# Patient Record
Sex: MALE | Race: WHITE | NOT HISPANIC OR LATINO | Employment: OTHER | URBAN - METROPOLITAN AREA
[De-identification: names, ages, dates, MRNs, and addresses within clinical notes are randomized per-mention and may not be internally consistent; named-entity substitution may affect disease eponyms.]

---

## 2019-01-02 ENCOUNTER — HOSPITAL ENCOUNTER (INPATIENT)
Facility: HOSPITAL | Age: 64
LOS: 37 days | Discharge: HOME HEALTH | End: 2019-02-08
Attending: PHYSICAL MEDICINE & REHABILITATION | Admitting: PHYSICAL MEDICINE & REHABILITATION

## 2019-01-02 DIAGNOSIS — R41.89 IMPAIRED COGNITION: ICD-10-CM

## 2019-01-02 DIAGNOSIS — E46 PROTEIN-CALORIE MALNUTRITION, UNSPECIFIED SEVERITY (HCC): Primary | ICD-10-CM

## 2019-01-02 PROBLEM — Z98.890 S/P COIL EMBOLIZATION OF CEREBRAL ANEURYSM: Status: ACTIVE | Noted: 2019-01-02

## 2019-01-02 PROBLEM — I61.4 CEREBELLAR HEMORRHAGE (HCC): Status: ACTIVE | Noted: 2019-01-02

## 2019-01-02 PROBLEM — I61.5 INTRAVENTRICULAR HEMORRHAGE: Status: ACTIVE | Noted: 2019-01-02

## 2019-01-02 RX ORDER — MULTIPLE VITAMINS W/ MINERALS TAB 9MG-400MCG
1 TAB ORAL DAILY
Status: DISCONTINUED | OUTPATIENT
Start: 2019-01-03 | End: 2019-02-08 | Stop reason: HOSPADM

## 2019-01-02 RX ORDER — ONDANSETRON 4 MG/1
4 TABLET, ORALLY DISINTEGRATING ORAL EVERY 4 HOURS PRN
Status: DISCONTINUED | OUTPATIENT
Start: 2019-01-02 | End: 2019-02-08 | Stop reason: HOSPADM

## 2019-01-02 RX ORDER — BUTALBITAL, ACETAMINOPHEN AND CAFFEINE 50; 325; 40 MG/1; MG/1; MG/1
1 TABLET ORAL EVERY 6 HOURS PRN
Status: DISCONTINUED | OUTPATIENT
Start: 2019-01-02 | End: 2019-01-18

## 2019-01-02 RX ORDER — DEXAMETHASONE 0.5 MG/1
0.5 TABLET ORAL ONCE
Status: COMPLETED | OUTPATIENT
Start: 2019-01-03 | End: 2019-01-03

## 2019-01-02 RX ORDER — LEVETIRACETAM 500 MG/1
500 TABLET ORAL EVERY 12 HOURS SCHEDULED
Status: DISCONTINUED | OUTPATIENT
Start: 2019-01-02 | End: 2019-01-07

## 2019-01-02 RX ORDER — PANTOPRAZOLE SODIUM 40 MG/1
40 TABLET, DELAYED RELEASE ORAL
Status: DISCONTINUED | OUTPATIENT
Start: 2019-01-03 | End: 2019-01-18

## 2019-01-02 RX ORDER — POLYETHYLENE GLYCOL 3350 17 G/17G
17 POWDER, FOR SOLUTION ORAL DAILY
Status: DISCONTINUED | OUTPATIENT
Start: 2019-01-03 | End: 2019-02-08

## 2019-01-02 RX ORDER — FLUDROCORTISONE ACETATE 0.1 MG/1
100 TABLET ORAL DAILY
Status: DISCONTINUED | OUTPATIENT
Start: 2019-01-03 | End: 2019-01-03

## 2019-01-02 RX ADMIN — LEVETIRACETAM 500 MG: 500 TABLET, FILM COATED ORAL at 20:22

## 2019-01-02 RX ADMIN — APIXABAN 5 MG: 5 TABLET, FILM COATED ORAL at 20:22

## 2019-01-03 PROBLEM — R73.09 ELEVATED HEMOGLOBIN A1C: Status: ACTIVE | Noted: 2019-01-03

## 2019-01-03 PROBLEM — E78.5 HYPERLIPIDEMIA: Status: ACTIVE | Noted: 2019-01-03

## 2019-01-03 PROBLEM — E27.40 ADRENAL INSUFFICIENCY (HCC): Status: ACTIVE | Noted: 2019-01-03

## 2019-01-03 PROBLEM — E22.2 SIADH (SYNDROME OF INAPPROPRIATE ADH PRODUCTION): Status: ACTIVE | Noted: 2019-01-03

## 2019-01-03 LAB
ALBUMIN SERPL-MCNC: 3 G/DL (ref 3.5–5.2)
ALP SERPL-CCNC: 55 U/L (ref 39–117)
ALT SERPL W P-5'-P-CCNC: 66 U/L (ref 1–41)
ANION GAP SERPL CALCULATED.3IONS-SCNC: 7.7 MMOL/L
AST SERPL-CCNC: 29 U/L (ref 1–40)
BILIRUB CONJ SERPL-MCNC: <0.2 MG/DL (ref 0–0.3)
BILIRUB INDIRECT SERPL-MCNC: ABNORMAL MG/DL
BILIRUB SERPL-MCNC: 0.4 MG/DL (ref 0.1–1.2)
BUN BLD-MCNC: 13 MG/DL (ref 8–23)
BUN/CREAT SERPL: 24.5 (ref 7–25)
CALCIUM SPEC-SCNC: 8.4 MG/DL (ref 8.6–10.5)
CHLORIDE SERPL-SCNC: 98 MMOL/L (ref 98–107)
CO2 SERPL-SCNC: 24.3 MMOL/L (ref 22–29)
CREAT BLD-MCNC: 0.53 MG/DL (ref 0.76–1.27)
GFR SERPL CREATININE-BSD FRML MDRD: >150 ML/MIN/1.73
GLUCOSE BLD-MCNC: 98 MG/DL (ref 65–99)
POTASSIUM BLD-SCNC: 4 MMOL/L (ref 3.5–5.2)
PROT SERPL-MCNC: 4.9 G/DL (ref 6–8.5)
SODIUM BLD-SCNC: 130 MMOL/L (ref 136–145)
T4 FREE SERPL-MCNC: 1.61 NG/DL (ref 0.93–1.7)
TSH SERPL DL<=0.05 MIU/L-ACNC: 2 MIU/ML (ref 0.27–4.2)

## 2019-01-03 PROCEDURE — 84443 ASSAY THYROID STIM HORMONE: CPT | Performed by: INTERNAL MEDICINE

## 2019-01-03 PROCEDURE — 80048 BASIC METABOLIC PNL TOTAL CA: CPT | Performed by: PHYSICAL MEDICINE & REHABILITATION

## 2019-01-03 PROCEDURE — 83519 RIA NONANTIBODY: CPT | Performed by: INTERNAL MEDICINE

## 2019-01-03 PROCEDURE — 99223 1ST HOSP IP/OBS HIGH 75: CPT | Performed by: INTERNAL MEDICINE

## 2019-01-03 PROCEDURE — 97110 THERAPEUTIC EXERCISES: CPT

## 2019-01-03 PROCEDURE — 82024 ASSAY OF ACTH: CPT | Performed by: INTERNAL MEDICINE

## 2019-01-03 PROCEDURE — 80076 HEPATIC FUNCTION PANEL: CPT | Performed by: PHYSICAL MEDICINE & REHABILITATION

## 2019-01-03 PROCEDURE — 97162 PT EVAL MOD COMPLEX 30 MIN: CPT

## 2019-01-03 PROCEDURE — 84439 ASSAY OF FREE THYROXINE: CPT | Performed by: INTERNAL MEDICINE

## 2019-01-03 PROCEDURE — 96125 COGNITIVE TEST BY HC PRO: CPT

## 2019-01-03 PROCEDURE — 97535 SELF CARE MNGMENT TRAINING: CPT

## 2019-01-03 PROCEDURE — 36415 COLL VENOUS BLD VENIPUNCTURE: CPT | Performed by: INTERNAL MEDICINE

## 2019-01-03 PROCEDURE — 97166 OT EVAL MOD COMPLEX 45 MIN: CPT

## 2019-01-03 RX ORDER — DEXAMETHASONE 0.5 MG/1
0.5 TABLET ORAL
Status: DISCONTINUED | OUTPATIENT
Start: 2019-01-03 | End: 2019-01-04

## 2019-01-03 RX ADMIN — ONDANSETRON 4 MG: 4 TABLET, ORALLY DISINTEGRATING ORAL at 09:14

## 2019-01-03 RX ADMIN — ONDANSETRON 4 MG: 4 TABLET, ORALLY DISINTEGRATING ORAL at 05:05

## 2019-01-03 RX ADMIN — APIXABAN 5 MG: 5 TABLET, FILM COATED ORAL at 07:42

## 2019-01-03 RX ADMIN — AMANTADINE HYDROCHLORIDE 100 MG: 50 SOLUTION ORAL at 12:35

## 2019-01-03 RX ADMIN — ONDANSETRON 4 MG: 4 TABLET, ORALLY DISINTEGRATING ORAL at 13:50

## 2019-01-03 RX ADMIN — MULTIPLE VITAMINS W/ MINERALS TAB 1 TABLET: TAB at 07:42

## 2019-01-03 RX ADMIN — FLUDROCORTISONE ACETATE 100 MCG: 0.1 TABLET ORAL at 07:42

## 2019-01-03 RX ADMIN — APIXABAN 5 MG: 5 TABLET, FILM COATED ORAL at 20:34

## 2019-01-03 RX ADMIN — AMANTADINE HYDROCHLORIDE 100 MG: 50 SOLUTION ORAL at 07:42

## 2019-01-03 RX ADMIN — DEXAMETHASONE 0.5 MG: 0.5 TABLET ORAL at 07:42

## 2019-01-03 RX ADMIN — PANTOPRAZOLE SODIUM 40 MG: 40 TABLET, DELAYED RELEASE ORAL at 05:56

## 2019-01-03 RX ADMIN — LEVETIRACETAM 500 MG: 500 TABLET, FILM COATED ORAL at 07:42

## 2019-01-03 RX ADMIN — LEVETIRACETAM 500 MG: 500 TABLET, FILM COATED ORAL at 20:34

## 2019-01-04 LAB
ANION GAP SERPL CALCULATED.3IONS-SCNC: 10.2 MMOL/L
BUN BLD-MCNC: 15 MG/DL (ref 8–23)
BUN/CREAT SERPL: 24.6 (ref 7–25)
CALCIUM SPEC-SCNC: 8.3 MG/DL (ref 8.6–10.5)
CHLORIDE SERPL-SCNC: 97 MMOL/L (ref 98–107)
CHOLEST SERPL-MCNC: 213 MG/DL (ref 0–200)
CO2 SERPL-SCNC: 23.8 MMOL/L (ref 22–29)
CORTIS SERPL-MCNC: 12.44 MCG/DL
CREAT BLD-MCNC: 0.61 MG/DL (ref 0.76–1.27)
GFR SERPL CREATININE-BSD FRML MDRD: 133 ML/MIN/1.73
GLUCOSE BLD-MCNC: 96 MG/DL (ref 65–99)
HDLC SERPL-MCNC: 54 MG/DL (ref 40–60)
LDLC SERPL CALC-MCNC: 146 MG/DL (ref 0–100)
LDLC/HDLC SERPL: 2.71 {RATIO}
OSMOLALITY SERPL: 272 MOSM/KG (ref 280–301)
OSMOLALITY UR: 847 MOSM/KG
POTASSIUM BLD-SCNC: 3.8 MMOL/L (ref 3.5–5.2)
SODIUM BLD-SCNC: 131 MMOL/L (ref 136–145)
SODIUM UR-SCNC: 39 MMOL/L
TRIGL SERPL-MCNC: 64 MG/DL (ref 0–150)
VLDLC SERPL-MCNC: 12.8 MG/DL (ref 5–40)

## 2019-01-04 PROCEDURE — 83935 ASSAY OF URINE OSMOLALITY: CPT | Performed by: INTERNAL MEDICINE

## 2019-01-04 PROCEDURE — 99232 SBSQ HOSP IP/OBS MODERATE 35: CPT | Performed by: INTERNAL MEDICINE

## 2019-01-04 PROCEDURE — 83930 ASSAY OF BLOOD OSMOLALITY: CPT | Performed by: INTERNAL MEDICINE

## 2019-01-04 PROCEDURE — 80048 BASIC METABOLIC PNL TOTAL CA: CPT | Performed by: PHYSICAL MEDICINE & REHABILITATION

## 2019-01-04 PROCEDURE — G0515 COGNITIVE SKILLS DEVELOPMENT: HCPCS

## 2019-01-04 PROCEDURE — 84300 ASSAY OF URINE SODIUM: CPT | Performed by: INTERNAL MEDICINE

## 2019-01-04 PROCEDURE — 82533 TOTAL CORTISOL: CPT | Performed by: INTERNAL MEDICINE

## 2019-01-04 PROCEDURE — 97535 SELF CARE MNGMENT TRAINING: CPT

## 2019-01-04 PROCEDURE — 97110 THERAPEUTIC EXERCISES: CPT

## 2019-01-04 PROCEDURE — 80061 LIPID PANEL: CPT | Performed by: INTERNAL MEDICINE

## 2019-01-04 RX ORDER — ONDANSETRON 4 MG/1
4 TABLET, ORALLY DISINTEGRATING ORAL DAILY
Status: DISCONTINUED | OUTPATIENT
Start: 2019-01-05 | End: 2019-01-07

## 2019-01-04 RX ADMIN — PANTOPRAZOLE SODIUM 40 MG: 40 TABLET, DELAYED RELEASE ORAL at 05:56

## 2019-01-04 RX ADMIN — ONDANSETRON 4 MG: 4 TABLET, ORALLY DISINTEGRATING ORAL at 19:51

## 2019-01-04 RX ADMIN — LEVETIRACETAM 500 MG: 500 TABLET, FILM COATED ORAL at 19:39

## 2019-01-04 RX ADMIN — APIXABAN 5 MG: 5 TABLET, FILM COATED ORAL at 19:39

## 2019-01-04 RX ADMIN — LEVETIRACETAM 500 MG: 500 TABLET, FILM COATED ORAL at 08:05

## 2019-01-04 RX ADMIN — AMANTADINE HYDROCHLORIDE 100 MG: 50 SOLUTION ORAL at 12:07

## 2019-01-04 RX ADMIN — ONDANSETRON 4 MG: 4 TABLET, ORALLY DISINTEGRATING ORAL at 07:34

## 2019-01-04 RX ADMIN — MULTIPLE VITAMINS W/ MINERALS TAB 1 TABLET: TAB at 08:05

## 2019-01-04 RX ADMIN — DEXAMETHASONE 0.5 MG: 0.5 TABLET ORAL at 08:05

## 2019-01-04 RX ADMIN — POLYETHYLENE GLYCOL 3350 17 G: 17 POWDER, FOR SOLUTION ORAL at 08:05

## 2019-01-04 RX ADMIN — AMANTADINE HYDROCHLORIDE 100 MG: 50 SOLUTION ORAL at 08:05

## 2019-01-04 RX ADMIN — APIXABAN 5 MG: 5 TABLET, FILM COATED ORAL at 08:05

## 2019-01-05 LAB
ANION GAP SERPL CALCULATED.3IONS-SCNC: 11.8 MMOL/L
BASOPHILS # BLD AUTO: 0.02 10*3/MM3 (ref 0–0.2)
BASOPHILS NFR BLD AUTO: 0.3 % (ref 0–1.5)
BUN BLD-MCNC: 13 MG/DL (ref 8–23)
BUN/CREAT SERPL: 21.7 (ref 7–25)
CALCIUM SPEC-SCNC: 8.1 MG/DL (ref 8.6–10.5)
CHLORIDE SERPL-SCNC: 92 MMOL/L (ref 98–107)
CO2 SERPL-SCNC: 24.2 MMOL/L (ref 22–29)
CREAT BLD-MCNC: 0.6 MG/DL (ref 0.76–1.27)
DEPRECATED RDW RBC AUTO: 49.4 FL (ref 37–54)
EOSINOPHIL # BLD AUTO: 0.2 10*3/MM3 (ref 0–0.7)
EOSINOPHIL NFR BLD AUTO: 3.4 % (ref 0.3–6.2)
ERYTHROCYTE [DISTWIDTH] IN BLOOD BY AUTOMATED COUNT: 14.6 % (ref 11.5–14.5)
GFR SERPL CREATININE-BSD FRML MDRD: 135 ML/MIN/1.73
GLUCOSE BLD-MCNC: 126 MG/DL (ref 65–99)
HCT VFR BLD AUTO: 34.5 % (ref 40.4–52.2)
HGB BLD-MCNC: 11.8 G/DL (ref 13.7–17.6)
IMM GRANULOCYTES # BLD AUTO: 0.03 10*3/MM3 (ref 0–0.03)
IMM GRANULOCYTES NFR BLD AUTO: 0.5 % (ref 0–0.5)
LYMPHOCYTES # BLD AUTO: 1.59 10*3/MM3 (ref 0.9–4.8)
LYMPHOCYTES NFR BLD AUTO: 27.3 % (ref 19.6–45.3)
MCH RBC QN AUTO: 31.7 PG (ref 27–32.7)
MCHC RBC AUTO-ENTMCNC: 34.2 G/DL (ref 32.6–36.4)
MCV RBC AUTO: 92.7 FL (ref 79.8–96.2)
MONOCYTES # BLD AUTO: 0.54 10*3/MM3 (ref 0.2–1.2)
MONOCYTES NFR BLD AUTO: 9.3 % (ref 5–12)
NEUTROPHILS # BLD AUTO: 3.47 10*3/MM3 (ref 1.9–8.1)
NEUTROPHILS NFR BLD AUTO: 59.7 % (ref 42.7–76)
OSMOLALITY UR: 759 MOSM/KG
PLATELET # BLD AUTO: 124 10*3/MM3 (ref 140–500)
PMV BLD AUTO: 9.9 FL (ref 6–12)
POTASSIUM BLD-SCNC: 4.4 MMOL/L (ref 3.5–5.2)
RBC # BLD AUTO: 3.72 10*6/MM3 (ref 4.6–6)
SODIUM BLD-SCNC: 128 MMOL/L (ref 136–145)
WBC NRBC COR # BLD: 5.82 10*3/MM3 (ref 4.5–10.7)

## 2019-01-05 PROCEDURE — 83935 ASSAY OF URINE OSMOLALITY: CPT | Performed by: INTERNAL MEDICINE

## 2019-01-05 PROCEDURE — 99233 SBSQ HOSP IP/OBS HIGH 50: CPT | Performed by: INTERNAL MEDICINE

## 2019-01-05 PROCEDURE — 85025 COMPLETE CBC W/AUTO DIFF WBC: CPT | Performed by: PHYSICAL MEDICINE & REHABILITATION

## 2019-01-05 PROCEDURE — 97110 THERAPEUTIC EXERCISES: CPT

## 2019-01-05 PROCEDURE — 80048 BASIC METABOLIC PNL TOTAL CA: CPT | Performed by: INTERNAL MEDICINE

## 2019-01-05 PROCEDURE — 97535 SELF CARE MNGMENT TRAINING: CPT

## 2019-01-05 PROCEDURE — G0515 COGNITIVE SKILLS DEVELOPMENT: HCPCS

## 2019-01-05 RX ADMIN — LEVETIRACETAM 500 MG: 500 TABLET, FILM COATED ORAL at 09:14

## 2019-01-05 RX ADMIN — MICONAZOLE NITRATE 1 APPLICATION: 2 POWDER TOPICAL at 09:14

## 2019-01-05 RX ADMIN — AMANTADINE HYDROCHLORIDE 100 MG: 50 SOLUTION ORAL at 11:29

## 2019-01-05 RX ADMIN — ONDANSETRON 4 MG: 4 TABLET, ORALLY DISINTEGRATING ORAL at 04:54

## 2019-01-05 RX ADMIN — MULTIPLE VITAMINS W/ MINERALS TAB 1 TABLET: TAB at 09:14

## 2019-01-05 RX ADMIN — APIXABAN 5 MG: 5 TABLET, FILM COATED ORAL at 20:28

## 2019-01-05 RX ADMIN — LEVETIRACETAM 500 MG: 500 TABLET, FILM COATED ORAL at 20:28

## 2019-01-05 RX ADMIN — PANTOPRAZOLE SODIUM 40 MG: 40 TABLET, DELAYED RELEASE ORAL at 04:55

## 2019-01-05 RX ADMIN — APIXABAN 5 MG: 5 TABLET, FILM COATED ORAL at 09:14

## 2019-01-05 RX ADMIN — POLYETHYLENE GLYCOL 3350 17 G: 17 POWDER, FOR SOLUTION ORAL at 09:14

## 2019-01-05 RX ADMIN — AMANTADINE HYDROCHLORIDE 100 MG: 50 SOLUTION ORAL at 09:14

## 2019-01-06 LAB
ANION GAP SERPL CALCULATED.3IONS-SCNC: 10.4 MMOL/L
BUN BLD-MCNC: 9 MG/DL (ref 8–23)
BUN/CREAT SERPL: 13.4 (ref 7–25)
CALCIUM SPEC-SCNC: 8.6 MG/DL (ref 8.6–10.5)
CHLORIDE SERPL-SCNC: 95 MMOL/L (ref 98–107)
CO2 SERPL-SCNC: 23.6 MMOL/L (ref 22–29)
CREAT BLD-MCNC: 0.67 MG/DL (ref 0.76–1.27)
GFR SERPL CREATININE-BSD FRML MDRD: 119 ML/MIN/1.73
GLUCOSE BLD-MCNC: 151 MG/DL (ref 65–99)
OSMOLALITY UR: 277 MOSM/KG
POTASSIUM BLD-SCNC: 3.7 MMOL/L (ref 3.5–5.2)
SODIUM BLD-SCNC: 129 MMOL/L (ref 136–145)

## 2019-01-06 PROCEDURE — 80048 BASIC METABOLIC PNL TOTAL CA: CPT | Performed by: INTERNAL MEDICINE

## 2019-01-06 PROCEDURE — 83935 ASSAY OF URINE OSMOLALITY: CPT | Performed by: INTERNAL MEDICINE

## 2019-01-06 PROCEDURE — 99233 SBSQ HOSP IP/OBS HIGH 50: CPT | Performed by: INTERNAL MEDICINE

## 2019-01-06 RX ORDER — BISACODYL 10 MG
10 SUPPOSITORY, RECTAL RECTAL DAILY PRN
Status: DISCONTINUED | OUTPATIENT
Start: 2019-01-06 | End: 2019-02-08

## 2019-01-06 RX ADMIN — ONDANSETRON 4 MG: 4 TABLET, ORALLY DISINTEGRATING ORAL at 06:54

## 2019-01-06 RX ADMIN — APIXABAN 5 MG: 5 TABLET, FILM COATED ORAL at 20:31

## 2019-01-06 RX ADMIN — POLYETHYLENE GLYCOL 3350 17 G: 17 POWDER, FOR SOLUTION ORAL at 09:47

## 2019-01-06 RX ADMIN — MULTIPLE VITAMINS W/ MINERALS TAB 1 TABLET: TAB at 09:44

## 2019-01-06 RX ADMIN — LEVETIRACETAM 500 MG: 500 TABLET, FILM COATED ORAL at 09:44

## 2019-01-06 RX ADMIN — AMANTADINE HYDROCHLORIDE 100 MG: 50 SOLUTION ORAL at 11:44

## 2019-01-06 RX ADMIN — PANTOPRAZOLE SODIUM 40 MG: 40 TABLET, DELAYED RELEASE ORAL at 06:53

## 2019-01-06 RX ADMIN — ONDANSETRON 4 MG: 4 TABLET, ORALLY DISINTEGRATING ORAL at 09:58

## 2019-01-06 RX ADMIN — MICONAZOLE NITRATE: 2 POWDER TOPICAL at 09:42

## 2019-01-06 RX ADMIN — AMANTADINE HYDROCHLORIDE 100 MG: 50 SOLUTION ORAL at 09:44

## 2019-01-06 RX ADMIN — LEVETIRACETAM 500 MG: 500 TABLET, FILM COATED ORAL at 20:31

## 2019-01-06 RX ADMIN — APIXABAN 5 MG: 5 TABLET, FILM COATED ORAL at 09:44

## 2019-01-07 ENCOUNTER — APPOINTMENT (OUTPATIENT)
Dept: CT IMAGING | Facility: HOSPITAL | Age: 64
End: 2019-01-07

## 2019-01-07 LAB
ACTH PLAS-MCNC: 16.3 PG/ML (ref 7.2–63.3)
ALBUMIN SERPL-MCNC: 3.2 G/DL (ref 3.5–5.2)
ALP SERPL-CCNC: 63 U/L (ref 39–117)
ALT SERPL W P-5'-P-CCNC: 58 U/L (ref 1–41)
AST SERPL-CCNC: 30 U/L (ref 1–40)
BASOPHILS # BLD AUTO: 0.02 10*3/MM3 (ref 0–0.2)
BASOPHILS NFR BLD AUTO: 0.3 % (ref 0–1.5)
BILIRUB CONJ SERPL-MCNC: <0.2 MG/DL (ref 0–0.3)
BILIRUB INDIRECT SERPL-MCNC: ABNORMAL MG/DL
BILIRUB SERPL-MCNC: 0.5 MG/DL (ref 0.1–1.2)
CRP SERPL-MCNC: 0.12 MG/DL (ref 0–0.5)
DEPRECATED RDW RBC AUTO: 50.1 FL (ref 37–54)
EOSINOPHIL # BLD AUTO: 0.14 10*3/MM3 (ref 0–0.7)
EOSINOPHIL NFR BLD AUTO: 2.4 % (ref 0.3–6.2)
ERYTHROCYTE [DISTWIDTH] IN BLOOD BY AUTOMATED COUNT: 14.5 % (ref 11.5–14.5)
ERYTHROCYTE [SEDIMENTATION RATE] IN BLOOD: 14 MM/HR (ref 0–20)
HCT VFR BLD AUTO: 38.7 % (ref 40.4–52.2)
HGB BLD-MCNC: 13.3 G/DL (ref 13.7–17.6)
IMM GRANULOCYTES # BLD AUTO: 0.05 10*3/MM3 (ref 0–0.03)
IMM GRANULOCYTES NFR BLD AUTO: 0.8 % (ref 0–0.5)
LYMPHOCYTES # BLD AUTO: 1.12 10*3/MM3 (ref 0.9–4.8)
LYMPHOCYTES NFR BLD AUTO: 18.9 % (ref 19.6–45.3)
MCH RBC QN AUTO: 32.4 PG (ref 27–32.7)
MCHC RBC AUTO-ENTMCNC: 34.4 G/DL (ref 32.6–36.4)
MCV RBC AUTO: 94.2 FL (ref 79.8–96.2)
MONOCYTES # BLD AUTO: 0.57 10*3/MM3 (ref 0.2–1.2)
MONOCYTES NFR BLD AUTO: 9.6 % (ref 5–12)
NEUTROPHILS # BLD AUTO: 4.08 10*3/MM3 (ref 1.9–8.1)
NEUTROPHILS NFR BLD AUTO: 68.8 % (ref 42.7–76)
PLATELET # BLD AUTO: 153 10*3/MM3 (ref 140–500)
PMV BLD AUTO: 9.8 FL (ref 6–12)
PROCALCITONIN SERPL-MCNC: 0.05 NG/ML (ref 0.1–0.25)
PROT SERPL-MCNC: 5.8 G/DL (ref 6–8.5)
RBC # BLD AUTO: 4.11 10*6/MM3 (ref 4.6–6)
WBC NRBC COR # BLD: 5.93 10*3/MM3 (ref 4.5–10.7)

## 2019-01-07 PROCEDURE — 86140 C-REACTIVE PROTEIN: CPT | Performed by: PHYSICAL MEDICINE & REHABILITATION

## 2019-01-07 PROCEDURE — 85652 RBC SED RATE AUTOMATED: CPT | Performed by: PHYSICAL MEDICINE & REHABILITATION

## 2019-01-07 PROCEDURE — 80076 HEPATIC FUNCTION PANEL: CPT | Performed by: PHYSICAL MEDICINE & REHABILITATION

## 2019-01-07 PROCEDURE — 70450 CT HEAD/BRAIN W/O DYE: CPT

## 2019-01-07 PROCEDURE — 97110 THERAPEUTIC EXERCISES: CPT

## 2019-01-07 PROCEDURE — G0515 COGNITIVE SKILLS DEVELOPMENT: HCPCS

## 2019-01-07 PROCEDURE — 84145 PROCALCITONIN (PCT): CPT | Performed by: PHYSICAL MEDICINE & REHABILITATION

## 2019-01-07 PROCEDURE — 97535 SELF CARE MNGMENT TRAINING: CPT

## 2019-01-07 PROCEDURE — 85025 COMPLETE CBC W/AUTO DIFF WBC: CPT | Performed by: PHYSICAL MEDICINE & REHABILITATION

## 2019-01-07 PROCEDURE — 99232 SBSQ HOSP IP/OBS MODERATE 35: CPT | Performed by: INTERNAL MEDICINE

## 2019-01-07 RX ORDER — LEVETIRACETAM 100 MG/ML
500 SOLUTION ORAL EVERY 12 HOURS SCHEDULED
Status: DISPENSED | OUTPATIENT
Start: 2019-01-07 | End: 2019-01-30

## 2019-01-07 RX ORDER — ONDANSETRON 4 MG/1
4 TABLET, ORALLY DISINTEGRATING ORAL
Status: DISPENSED | OUTPATIENT
Start: 2019-01-07 | End: 2019-01-21

## 2019-01-07 RX ADMIN — ONDANSETRON 4 MG: 4 TABLET, ORALLY DISINTEGRATING ORAL at 06:10

## 2019-01-07 RX ADMIN — APIXABAN 5 MG: 5 TABLET, FILM COATED ORAL at 08:07

## 2019-01-07 RX ADMIN — AMANTADINE HYDROCHLORIDE 100 MG: 50 SOLUTION ORAL at 08:08

## 2019-01-07 RX ADMIN — ONDANSETRON 4 MG: 4 TABLET, ORALLY DISINTEGRATING ORAL at 16:11

## 2019-01-07 RX ADMIN — MULTIPLE VITAMINS W/ MINERALS TAB 1 TABLET: TAB at 08:07

## 2019-01-07 RX ADMIN — PANTOPRAZOLE SODIUM 40 MG: 40 TABLET, DELAYED RELEASE ORAL at 06:10

## 2019-01-07 RX ADMIN — APIXABAN 5 MG: 5 TABLET, FILM COATED ORAL at 21:07

## 2019-01-07 RX ADMIN — LEVETIRACETAM 500 MG: 100 SOLUTION ORAL at 21:07

## 2019-01-07 RX ADMIN — ONDANSETRON 4 MG: 4 TABLET, ORALLY DISINTEGRATING ORAL at 21:07

## 2019-01-07 RX ADMIN — ONDANSETRON 4 MG: 4 TABLET, ORALLY DISINTEGRATING ORAL at 10:16

## 2019-01-07 RX ADMIN — LEVETIRACETAM 500 MG: 100 SOLUTION ORAL at 11:59

## 2019-01-07 RX ADMIN — AMANTADINE HYDROCHLORIDE 100 MG: 50 SOLUTION ORAL at 12:00

## 2019-01-07 RX ADMIN — ONDANSETRON 4 MG: 4 TABLET, ORALLY DISINTEGRATING ORAL at 01:41

## 2019-01-07 RX ADMIN — POLYETHYLENE GLYCOL 3350 17 G: 17 POWDER, FOR SOLUTION ORAL at 08:08

## 2019-01-08 ENCOUNTER — APPOINTMENT (OUTPATIENT)
Dept: ULTRASOUND IMAGING | Facility: HOSPITAL | Age: 64
End: 2019-01-08
Attending: INTERNAL MEDICINE

## 2019-01-08 PROCEDURE — 76700 US EXAM ABDOM COMPLETE: CPT

## 2019-01-08 PROCEDURE — G0515 COGNITIVE SKILLS DEVELOPMENT: HCPCS

## 2019-01-08 PROCEDURE — 97110 THERAPEUTIC EXERCISES: CPT

## 2019-01-08 PROCEDURE — 99231 SBSQ HOSP IP/OBS SF/LOW 25: CPT | Performed by: INTERNAL MEDICINE

## 2019-01-08 PROCEDURE — 97535 SELF CARE MNGMENT TRAINING: CPT

## 2019-01-08 RX ORDER — COSYNTROPIN 0.25 MG/ML
0.25 INJECTION, POWDER, FOR SOLUTION INTRAMUSCULAR; INTRAVENOUS ONCE
Status: COMPLETED | OUTPATIENT
Start: 2019-01-09 | End: 2019-01-09

## 2019-01-08 RX ORDER — METHYLPHENIDATE HYDROCHLORIDE 5 MG/1
5 TABLET ORAL 2 TIMES DAILY WITH MEALS
Status: DISCONTINUED | OUTPATIENT
Start: 2019-01-08 | End: 2019-01-24

## 2019-01-08 RX ADMIN — PANTOPRAZOLE SODIUM 40 MG: 40 TABLET, DELAYED RELEASE ORAL at 06:01

## 2019-01-08 RX ADMIN — MULTIPLE VITAMINS W/ MINERALS TAB 1 TABLET: TAB at 07:50

## 2019-01-08 RX ADMIN — AMANTADINE HYDROCHLORIDE 100 MG: 50 SOLUTION ORAL at 07:50

## 2019-01-08 RX ADMIN — LEVETIRACETAM 500 MG: 100 SOLUTION ORAL at 07:50

## 2019-01-08 RX ADMIN — POLYETHYLENE GLYCOL 3350 17 G: 17 POWDER, FOR SOLUTION ORAL at 07:50

## 2019-01-08 RX ADMIN — BISACODYL 10 MG: 10 SUPPOSITORY RECTAL at 18:11

## 2019-01-08 RX ADMIN — LEVETIRACETAM 500 MG: 100 SOLUTION ORAL at 20:07

## 2019-01-08 RX ADMIN — AMANTADINE HYDROCHLORIDE 100 MG: 50 SOLUTION ORAL at 11:31

## 2019-01-08 RX ADMIN — APIXABAN 5 MG: 5 TABLET, FILM COATED ORAL at 20:07

## 2019-01-08 RX ADMIN — ONDANSETRON 4 MG: 4 TABLET, ORALLY DISINTEGRATING ORAL at 16:25

## 2019-01-08 RX ADMIN — ONDANSETRON 4 MG: 4 TABLET, ORALLY DISINTEGRATING ORAL at 06:02

## 2019-01-08 RX ADMIN — ONDANSETRON 4 MG: 4 TABLET, ORALLY DISINTEGRATING ORAL at 11:31

## 2019-01-08 RX ADMIN — METHYLPHENIDATE HYDROCHLORIDE 5 MG: 5 TABLET ORAL at 14:13

## 2019-01-08 RX ADMIN — APIXABAN 5 MG: 5 TABLET, FILM COATED ORAL at 11:31

## 2019-01-09 ENCOUNTER — APPOINTMENT (OUTPATIENT)
Dept: CT IMAGING | Facility: HOSPITAL | Age: 64
End: 2019-01-09

## 2019-01-09 LAB
ANION GAP SERPL CALCULATED.3IONS-SCNC: 9.9 MMOL/L
BUN BLD-MCNC: 8 MG/DL (ref 8–23)
BUN/CREAT SERPL: 11.1 (ref 7–25)
CALCIUM SPEC-SCNC: 8.6 MG/DL (ref 8.6–10.5)
CHLORIDE SERPL-SCNC: 94 MMOL/L (ref 98–107)
CO2 SERPL-SCNC: 25.1 MMOL/L (ref 22–29)
CORTIS SERPL-MCNC: 13.65 MCG/DL
CORTIS SERPL-MCNC: 23.89 MCG/DL
CORTIS SERPL-MCNC: 25.08 MCG/DL
CREAT BLD-MCNC: 0.72 MG/DL (ref 0.76–1.27)
GFR SERPL CREATININE-BSD FRML MDRD: 110 ML/MIN/1.73
GLUCOSE BLD-MCNC: 98 MG/DL (ref 65–99)
OSMOLALITY SERPL: 268 MOSM/KG (ref 280–301)
POTASSIUM BLD-SCNC: 4 MMOL/L (ref 3.5–5.2)
PREALB SERPL-MCNC: 22.8 MG/DL (ref 20–40)
SODIUM BLD-SCNC: 129 MMOL/L (ref 136–145)

## 2019-01-09 PROCEDURE — 36415 COLL VENOUS BLD VENIPUNCTURE: CPT | Performed by: INTERNAL MEDICINE

## 2019-01-09 PROCEDURE — 25010000002 COSYNTROPIN PER 0.25 MG: Performed by: INTERNAL MEDICINE

## 2019-01-09 PROCEDURE — 82533 TOTAL CORTISOL: CPT | Performed by: INTERNAL MEDICINE

## 2019-01-09 PROCEDURE — 97110 THERAPEUTIC EXERCISES: CPT

## 2019-01-09 PROCEDURE — 80048 BASIC METABOLIC PNL TOTAL CA: CPT | Performed by: INTERNAL MEDICINE

## 2019-01-09 PROCEDURE — 97535 SELF CARE MNGMENT TRAINING: CPT | Performed by: OCCUPATIONAL THERAPIST

## 2019-01-09 PROCEDURE — 99232 SBSQ HOSP IP/OBS MODERATE 35: CPT | Performed by: INTERNAL MEDICINE

## 2019-01-09 PROCEDURE — 97112 NEUROMUSCULAR REEDUCATION: CPT | Performed by: OCCUPATIONAL THERAPIST

## 2019-01-09 PROCEDURE — 84134 ASSAY OF PREALBUMIN: CPT | Performed by: INTERNAL MEDICINE

## 2019-01-09 PROCEDURE — 70450 CT HEAD/BRAIN W/O DYE: CPT

## 2019-01-09 PROCEDURE — 83930 ASSAY OF BLOOD OSMOLALITY: CPT | Performed by: INTERNAL MEDICINE

## 2019-01-09 PROCEDURE — 82024 ASSAY OF ACTH: CPT | Performed by: INTERNAL MEDICINE

## 2019-01-09 RX ORDER — SODIUM CHLORIDE 1000 MG
1 TABLET, SOLUBLE MISCELLANEOUS
Status: DISCONTINUED | OUTPATIENT
Start: 2019-01-09 | End: 2019-01-22

## 2019-01-09 RX ADMIN — AMANTADINE HYDROCHLORIDE 100 MG: 50 SOLUTION ORAL at 11:09

## 2019-01-09 RX ADMIN — ONDANSETRON 4 MG: 4 TABLET, ORALLY DISINTEGRATING ORAL at 16:06

## 2019-01-09 RX ADMIN — LEVETIRACETAM 500 MG: 100 SOLUTION ORAL at 20:39

## 2019-01-09 RX ADMIN — LEVETIRACETAM 500 MG: 100 SOLUTION ORAL at 08:21

## 2019-01-09 RX ADMIN — METHYLPHENIDATE HYDROCHLORIDE 5 MG: 5 TABLET ORAL at 11:09

## 2019-01-09 RX ADMIN — ONDANSETRON 4 MG: 4 TABLET, ORALLY DISINTEGRATING ORAL at 11:09

## 2019-01-09 RX ADMIN — PANTOPRAZOLE SODIUM 40 MG: 40 TABLET, DELAYED RELEASE ORAL at 06:22

## 2019-01-09 RX ADMIN — METHYLPHENIDATE HYDROCHLORIDE 5 MG: 5 TABLET ORAL at 08:20

## 2019-01-09 RX ADMIN — AMANTADINE HYDROCHLORIDE 100 MG: 50 SOLUTION ORAL at 08:21

## 2019-01-09 RX ADMIN — ONDANSETRON 4 MG: 4 TABLET, ORALLY DISINTEGRATING ORAL at 06:22

## 2019-01-09 RX ADMIN — COSYNTROPIN 0.25 MG: 0.25 INJECTION, POWDER, LYOPHILIZED, FOR SOLUTION INTRAMUSCULAR; INTRAVENOUS at 08:16

## 2019-01-09 RX ADMIN — Medication 1 G: at 17:10

## 2019-01-09 RX ADMIN — POLYETHYLENE GLYCOL 3350 17 G: 17 POWDER, FOR SOLUTION ORAL at 08:21

## 2019-01-10 ENCOUNTER — APPOINTMENT (OUTPATIENT)
Dept: CT IMAGING | Facility: HOSPITAL | Age: 64
End: 2019-01-10

## 2019-01-10 LAB — ACTH PLAS-MCNC: 18.6 PG/ML (ref 7.2–63.3)

## 2019-01-10 PROCEDURE — 99231 SBSQ HOSP IP/OBS SF/LOW 25: CPT | Performed by: INTERNAL MEDICINE

## 2019-01-10 PROCEDURE — G0515 COGNITIVE SKILLS DEVELOPMENT: HCPCS

## 2019-01-10 PROCEDURE — 97110 THERAPEUTIC EXERCISES: CPT

## 2019-01-10 PROCEDURE — 70450 CT HEAD/BRAIN W/O DYE: CPT

## 2019-01-10 PROCEDURE — 97530 THERAPEUTIC ACTIVITIES: CPT

## 2019-01-10 PROCEDURE — 97535 SELF CARE MNGMENT TRAINING: CPT

## 2019-01-10 RX ADMIN — APIXABAN 5 MG: 5 TABLET, FILM COATED ORAL at 21:14

## 2019-01-10 RX ADMIN — ONDANSETRON 4 MG: 4 TABLET, ORALLY DISINTEGRATING ORAL at 11:38

## 2019-01-10 RX ADMIN — AMANTADINE HYDROCHLORIDE 100 MG: 50 SOLUTION ORAL at 12:40

## 2019-01-10 RX ADMIN — Medication 1 G: at 09:38

## 2019-01-10 RX ADMIN — ONDANSETRON 4 MG: 4 TABLET, ORALLY DISINTEGRATING ORAL at 20:13

## 2019-01-10 RX ADMIN — MULTIPLE VITAMINS W/ MINERALS TAB 1 TABLET: TAB at 09:39

## 2019-01-10 RX ADMIN — Medication 1 G: at 17:57

## 2019-01-10 RX ADMIN — POLYETHYLENE GLYCOL 3350 17 G: 17 POWDER, FOR SOLUTION ORAL at 17:56

## 2019-01-10 RX ADMIN — AMANTADINE HYDROCHLORIDE 100 MG: 50 SOLUTION ORAL at 09:38

## 2019-01-10 RX ADMIN — LEVETIRACETAM 500 MG: 100 SOLUTION ORAL at 09:38

## 2019-01-10 RX ADMIN — METHYLPHENIDATE HYDROCHLORIDE 5 MG: 5 TABLET ORAL at 12:40

## 2019-01-10 RX ADMIN — ONDANSETRON 4 MG: 4 TABLET, ORALLY DISINTEGRATING ORAL at 16:06

## 2019-01-10 RX ADMIN — ONDANSETRON 4 MG: 4 TABLET, ORALLY DISINTEGRATING ORAL at 06:17

## 2019-01-10 RX ADMIN — Medication 1 G: at 12:40

## 2019-01-10 RX ADMIN — PANTOPRAZOLE SODIUM 40 MG: 40 TABLET, DELAYED RELEASE ORAL at 07:05

## 2019-01-10 RX ADMIN — METHYLPHENIDATE HYDROCHLORIDE 5 MG: 5 TABLET ORAL at 09:38

## 2019-01-10 RX ADMIN — LEVETIRACETAM 500 MG: 100 SOLUTION ORAL at 20:13

## 2019-01-11 LAB
21HYDROXYLASE AB SER-ACNC: <1 U/ML
ANION GAP SERPL CALCULATED.3IONS-SCNC: 8.2 MMOL/L
BUN BLD-MCNC: 8 MG/DL (ref 8–23)
BUN/CREAT SERPL: 15.1 (ref 7–25)
CALCIUM SPEC-SCNC: 8.6 MG/DL (ref 8.6–10.5)
CHLORIDE SERPL-SCNC: 96 MMOL/L (ref 98–107)
CO2 SERPL-SCNC: 27.8 MMOL/L (ref 22–29)
CREAT BLD-MCNC: 0.53 MG/DL (ref 0.76–1.27)
GFR SERPL CREATININE-BSD FRML MDRD: >150 ML/MIN/1.73
GLUCOSE BLD-MCNC: 86 MG/DL (ref 65–99)
OSMOLALITY SERPL: 274 MOSM/KG (ref 280–301)
POTASSIUM BLD-SCNC: 3.7 MMOL/L (ref 3.5–5.2)
SODIUM BLD-SCNC: 132 MMOL/L (ref 136–145)

## 2019-01-11 PROCEDURE — 97110 THERAPEUTIC EXERCISES: CPT

## 2019-01-11 PROCEDURE — 97535 SELF CARE MNGMENT TRAINING: CPT

## 2019-01-11 PROCEDURE — 80048 BASIC METABOLIC PNL TOTAL CA: CPT | Performed by: INTERNAL MEDICINE

## 2019-01-11 PROCEDURE — 99231 SBSQ HOSP IP/OBS SF/LOW 25: CPT | Performed by: INTERNAL MEDICINE

## 2019-01-11 PROCEDURE — 36415 COLL VENOUS BLD VENIPUNCTURE: CPT | Performed by: INTERNAL MEDICINE

## 2019-01-11 PROCEDURE — G0515 COGNITIVE SKILLS DEVELOPMENT: HCPCS

## 2019-01-11 PROCEDURE — 83930 ASSAY OF BLOOD OSMOLALITY: CPT | Performed by: INTERNAL MEDICINE

## 2019-01-11 RX ADMIN — Medication 1 G: at 12:22

## 2019-01-11 RX ADMIN — Medication 1 G: at 08:34

## 2019-01-11 RX ADMIN — AMANTADINE HYDROCHLORIDE 100 MG: 50 SOLUTION ORAL at 12:22

## 2019-01-11 RX ADMIN — AMANTADINE HYDROCHLORIDE 100 MG: 50 SOLUTION ORAL at 08:34

## 2019-01-11 RX ADMIN — MULTIPLE VITAMINS W/ MINERALS TAB 1 TABLET: TAB at 08:39

## 2019-01-11 RX ADMIN — ONDANSETRON 4 MG: 4 TABLET, ORALLY DISINTEGRATING ORAL at 11:15

## 2019-01-11 RX ADMIN — LEVETIRACETAM 500 MG: 100 SOLUTION ORAL at 08:34

## 2019-01-11 RX ADMIN — METHYLPHENIDATE HYDROCHLORIDE 5 MG: 5 TABLET ORAL at 08:34

## 2019-01-11 RX ADMIN — APIXABAN 5 MG: 5 TABLET, FILM COATED ORAL at 08:34

## 2019-01-11 RX ADMIN — APIXABAN 5 MG: 5 TABLET, FILM COATED ORAL at 21:13

## 2019-01-11 RX ADMIN — Medication 1 G: at 18:12

## 2019-01-11 RX ADMIN — PANTOPRAZOLE SODIUM 40 MG: 40 TABLET, DELAYED RELEASE ORAL at 06:15

## 2019-01-11 RX ADMIN — ONDANSETRON 4 MG: 4 TABLET, ORALLY DISINTEGRATING ORAL at 06:15

## 2019-01-11 RX ADMIN — LEVETIRACETAM 500 MG: 100 SOLUTION ORAL at 21:13

## 2019-01-11 RX ADMIN — ONDANSETRON 4 MG: 4 TABLET, ORALLY DISINTEGRATING ORAL at 16:20

## 2019-01-11 RX ADMIN — METHYLPHENIDATE HYDROCHLORIDE 5 MG: 5 TABLET ORAL at 12:22

## 2019-01-12 PROCEDURE — 97110 THERAPEUTIC EXERCISES: CPT | Performed by: PHYSICAL THERAPIST

## 2019-01-12 RX ADMIN — ONDANSETRON 4 MG: 4 TABLET, ORALLY DISINTEGRATING ORAL at 05:42

## 2019-01-12 RX ADMIN — ONDANSETRON 4 MG: 4 TABLET, ORALLY DISINTEGRATING ORAL at 21:38

## 2019-01-12 RX ADMIN — APIXABAN 5 MG: 5 TABLET, FILM COATED ORAL at 08:00

## 2019-01-12 RX ADMIN — AMANTADINE HYDROCHLORIDE 100 MG: 50 SOLUTION ORAL at 11:04

## 2019-01-12 RX ADMIN — PANTOPRAZOLE SODIUM 40 MG: 40 TABLET, DELAYED RELEASE ORAL at 05:42

## 2019-01-12 RX ADMIN — AMANTADINE HYDROCHLORIDE 100 MG: 50 SOLUTION ORAL at 08:00

## 2019-01-12 RX ADMIN — ONDANSETRON 4 MG: 4 TABLET, ORALLY DISINTEGRATING ORAL at 11:04

## 2019-01-12 RX ADMIN — Medication 1 G: at 17:13

## 2019-01-12 RX ADMIN — ONDANSETRON 4 MG: 4 TABLET, ORALLY DISINTEGRATING ORAL at 16:15

## 2019-01-12 RX ADMIN — APIXABAN 5 MG: 5 TABLET, FILM COATED ORAL at 21:34

## 2019-01-12 RX ADMIN — METHYLPHENIDATE HYDROCHLORIDE 5 MG: 5 TABLET ORAL at 11:04

## 2019-01-12 RX ADMIN — METHYLPHENIDATE HYDROCHLORIDE 5 MG: 5 TABLET ORAL at 08:00

## 2019-01-12 RX ADMIN — LEVETIRACETAM 500 MG: 100 SOLUTION ORAL at 21:34

## 2019-01-12 RX ADMIN — POLYETHYLENE GLYCOL 3350 17 G: 17 POWDER, FOR SOLUTION ORAL at 08:00

## 2019-01-12 RX ADMIN — Medication 1 G: at 11:04

## 2019-01-12 RX ADMIN — LEVETIRACETAM 500 MG: 100 SOLUTION ORAL at 08:00

## 2019-01-12 RX ADMIN — Medication 1 G: at 08:00

## 2019-01-13 RX ADMIN — LEVETIRACETAM 500 MG: 100 SOLUTION ORAL at 08:13

## 2019-01-13 RX ADMIN — Medication 1 G: at 11:04

## 2019-01-13 RX ADMIN — ONDANSETRON 4 MG: 4 TABLET, ORALLY DISINTEGRATING ORAL at 15:55

## 2019-01-13 RX ADMIN — METHYLPHENIDATE HYDROCHLORIDE 5 MG: 5 TABLET ORAL at 11:04

## 2019-01-13 RX ADMIN — METHYLPHENIDATE HYDROCHLORIDE 5 MG: 5 TABLET ORAL at 08:13

## 2019-01-13 RX ADMIN — APIXABAN 5 MG: 5 TABLET, FILM COATED ORAL at 20:29

## 2019-01-13 RX ADMIN — AMANTADINE HYDROCHLORIDE 100 MG: 50 SOLUTION ORAL at 11:04

## 2019-01-13 RX ADMIN — ONDANSETRON 4 MG: 4 TABLET, ORALLY DISINTEGRATING ORAL at 06:23

## 2019-01-13 RX ADMIN — ONDANSETRON 4 MG: 4 TABLET, ORALLY DISINTEGRATING ORAL at 11:04

## 2019-01-13 RX ADMIN — LEVETIRACETAM 500 MG: 100 SOLUTION ORAL at 20:29

## 2019-01-13 RX ADMIN — Medication 1 G: at 08:13

## 2019-01-13 RX ADMIN — PANTOPRAZOLE SODIUM 40 MG: 40 TABLET, DELAYED RELEASE ORAL at 06:23

## 2019-01-13 RX ADMIN — Medication 1 G: at 17:35

## 2019-01-13 RX ADMIN — APIXABAN 5 MG: 5 TABLET, FILM COATED ORAL at 08:13

## 2019-01-13 RX ADMIN — AMANTADINE HYDROCHLORIDE 100 MG: 50 SOLUTION ORAL at 08:13

## 2019-01-13 RX ADMIN — POLYETHYLENE GLYCOL 3350 17 G: 17 POWDER, FOR SOLUTION ORAL at 08:13

## 2019-01-14 ENCOUNTER — APPOINTMENT (OUTPATIENT)
Dept: GENERAL RADIOLOGY | Facility: HOSPITAL | Age: 64
End: 2019-01-14
Attending: RADIOLOGY

## 2019-01-14 ENCOUNTER — APPOINTMENT (OUTPATIENT)
Dept: GENERAL RADIOLOGY | Facility: HOSPITAL | Age: 64
End: 2019-01-14
Attending: PHYSICAL MEDICINE & REHABILITATION

## 2019-01-14 LAB
ANION GAP SERPL CALCULATED.3IONS-SCNC: 14.9 MMOL/L
BASOPHILS # BLD AUTO: 0.05 10*3/MM3 (ref 0–0.2)
BASOPHILS NFR BLD AUTO: 1.1 % (ref 0–1.5)
BUN BLD-MCNC: 9 MG/DL (ref 8–23)
BUN/CREAT SERPL: 12.7 (ref 7–25)
CALCIUM SPEC-SCNC: 9.2 MG/DL (ref 8.6–10.5)
CHLORIDE SERPL-SCNC: 95 MMOL/L (ref 98–107)
CO2 SERPL-SCNC: 21.1 MMOL/L (ref 22–29)
CREAT BLD-MCNC: 0.71 MG/DL (ref 0.76–1.27)
DEPRECATED RDW RBC AUTO: 52.8 FL (ref 37–54)
EOSINOPHIL # BLD AUTO: 0.09 10*3/MM3 (ref 0–0.7)
EOSINOPHIL NFR BLD AUTO: 2 % (ref 0.3–6.2)
ERYTHROCYTE [DISTWIDTH] IN BLOOD BY AUTOMATED COUNT: 14.5 % (ref 11.5–14.5)
GFR SERPL CREATININE-BSD FRML MDRD: 111 ML/MIN/1.73
GLUCOSE BLD-MCNC: 114 MG/DL (ref 65–99)
HCT VFR BLD AUTO: 35.3 % (ref 40.4–52.2)
HGB BLD-MCNC: 11.7 G/DL (ref 13.7–17.6)
IMM GRANULOCYTES # BLD AUTO: 0.06 10*3/MM3 (ref 0–0.03)
IMM GRANULOCYTES NFR BLD AUTO: 1.4 % (ref 0–0.5)
LYMPHOCYTES # BLD AUTO: 1.31 10*3/MM3 (ref 0.9–4.8)
LYMPHOCYTES NFR BLD AUTO: 29.5 % (ref 19.6–45.3)
MCH RBC QN AUTO: 32.8 PG (ref 27–32.7)
MCHC RBC AUTO-ENTMCNC: 33.1 G/DL (ref 32.6–36.4)
MCV RBC AUTO: 98.9 FL (ref 79.8–96.2)
MONOCYTES # BLD AUTO: 0.6 10*3/MM3 (ref 0.2–1.2)
MONOCYTES NFR BLD AUTO: 13.5 % (ref 5–12)
NEUTROPHILS # BLD AUTO: 2.33 10*3/MM3 (ref 1.9–8.1)
NEUTROPHILS NFR BLD AUTO: 52.5 % (ref 42.7–76)
PLATELET # BLD AUTO: 283 10*3/MM3 (ref 140–500)
PMV BLD AUTO: 8.8 FL (ref 6–12)
POTASSIUM BLD-SCNC: 4.4 MMOL/L (ref 3.5–5.2)
RBC # BLD AUTO: 3.57 10*6/MM3 (ref 4.6–6)
SODIUM BLD-SCNC: 131 MMOL/L (ref 136–145)
WBC NRBC COR # BLD: 4.44 10*3/MM3 (ref 4.5–10.7)

## 2019-01-14 PROCEDURE — 97535 SELF CARE MNGMENT TRAINING: CPT

## 2019-01-14 PROCEDURE — 80048 BASIC METABOLIC PNL TOTAL CA: CPT | Performed by: PHYSICAL MEDICINE & REHABILITATION

## 2019-01-14 PROCEDURE — 97112 NEUROMUSCULAR REEDUCATION: CPT | Performed by: OCCUPATIONAL THERAPIST

## 2019-01-14 PROCEDURE — 85025 COMPLETE CBC W/AUTO DIFF WBC: CPT | Performed by: PHYSICAL MEDICINE & REHABILITATION

## 2019-01-14 PROCEDURE — G0515 COGNITIVE SKILLS DEVELOPMENT: HCPCS

## 2019-01-14 PROCEDURE — 74018 RADEX ABDOMEN 1 VIEW: CPT

## 2019-01-14 PROCEDURE — 36415 COLL VENOUS BLD VENIPUNCTURE: CPT | Performed by: PHYSICAL MEDICINE & REHABILITATION

## 2019-01-14 PROCEDURE — 97110 THERAPEUTIC EXERCISES: CPT

## 2019-01-14 RX ORDER — SCOLOPAMINE TRANSDERMAL SYSTEM 1 MG/1
1 PATCH, EXTENDED RELEASE TRANSDERMAL
Status: DISCONTINUED | OUTPATIENT
Start: 2019-01-14 | End: 2019-01-16

## 2019-01-14 RX ADMIN — LEVETIRACETAM 500 MG: 100 SOLUTION ORAL at 09:18

## 2019-01-14 RX ADMIN — MICONAZOLE NITRATE: 2 POWDER TOPICAL at 09:19

## 2019-01-14 RX ADMIN — AMANTADINE HYDROCHLORIDE 100 MG: 50 SOLUTION ORAL at 09:18

## 2019-01-14 RX ADMIN — Medication 1 G: at 09:17

## 2019-01-14 RX ADMIN — METHYLPHENIDATE HYDROCHLORIDE 5 MG: 5 TABLET ORAL at 11:34

## 2019-01-14 RX ADMIN — Medication 1 G: at 11:34

## 2019-01-14 RX ADMIN — Medication 1 G: at 17:30

## 2019-01-14 RX ADMIN — MULTIPLE VITAMINS W/ MINERALS TAB 1 TABLET: TAB at 09:18

## 2019-01-14 RX ADMIN — SCOPALAMINE 1 PATCH: 1 PATCH, EXTENDED RELEASE TRANSDERMAL at 14:32

## 2019-01-14 RX ADMIN — AMANTADINE HYDROCHLORIDE 100 MG: 50 SOLUTION ORAL at 11:35

## 2019-01-14 RX ADMIN — APIXABAN 5 MG: 5 TABLET, FILM COATED ORAL at 20:20

## 2019-01-14 RX ADMIN — ONDANSETRON 4 MG: 4 TABLET, ORALLY DISINTEGRATING ORAL at 17:30

## 2019-01-14 RX ADMIN — APIXABAN 5 MG: 5 TABLET, FILM COATED ORAL at 09:17

## 2019-01-14 RX ADMIN — POLYETHYLENE GLYCOL 3350 17 G: 17 POWDER, FOR SOLUTION ORAL at 09:19

## 2019-01-14 RX ADMIN — LEVETIRACETAM 500 MG: 100 SOLUTION ORAL at 20:20

## 2019-01-14 RX ADMIN — ONDANSETRON 4 MG: 4 TABLET, ORALLY DISINTEGRATING ORAL at 06:17

## 2019-01-14 RX ADMIN — ONDANSETRON 4 MG: 4 TABLET, ORALLY DISINTEGRATING ORAL at 11:28

## 2019-01-14 RX ADMIN — METHYLPHENIDATE HYDROCHLORIDE 5 MG: 5 TABLET ORAL at 09:17

## 2019-01-14 RX ADMIN — PANTOPRAZOLE SODIUM 40 MG: 40 TABLET, DELAYED RELEASE ORAL at 09:17

## 2019-01-15 LAB
ANION GAP SERPL CALCULATED.3IONS-SCNC: 8.6 MMOL/L
BUN BLD-MCNC: 9 MG/DL (ref 8–23)
BUN/CREAT SERPL: 13 (ref 7–25)
CALCIUM SPEC-SCNC: 8.4 MG/DL (ref 8.6–10.5)
CHLORIDE SERPL-SCNC: 97 MMOL/L (ref 98–107)
CO2 SERPL-SCNC: 25.4 MMOL/L (ref 22–29)
CREAT BLD-MCNC: 0.69 MG/DL (ref 0.76–1.27)
GFR SERPL CREATININE-BSD FRML MDRD: 115 ML/MIN/1.73
GLUCOSE BLD-MCNC: 134 MG/DL (ref 65–99)
POTASSIUM BLD-SCNC: 4.2 MMOL/L (ref 3.5–5.2)
SODIUM BLD-SCNC: 131 MMOL/L (ref 136–145)

## 2019-01-15 PROCEDURE — 97535 SELF CARE MNGMENT TRAINING: CPT

## 2019-01-15 PROCEDURE — 80048 BASIC METABOLIC PNL TOTAL CA: CPT | Performed by: INTERNAL MEDICINE

## 2019-01-15 PROCEDURE — 97110 THERAPEUTIC EXERCISES: CPT

## 2019-01-15 PROCEDURE — 36415 COLL VENOUS BLD VENIPUNCTURE: CPT | Performed by: INTERNAL MEDICINE

## 2019-01-15 PROCEDURE — G0515 COGNITIVE SKILLS DEVELOPMENT: HCPCS

## 2019-01-15 RX ADMIN — APIXABAN 5 MG: 5 TABLET, FILM COATED ORAL at 08:21

## 2019-01-15 RX ADMIN — Medication 1 G: at 17:18

## 2019-01-15 RX ADMIN — MICONAZOLE NITRATE: 2 POWDER TOPICAL at 08:21

## 2019-01-15 RX ADMIN — AMANTADINE HYDROCHLORIDE 100 MG: 50 SOLUTION ORAL at 08:21

## 2019-01-15 RX ADMIN — ONDANSETRON 4 MG: 4 TABLET, ORALLY DISINTEGRATING ORAL at 17:18

## 2019-01-15 RX ADMIN — AMANTADINE HYDROCHLORIDE 100 MG: 50 SOLUTION ORAL at 11:36

## 2019-01-15 RX ADMIN — LEVETIRACETAM 500 MG: 100 SOLUTION ORAL at 08:21

## 2019-01-15 RX ADMIN — METHYLPHENIDATE HYDROCHLORIDE 5 MG: 5 TABLET ORAL at 08:22

## 2019-01-15 RX ADMIN — LEVETIRACETAM 500 MG: 100 SOLUTION ORAL at 21:51

## 2019-01-15 RX ADMIN — POLYETHYLENE GLYCOL 3350 17 G: 17 POWDER, FOR SOLUTION ORAL at 08:20

## 2019-01-15 RX ADMIN — MULTIPLE VITAMINS W/ MINERALS TAB 1 TABLET: TAB at 08:21

## 2019-01-15 RX ADMIN — PANTOPRAZOLE SODIUM 40 MG: 40 TABLET, DELAYED RELEASE ORAL at 06:48

## 2019-01-15 RX ADMIN — Medication 1 G: at 11:36

## 2019-01-15 RX ADMIN — ONDANSETRON 4 MG: 4 TABLET, ORALLY DISINTEGRATING ORAL at 11:36

## 2019-01-15 RX ADMIN — METHYLPHENIDATE HYDROCHLORIDE 5 MG: 5 TABLET ORAL at 11:36

## 2019-01-15 RX ADMIN — ONDANSETRON 4 MG: 4 TABLET, ORALLY DISINTEGRATING ORAL at 06:26

## 2019-01-15 RX ADMIN — Medication 1 G: at 08:21

## 2019-01-16 ENCOUNTER — APPOINTMENT (OUTPATIENT)
Dept: CARDIOLOGY | Facility: HOSPITAL | Age: 64
End: 2019-01-16
Attending: PHYSICAL MEDICINE & REHABILITATION

## 2019-01-16 ENCOUNTER — APPOINTMENT (OUTPATIENT)
Dept: MRI IMAGING | Facility: HOSPITAL | Age: 64
End: 2019-01-16
Attending: PHYSICAL MEDICINE & REHABILITATION

## 2019-01-16 LAB
ANION GAP SERPL CALCULATED.3IONS-SCNC: 9.9 MMOL/L
BACTERIA UR QL AUTO: ABNORMAL /HPF
BASOPHILS # BLD AUTO: 0.08 10*3/MM3 (ref 0–0.2)
BASOPHILS NFR BLD AUTO: 1.3 % (ref 0–1.5)
BH CV UPPER VENOUS LEFT AXILLARY AUGMENT: NORMAL
BH CV UPPER VENOUS LEFT AXILLARY COMPETENT: NORMAL
BH CV UPPER VENOUS LEFT AXILLARY COMPRESS: NORMAL
BH CV UPPER VENOUS LEFT AXILLARY PHASIC: NORMAL
BH CV UPPER VENOUS LEFT AXILLARY SPONT: NORMAL
BH CV UPPER VENOUS LEFT BASILIC FOREARM COMPRESS: NORMAL
BH CV UPPER VENOUS LEFT BASILIC UPPER COLOR: 1
BH CV UPPER VENOUS LEFT BASILIC UPPER COMPRESS: NORMAL
BH CV UPPER VENOUS LEFT BASILIC UPPER THROMBUS: NORMAL
BH CV UPPER VENOUS LEFT BRACHIAL COMPRESS: NORMAL
BH CV UPPER VENOUS LEFT CEPHALIC FOREARM COLOR: 1
BH CV UPPER VENOUS LEFT CEPHALIC FOREARM COMPRESS: NORMAL
BH CV UPPER VENOUS LEFT CEPHALIC FOREARM THROMBUS: NORMAL
BH CV UPPER VENOUS LEFT CEPHALIC UPPER COMPRESS: NORMAL
BH CV UPPER VENOUS LEFT INTERNAL JUGULAR AUGMENT: NORMAL
BH CV UPPER VENOUS LEFT INTERNAL JUGULAR COMPETENT: NORMAL
BH CV UPPER VENOUS LEFT INTERNAL JUGULAR COMPRESS: NORMAL
BH CV UPPER VENOUS LEFT INTERNAL JUGULAR PHASIC: NORMAL
BH CV UPPER VENOUS LEFT INTERNAL JUGULAR SPONT: NORMAL
BH CV UPPER VENOUS LEFT RADIAL COMPRESS: NORMAL
BH CV UPPER VENOUS LEFT SUBCLAVIAN AUGMENT: NORMAL
BH CV UPPER VENOUS LEFT SUBCLAVIAN COMPETENT: NORMAL
BH CV UPPER VENOUS LEFT SUBCLAVIAN PHASIC: NORMAL
BH CV UPPER VENOUS LEFT SUBCLAVIAN SPONT: NORMAL
BH CV UPPER VENOUS LEFT ULNAR COMPRESS: NORMAL
BH CV UPPER VENOUS RIGHT AXILLARY AUGMENT: NORMAL
BH CV UPPER VENOUS RIGHT AXILLARY COMPETENT: NORMAL
BH CV UPPER VENOUS RIGHT AXILLARY COMPRESS: NORMAL
BH CV UPPER VENOUS RIGHT AXILLARY PHASIC: NORMAL
BH CV UPPER VENOUS RIGHT AXILLARY SPONT: NORMAL
BH CV UPPER VENOUS RIGHT BASILIC FOREARM COLOR: 1
BH CV UPPER VENOUS RIGHT BASILIC FOREARM COMPRESS: NORMAL
BH CV UPPER VENOUS RIGHT BASILIC FOREARM THROMBUS: NORMAL
BH CV UPPER VENOUS RIGHT BASILIC UPPER COLOR: 1
BH CV UPPER VENOUS RIGHT BASILIC UPPER COMPRESS: NORMAL
BH CV UPPER VENOUS RIGHT BASILIC UPPER THROMBUS: NORMAL
BH CV UPPER VENOUS RIGHT BRACHIAL COMPRESS: NORMAL
BH CV UPPER VENOUS RIGHT CEPHALIC FOREARM COLOR: 1
BH CV UPPER VENOUS RIGHT CEPHALIC FOREARM COMPRESS: NORMAL
BH CV UPPER VENOUS RIGHT CEPHALIC FOREARM THROMBUS: NORMAL
BH CV UPPER VENOUS RIGHT CEPHALIC UPPER COLOR: 1
BH CV UPPER VENOUS RIGHT CEPHALIC UPPER COMPRESS: NORMAL
BH CV UPPER VENOUS RIGHT CEPHALIC UPPER THROMBUS: NORMAL
BH CV UPPER VENOUS RIGHT INTERNAL JUGULAR AUGMENT: NORMAL
BH CV UPPER VENOUS RIGHT INTERNAL JUGULAR COMPETENT: NORMAL
BH CV UPPER VENOUS RIGHT INTERNAL JUGULAR COMPRESS: NORMAL
BH CV UPPER VENOUS RIGHT INTERNAL JUGULAR PHASIC: NORMAL
BH CV UPPER VENOUS RIGHT INTERNAL JUGULAR SPONT: NORMAL
BH CV UPPER VENOUS RIGHT RADIAL COMPRESS: NORMAL
BH CV UPPER VENOUS RIGHT SUBCLAVIAN AUGMENT: NORMAL
BH CV UPPER VENOUS RIGHT SUBCLAVIAN COMPETENT: NORMAL
BH CV UPPER VENOUS RIGHT SUBCLAVIAN PHASIC: NORMAL
BH CV UPPER VENOUS RIGHT SUBCLAVIAN SPONT: NORMAL
BH CV UPPER VENOUS RIGHT ULNAR COMPRESS: NORMAL
BILIRUB UR QL STRIP: NEGATIVE
BUN BLD-MCNC: 11 MG/DL (ref 8–23)
BUN/CREAT SERPL: 19.6 (ref 7–25)
CALCIUM SPEC-SCNC: 8.8 MG/DL (ref 8.6–10.5)
CHLORIDE SERPL-SCNC: 97 MMOL/L (ref 98–107)
CLARITY UR: ABNORMAL
CO2 SERPL-SCNC: 22.1 MMOL/L (ref 22–29)
COLOR UR: YELLOW
CREAT BLD-MCNC: 0.56 MG/DL (ref 0.76–1.27)
DEPRECATED RDW RBC AUTO: 51.6 FL (ref 37–54)
EOSINOPHIL # BLD AUTO: 0.08 10*3/MM3 (ref 0–0.7)
EOSINOPHIL NFR BLD AUTO: 1.3 % (ref 0.3–6.2)
ERYTHROCYTE [DISTWIDTH] IN BLOOD BY AUTOMATED COUNT: 14.4 % (ref 11.5–14.5)
GFR SERPL CREATININE-BSD FRML MDRD: 146 ML/MIN/1.73
GLUCOSE BLD-MCNC: 110 MG/DL (ref 65–99)
GLUCOSE UR STRIP-MCNC: NEGATIVE MG/DL
HCT VFR BLD AUTO: 40.2 % (ref 40.4–52.2)
HGB BLD-MCNC: 13.4 G/DL (ref 13.7–17.6)
HGB UR QL STRIP.AUTO: NEGATIVE
HYALINE CASTS UR QL AUTO: ABNORMAL /LPF
IMM GRANULOCYTES # BLD AUTO: 0.18 10*3/MM3 (ref 0–0.03)
IMM GRANULOCYTES NFR BLD AUTO: 2.8 % (ref 0–0.5)
KETONES UR QL STRIP: NEGATIVE
LEUKOCYTE ESTERASE UR QL STRIP.AUTO: ABNORMAL
LYMPHOCYTES # BLD AUTO: 1.17 10*3/MM3 (ref 0.9–4.8)
LYMPHOCYTES NFR BLD AUTO: 18.5 % (ref 19.6–45.3)
MCH RBC QN AUTO: 32.7 PG (ref 27–32.7)
MCHC RBC AUTO-ENTMCNC: 33.3 G/DL (ref 32.6–36.4)
MCV RBC AUTO: 98 FL (ref 79.8–96.2)
MONOCYTES # BLD AUTO: 0.87 10*3/MM3 (ref 0.2–1.2)
MONOCYTES NFR BLD AUTO: 13.7 % (ref 5–12)
NEUTROPHILS # BLD AUTO: 4.13 10*3/MM3 (ref 1.9–8.1)
NEUTROPHILS NFR BLD AUTO: 65.2 % (ref 42.7–76)
NITRITE UR QL STRIP: NEGATIVE
NRBC BLD AUTO-RTO: 0 /100 WBC (ref 0–0)
PH UR STRIP.AUTO: 8 [PH] (ref 5–8)
PLATELET # BLD AUTO: 336 10*3/MM3 (ref 140–500)
PMV BLD AUTO: 9.1 FL (ref 6–12)
POTASSIUM BLD-SCNC: 4.5 MMOL/L (ref 3.5–5.2)
PROT UR QL STRIP: NEGATIVE
RBC # BLD AUTO: 4.1 10*6/MM3 (ref 4.6–6)
RBC # UR: ABNORMAL /HPF
REF LAB TEST METHOD: ABNORMAL
SODIUM BLD-SCNC: 129 MMOL/L (ref 136–145)
SP GR UR STRIP: 1.01 (ref 1–1.03)
SQUAMOUS #/AREA URNS HPF: ABNORMAL /HPF
UROBILINOGEN UR QL STRIP: ABNORMAL
WBC NRBC COR # BLD: 6.33 10*3/MM3 (ref 4.5–10.7)
WBC UR QL AUTO: ABNORMAL /HPF

## 2019-01-16 PROCEDURE — 93970 EXTREMITY STUDY: CPT

## 2019-01-16 PROCEDURE — 97110 THERAPEUTIC EXERCISES: CPT

## 2019-01-16 PROCEDURE — 80048 BASIC METABOLIC PNL TOTAL CA: CPT | Performed by: PHYSICAL MEDICINE & REHABILITATION

## 2019-01-16 PROCEDURE — 70551 MRI BRAIN STEM W/O DYE: CPT

## 2019-01-16 PROCEDURE — G0515 COGNITIVE SKILLS DEVELOPMENT: HCPCS

## 2019-01-16 PROCEDURE — 85007 BL SMEAR W/DIFF WBC COUNT: CPT | Performed by: PHYSICAL MEDICINE & REHABILITATION

## 2019-01-16 PROCEDURE — 87086 URINE CULTURE/COLONY COUNT: CPT | Performed by: PHYSICAL MEDICINE & REHABILITATION

## 2019-01-16 PROCEDURE — 97535 SELF CARE MNGMENT TRAINING: CPT

## 2019-01-16 PROCEDURE — 85025 COMPLETE CBC W/AUTO DIFF WBC: CPT | Performed by: PHYSICAL MEDICINE & REHABILITATION

## 2019-01-16 PROCEDURE — 81001 URINALYSIS AUTO W/SCOPE: CPT | Performed by: PHYSICAL MEDICINE & REHABILITATION

## 2019-01-16 RX ADMIN — APIXABAN 5 MG: 5 TABLET, FILM COATED ORAL at 22:26

## 2019-01-16 RX ADMIN — MULTIPLE VITAMINS W/ MINERALS TAB 1 TABLET: TAB at 09:14

## 2019-01-16 RX ADMIN — ONDANSETRON 4 MG: 4 TABLET, ORALLY DISINTEGRATING ORAL at 14:32

## 2019-01-16 RX ADMIN — MICONAZOLE NITRATE: 2 POWDER TOPICAL at 09:16

## 2019-01-16 RX ADMIN — PANTOPRAZOLE SODIUM 40 MG: 40 TABLET, DELAYED RELEASE ORAL at 05:53

## 2019-01-16 RX ADMIN — Medication 1 G: at 14:32

## 2019-01-16 RX ADMIN — Medication 1 G: at 09:14

## 2019-01-16 RX ADMIN — AMANTADINE HYDROCHLORIDE 100 MG: 50 SOLUTION ORAL at 14:32

## 2019-01-16 RX ADMIN — METHYLPHENIDATE HYDROCHLORIDE 5 MG: 5 TABLET ORAL at 09:18

## 2019-01-16 RX ADMIN — ONDANSETRON 4 MG: 4 TABLET, ORALLY DISINTEGRATING ORAL at 05:53

## 2019-01-16 RX ADMIN — AMANTADINE HYDROCHLORIDE 100 MG: 50 SOLUTION ORAL at 09:14

## 2019-01-16 RX ADMIN — LEVETIRACETAM 500 MG: 100 SOLUTION ORAL at 09:14

## 2019-01-16 RX ADMIN — POLYETHYLENE GLYCOL 3350 17 G: 17 POWDER, FOR SOLUTION ORAL at 09:18

## 2019-01-16 RX ADMIN — Medication 1 G: at 17:07

## 2019-01-16 RX ADMIN — LEVETIRACETAM 500 MG: 100 SOLUTION ORAL at 22:25

## 2019-01-16 RX ADMIN — METHYLPHENIDATE HYDROCHLORIDE 5 MG: 5 TABLET ORAL at 14:32

## 2019-01-16 RX ADMIN — ONDANSETRON 4 MG: 4 TABLET, ORALLY DISINTEGRATING ORAL at 17:07

## 2019-01-17 PROCEDURE — 97535 SELF CARE MNGMENT TRAINING: CPT

## 2019-01-17 PROCEDURE — 97110 THERAPEUTIC EXERCISES: CPT

## 2019-01-17 PROCEDURE — G0515 COGNITIVE SKILLS DEVELOPMENT: HCPCS

## 2019-01-17 RX ADMIN — APIXABAN 5 MG: 5 TABLET, FILM COATED ORAL at 20:23

## 2019-01-17 RX ADMIN — LEVETIRACETAM 500 MG: 100 SOLUTION ORAL at 11:10

## 2019-01-17 RX ADMIN — AMANTADINE HYDROCHLORIDE 100 MG: 50 SOLUTION ORAL at 11:10

## 2019-01-17 RX ADMIN — MULTIPLE VITAMINS W/ MINERALS TAB 1 TABLET: TAB at 11:08

## 2019-01-17 RX ADMIN — AMANTADINE HYDROCHLORIDE 100 MG: 50 SOLUTION ORAL at 15:12

## 2019-01-17 RX ADMIN — METHYLPHENIDATE HYDROCHLORIDE 5 MG: 5 TABLET ORAL at 15:12

## 2019-01-17 RX ADMIN — Medication 1 G: at 15:11

## 2019-01-17 RX ADMIN — ONDANSETRON 4 MG: 4 TABLET, ORALLY DISINTEGRATING ORAL at 12:29

## 2019-01-17 RX ADMIN — ONDANSETRON 4 MG: 4 TABLET, ORALLY DISINTEGRATING ORAL at 15:51

## 2019-01-17 RX ADMIN — METHYLPHENIDATE HYDROCHLORIDE 5 MG: 5 TABLET ORAL at 11:11

## 2019-01-17 RX ADMIN — LEVETIRACETAM 500 MG: 100 SOLUTION ORAL at 20:23

## 2019-01-17 RX ADMIN — APIXABAN 5 MG: 5 TABLET, FILM COATED ORAL at 11:08

## 2019-01-17 RX ADMIN — Medication 1 G: at 20:23

## 2019-01-17 RX ADMIN — Medication 1 G: at 11:12

## 2019-01-17 RX ADMIN — ONDANSETRON 4 MG: 4 TABLET, ORALLY DISINTEGRATING ORAL at 05:49

## 2019-01-18 LAB
ANION GAP SERPL CALCULATED.3IONS-SCNC: 10.1 MMOL/L
BACTERIA SPEC AEROBE CULT: NO GROWTH
BUN BLD-MCNC: 14 MG/DL (ref 8–23)
BUN/CREAT SERPL: 24.6 (ref 7–25)
CALCIUM SPEC-SCNC: 8.4 MG/DL (ref 8.6–10.5)
CHLORIDE SERPL-SCNC: 99 MMOL/L (ref 98–107)
CO2 SERPL-SCNC: 23.9 MMOL/L (ref 22–29)
CREAT BLD-MCNC: 0.57 MG/DL (ref 0.76–1.27)
GFR SERPL CREATININE-BSD FRML MDRD: 143 ML/MIN/1.73
GLUCOSE BLD-MCNC: 141 MG/DL (ref 65–99)
POTASSIUM BLD-SCNC: 4.5 MMOL/L (ref 3.5–5.2)
SODIUM BLD-SCNC: 133 MMOL/L (ref 136–145)

## 2019-01-18 PROCEDURE — 97535 SELF CARE MNGMENT TRAINING: CPT

## 2019-01-18 PROCEDURE — G0515 COGNITIVE SKILLS DEVELOPMENT: HCPCS

## 2019-01-18 PROCEDURE — 80048 BASIC METABOLIC PNL TOTAL CA: CPT | Performed by: PHYSICAL MEDICINE & REHABILITATION

## 2019-01-18 PROCEDURE — 97110 THERAPEUTIC EXERCISES: CPT

## 2019-01-18 RX ORDER — LANSOPRAZOLE
30 KIT EVERY MORNING
Status: DISCONTINUED | OUTPATIENT
Start: 2019-01-18 | End: 2019-02-08 | Stop reason: HOSPADM

## 2019-01-18 RX ORDER — PROMETHAZINE HYDROCHLORIDE 25 MG/1
12.5 TABLET ORAL EVERY 8 HOURS PRN
Status: DISCONTINUED | OUTPATIENT
Start: 2019-01-19 | End: 2019-02-08

## 2019-01-18 RX ADMIN — MULTIPLE VITAMINS W/ MINERALS TAB 1 TABLET: TAB at 08:08

## 2019-01-18 RX ADMIN — Medication 1 G: at 11:34

## 2019-01-18 RX ADMIN — Medication 1 G: at 08:09

## 2019-01-18 RX ADMIN — ONDANSETRON 4 MG: 4 TABLET, ORALLY DISINTEGRATING ORAL at 15:34

## 2019-01-18 RX ADMIN — POLYETHYLENE GLYCOL 3350 17 G: 17 POWDER, FOR SOLUTION ORAL at 08:09

## 2019-01-18 RX ADMIN — APIXABAN 5 MG: 5 TABLET, FILM COATED ORAL at 08:07

## 2019-01-18 RX ADMIN — ONDANSETRON 4 MG: 4 TABLET, ORALLY DISINTEGRATING ORAL at 06:01

## 2019-01-18 RX ADMIN — ONDANSETRON 4 MG: 4 TABLET, ORALLY DISINTEGRATING ORAL at 11:34

## 2019-01-18 RX ADMIN — METHYLPHENIDATE HYDROCHLORIDE 5 MG: 5 TABLET ORAL at 08:08

## 2019-01-18 RX ADMIN — APIXABAN 5 MG: 5 TABLET, FILM COATED ORAL at 20:52

## 2019-01-18 RX ADMIN — LANSOPRAZOLE 30 MG: KIT at 17:54

## 2019-01-18 RX ADMIN — METHYLPHENIDATE HYDROCHLORIDE 5 MG: 5 TABLET ORAL at 11:34

## 2019-01-18 RX ADMIN — LEVETIRACETAM 500 MG: 100 SOLUTION ORAL at 20:52

## 2019-01-18 RX ADMIN — AMANTADINE HYDROCHLORIDE 100 MG: 50 SOLUTION ORAL at 08:07

## 2019-01-18 RX ADMIN — ONDANSETRON 4 MG: 4 TABLET, ORALLY DISINTEGRATING ORAL at 02:16

## 2019-01-18 RX ADMIN — LEVETIRACETAM 500 MG: 100 SOLUTION ORAL at 08:07

## 2019-01-18 RX ADMIN — AMANTADINE HYDROCHLORIDE 100 MG: 50 SOLUTION ORAL at 11:33

## 2019-01-18 RX ADMIN — Medication 1 G: at 17:54

## 2019-01-19 LAB
ALBUMIN SERPL-MCNC: 3.5 G/DL (ref 3.5–5.2)
ALBUMIN/GLOB SERPL: 1.3 G/DL
ALP SERPL-CCNC: 62 U/L (ref 39–117)
ALT SERPL W P-5'-P-CCNC: 30 U/L (ref 1–41)
ANION GAP SERPL CALCULATED.3IONS-SCNC: 13.6 MMOL/L
AST SERPL-CCNC: 17 U/L (ref 1–40)
BASOPHILS # BLD AUTO: 0.07 10*3/MM3 (ref 0–0.2)
BASOPHILS NFR BLD AUTO: 0.5 % (ref 0–1.5)
BILIRUB SERPL-MCNC: 0.3 MG/DL (ref 0.1–1.2)
BUN BLD-MCNC: 15 MG/DL (ref 8–23)
BUN/CREAT SERPL: 25.9 (ref 7–25)
CALCIUM SPEC-SCNC: 8.8 MG/DL (ref 8.6–10.5)
CHLORIDE SERPL-SCNC: 95 MMOL/L (ref 98–107)
CO2 SERPL-SCNC: 23.4 MMOL/L (ref 22–29)
CREAT BLD-MCNC: 0.58 MG/DL (ref 0.76–1.27)
CRP SERPL-MCNC: 0.64 MG/DL (ref 0–0.5)
DEPRECATED RDW RBC AUTO: 53.7 FL (ref 37–54)
EOSINOPHIL # BLD AUTO: 0.05 10*3/MM3 (ref 0–0.7)
EOSINOPHIL NFR BLD AUTO: 0.4 % (ref 0.3–6.2)
ERYTHROCYTE [DISTWIDTH] IN BLOOD BY AUTOMATED COUNT: 14.6 % (ref 11.5–14.5)
ERYTHROCYTE [SEDIMENTATION RATE] IN BLOOD: 23 MM/HR (ref 0–20)
GFR SERPL CREATININE-BSD FRML MDRD: 141 ML/MIN/1.73
GLOBULIN UR ELPH-MCNC: 2.6 GM/DL
GLUCOSE BLD-MCNC: 102 MG/DL (ref 65–99)
HCT VFR BLD AUTO: 39.5 % (ref 40.4–52.2)
HGB BLD-MCNC: 12.9 G/DL (ref 13.7–17.6)
IMM GRANULOCYTES # BLD AUTO: 0.2 10*3/MM3 (ref 0–0.03)
IMM GRANULOCYTES NFR BLD AUTO: 1.5 % (ref 0–0.5)
LYMPHOCYTES # BLD AUTO: 1.04 10*3/MM3 (ref 0.9–4.8)
LYMPHOCYTES NFR BLD AUTO: 8 % (ref 19.6–45.3)
MCH RBC QN AUTO: 32.7 PG (ref 27–32.7)
MCHC RBC AUTO-ENTMCNC: 32.7 G/DL (ref 32.6–36.4)
MCV RBC AUTO: 100.3 FL (ref 79.8–96.2)
MONOCYTES # BLD AUTO: 1.45 10*3/MM3 (ref 0.2–1.2)
MONOCYTES NFR BLD AUTO: 11.2 % (ref 5–12)
NEUTROPHILS # BLD AUTO: 10.12 10*3/MM3 (ref 1.9–8.1)
NEUTROPHILS NFR BLD AUTO: 78.4 % (ref 42.7–76)
PLATELET # BLD AUTO: 327 10*3/MM3 (ref 140–500)
PMV BLD AUTO: 9.1 FL (ref 6–12)
POTASSIUM BLD-SCNC: 4.5 MMOL/L (ref 3.5–5.2)
PROCALCITONIN SERPL-MCNC: 0.06 NG/ML (ref 0.1–0.25)
PROT SERPL-MCNC: 6.1 G/DL (ref 6–8.5)
RBC # BLD AUTO: 3.94 10*6/MM3 (ref 4.6–6)
SODIUM BLD-SCNC: 132 MMOL/L (ref 136–145)
WBC NRBC COR # BLD: 12.93 10*3/MM3 (ref 4.5–10.7)

## 2019-01-19 PROCEDURE — 63710000001 PROMETHAZINE PER 25 MG: Performed by: PHYSICAL MEDICINE & REHABILITATION

## 2019-01-19 PROCEDURE — 99223 1ST HOSP IP/OBS HIGH 75: CPT | Performed by: INTERNAL MEDICINE

## 2019-01-19 PROCEDURE — 86140 C-REACTIVE PROTEIN: CPT | Performed by: PHYSICAL MEDICINE & REHABILITATION

## 2019-01-19 PROCEDURE — 80053 COMPREHEN METABOLIC PANEL: CPT | Performed by: PHYSICAL MEDICINE & REHABILITATION

## 2019-01-19 PROCEDURE — 97110 THERAPEUTIC EXERCISES: CPT

## 2019-01-19 PROCEDURE — 85025 COMPLETE CBC W/AUTO DIFF WBC: CPT | Performed by: PHYSICAL MEDICINE & REHABILITATION

## 2019-01-19 PROCEDURE — 84145 PROCALCITONIN (PCT): CPT | Performed by: PHYSICAL MEDICINE & REHABILITATION

## 2019-01-19 PROCEDURE — 85652 RBC SED RATE AUTOMATED: CPT | Performed by: PHYSICAL MEDICINE & REHABILITATION

## 2019-01-19 RX ADMIN — METHYLPHENIDATE HYDROCHLORIDE 5 MG: 5 TABLET ORAL at 11:28

## 2019-01-19 RX ADMIN — LEVETIRACETAM 500 MG: 100 SOLUTION ORAL at 20:16

## 2019-01-19 RX ADMIN — PROMETHAZINE HYDROCHLORIDE 12.5 MG: 25 TABLET ORAL at 20:17

## 2019-01-19 RX ADMIN — METHYLPHENIDATE HYDROCHLORIDE 5 MG: 5 TABLET ORAL at 08:38

## 2019-01-19 RX ADMIN — MICONAZOLE NITRATE: 2 POWDER TOPICAL at 10:12

## 2019-01-19 RX ADMIN — AMANTADINE HYDROCHLORIDE 100 MG: 50 SOLUTION ORAL at 08:38

## 2019-01-19 RX ADMIN — ONDANSETRON 4 MG: 4 TABLET, ORALLY DISINTEGRATING ORAL at 11:01

## 2019-01-19 RX ADMIN — APIXABAN 5 MG: 5 TABLET, FILM COATED ORAL at 08:38

## 2019-01-19 RX ADMIN — AMANTADINE HYDROCHLORIDE 100 MG: 50 SOLUTION ORAL at 11:28

## 2019-01-19 RX ADMIN — PROMETHAZINE HYDROCHLORIDE 12.5 MG: 25 TABLET ORAL at 11:29

## 2019-01-19 RX ADMIN — MULTIPLE VITAMINS W/ MINERALS TAB 1 TABLET: TAB at 08:38

## 2019-01-19 RX ADMIN — LANSOPRAZOLE 30 MG: KIT at 05:47

## 2019-01-19 RX ADMIN — APIXABAN 5 MG: 5 TABLET, FILM COATED ORAL at 20:16

## 2019-01-19 RX ADMIN — ONDANSETRON 4 MG: 4 TABLET, ORALLY DISINTEGRATING ORAL at 16:47

## 2019-01-19 RX ADMIN — ONDANSETRON 4 MG: 4 TABLET, ORALLY DISINTEGRATING ORAL at 16:17

## 2019-01-19 RX ADMIN — ONDANSETRON 4 MG: 4 TABLET, ORALLY DISINTEGRATING ORAL at 05:47

## 2019-01-19 RX ADMIN — Medication 1 G: at 17:00

## 2019-01-19 RX ADMIN — Medication 1 G: at 08:38

## 2019-01-19 RX ADMIN — Medication 1 G: at 11:28

## 2019-01-19 RX ADMIN — LEVETIRACETAM 500 MG: 100 SOLUTION ORAL at 08:38

## 2019-01-20 LAB
ANION GAP SERPL CALCULATED.3IONS-SCNC: 10.6 MMOL/L
BUN BLD-MCNC: 13 MG/DL (ref 8–23)
BUN/CREAT SERPL: 21.7 (ref 7–25)
CALCIUM SPEC-SCNC: 8.3 MG/DL (ref 8.6–10.5)
CHLORIDE SERPL-SCNC: 98 MMOL/L (ref 98–107)
CO2 SERPL-SCNC: 24.4 MMOL/L (ref 22–29)
CREAT BLD-MCNC: 0.6 MG/DL (ref 0.76–1.27)
GFR SERPL CREATININE-BSD FRML MDRD: 135 ML/MIN/1.73
GLUCOSE BLD-MCNC: 132 MG/DL (ref 65–99)
MAGNESIUM SERPL-MCNC: 2.1 MG/DL (ref 1.6–2.4)
POTASSIUM BLD-SCNC: 4.6 MMOL/L (ref 3.5–5.2)
SODIUM BLD-SCNC: 133 MMOL/L (ref 136–145)

## 2019-01-20 PROCEDURE — 83735 ASSAY OF MAGNESIUM: CPT | Performed by: INTERNAL MEDICINE

## 2019-01-20 PROCEDURE — 80048 BASIC METABOLIC PNL TOTAL CA: CPT | Performed by: INTERNAL MEDICINE

## 2019-01-20 PROCEDURE — 99232 SBSQ HOSP IP/OBS MODERATE 35: CPT | Performed by: INTERNAL MEDICINE

## 2019-01-20 RX ADMIN — ONDANSETRON 4 MG: 4 TABLET, ORALLY DISINTEGRATING ORAL at 06:21

## 2019-01-20 RX ADMIN — METHYLPHENIDATE HYDROCHLORIDE 5 MG: 5 TABLET ORAL at 11:12

## 2019-01-20 RX ADMIN — AMANTADINE HYDROCHLORIDE 100 MG: 50 SOLUTION ORAL at 07:49

## 2019-01-20 RX ADMIN — LANSOPRAZOLE 30 MG: KIT at 06:21

## 2019-01-20 RX ADMIN — Medication 1 G: at 17:09

## 2019-01-20 RX ADMIN — Medication 1 G: at 11:12

## 2019-01-20 RX ADMIN — ONDANSETRON 4 MG: 4 TABLET, ORALLY DISINTEGRATING ORAL at 15:22

## 2019-01-20 RX ADMIN — APIXABAN 5 MG: 5 TABLET, FILM COATED ORAL at 21:55

## 2019-01-20 RX ADMIN — APIXABAN 5 MG: 5 TABLET, FILM COATED ORAL at 08:01

## 2019-01-20 RX ADMIN — LEVETIRACETAM 500 MG: 100 SOLUTION ORAL at 08:01

## 2019-01-20 RX ADMIN — Medication 1 G: at 07:47

## 2019-01-20 RX ADMIN — LEVETIRACETAM 500 MG: 100 SOLUTION ORAL at 21:55

## 2019-01-20 RX ADMIN — METHYLPHENIDATE HYDROCHLORIDE 5 MG: 5 TABLET ORAL at 07:47

## 2019-01-20 RX ADMIN — ONDANSETRON 4 MG: 4 TABLET, ORALLY DISINTEGRATING ORAL at 10:36

## 2019-01-21 LAB
ANION GAP SERPL CALCULATED.3IONS-SCNC: 9.9 MMOL/L
BASOPHILS # BLD AUTO: 0.08 10*3/MM3 (ref 0–0.2)
BASOPHILS NFR BLD AUTO: 1 % (ref 0–1.5)
BUN BLD-MCNC: 17 MG/DL (ref 8–23)
BUN/CREAT SERPL: 27 (ref 7–25)
CALCIUM SPEC-SCNC: 8.8 MG/DL (ref 8.6–10.5)
CHLORIDE SERPL-SCNC: 97 MMOL/L (ref 98–107)
CO2 SERPL-SCNC: 25.1 MMOL/L (ref 22–29)
CREAT BLD-MCNC: 0.63 MG/DL (ref 0.76–1.27)
DEPRECATED RDW RBC AUTO: 54.4 FL (ref 37–54)
EOSINOPHIL # BLD AUTO: 0.1 10*3/MM3 (ref 0–0.7)
EOSINOPHIL NFR BLD AUTO: 1.3 % (ref 0.3–6.2)
ERYTHROCYTE [DISTWIDTH] IN BLOOD BY AUTOMATED COUNT: 14.9 % (ref 11.5–14.5)
GFR SERPL CREATININE-BSD FRML MDRD: 128 ML/MIN/1.73
GLUCOSE BLD-MCNC: 174 MG/DL (ref 65–99)
HCT VFR BLD AUTO: 35.4 % (ref 40.4–52.2)
HGB BLD-MCNC: 11.5 G/DL (ref 13.7–17.6)
IMM GRANULOCYTES # BLD AUTO: 0.34 10*3/MM3 (ref 0–0.03)
IMM GRANULOCYTES NFR BLD AUTO: 4.4 % (ref 0–0.5)
LYMPHOCYTES # BLD AUTO: 1.26 10*3/MM3 (ref 0.9–4.8)
LYMPHOCYTES NFR BLD AUTO: 16.4 % (ref 19.6–45.3)
MAGNESIUM SERPL-MCNC: 2.1 MG/DL (ref 1.6–2.4)
MCH RBC QN AUTO: 32.5 PG (ref 27–32.7)
MCHC RBC AUTO-ENTMCNC: 32.5 G/DL (ref 32.6–36.4)
MCV RBC AUTO: 100 FL (ref 79.8–96.2)
MONOCYTES # BLD AUTO: 0.73 10*3/MM3 (ref 0.2–1.2)
MONOCYTES NFR BLD AUTO: 9.5 % (ref 5–12)
NEUTROPHILS # BLD AUTO: 5.17 10*3/MM3 (ref 1.9–8.1)
NEUTROPHILS NFR BLD AUTO: 67.4 % (ref 42.7–76)
PLATELET # BLD AUTO: 309 10*3/MM3 (ref 140–500)
PMV BLD AUTO: 9.4 FL (ref 6–12)
POTASSIUM BLD-SCNC: 4.3 MMOL/L (ref 3.5–5.2)
RBC # BLD AUTO: 3.54 10*6/MM3 (ref 4.6–6)
SODIUM BLD-SCNC: 132 MMOL/L (ref 136–145)
WBC NRBC COR # BLD: 7.68 10*3/MM3 (ref 4.5–10.7)

## 2019-01-21 PROCEDURE — 80048 BASIC METABOLIC PNL TOTAL CA: CPT | Performed by: PHYSICAL MEDICINE & REHABILITATION

## 2019-01-21 PROCEDURE — 97110 THERAPEUTIC EXERCISES: CPT

## 2019-01-21 PROCEDURE — 85025 COMPLETE CBC W/AUTO DIFF WBC: CPT | Performed by: PHYSICAL MEDICINE & REHABILITATION

## 2019-01-21 PROCEDURE — 99231 SBSQ HOSP IP/OBS SF/LOW 25: CPT | Performed by: INTERNAL MEDICINE

## 2019-01-21 PROCEDURE — 97535 SELF CARE MNGMENT TRAINING: CPT

## 2019-01-21 PROCEDURE — G0515 COGNITIVE SKILLS DEVELOPMENT: HCPCS

## 2019-01-21 PROCEDURE — 83735 ASSAY OF MAGNESIUM: CPT | Performed by: INTERNAL MEDICINE

## 2019-01-21 RX ORDER — MECLIZINE HCL 12.5 MG/1
12.5 TABLET ORAL
Status: DISCONTINUED | OUTPATIENT
Start: 2019-01-22 | End: 2019-02-04

## 2019-01-21 RX ORDER — MIRTAZAPINE 15 MG/1
7.5 TABLET, FILM COATED ORAL NIGHTLY
Status: DISCONTINUED | OUTPATIENT
Start: 2019-01-22 | End: 2019-01-24

## 2019-01-21 RX ADMIN — Medication 1 G: at 08:49

## 2019-01-21 RX ADMIN — APIXABAN 5 MG: 5 TABLET, FILM COATED ORAL at 20:23

## 2019-01-21 RX ADMIN — APIXABAN 5 MG: 5 TABLET, FILM COATED ORAL at 08:49

## 2019-01-21 RX ADMIN — ONDANSETRON 4 MG: 4 TABLET, ORALLY DISINTEGRATING ORAL at 11:55

## 2019-01-21 RX ADMIN — METHYLPHENIDATE HYDROCHLORIDE 5 MG: 5 TABLET ORAL at 11:55

## 2019-01-21 RX ADMIN — POLYETHYLENE GLYCOL 3350 17 G: 17 POWDER, FOR SOLUTION ORAL at 08:49

## 2019-01-21 RX ADMIN — Medication 1 G: at 16:15

## 2019-01-21 RX ADMIN — MULTIPLE VITAMINS W/ MINERALS TAB 1 TABLET: TAB at 08:49

## 2019-01-21 RX ADMIN — ONDANSETRON 4 MG: 4 TABLET, ORALLY DISINTEGRATING ORAL at 06:03

## 2019-01-21 RX ADMIN — Medication 1 G: at 11:55

## 2019-01-21 RX ADMIN — LANSOPRAZOLE 30 MG: KIT at 06:03

## 2019-01-21 RX ADMIN — LEVETIRACETAM 500 MG: 100 SOLUTION ORAL at 20:23

## 2019-01-21 RX ADMIN — ONDANSETRON 4 MG: 4 TABLET, ORALLY DISINTEGRATING ORAL at 16:15

## 2019-01-21 RX ADMIN — METHYLPHENIDATE HYDROCHLORIDE 5 MG: 5 TABLET ORAL at 08:49

## 2019-01-21 RX ADMIN — LEVETIRACETAM 500 MG: 100 SOLUTION ORAL at 08:49

## 2019-01-22 PROCEDURE — 97110 THERAPEUTIC EXERCISES: CPT | Performed by: PHYSICAL THERAPIST

## 2019-01-22 PROCEDURE — 97110 THERAPEUTIC EXERCISES: CPT

## 2019-01-22 PROCEDURE — 97535 SELF CARE MNGMENT TRAINING: CPT

## 2019-01-22 PROCEDURE — G0515 COGNITIVE SKILLS DEVELOPMENT: HCPCS

## 2019-01-22 RX ORDER — SODIUM CHLORIDE 1000 MG
1 TABLET, SOLUBLE MISCELLANEOUS 2 TIMES DAILY WITH MEALS
Status: DISCONTINUED | OUTPATIENT
Start: 2019-01-22 | End: 2019-01-23

## 2019-01-22 RX ADMIN — LEVETIRACETAM 500 MG: 100 SOLUTION ORAL at 21:04

## 2019-01-22 RX ADMIN — LANSOPRAZOLE 30 MG: KIT at 05:53

## 2019-01-22 RX ADMIN — METHYLPHENIDATE HYDROCHLORIDE 5 MG: 5 TABLET ORAL at 07:10

## 2019-01-22 RX ADMIN — MIRTAZAPINE 7.5 MG: 15 TABLET, FILM COATED ORAL at 21:04

## 2019-01-22 RX ADMIN — MECLIZINE HCL 12.5 MG: 12.5 TABLET ORAL at 11:18

## 2019-01-22 RX ADMIN — MECLIZINE HCL 12.5 MG: 12.5 TABLET ORAL at 05:53

## 2019-01-22 RX ADMIN — LEVETIRACETAM 500 MG: 100 SOLUTION ORAL at 07:10

## 2019-01-22 RX ADMIN — Medication 1 G: at 16:21

## 2019-01-22 RX ADMIN — Medication 1 G: at 11:18

## 2019-01-22 RX ADMIN — APIXABAN 5 MG: 5 TABLET, FILM COATED ORAL at 07:10

## 2019-01-22 RX ADMIN — METHYLPHENIDATE HYDROCHLORIDE 5 MG: 5 TABLET ORAL at 11:19

## 2019-01-22 RX ADMIN — MULTIPLE VITAMINS W/ MINERALS TAB 1 TABLET: TAB at 07:10

## 2019-01-22 RX ADMIN — APIXABAN 5 MG: 5 TABLET, FILM COATED ORAL at 21:04

## 2019-01-22 RX ADMIN — Medication 1 G: at 07:10

## 2019-01-22 RX ADMIN — MECLIZINE HCL 12.5 MG: 12.5 TABLET ORAL at 16:22

## 2019-01-23 LAB
ANION GAP SERPL CALCULATED.3IONS-SCNC: 11.5 MMOL/L
BUN BLD-MCNC: 16 MG/DL (ref 8–23)
BUN/CREAT SERPL: 32 (ref 7–25)
CALCIUM SPEC-SCNC: 9 MG/DL (ref 8.6–10.5)
CHLORIDE SERPL-SCNC: 98 MMOL/L (ref 98–107)
CO2 SERPL-SCNC: 26.5 MMOL/L (ref 22–29)
CREAT BLD-MCNC: 0.5 MG/DL (ref 0.76–1.27)
GFR SERPL CREATININE-BSD FRML MDRD: >150 ML/MIN/1.73
GLUCOSE BLD-MCNC: 103 MG/DL (ref 65–99)
POTASSIUM BLD-SCNC: 4.4 MMOL/L (ref 3.5–5.2)
SODIUM BLD-SCNC: 136 MMOL/L (ref 136–145)

## 2019-01-23 PROCEDURE — 97110 THERAPEUTIC EXERCISES: CPT | Performed by: OCCUPATIONAL THERAPIST

## 2019-01-23 PROCEDURE — 97535 SELF CARE MNGMENT TRAINING: CPT

## 2019-01-23 PROCEDURE — 97110 THERAPEUTIC EXERCISES: CPT

## 2019-01-23 PROCEDURE — 80048 BASIC METABOLIC PNL TOTAL CA: CPT | Performed by: PHYSICAL MEDICINE & REHABILITATION

## 2019-01-23 PROCEDURE — 97112 NEUROMUSCULAR REEDUCATION: CPT | Performed by: OCCUPATIONAL THERAPIST

## 2019-01-23 PROCEDURE — G0515 COGNITIVE SKILLS DEVELOPMENT: HCPCS

## 2019-01-23 RX ADMIN — MULTIPLE VITAMINS W/ MINERALS TAB 1 TABLET: TAB at 18:03

## 2019-01-23 RX ADMIN — LEVETIRACETAM 500 MG: 100 SOLUTION ORAL at 21:07

## 2019-01-23 RX ADMIN — LEVETIRACETAM 500 MG: 100 SOLUTION ORAL at 08:56

## 2019-01-23 RX ADMIN — METHYLPHENIDATE HYDROCHLORIDE 5 MG: 5 TABLET ORAL at 11:27

## 2019-01-23 RX ADMIN — METHYLPHENIDATE HYDROCHLORIDE 5 MG: 5 TABLET ORAL at 08:56

## 2019-01-23 RX ADMIN — MECLIZINE HCL 12.5 MG: 12.5 TABLET ORAL at 11:27

## 2019-01-23 RX ADMIN — MECLIZINE HCL 12.5 MG: 12.5 TABLET ORAL at 06:14

## 2019-01-23 RX ADMIN — Medication 1 G: at 08:56

## 2019-01-23 RX ADMIN — APIXABAN 5 MG: 5 TABLET, FILM COATED ORAL at 08:56

## 2019-01-23 RX ADMIN — MECLIZINE HCL 12.5 MG: 12.5 TABLET ORAL at 16:08

## 2019-01-23 RX ADMIN — MIRTAZAPINE 7.5 MG: 15 TABLET, FILM COATED ORAL at 21:07

## 2019-01-23 RX ADMIN — LANSOPRAZOLE 30 MG: KIT at 06:15

## 2019-01-24 ENCOUNTER — APPOINTMENT (OUTPATIENT)
Dept: CT IMAGING | Facility: HOSPITAL | Age: 64
End: 2019-01-24

## 2019-01-24 LAB
BASOPHILS # BLD AUTO: 0.11 10*3/MM3 (ref 0–0.2)
BASOPHILS NFR BLD AUTO: 1.3 % (ref 0–1.5)
DEPRECATED RDW RBC AUTO: 57.2 FL (ref 37–54)
EOSINOPHIL # BLD AUTO: 0.05 10*3/MM3 (ref 0–0.7)
EOSINOPHIL NFR BLD AUTO: 0.6 % (ref 0.3–6.2)
ERYTHROCYTE [DISTWIDTH] IN BLOOD BY AUTOMATED COUNT: 15.4 % (ref 11.5–14.5)
HCT VFR BLD AUTO: 44.4 % (ref 40.4–52.2)
HGB BLD-MCNC: 14.5 G/DL (ref 13.7–17.6)
IMM GRANULOCYTES # BLD AUTO: 0.15 10*3/MM3 (ref 0–0.03)
IMM GRANULOCYTES NFR BLD AUTO: 1.8 % (ref 0–0.5)
LYMPHOCYTES # BLD AUTO: 1.48 10*3/MM3 (ref 0.9–4.8)
LYMPHOCYTES NFR BLD AUTO: 17.5 % (ref 19.6–45.3)
MCH RBC QN AUTO: 33 PG (ref 27–32.7)
MCHC RBC AUTO-ENTMCNC: 32.7 G/DL (ref 32.6–36.4)
MCV RBC AUTO: 101.1 FL (ref 79.8–96.2)
MONOCYTES # BLD AUTO: 0.82 10*3/MM3 (ref 0.2–1.2)
MONOCYTES NFR BLD AUTO: 9.7 % (ref 5–12)
NEUTROPHILS # BLD AUTO: 6.02 10*3/MM3 (ref 1.9–8.1)
NEUTROPHILS NFR BLD AUTO: 70.9 % (ref 42.7–76)
PLAT MORPH BLD: NORMAL
PLATELET # BLD AUTO: 309 10*3/MM3 (ref 140–500)
PMV BLD AUTO: 9.8 FL (ref 6–12)
RBC # BLD AUTO: 4.39 10*6/MM3 (ref 4.6–6)
RBC MORPH BLD: NORMAL
WBC MORPH BLD: NORMAL
WBC NRBC COR # BLD: 8.48 10*3/MM3 (ref 4.5–10.7)

## 2019-01-24 PROCEDURE — 85025 COMPLETE CBC W/AUTO DIFF WBC: CPT | Performed by: PHYSICAL MEDICINE & REHABILITATION

## 2019-01-24 PROCEDURE — 70450 CT HEAD/BRAIN W/O DYE: CPT

## 2019-01-24 PROCEDURE — 97110 THERAPEUTIC EXERCISES: CPT

## 2019-01-24 PROCEDURE — G0515 COGNITIVE SKILLS DEVELOPMENT: HCPCS | Performed by: SPEECH-LANGUAGE PATHOLOGIST

## 2019-01-24 PROCEDURE — 85007 BL SMEAR W/DIFF WBC COUNT: CPT | Performed by: PHYSICAL MEDICINE & REHABILITATION

## 2019-01-24 PROCEDURE — 97112 NEUROMUSCULAR REEDUCATION: CPT

## 2019-01-24 PROCEDURE — 97535 SELF CARE MNGMENT TRAINING: CPT | Performed by: OCCUPATIONAL THERAPIST

## 2019-01-24 PROCEDURE — G0515 COGNITIVE SKILLS DEVELOPMENT: HCPCS

## 2019-01-24 RX ADMIN — APIXABAN 5 MG: 5 TABLET, FILM COATED ORAL at 07:32

## 2019-01-24 RX ADMIN — APIXABAN 5 MG: 5 TABLET, FILM COATED ORAL at 19:38

## 2019-01-24 RX ADMIN — MECLIZINE HCL 12.5 MG: 12.5 TABLET ORAL at 12:09

## 2019-01-24 RX ADMIN — MECLIZINE HCL 12.5 MG: 12.5 TABLET ORAL at 16:23

## 2019-01-24 RX ADMIN — LEVETIRACETAM 500 MG: 100 SOLUTION ORAL at 19:38

## 2019-01-24 RX ADMIN — LEVETIRACETAM 500 MG: 100 SOLUTION ORAL at 07:32

## 2019-01-24 RX ADMIN — METHYLPHENIDATE HYDROCHLORIDE 5 MG: 5 TABLET ORAL at 12:09

## 2019-01-24 RX ADMIN — METHYLPHENIDATE HYDROCHLORIDE 5 MG: 5 TABLET ORAL at 07:33

## 2019-01-24 RX ADMIN — MECLIZINE HCL 12.5 MG: 12.5 TABLET ORAL at 06:13

## 2019-01-24 RX ADMIN — LANSOPRAZOLE 30 MG: KIT at 06:13

## 2019-01-25 LAB
ANION GAP SERPL CALCULATED.3IONS-SCNC: 13.7 MMOL/L
BACTERIA UR QL AUTO: ABNORMAL /HPF
BILIRUB UR QL STRIP: NEGATIVE
BUN BLD-MCNC: 18 MG/DL (ref 8–23)
BUN/CREAT SERPL: 30 (ref 7–25)
CALCIUM SPEC-SCNC: 8.9 MG/DL (ref 8.6–10.5)
CHLORIDE SERPL-SCNC: 99 MMOL/L (ref 98–107)
CLARITY UR: CLEAR
CO2 SERPL-SCNC: 24.3 MMOL/L (ref 22–29)
COLOR UR: YELLOW
CREAT BLD-MCNC: 0.6 MG/DL (ref 0.76–1.27)
GFR SERPL CREATININE-BSD FRML MDRD: 135 ML/MIN/1.73
GLUCOSE BLD-MCNC: 100 MG/DL (ref 65–99)
GLUCOSE UR STRIP-MCNC: NEGATIVE MG/DL
HGB UR QL STRIP.AUTO: NEGATIVE
HYALINE CASTS UR QL AUTO: ABNORMAL /LPF
KETONES UR QL STRIP: NEGATIVE
LEUKOCYTE ESTERASE UR QL STRIP.AUTO: ABNORMAL
NITRITE UR QL STRIP: NEGATIVE
PH UR STRIP.AUTO: 6.5 [PH] (ref 5–8)
POTASSIUM BLD-SCNC: 4.3 MMOL/L (ref 3.5–5.2)
PROT UR QL STRIP: NEGATIVE
RBC # UR: ABNORMAL /HPF
REF LAB TEST METHOD: ABNORMAL
SODIUM BLD-SCNC: 137 MMOL/L (ref 136–145)
SP GR UR STRIP: 1.02 (ref 1–1.03)
SQUAMOUS #/AREA URNS HPF: ABNORMAL /HPF
UROBILINOGEN UR QL STRIP: ABNORMAL
WBC UR QL AUTO: ABNORMAL /HPF

## 2019-01-25 PROCEDURE — 81001 URINALYSIS AUTO W/SCOPE: CPT | Performed by: PHYSICAL MEDICINE & REHABILITATION

## 2019-01-25 PROCEDURE — 97110 THERAPEUTIC EXERCISES: CPT

## 2019-01-25 PROCEDURE — 97110 THERAPEUTIC EXERCISES: CPT | Performed by: PHYSICAL THERAPIST

## 2019-01-25 PROCEDURE — G0515 COGNITIVE SKILLS DEVELOPMENT: HCPCS

## 2019-01-25 PROCEDURE — 87086 URINE CULTURE/COLONY COUNT: CPT | Performed by: PHYSICAL MEDICINE & REHABILITATION

## 2019-01-25 PROCEDURE — 80048 BASIC METABOLIC PNL TOTAL CA: CPT | Performed by: PHYSICAL MEDICINE & REHABILITATION

## 2019-01-25 PROCEDURE — 97535 SELF CARE MNGMENT TRAINING: CPT

## 2019-01-25 PROCEDURE — 99222 1ST HOSP IP/OBS MODERATE 55: CPT | Performed by: INTERNAL MEDICINE

## 2019-01-25 RX ADMIN — MECLIZINE HCL 12.5 MG: 12.5 TABLET ORAL at 06:19

## 2019-01-25 RX ADMIN — APIXABAN 5 MG: 5 TABLET, FILM COATED ORAL at 21:03

## 2019-01-25 RX ADMIN — LANSOPRAZOLE 30 MG: KIT at 06:19

## 2019-01-25 RX ADMIN — LEVETIRACETAM 500 MG: 100 SOLUTION ORAL at 09:26

## 2019-01-25 RX ADMIN — MULTIPLE VITAMINS W/ MINERALS TAB 1 TABLET: TAB at 09:26

## 2019-01-25 RX ADMIN — APIXABAN 5 MG: 5 TABLET, FILM COATED ORAL at 09:26

## 2019-01-25 RX ADMIN — MECLIZINE HCL 12.5 MG: 12.5 TABLET ORAL at 16:14

## 2019-01-25 RX ADMIN — MECLIZINE HCL 12.5 MG: 12.5 TABLET ORAL at 10:51

## 2019-01-26 LAB — BACTERIA SPEC AEROBE CULT: NO GROWTH

## 2019-01-26 PROCEDURE — 97110 THERAPEUTIC EXERCISES: CPT

## 2019-01-26 RX ADMIN — MECLIZINE HCL 12.5 MG: 12.5 TABLET ORAL at 11:09

## 2019-01-26 RX ADMIN — LANSOPRAZOLE 30 MG: KIT at 06:20

## 2019-01-26 RX ADMIN — MECLIZINE HCL 12.5 MG: 12.5 TABLET ORAL at 16:42

## 2019-01-26 RX ADMIN — MULTIPLE VITAMINS W/ MINERALS TAB 1 TABLET: TAB at 09:55

## 2019-01-26 RX ADMIN — LEVETIRACETAM 500 MG: 100 SOLUTION ORAL at 09:55

## 2019-01-26 RX ADMIN — POLYETHYLENE GLYCOL 3350 17 G: 17 POWDER, FOR SOLUTION ORAL at 09:55

## 2019-01-26 RX ADMIN — LEVETIRACETAM 500 MG: 100 SOLUTION ORAL at 22:28

## 2019-01-26 RX ADMIN — MECLIZINE HCL 12.5 MG: 12.5 TABLET ORAL at 06:20

## 2019-01-27 PROCEDURE — 99232 SBSQ HOSP IP/OBS MODERATE 35: CPT | Performed by: INTERNAL MEDICINE

## 2019-01-27 PROCEDURE — 25010000002 ONDANSETRON PER 1 MG: Performed by: PHYSICAL MEDICINE & REHABILITATION

## 2019-01-27 RX ORDER — SODIUM CHLORIDE 9 MG/ML
75 INJECTION, SOLUTION INTRAVENOUS CONTINUOUS
Status: DISCONTINUED | OUTPATIENT
Start: 2019-01-27 | End: 2019-01-29

## 2019-01-27 RX ORDER — SODIUM CHLORIDE 0.9 % (FLUSH) 0.9 %
3 SYRINGE (ML) INJECTION EVERY 12 HOURS SCHEDULED
Status: DISCONTINUED | OUTPATIENT
Start: 2019-01-27 | End: 2019-02-07

## 2019-01-27 RX ORDER — PANTOPRAZOLE SODIUM 40 MG/1
40 TABLET, DELAYED RELEASE ORAL
Status: DISCONTINUED | OUTPATIENT
Start: 2019-01-28 | End: 2019-01-28

## 2019-01-27 RX ORDER — ONDANSETRON 2 MG/ML
4 INJECTION INTRAMUSCULAR; INTRAVENOUS EVERY 6 HOURS PRN
Status: DISCONTINUED | OUTPATIENT
Start: 2019-01-27 | End: 2019-02-08

## 2019-01-27 RX ORDER — SODIUM CHLORIDE 0.9 % (FLUSH) 0.9 %
5 SYRINGE (ML) INJECTION AS NEEDED
Status: DISCONTINUED | OUTPATIENT
Start: 2019-01-27 | End: 2019-02-08

## 2019-01-27 RX ADMIN — MULTIPLE VITAMINS W/ MINERALS TAB 1 TABLET: TAB at 07:40

## 2019-01-27 RX ADMIN — SODIUM CHLORIDE, PRESERVATIVE FREE 3 ML: 5 INJECTION INTRAVENOUS at 10:23

## 2019-01-27 RX ADMIN — MECLIZINE HCL 12.5 MG: 12.5 TABLET ORAL at 11:22

## 2019-01-27 RX ADMIN — LEVETIRACETAM 500 MG: 100 SOLUTION ORAL at 20:41

## 2019-01-27 RX ADMIN — ONDANSETRON 4 MG: 2 INJECTION, SOLUTION INTRAMUSCULAR; INTRAVENOUS at 21:02

## 2019-01-27 RX ADMIN — MECLIZINE HCL 12.5 MG: 12.5 TABLET ORAL at 07:47

## 2019-01-27 RX ADMIN — SODIUM CHLORIDE 75 ML/HR: 9 INJECTION, SOLUTION INTRAVENOUS at 23:13

## 2019-01-27 RX ADMIN — LEVETIRACETAM 500 MG: 100 SOLUTION ORAL at 07:46

## 2019-01-27 RX ADMIN — SODIUM CHLORIDE 75 ML/HR: 9 INJECTION, SOLUTION INTRAVENOUS at 10:21

## 2019-01-27 RX ADMIN — LANSOPRAZOLE 30 MG: KIT at 07:47

## 2019-01-28 ENCOUNTER — APPOINTMENT (OUTPATIENT)
Dept: GENERAL RADIOLOGY | Facility: HOSPITAL | Age: 64
End: 2019-01-28

## 2019-01-28 LAB
ANION GAP SERPL CALCULATED.3IONS-SCNC: 10.9 MMOL/L
BASOPHILS # BLD AUTO: 0.08 10*3/MM3 (ref 0–0.2)
BASOPHILS NFR BLD AUTO: 1.4 % (ref 0–1.5)
BUN BLD-MCNC: 20 MG/DL (ref 8–23)
BUN/CREAT SERPL: 32.3 (ref 7–25)
CALCIUM SPEC-SCNC: 8.6 MG/DL (ref 8.6–10.5)
CHLORIDE SERPL-SCNC: 104 MMOL/L (ref 98–107)
CO2 SERPL-SCNC: 24.1 MMOL/L (ref 22–29)
CREAT BLD-MCNC: 0.62 MG/DL (ref 0.76–1.27)
DEPRECATED RDW RBC AUTO: 58 FL (ref 37–54)
EOSINOPHIL # BLD AUTO: 0.05 10*3/MM3 (ref 0–0.7)
EOSINOPHIL NFR BLD AUTO: 0.9 % (ref 0.3–6.2)
ERYTHROCYTE [DISTWIDTH] IN BLOOD BY AUTOMATED COUNT: 15.3 % (ref 11.5–14.5)
GFR SERPL CREATININE-BSD FRML MDRD: 130 ML/MIN/1.73
GLUCOSE BLD-MCNC: 84 MG/DL (ref 65–99)
HCT VFR BLD AUTO: 38.5 % (ref 40.4–52.2)
HGB BLD-MCNC: 12.7 G/DL (ref 13.7–17.6)
IMM GRANULOCYTES # BLD AUTO: 0.02 10*3/MM3 (ref 0–0.03)
IMM GRANULOCYTES NFR BLD AUTO: 0.4 % (ref 0–0.5)
LYMPHOCYTES # BLD AUTO: 1.54 10*3/MM3 (ref 0.9–4.8)
LYMPHOCYTES NFR BLD AUTO: 27.4 % (ref 19.6–45.3)
MCH RBC QN AUTO: 34 PG (ref 27–32.7)
MCHC RBC AUTO-ENTMCNC: 33 G/DL (ref 32.6–36.4)
MCV RBC AUTO: 102.9 FL (ref 79.8–96.2)
MONOCYTES # BLD AUTO: 0.49 10*3/MM3 (ref 0.2–1.2)
MONOCYTES NFR BLD AUTO: 8.7 % (ref 5–12)
NEUTROPHILS # BLD AUTO: 3.46 10*3/MM3 (ref 1.9–8.1)
NEUTROPHILS NFR BLD AUTO: 61.6 % (ref 42.7–76)
NRBC BLD AUTO-RTO: 1 /100 WBC (ref 0–0)
PLATELET # BLD AUTO: 228 10*3/MM3 (ref 140–500)
PMV BLD AUTO: 9.9 FL (ref 6–12)
POTASSIUM BLD-SCNC: 4.1 MMOL/L (ref 3.5–5.2)
RBC # BLD AUTO: 3.74 10*6/MM3 (ref 4.6–6)
SODIUM BLD-SCNC: 139 MMOL/L (ref 136–145)
WBC NRBC COR # BLD: 5.62 10*3/MM3 (ref 4.5–10.7)

## 2019-01-28 PROCEDURE — 74249: CPT

## 2019-01-28 PROCEDURE — 85025 COMPLETE CBC W/AUTO DIFF WBC: CPT | Performed by: PHYSICAL MEDICINE & REHABILITATION

## 2019-01-28 PROCEDURE — 97535 SELF CARE MNGMENT TRAINING: CPT

## 2019-01-28 PROCEDURE — G0515 COGNITIVE SKILLS DEVELOPMENT: HCPCS

## 2019-01-28 PROCEDURE — 97110 THERAPEUTIC EXERCISES: CPT

## 2019-01-28 PROCEDURE — 80048 BASIC METABOLIC PNL TOTAL CA: CPT | Performed by: PHYSICAL MEDICINE & REHABILITATION

## 2019-01-28 PROCEDURE — 99232 SBSQ HOSP IP/OBS MODERATE 35: CPT | Performed by: INTERNAL MEDICINE

## 2019-01-28 PROCEDURE — 74245: CPT | Performed by: INTERNAL MEDICINE

## 2019-01-28 RX ORDER — LACOSAMIDE 100 MG/1
100 TABLET ORAL EVERY 12 HOURS SCHEDULED
Status: DISCONTINUED | OUTPATIENT
Start: 2019-01-29 | End: 2019-01-28

## 2019-01-28 RX ORDER — LACOSAMIDE 100 MG/1
200 TABLET ORAL ONCE
Status: DISCONTINUED | OUTPATIENT
Start: 2019-01-28 | End: 2019-01-28

## 2019-01-28 RX ADMIN — BARIUM SULFATE 366 ML: 960 POWDER, FOR SUSPENSION ORAL at 12:26

## 2019-01-28 RX ADMIN — MECLIZINE HCL 12.5 MG: 12.5 TABLET ORAL at 16:39

## 2019-01-28 RX ADMIN — SODIUM CHLORIDE 75 ML/HR: 9 INJECTION, SOLUTION INTRAVENOUS at 12:42

## 2019-01-28 RX ADMIN — LEVETIRACETAM 500 MG: 100 SOLUTION ORAL at 19:07

## 2019-01-28 RX ADMIN — MULTIPLE VITAMINS W/ MINERALS TAB 1 TABLET: TAB at 12:51

## 2019-01-28 RX ADMIN — MECLIZINE HCL 12.5 MG: 12.5 TABLET ORAL at 12:53

## 2019-01-28 RX ADMIN — LEVETIRACETAM 500 MG: 100 SOLUTION ORAL at 12:53

## 2019-01-29 ENCOUNTER — APPOINTMENT (OUTPATIENT)
Dept: NUCLEAR MEDICINE | Facility: HOSPITAL | Age: 64
End: 2019-01-29

## 2019-01-29 PROCEDURE — 97535 SELF CARE MNGMENT TRAINING: CPT

## 2019-01-29 PROCEDURE — 97110 THERAPEUTIC EXERCISES: CPT

## 2019-01-29 PROCEDURE — G0515 COGNITIVE SKILLS DEVELOPMENT: HCPCS

## 2019-01-29 PROCEDURE — 99232 SBSQ HOSP IP/OBS MODERATE 35: CPT | Performed by: INTERNAL MEDICINE

## 2019-01-29 RX ADMIN — MECLIZINE HCL 12.5 MG: 12.5 TABLET ORAL at 18:10

## 2019-01-29 RX ADMIN — LEVETIRACETAM 500 MG: 100 SOLUTION ORAL at 08:10

## 2019-01-29 RX ADMIN — SODIUM CHLORIDE, PRESERVATIVE FREE 3 ML: 5 INJECTION INTRAVENOUS at 21:40

## 2019-01-29 RX ADMIN — MECLIZINE HCL 12.5 MG: 12.5 TABLET ORAL at 08:10

## 2019-01-29 RX ADMIN — MULTIPLE VITAMINS W/ MINERALS TAB 1 TABLET: TAB at 08:10

## 2019-01-29 RX ADMIN — SODIUM CHLORIDE, PRESERVATIVE FREE 3 ML: 5 INJECTION INTRAVENOUS at 08:10

## 2019-01-29 RX ADMIN — MECLIZINE HCL 12.5 MG: 12.5 TABLET ORAL at 11:34

## 2019-01-29 RX ADMIN — LANSOPRAZOLE 30 MG: KIT at 08:10

## 2019-01-29 RX ADMIN — LEVETIRACETAM 500 MG: 100 SOLUTION ORAL at 19:15

## 2019-01-30 ENCOUNTER — APPOINTMENT (OUTPATIENT)
Dept: NUCLEAR MEDICINE | Facility: HOSPITAL | Age: 64
End: 2019-01-30

## 2019-01-30 LAB
ANION GAP SERPL CALCULATED.3IONS-SCNC: 15.7 MMOL/L
BUN BLD-MCNC: 10 MG/DL (ref 8–23)
BUN/CREAT SERPL: 17.9 (ref 7–25)
CALCIUM SPEC-SCNC: 9.2 MG/DL (ref 8.6–10.5)
CHLORIDE SERPL-SCNC: 100 MMOL/L (ref 98–107)
CO2 SERPL-SCNC: 23.3 MMOL/L (ref 22–29)
CREAT BLD-MCNC: 0.56 MG/DL (ref 0.76–1.27)
GFR SERPL CREATININE-BSD FRML MDRD: 146 ML/MIN/1.73
GLUCOSE BLD-MCNC: 88 MG/DL (ref 65–99)
POTASSIUM BLD-SCNC: 3.8 MMOL/L (ref 3.5–5.2)
SODIUM BLD-SCNC: 139 MMOL/L (ref 136–145)

## 2019-01-30 PROCEDURE — G0515 COGNITIVE SKILLS DEVELOPMENT: HCPCS

## 2019-01-30 PROCEDURE — A9541 TC99M SULFUR COLLOID: HCPCS | Performed by: INTERNAL MEDICINE

## 2019-01-30 PROCEDURE — 99232 SBSQ HOSP IP/OBS MODERATE 35: CPT | Performed by: INTERNAL MEDICINE

## 2019-01-30 PROCEDURE — 97112 NEUROMUSCULAR REEDUCATION: CPT | Performed by: OCCUPATIONAL THERAPIST

## 2019-01-30 PROCEDURE — 97110 THERAPEUTIC EXERCISES: CPT | Performed by: OCCUPATIONAL THERAPIST

## 2019-01-30 PROCEDURE — 80048 BASIC METABOLIC PNL TOTAL CA: CPT | Performed by: PHYSICAL MEDICINE & REHABILITATION

## 2019-01-30 PROCEDURE — 0 TECHNETIUM SULFUR COLLOID: Performed by: INTERNAL MEDICINE

## 2019-01-30 PROCEDURE — 78264 GASTRIC EMPTYING IMG STUDY: CPT

## 2019-01-30 PROCEDURE — 97110 THERAPEUTIC EXERCISES: CPT

## 2019-01-30 RX ORDER — LACOSAMIDE 10 MG/ML
100 SOLUTION ORAL ONCE
Status: COMPLETED | OUTPATIENT
Start: 2019-01-30 | End: 2019-01-30

## 2019-01-30 RX ADMIN — LEVETIRACETAM 500 MG: 100 SOLUTION ORAL at 19:13

## 2019-01-30 RX ADMIN — LACOSAMIDE 100 MG: 10 SOLUTION ORAL at 14:48

## 2019-01-30 RX ADMIN — SODIUM CHLORIDE, PRESERVATIVE FREE 3 ML: 5 INJECTION INTRAVENOUS at 19:14

## 2019-01-30 RX ADMIN — MECLIZINE HCL 12.5 MG: 12.5 TABLET ORAL at 16:25

## 2019-01-30 RX ADMIN — TECHNETIUM TC 99M SULFUR COLLOID 1 DOSE: KIT at 08:00

## 2019-01-30 RX ADMIN — MECLIZINE HCL 12.5 MG: 12.5 TABLET ORAL at 12:11

## 2019-01-30 RX ADMIN — LEVETIRACETAM 500 MG: 100 SOLUTION ORAL at 12:10

## 2019-01-31 PROBLEM — E46 PROTEIN-CALORIE MALNUTRITION: Status: ACTIVE | Noted: 2019-01-31

## 2019-01-31 PROCEDURE — 99232 SBSQ HOSP IP/OBS MODERATE 35: CPT | Performed by: INTERNAL MEDICINE

## 2019-01-31 PROCEDURE — 97110 THERAPEUTIC EXERCISES: CPT

## 2019-01-31 PROCEDURE — 97535 SELF CARE MNGMENT TRAINING: CPT

## 2019-01-31 RX ORDER — LACOSAMIDE 10 MG/ML
100 SOLUTION ORAL EVERY 12 HOURS SCHEDULED
Status: DISCONTINUED | OUTPATIENT
Start: 2019-01-31 | End: 2019-02-04

## 2019-01-31 RX ORDER — VANCOMYCIN HYDROCHLORIDE 1 G/200ML
1000 INJECTION, SOLUTION INTRAVENOUS
Status: DISCONTINUED | OUTPATIENT
Start: 2019-02-01 | End: 2019-02-01

## 2019-01-31 RX ADMIN — SODIUM CHLORIDE, PRESERVATIVE FREE 3 ML: 5 INJECTION INTRAVENOUS at 09:39

## 2019-01-31 RX ADMIN — LACOSAMIDE 100 MG: 10 SOLUTION ORAL at 16:48

## 2019-01-31 RX ADMIN — MECLIZINE HCL 12.5 MG: 12.5 TABLET ORAL at 11:05

## 2019-01-31 RX ADMIN — LANSOPRAZOLE 30 MG: KIT at 05:47

## 2019-01-31 RX ADMIN — POLYETHYLENE GLYCOL 3350 17 G: 17 POWDER, FOR SOLUTION ORAL at 17:04

## 2019-01-31 RX ADMIN — MECLIZINE HCL 12.5 MG: 12.5 TABLET ORAL at 05:37

## 2019-01-31 RX ADMIN — MULTIPLE VITAMINS W/ MINERALS TAB 1 TABLET: TAB at 09:40

## 2019-01-31 RX ADMIN — MECLIZINE HCL 12.5 MG: 12.5 TABLET ORAL at 16:48

## 2019-02-01 ENCOUNTER — HOSPITAL ENCOUNTER (OUTPATIENT)
Facility: HOSPITAL | Age: 64
Setting detail: HOSPITAL OUTPATIENT SURGERY
End: 2019-02-01
Attending: INTERNAL MEDICINE | Admitting: INTERNAL MEDICINE

## 2019-02-01 ENCOUNTER — ANESTHESIA (OUTPATIENT)
Dept: GASTROENTEROLOGY | Facility: HOSPITAL | Age: 64
End: 2019-02-01

## 2019-02-01 ENCOUNTER — ANESTHESIA EVENT (OUTPATIENT)
Dept: GASTROENTEROLOGY | Facility: HOSPITAL | Age: 64
End: 2019-02-01

## 2019-02-01 VITALS
HEART RATE: 79 BPM | TEMPERATURE: 97.6 F | SYSTOLIC BLOOD PRESSURE: 143 MMHG | RESPIRATION RATE: 12 BRPM | OXYGEN SATURATION: 99 % | DIASTOLIC BLOOD PRESSURE: 85 MMHG

## 2019-02-01 LAB
ANION GAP SERPL CALCULATED.3IONS-SCNC: 14.7 MMOL/L
BASOPHILS # BLD AUTO: 0.05 10*3/MM3 (ref 0–0.2)
BASOPHILS NFR BLD AUTO: 0.8 % (ref 0–1.5)
BUN BLD-MCNC: 12 MG/DL (ref 8–23)
BUN/CREAT SERPL: 20.7 (ref 7–25)
CALCIUM SPEC-SCNC: 9.3 MG/DL (ref 8.6–10.5)
CHLORIDE SERPL-SCNC: 102 MMOL/L (ref 98–107)
CO2 SERPL-SCNC: 22.3 MMOL/L (ref 22–29)
CREAT BLD-MCNC: 0.58 MG/DL (ref 0.76–1.27)
DEPRECATED RDW RBC AUTO: 53.6 FL (ref 37–54)
EOSINOPHIL # BLD AUTO: 0.09 10*3/MM3 (ref 0–0.7)
EOSINOPHIL NFR BLD AUTO: 1.4 % (ref 0.3–6.2)
ERYTHROCYTE [DISTWIDTH] IN BLOOD BY AUTOMATED COUNT: 15 % (ref 11.5–14.5)
GFR SERPL CREATININE-BSD FRML MDRD: 141 ML/MIN/1.73
GLUCOSE BLD-MCNC: 93 MG/DL (ref 65–99)
HCT VFR BLD AUTO: 44.8 % (ref 40.4–52.2)
HGB BLD-MCNC: 15.2 G/DL (ref 13.7–17.6)
IMM GRANULOCYTES # BLD AUTO: 0.05 10*3/MM3 (ref 0–0.03)
IMM GRANULOCYTES NFR BLD AUTO: 0.8 % (ref 0–0.5)
INR PPP: 1.09 (ref 0.9–1.1)
LYMPHOCYTES # BLD AUTO: 1.05 10*3/MM3 (ref 0.9–4.8)
LYMPHOCYTES NFR BLD AUTO: 16.8 % (ref 19.6–45.3)
MCH RBC QN AUTO: 33 PG (ref 27–32.7)
MCHC RBC AUTO-ENTMCNC: 33.9 G/DL (ref 32.6–36.4)
MCV RBC AUTO: 97.4 FL (ref 79.8–96.2)
MONOCYTES # BLD AUTO: 0.4 10*3/MM3 (ref 0.2–1.2)
MONOCYTES NFR BLD AUTO: 6.4 % (ref 5–12)
NEUTROPHILS # BLD AUTO: 4.65 10*3/MM3 (ref 1.9–8.1)
NEUTROPHILS NFR BLD AUTO: 74.6 % (ref 42.7–76)
PLATELET # BLD AUTO: 227 10*3/MM3 (ref 140–500)
PMV BLD AUTO: 10.3 FL (ref 6–12)
POTASSIUM BLD-SCNC: 4.1 MMOL/L (ref 3.5–5.2)
PROTHROMBIN TIME: 13.9 SECONDS (ref 11.7–14.2)
RBC # BLD AUTO: 4.6 10*6/MM3 (ref 4.6–6)
SODIUM BLD-SCNC: 139 MMOL/L (ref 136–145)
WBC NRBC COR # BLD: 6.24 10*3/MM3 (ref 4.5–10.7)

## 2019-02-01 PROCEDURE — 97535 SELF CARE MNGMENT TRAINING: CPT

## 2019-02-01 PROCEDURE — 85610 PROTHROMBIN TIME: CPT | Performed by: INTERNAL MEDICINE

## 2019-02-01 PROCEDURE — 25010000002 PROPOFOL 10 MG/ML EMULSION: Performed by: ANESTHESIOLOGY

## 2019-02-01 PROCEDURE — 80048 BASIC METABOLIC PNL TOTAL CA: CPT | Performed by: PHYSICAL MEDICINE & REHABILITATION

## 2019-02-01 PROCEDURE — 0DH63UZ INSERTION OF FEEDING DEVICE INTO STOMACH, PERCUTANEOUS APPROACH: ICD-10-PCS | Performed by: INTERNAL MEDICINE

## 2019-02-01 PROCEDURE — 43246 EGD PLACE GASTROSTOMY TUBE: CPT | Performed by: INTERNAL MEDICINE

## 2019-02-01 PROCEDURE — G0515 COGNITIVE SKILLS DEVELOPMENT: HCPCS

## 2019-02-01 PROCEDURE — 97110 THERAPEUTIC EXERCISES: CPT

## 2019-02-01 PROCEDURE — 85025 COMPLETE CBC W/AUTO DIFF WBC: CPT | Performed by: INTERNAL MEDICINE

## 2019-02-01 RX ORDER — PROPOFOL 10 MG/ML
VIAL (ML) INTRAVENOUS CONTINUOUS PRN
Status: DISCONTINUED | OUTPATIENT
Start: 2019-02-01 | End: 2019-02-01 | Stop reason: SURG

## 2019-02-01 RX ORDER — PROPOFOL 10 MG/ML
VIAL (ML) INTRAVENOUS AS NEEDED
Status: DISCONTINUED | OUTPATIENT
Start: 2019-02-01 | End: 2019-02-01 | Stop reason: SURG

## 2019-02-01 RX ORDER — LIDOCAINE HYDROCHLORIDE 20 MG/ML
INJECTION, SOLUTION INFILTRATION; PERINEURAL AS NEEDED
Status: DISCONTINUED | OUTPATIENT
Start: 2019-02-01 | End: 2019-02-01 | Stop reason: SURG

## 2019-02-01 RX ORDER — DEXTROSE, SODIUM CHLORIDE, AND POTASSIUM CHLORIDE 5; .9; .15 G/100ML; G/100ML; G/100ML
75 INJECTION INTRAVENOUS CONTINUOUS
Status: DISCONTINUED | OUTPATIENT
Start: 2019-02-01 | End: 2019-02-02

## 2019-02-01 RX ORDER — SODIUM CHLORIDE, SODIUM LACTATE, POTASSIUM CHLORIDE, CALCIUM CHLORIDE 600; 310; 30; 20 MG/100ML; MG/100ML; MG/100ML; MG/100ML
1000 INJECTION, SOLUTION INTRAVENOUS CONTINUOUS
Status: DISCONTINUED | OUTPATIENT
Start: 2019-02-01 | End: 2019-02-01 | Stop reason: HOSPADM

## 2019-02-01 RX ADMIN — PROPOFOL 100 MCG/KG/MIN: 10 INJECTION, EMULSION INTRAVENOUS at 16:18

## 2019-02-01 RX ADMIN — SODIUM CHLORIDE, PRESERVATIVE FREE 3 ML: 5 INJECTION INTRAVENOUS at 09:12

## 2019-02-01 RX ADMIN — LACOSAMIDE 100 MG: 10 SOLUTION ORAL at 23:08

## 2019-02-01 RX ADMIN — SODIUM CHLORIDE, PRESERVATIVE FREE 3 ML: 5 INJECTION INTRAVENOUS at 23:14

## 2019-02-01 RX ADMIN — PROPOFOL 100 MG: 10 INJECTION, EMULSION INTRAVENOUS at 16:18

## 2019-02-01 RX ADMIN — POTASSIUM CHLORIDE, DEXTROSE MONOHYDRATE AND SODIUM CHLORIDE 100 ML/HR: 150; 5; 900 INJECTION, SOLUTION INTRAVENOUS at 13:00

## 2019-02-01 RX ADMIN — LIDOCAINE HYDROCHLORIDE 100 MG: 20 INJECTION, SOLUTION INFILTRATION; PERINEURAL at 16:18

## 2019-02-01 RX ADMIN — SODIUM CHLORIDE, POTASSIUM CHLORIDE, SODIUM LACTATE AND CALCIUM CHLORIDE: 600; 310; 30; 20 INJECTION, SOLUTION INTRAVENOUS at 16:13

## 2019-02-01 NOTE — PROGRESS NOTES
Inpatient Rehabilitation Functional Measures Assessment    Functional Measures  ALBERTO Eating:  Branch  [facility] Grooming: Branch  [facility] Bathing:  Branch  [facility] Upper Body Dressing:  [place]  [facility] Lower Body Dressing:  [facility] Toileting:  [facility] Bladder Management  Level of Assistance:  [place]  Frequency/Number of Accidents this Shift:  [facility] Bowel Management  Level of Assistance: [place]  Frequency/Number of Accidents this Shift: [place]    [facility] Bed/Chair/Wheelchair Transfer:  Activity was not observed.  ALBERTO Toilet Transfer:  [facility] Tub/Shower Transfer:  [place]    Previously Documented Mode of Locomotion at Discharge: Field  ALBERTO Expected Mode of Locomotion at Discharge: [facility] Walk/Wheelchair:  WHEELCHAIR OBSERVATION   Activity was not observed.    WALK OBSERVATION   Walk Distance Scale = 3.  Distance walked is greater than 150 feet. Walk Score  = 1.  Patient performs 75% or more of effort and requires minimal assistance of  two or more people for walking. for safety . Patient walked a distance of  160  feet. Patient requires the following assistive device(s): Rolling walker.  ALBERTO Stairs:  Activity was not observed.    ALBERTO Comprehension:  [facility] Expression:  [facility] Social Interaction:  [facility] Problem Solving:  [facility] Memory:  [place]    Therapy Mode Minutes  Occupational Therapy: [place]  Physical Therapy: Individual: 60 minutes.  Speech Language Pathology:  [place]    Signed by: Steffany Flynn PT

## 2019-02-01 NOTE — ANESTHESIA PREPROCEDURE EVALUATION
Anesthesia Evaluation     Patient summary reviewed and Nursing notes reviewed   NPO Solid Status: > 8 hours  NPO Liquid Status: > 8 hours           Airway   Mallampati: III  No difficulty expected  Dental - normal exam     Pulmonary     breath sounds clear to auscultation  Cardiovascular     (+) hyperlipidemia,       Neuro/Psych  (+) CVA,       ROS Comment: Cerebellar hemorrage  GI/Hepatic/Renal/Endo      Musculoskeletal     Abdominal    Substance History      OB/GYN          Other                        Anesthesia Plan    ASA 4     MAC     intravenous induction   Anesthetic plan, all risks, benefits, and alternatives have been provided, discussed and informed consent has been obtained with: patient.

## 2019-02-01 NOTE — PROGRESS NOTES
Inpatient Rehabilitation Functional Measures Assessment and Plan of Care    Plan of Care  Updated Problems/Interventions  Field    Functional Measures  ALBERTO Eating:  [facility] Grooming: [facility] Bathing:  [facility] Upper Body Dressing:  [facility] Lower Body Dressing:  [facility] Toileting:  [facility] Bladder Management  Level of Assistance:  [place]  Frequency/Number of Accidents this Shift:  [facility] Bowel Management  Level of Assistance: [place]  Frequency/Number of Accidents this Shift: [facility] Bed/Chair/Wheelchair Transfer:  [facility] Toilet Transfer:  [facility] Tub/Shower Transfer:  [place]    Previously Documented Mode of Locomotion at Discharge: Field  ALBERTO Expected Mode of Locomotion at Discharge: [facility] Walk/Wheelchair:  [facility] Stairs:  [facility] Comprehension:  [facility] Expression:  [facility] Social Interaction:  [facility] Problem Solving:  [facility] Memory:  [place]    Therapy Mode Minutes  Occupational Therapy: Individual: 30 minutes.  Physical Therapy: [place]  Speech Language Pathology:  [place]    Signed by: MELISSA Marrero/ERNESTO

## 2019-02-01 NOTE — PROGRESS NOTES
"   LOS: 30 days   Patient Care Team:  Jesus Bautista MD as PCP - General (Family Medicine)     Chief Complaint:   1. 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  2. 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  3. Status post right  shunt removal secondary to infection- completed course of antibiotics on 01/02/2019.  4. Status post external ventricular drain and removal.  5. Left upper extremity brachial vein DVT-on Eliquis.  6. Hyponatremia.   7. Neural stimulation-       Subjective          Subjective  Continues slow processing. Denies HA. Denies nausea but has emesis bag with him.  Plan for PEG tube is now around 2-2:30 PM  Doing better on task with SLP this morning.        History taken from: patient    Objective     Vital Signs  Temp:  [96.7 °F (35.9 °C)-97.9 °F (36.6 °C)] 97.9 °F (36.6 °C)  Heart Rate:  [85-97] 97  Resp:  [16-20] 18  BP: (120-137)/(81-90) 120/81    Objective:  Vital signs: (most recent): Blood pressure 120/81, pulse 97, temperature 97.9 °F (36.6 °C), temperature source Oral, resp. rate 18, height 162.6 cm (64\"), weight 58.2 kg (128 lb 4.9 oz), SpO2 98 %.            GENERAL: The patient is a 63-year-old male who is awake, alert   cooperative in no acute distress.  HEENT:   Sclerae anicteric. Conjunctivae pink. Oropharynx moist.     LUNGS: Clear to auscultation bilaterally without wheezes, rales or rhonchi. No accessory muscle use.  HEART: RRR  ABDOMEN: Normoactive bowel sounds. Soft, nontender.    EXTREMITIES: Without edema or cyanosis.   NEUROLOGIC: Slow processing   . Converses.    Impaired recall. Takes resistance bilaterally            Results Review:     I reviewed the patient's new clinical results.  Results from last 7 days   Lab Units 02/01/19  0724 01/30/19  0622 01/28/19  0653   SODIUM mmol/L 139 139 139   POTASSIUM mmol/L 4.1 3.8 4.1   CHLORIDE mmol/L 102 100 104   CO2 " mmol/L 22.3 23.3 24.1   BUN mg/dL 12 10 20   CREATININE mg/dL 0.58* 0.56* 0.62*   CALCIUM mg/dL 9.3 9.2 8.6   GLUCOSE mg/dL 93 88 84     Results from last 7 days   Lab Units 02/01/19  0724 01/28/19  0723   WBC 10*3/mm3 6.24 5.62   HEMOGLOBIN g/dL 15.2 12.7*   HEMATOCRIT % 44.8 38.5*   PLATELETS 10*3/mm3 227 228       MRI brain - Jan 16, 2019  Portions summarized -   The lateral 3rd and superior 4th ventricle remain mildly enlarged  compatible with mild hydrocephalus unchanged since 10/09/2019  , the   ventricles are clearly slightly larger than they were on prior outside  head CTs on 12/25/2018 and 12/27/2018 after the removal of the  ventriculostomy catheters.  3.8 x 2.9 cm area of FLAIR hyperintensity and mixed diffusion  hyperintensity extending from the inferior medial cerebellar white  matter into the cerebellar vermis that is compatible with subacute  infarct of brain along the margins of the [place] used to embolize the  vermian AVM  The 2nd  ventriculostomy catheter extended from the superior right coronal sutures through the  anterior superior lateral right frontal lobe parenchyma to the  anterosuperior body of the right lateral ventricle, and this has some  diffusion hyperintensity in the tract but also there is some FLAIR and  diffusion hyperintensity circumscribing the catheter tract within the  right anterosuperior lateral frontal white matter measuring up to 12 x 8  x 9 mm surrounding FLAIR and diffusion hyperintensity could be edema  related to the placement and removal or could be some focal cerebritis.    CT head - Jan 24 , 2019  -There is mild prominence of the temporal horns similar to the prior  examination. Beam hardening artifact from the involved material limits  evaluation of the posterior fossa somewhat, but there is no convincing  evidence of hemorrhage involving the posterior fossa. There is resolving  intraventricular blood appreciated. The occipital horns are smaller than  the prior  examinations. The remaining portions of the lateral ventricles  as well as the 3rd and 4th ventricle appear unchanged. There is no  evidence of transependymal migration of fluid. There is no evidence of  intra-axial hemorrhage.    Medication Review: done  Scheduled Meds:    influenza vaccine 0.5 mL Intramuscular Once   lacosamide 100 mg Oral Q12H   lansoprazole 30 mg Oral QAM   meclizine 12.5 mg Oral TID AC   multivitamin with minerals 1 tablet Oral Daily   polyethylene glycol 17 g Oral Daily   sodium chloride 3 mL Intravenous Q12H   vancomycin 1,000 mg Intravenous On Call to OR     Continuous Infusions:    dextrose 5 % and sodium chloride 0.9 % with KCl 20 mEq 100 mL/hr     PRN Meds:.•  bisacodyl  •  lidocaine viscous  •  ondansetron  •  ondansetron ODT  •  promethazine  •  sodium chloride      Assessment/Plan       Cerebellar hemorrhage (CMS/HCC)    Intraventricular hemorrhage (CMS/HCC)    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    SIADH (syndrome of inappropriate ADH production) (CMS/HCC)    Elevated hemoglobin A1c    Hyperlipidemia      Assessment & Plan  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    Status post hydrocephalus. S/p removal infected  shunt. Features of hydrocephalus worsening impaired memory, balance , bladder incontinence reviewed with patient and wife in event he develops any of those symptoms in future.   Jan 7 - recheck CT head with patient's lethargy / persistent nausea ( also had at outside hospital) to assess for any worsening of hydrocephalus with shunt removed.  Addendum-There is stable to equivocal slight interval increase in size in the lateral and third ventricles when compared to prior head CT 11 days ago on 12/27/2018 compatible with mild hydrocephalus. There is some low density and volume loss along the margins of the hyperdense liquid  embolic agents of the cerebellar vermi,  compatible with areas of infarcted cerebellar vermis measuring up to 3.5 x 3 x 2.8 cm, unchanged.No residual acute intracranial hemorrhage is seen.  Jan 9 - Nursing called - patient got up on his own, bed alarm went off, in room near nursing station.  Fell and struck head on bedside dresser and bed that wife was in.  Nursing reports no change seen from recent baseline neuro. Got stat CT head ( no chnages from two days ago, no new bleed) and repeat CT  in 24 hours as on Eliquis, neuro checks q 2 hours, sitter ordered. Discontinued Eliquis which he is on for LUE brachial DVT and re-assess regarding anticoagulation after CT tomorrow.   He does not describe any new complaint. Baseline nausea, headache. He did better yesterday after first dose of Ritalin and ate more at dinner. Ate 50% breakfast today per sitter.   Selvin 10 - neuro stable. Given head trauma yesterday on Eliquis, will repeat CT head this afternoon to rule out any bleed before resuming Eliquis for DVT. Add:  F/U CT this afternoon - no acute hemorrhage.  Will resume Eliquis for LUE DVT  Selvin 15 - Patient with variable performance, more alert at times per nursing earlier today,  but then other times more fatigued with therapies.   On amantadine and ritalin.  Na level stable yesterday. To recheck in AM.  WBC normal yesterday. No fever.  On Eliquis for LUE DVT on venous duplex Dec 4 and still present on venous duplex Dec 26.  Concern at any point would be for potential for SDH over time.   At this point, as he is 6 weeks out from LUE DVT on Dec 4, will hold Eliquis for now, recheck BUE venous duplex tomorrow, and then decide on ongoing anti-coagulation,  and if any persistent decline with performance will check CT head. Will not check CT head this evening as not any dramatic change on physical exam .    Jan 16 - Patient with confused comments, said an odd name randomly, mis-identified where wife worked. SLP also notes some confused comments. Wife reports he  complains of headache, but  denied having headache presently to examiner. Wife reports he complained of ear pain but may have been related to tape for feeding tube. His nausea is better and eating better. He reports nausea is low . Does not complain of any new weakness on exam.   Na 129 - not a dramatic change but will d/c water flushes with tube feeds. Will get MRI brain with and without contrast stat- assess for any acute changes from prior infection or any acute bleed. MRI states can get timely once screening sheet completed. Recheck WBC.  Will d/c scopalamine patch - done. (add:  Discussed with neurosurgery and neuroradiology - patient below the threshold in terms of number of CT scan for any concern for cumulative effect - can get repeat CT scans as need). (add:  MRI done without contrast as not able to get IV site).    Add:  MRI portions summarized above. No acute bleed. Subacute infarct - 3.8 x 2.9 cm area of FLAIR hyperintensity and mixed diffusion hyperintensity extending from the inferior medial cerebellar white matter into the cerebellar vermis that is compatible with subacute infarct of brain along the margins of the [place] used to embolize the  vermian AVM . Mild hydrocephalus - Neurosurgery wanted to hold of placing  shunt in near term due to infection with  shunt. Along tract of previous ventriculostomy changes of either edema or focal cerebritis (will monitor as no fever or leukocytosis).   Jan 17 - The patient's imaging was reviewed with [facility] neuro radiology and [facility] neurosurgery service.  Neurosurgery evaluating ventricle size as well as the area along the track of the previous frontal ventriculostomy of either edema or focal cerebritis.  On the previous CAT scan that appeared to represent possibly an area of CSF backflow that improved with subsequent repeat ventriculostomy placement posteriorly.  Jan 19 - Patient with temp 100.2 earlier today. Later was 99.0 / 98.4.   He continues with  baseline nausea. Ate only 1/5 of breakfast. He describes some posterior headache.  Wife reports he will do confused action, talk about people who don't live here.   He continues more alert. Does not describe any focal weakness. Dysphonia better.   Will recheck CBC with diff, procalcitonin, ESR, CRP, CMP and consult ID to see.  Addendum-infectious disease does not feel there was any active infectious process.  Continues to monitor  Jan 24 - Ct shows ventricles stable to slightly smaller size  Jan 31 - f/u with [facility] -  [facility] note reviewed.  Per EDIL, can come off AED and see how does in terms of seizures.  He received first dose of Vimpat liquid yesterday but was not continued on MAR as q 12 hours. Got second dose today and back on MAR as q 12 hours. Last dose Keppra yesterday.  Will continue Vimpat through procedure tomorrow and can then look at taper off.           Status post meningitis.Completed antibiotics at outside hospital.  Jan 4 - recheck CBC in AM.  Jan 5 - WBC 5K  Jan 7 - WBC 5.9. No fever. Will check procalcitonin/ESR, CRP with lethargy to assess for any recurrent infection.  Addendum-unremarkable.  CRP 0.12, sedimentation rate 14, pro-calcitonin 0.05, WBC 5.93  Jan 19 - temp 100.2. Recheck labs and ask ID to see. Addendum-infectious disease does not feel there was any active infectious process.  Continues to monitor  Jan 21 - afberile. WBC 7.69      Nausea - Jan 14 . Has been on Zofran. Persistent nausea s/p posterior fossa changes. Previously held off on scopolamine patch given potential cognitive side effects, but with persistent nausea that is affecting nutrition, will add.   Jan 16 - Scopolamine helped nausea but had to d/c scopolamine due to cognitive side effect.   Jan 18 - Tolerates tube feeds. Still nauseated. Discussed trial of Phenergan after therapies tomorrow due to sedative side effect.    January 20- he took Phenergan twice yesterday, patient reports nausea better, wife  notes that had emesis after dinner.  He is on Zofran scheduled before meal time  Jan 21 - Has not take phenergan since Jan 19. Emesis again with breakfast.Discussed observe off amantadine today to see if any effect on confused comments.  Discussed tomorrow will change from Zofran scheduled to antivert 12.5 mg scheduled tid before meals.  Also add Remeron tomorrow PM to see if helps with appetite. Other option would include Topamax 25 mg HS for headache/nausea.   Jan 24 - Antivert helps nausea but ? If cognitive side effect. Will adjust other meds first.  Jan 25 - Get gastroenterology input on any options for nausea.  Jan 28 - UGI with SBFT today. Possibly gastric emptying study depending on results.   Jan 29 - UGI with SBFT overall unremarkable yesterday. To go for gastric emptying study tomorrow at 7:30 AM  Jan 30 - GES results - normal. Denies nausea on Anitvert.   Feb 1 - if tolerates tube feeds after PEG placed, may change Antivert to 12.5 mg tid prn on Vish Feb 3 to see if any cognitive side effect.       Hyponatremia. SIADH.   Selvin 3 - stable at 130.   Jan 5 - Serum sodium has gone up from 130 to 131.  Serum osmolarity is low at 272 but urine osmolarity is inappropriately concentrated at 847/759.  Thyroid function test is normal.  Results are consistent with SIADH.   Placed on a 1500 mL per day fluid restriction ( but he does not approach that on his own).  Jan 6 - . Urine osmolality 277. BUN 9, Cr 0.67.   Jan 8 - Endocrinology increase dietary salt and continue fluid restriction.  Jan 9 - salt tabs added  Jan 14 -   Jan 18 - Na 133. On salt tabs. No water flushes with tube feeds.   Jan 22 - salt tabs decreased to bid  Jan 23 - Na 136. Salt tabs d/'d as difficulty taking by mouth  Jan 28 - started on normal saline yesterday with decrease appetite and NPO after midnight for meds. Na 139 today.   Jan 29 - IVF d/c'd  Feb 1 - PEG not until afternoon. Add IVF x 2 liters D5 NS with 20 KCL at 100 cc per  hour.       Adrenal - Evaluation against adrenal insufficiency.  Endocrinology discontinuing decadron and will re-evaluate further.   Jan 7 - worsened lethargy in therapies today, ? If could be related to being off steroids?  Jan 8- repeat ACTH test  January 11-adrenal insufficiency    Left upper extremity DVT-on Eliquis.  DVT prophylaxis-SCD and Eliquis.  Jan 9 - Eliquis on hold for at least 24 hours with recent fall with trauma to end. Re-assess after CT on Selvin 10.   Selvin 15 - Concern at any point would be for potential for SDH over time any time he shows a fluctuation. At this point, as he is 6 weeks out from LUE DVT on Dec 4, will hold Eliquis for now, recheck BUE venous duplex tomorrow, and then decide on ongoing anticoagulation.   Jan 16 - left brachial vein DVT resolved. BUE superficial venous thrombosis. No bleed on MRI. Resume Eliquis.   Jan 26 - Will d/c Eliquis after  Friday Jan 25 PM dose in preparation for any possible endoscopic evaluation/ procedure first of the week. . BUE venous duplex Jan 16  showed resolution of LUE brachial vein DVT. Had acute and chronic RUE SVT and chronic LUE SVT.       Encephalopathy / Neuro- stimulation-admitted on amantadine.  Jan 8 - depression may be a component. SSRI may affect appetite or sodium level. Possibly add Ritalin, could affect appetite but if more alert could possibly eat more.  Will add Ritalin 5 mg every 8 AM and 12 noon.  Selvin 10 - more alert and interactive  Jan 20 - he has some confused comments.  Neurosurgery evaluated imaging.  Seen by ID and not felt to have any acute infectious process.  Discussed with patient and wife,  may possibly have some cognitive side effects from amantadine, decreased to 100 mg today and then discontinue starting with tomorrow and see if there is any improvement.  Doubt confusion would be related to Ritalin.  He has had it before starting of Phenergan. Na level okay 133.  Other consideration would be Keppra side effect  contributing.  Jan 22 - no apparent change yet off amantadine.   Jan 24 - continues with increased confusion/ behavioral changes.   Patient has been more confused, more difficulty with tasks from cognitive standpoint.  Making odd, confused comments. Will perseverate on some tasks.  Also increased irritability at times, shaking fists.  He has had some hallucinations as well  He denies any headache. Denies any nausea but does not remember emesis from this morning.  His wife notes that nausea is noticeably better since antivert and nursing reports same, but we discussed could be having some confusion from antivert. Other possibilities could be Ritalin although showed initial improvement with that vs hydrocephalus. Discussed discontinue Remeron and Ritalin and recheck CT head. Will also check UA. Continue antivert for now as does help nausea.   Add: CBC unremarkable. UA pending. CT head stable with - There is resolvingintraventricular blood appreciated. The occipital horns are smaller than the prior examinations. The remaining portions of the lateral ventricles as well as the 3rd and 4th ventricle appear unchanged. There is no evidence of transependymal migration of fluid.  Jan 25 - still confusion , aggressive behavior at night. He had confusion last weekend before antivert but could have symptoms of underlying changes in brain made more prominent by medication side effect. Discussed see how does off other meds for next couple days and if confusion/ hallucinations continue, then change Keppra to Vimpat. UA unremarkable. BUN/CR, NA okay.   Jan 28 - had planned change to Vimpat but patient unable to swallow pill that large and liquid form not available here.  Jan 30 - gets more confused / irritable later in the days. Still with hallucinations at times per wife. Patient not able to describe. Vimpat liquid to be available this afternoon - will transition from Keppra to see if helps cognition/hallucinaiton/ mood. Last dose  Keppra tonight.         Seizure prophylaxis-Keppra.  Jan 30 - Vimpat liquid to be available this afternoon - will transition from Keppra to see if helps cognition/hallucinaiton/ mood.   Feb 1 - per [facility] EDIL can come off seizure prophylaxis - will taper off Vimpat in couple days after recovery period after PEG placement     Nutrition - Jan 5 -  poor intake. Staff encouraging intake. ( He was placed on 1500 cc fluid restriction but does not appraoch that on his own. Calorie count in progress. May possibly add Remeron. Would avoid Megace given its thrombogenic potential and patient's history of LUE DVT.  Jan 7 - will change back to scheduled Zofran prior to meals. Assess ventricles with CT, labs to assess for infection.    Jan 8 - RUQ ultrasound ordered - see report.  Consider Remeron but sedative side effect could be an issue. Nausea probably related to posterior fossa CNS   Jan 9 - PO intake remains poor.  Will discuss with patient and wife Cortrak feeding tube.   Add: patient and spouse is agreeable. He ate about 50% at supper. Nursing feels he is showing some improvement in intake. Discussed with patient and wife that with next meal that he does not show good intake will place Cortrak. They are in agreement.   Selvin 10 - eating 50% breakfast and lunch so hold on Cortrak presently.   Jan 14 -  Persistent nausea. Had emesis yesterday and again today.  Intake decreased again over weekend.  Neuro without change. No headache, dizziness, abd pain. Previous follow up CTs without significant change.  Discussed with patient and wife - agreeable with Cortrak tube placement to help with nutrition given weight loss.  On Zofran scheduled before meals. Concern with adding Phenergan is sedative side effect. Will try scopalamine patch.   Selvin 15 - nausea better per patient report. Tolerates tube feeds.   Jan 18 - continue tube feeds through weekend and then re-assess.   Jan 21 - Intake by mouth still limited.  Reviewed with  dietician, patient, wife and will continue Cortrak tube feeds.  Jan 22- adding remeron for appetite tonight.   Jan 23 - patient pulled Cortrak early AM - discussed see how eats with tube out  Jan 24 -  Discussed replacing Cortrak tube in AM for nutritional support as less likely to pull out during the day.   Jan 25 - replace Cortrak- addendum: Patient not able to tolerate replacement of Cortrak tube. His intake has remained poor. Discussed option of Reglan to see if helps appetite but concern would be for cognitive side effects. Discussed with patient / wife looking at G-tube placement. Will ask Gastroenterology to see for assessment for options on nausea and PEG tube placement.. His Eliquis can be held at any time for the procedure.   Jan 29 - gastric emptying swallow study tomorrow to determine if place PEG or PEJ tube  Jan 30 - await GES results. Patient has appointment with his Neurosurgery at outside facility tomorrow at 1PM which would affect timing of tube placement.   Feb 1 - PEG placement today.           Impaired cognition, mobility and self-care. Now admitted for comprehensive inpatient rehabilitation program with physical therapy 1 hour, occupational therapy 1 hour, speech therapy 1 hour, 5 days a week. Areas to be addressed include cognition/executive function, swallowing, functional mobility, gross and fine motor control, ADL training, transfers, balance, gait. Rehabilitation nursing for carryover and monitoring of his neurologic status, bowel, bladder, skin. Ongoing physician followup. Weekly team conferences. Goal for him to achieve a level of supervision with his mobility and self-care and improvement in his cognition. Rehabilitation prognosis fair. Medical prognosis fair. Estimated length stay is indeterminate at this time.     Selvin 3 - initiated acute inpt rehab  TEAM CONF - JAN 4 - BED MIN. TRANSFERS MIN. GAIT 80 FEET MIN ASSIST RW. MAX - DEPENDENT WITH ADLS, CUES TO KEEP EYES OPEN AND  PARTICIPATE. DIFFICULTY WITH VISUAL TASKS.  MODERATE SEVERE  COGNITIVE DEFICITS. BNE PENDING. SWALLOW - TOLERATES SMALL PILLS WITH WATER. AMANTADINE FOR NEUROSTIM. CONTINENT BOWEL AND BLADDER. 30 LBS WEIGHT LOSS DURING HOSPITAL STAY.NAUSEA - HAS BEEN TAKING ZOFRAN. WAS SCHEDULED AT [facility] AS WELL AS PRN. WILL ADD SCHEDULED DOSE Q AM HERE AND CONTINUE PRN.  NUTRITION - NOT EATING OR DRINKING - 25% WITH CUES.  . SLEPT FROM 6 PM TO 7 AM. HYPONATREMIA - SIADH - STABLE.  ADRENAL INSUFFICIENCY EVALUATION.    ELOS- 3 WEEKS    TEAM CONF - JAN 10 - Executive Functions severely impaired-impaired attention, thought organization, visuospatial skills  Memory moderately impaired- impaired immediate and delayed  verbal recall, slightly better with visual recall  Bed/Chair/Wheelchair Min  Bed Mobility  Min  Walk 80 ft Rwx Min  Toilet Transfers  Min A  Bathing Mod A  Dressing Lower MAX  Dressing Upper Min  Toileting Dep  Continent mostly, but some incontinence  Sitter for safety  ELOS - two weeks    TEAM CONF - JAN 17 -TRANSFERS MIN-MOD ASSIST.  GAIT 80 FEET MIN-MOD 2.  LBD MOD-MAX.  UBD MIN ASSIST.  BATH MOD ASSIST. MAX CUES FOR TASKS WITH SLP.   MORE DIFFICULTY WITH TASKS. POOR INITIATION. NOT MAKING PROGRESS.  BLADDER INCONTINENT. ON CORTRAK TUBE FEEDS.   WILL ASK [facility] TO REVIEW RECENT IMAGING.   ELOS - TWO WEEKS    TEAM CONF - JAN 24 - NAUSEA AND EMESIS AFTER MED THIS AM.  RENAL DISCONTINUED SALT TABS.  FATIGUE . VARIABLE PARTICIPATION. BED MIN ASSIST. GAIT 80 FEET MIN ASSIST. ADLS MIN ASSIST FOR BATHING AND DRESSING. COGNITIVE TASKS - FLUCTUATING SKILLS DAY TO DAY. SEVERELY IMPAIRED MEMORY. ON RITALIN FOR NEUROSTIMULATION. ADDED REMERON LOW DOSE FOR APPETITE.  PULLED OUT CORTRAK - ATE 25-50% YESTERDAY. ON ANTIVERT PRIOR TO MEALS FOR NAUSEA.  WILL DISCUSS WITH PATIENT AND WIFE REGARDING POSSIBLE G-TUBE.  CONTINENT BLADDER.   ELOS - ONE WEEK -MAY NEED TO LOOK AT SUBACUTE.      Nilson Win,  MD  02/01/19  11:20 AM    Time:             >35 minutes with > 50% face-to-face / floor time / coordination of care

## 2019-02-01 NOTE — THERAPY TREATMENT NOTE
Inpatient Rehabilitation - Occupational Therapy Treatment Note    [facility]     Patient Name: Feliciano Light  : 1955  MRN: [id number]    Today's Date: 2019                 Admit Date: 2019      Visit Dx:    ICD-10-CM ICD-9-CM   1. Protein-calorie malnutrition, unspecified severity (CMS/HCC) E46 263.9   2. 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   • SIADH (syndrome of inappropriate ADH production) (CMS/HCC)   • Elevated hemoglobin A1c   • Hyperlipidemia   • Protein-calorie malnutrition (CMS/HCC)         Therapy Treatment    IRF Treatment Summary     Row Name 19 1339 19 1300 19 1100       Evaluation/Treatment Time and Intent    Subjective Information  fatigue  -CC  fatigue  -SA  no complaints  -SA    Existing Precautions/Restrictions  fall  -CC  fall  -SA  fall  -SA    Document Type  therapy note (daily note)  -CC  therapy note (daily note)  -  therapy note (daily note)  -SA    Mode of Treatment  occupational therapy  -CC  speech-language pathology  -SA  speech-language pathology  -SA    Patient/Family Observations  up in w/c  -CC  up in w/c; RN placing hydration getting ready for PEG Tube placement at 1430. Pt grimmacing; with eyes closed and refused to work with SLP. SHook head no he wasn't going to talk.   -SA  up in w/c; good mood and verbalized this am  -SA    Start Time (Evaluation/Treatment)  --  --  1100  -SA    Stop Time (Evaluation/Treatment)  --  --  1130  -SA    Unable to Perform (Evaluation/Treatment)  patient refused earlier session  -CC  patient refused  -SA  --    Recorded by [CC] Genevieve Talavera OTR [SA] Heaven Bautista MS CCC-SLP [SA] Heaven Bautista MS CCC-SLP    Row Name 19 0930 19 0815          Evaluation/Treatment Time and Intent    Subjective  Information  --  complains of;fatigue  -LB     Existing Precautions/Restrictions  --  fall  -LB     Document Type  --  therapy note (daily note)  -LB     Mode of Treatment  speech-language pathology  -SA  physical therapy  -LB     Patient/Family Observations  --  pt during first session, eyes closed with little verbage.  When returned at 1000, pt did wake up and participate in therapy  -LB     Unable to Perform (Evaluation/Treatment)  other (see comments) unable to be aroused; will check back  -  --     Recorded by [SA] Heaven Bautista MS CCC-SLP [LB] Steffany Flynn, PT     Row Name 02/01/19 1339 02/01/19 0815          Cognition/Psychosocial- PT/OT    Affect/Mental Status (Cognitive)  confused  -CC  confused  -LB     Behavioral Issues (Cognitive)  withdrawn  -CC  withdrawn  -LB     Orientation Status (Cognition)  --  oriented to;person  -LB     Follows Commands (Cognition)  --  follows one step commands;50-74% accuracy;delayed response/completion;increased processing time needed;initiation impaired  -LB     Personal Safety Interventions  fall prevention program maintained;gait belt;nonskid shoes/slippers when out of bed  -CC  fall prevention program maintained;gait belt;muscle strengthening facilitated;nonskid shoes/slippers when out of bed  -LB     Cognitive Function (Cognitive)  safety deficit  -CC  --     Attention Deficit (Cognitive)  --  distractible in noisy environment;distractible in quiet environment  -LB     Safety Deficit (Cognitive)  --  insight into deficits/self awareness;problem solving;judgment;ability to follow commands  -LB     Recorded by [CC] Genevieve Talavera, OTR [LB] Steffany Flynn, PT     Row Name 02/01/19 0815             Bed-Chair Transfer    Bed-Chair [place] (Transfers)  verbal cues;minimum assist (75% patient effort)  -LB      Assistive Device (Bed-Chair Transfers)  wheelchair  -LB      Recorded by [LB] Steffany Flynn, PT      Row Name 02/01/19 0815             Chair-Bed Transfer     Chair-Bed [place] (Transfers)  verbal cues;minimum assist (75% patient effort)  -LB      Assistive Device (Chair-Bed Transfers)  wheelchair  -LB      Recorded by [LB] Steffany Flynn, PT      Row Name 02/01/19 1339 02/01/19 0815          Sit-Stand Transfer    Sit-Stand [place] (Transfers)  verbal cues;minimum assist (75% patient effort);moderate assist (50% patient effort)  -CC  verbal cues;contact guard;minimum assist (75% patient effort)  -LB     Assistive Device (Sit-Stand Transfers)  walker, front-wheeled  -CC  walker, front-wheeled  -LB     Recorded by [CC] Genevieve Talavera OTR [LB] Steffany Flynn, PT     Row Name 02/01/19 1339 02/01/19 0815          Stand-Sit Transfer    Stand-Sit [place] (Transfers)  verbal cues;minimum assist (75% patient effort)  -CC  verbal cues;contact guard  -LB     Assistive Device (Stand-Sit Transfers)  walker, front-wheeled  -CC  walker, front-wheeled  -LB     Recorded by [CC] Genevieve Talavera OTR [LB] Steffany Flynn, PT     Row Name 02/01/19 1339             Shower Transfer    Type (Shower Transfer)  stand pivot/stand step;sit-stand;stand-sit  -CC      [place] Level (Shower Transfer)  moderate assist (50% patient effort) resistant  -CC      Assistive Device (Shower Transfer)  grab bars/tub rail;shower chair  -CC      Recorded by [CC] Genevieve Talavera OTR      Row Name 02/01/19 0815             Gait/Stairs Assessment/Training    [place] Level (Gait)  minimum assist (75% patient effort)  -LB      Assistive Device (Gait)  walker, front-wheeled  -LB      Distance in Feet (Gait)  160, 80 x 2  -LB      Deviations/Abnormal Patterns (Gait)  gait speed decreased;stride length decreased  -LB      Bilateral Gait Deviations  forward flexed posture;heel strike decreased  -LB      Comment (Gait/Stairs)  as pt fatigues, places walker too far forward and step length decreases as well as foot clearance  -LB      Recorded by [LB] Steffany Flynn, PT      Row Name 02/01/19 0884              Safety Issues, Functional Mobility    Safety Issues Affecting Function (Mobility)  ability to follow commands;judgment;problem solving;safety precautions follow-through/compliance  -LB      Impairments Affecting Function (Mobility)  balance;cognition;visual/perceptual;motor planning  -LB      Recorded by [LB] Steffany Flynn PT      Row Name 02/01/19 1339             Bathing Assessment/Treatment    Bathing [place] Level  bathing skills;lower body;upper body;verbal cues;nonverbal cues (demo/gesture);contact guard assist;maximum assist (25% patient effort)  -CC      Assistive Device (Bathing)  grab bar/tub rail;hand held shower spray hose;shower chair  -CC      Bathing Position  supported sitting;supported standing  -CC      Bathing Setup Assistance  adjust water temperature;obtain supplies  -CC      Recorded by [CC] Genevieve Talavera OTR      Row Name 02/01/19 1339             Upper Body Dressing Assessment/Treatment    Upper Body Dressing Task  upper body dressing skills;don;moderate assist (50-74% patient effort)  -CC      Upper Body Dressing Position  -- gown  -CC      Set-up Assistance (Upper Body Dressing)  obtain clothing  -CC      Recorded by [CC] Genevieve Talavera OTR      Row Name 02/01/19 1339             Lower Body Dressing Assessment/Treatment    Lower Body Dressing [place] Level  don;socks;verbal cues;minimum assist (75% patient effort)  -CC      Lower Body Dressing Position  supported sitting  -CC      Comment (Lower Body Dressing)  -- dep brief  -CC      Recorded by [CC] Genevieve Talavera OTR      Row Name 02/01/19 1339             Grooming Assessment/Treatment    Grooming [place] Level  grooming skills;deodorant application;oral care regimen;wash face, hands;set up;supervision  -CC      Grooming Position  supported sitting  -CC      Grooming Setup Assistance  obtain supplies  -CC      Recorded by [CC] Genevieve Talavera OTR      Row Name 02/01/19 1339 02/01/19 0815          Pain  Scale: Numbers Pre/Post-Treatment    Pain Scale: Numbers, Pretreatment  0/10 - no pain  -CC  0/10 - no pain  -LB     Pain Scale: Numbers, Post-Treatment  0/10 - no pain  -CC  0/10 - no pain  -LB     Recorded by [CC] Genevieve Talavera OTR [LB] Steffany Flynn, PT     Row Name 02/01/19 0815             Static Sitting Balance    Level of [place] (Unsupported Sitting, Static Balance)  contact guard assist  -LB      Sitting Position (Unsupported Sitting, Static Balance)  sitting edge of mat  -LB      Time Able to Maintain Position (Unsupported Sitting, Static Balance)  more than 5 minutes  -LB      Comment (Unsupported Sitting, Static Balance)  balloon tapping - pt able to see balloon on both right and left side.  Pt did encouragement to participate in the activity.  After a few minutes pt did complain of nausea which may be related to the visual scanning  -LB      Recorded by [LB] Steffany Flynn, PT      Row Name 02/01/19 0815             Lower Extremity Seated Therapeutic Exercise    Performed, Seated Lower Extremity (Therapeutic Exercise)  hip flexion/extension;knee flexion/extension;LAQ (long arc quad), knee extension;ankle dorsiflexion/plantarflexion  -LB      Exercise Type, Seated Lower Extremity (Therapeutic Exercise)  AROM (active range of motion)  -LB      Sets/Reps Detail, Seated Lower Extremity (Therapeutic Exercise)  1/15  -LB      Recorded by [LB] Steffany Flynn, PT      Row Name 02/01/19 1339 02/01/19 0815          Positioning and Restraints    Pre-Treatment Position  sitting in chair/recliner  -CC  sitting in chair/recliner  -LB     Post Treatment Position  wheelchair  -CC  wheelchair  -LB     In Wheelchair  notified nsg;sitting;with family/caregiver  -CC  with family/caregiver  -LB     Recorded by [CC] Genevieve Talavera, MELISSA [LB] Steffany Flynn, PT     Row Name 02/01/19 0815             Weekly Summary of Progress (PT)    Weekly Outcome Summary: Physical Therapy  Pt was able to progress to performing steps  with handrails but not much change in other aspects of mobility.  Plan for PEG today and hopefully increasing nutrition will help improve tolerance to activity.  -LB      Recorded by [LB] Steffany Flynn, PT        User Key  (r) = Recorded By, (t) = Taken By, (c) = Cosigned By    Initials Name Effective Dates    CC Genevieve Talavera, OTR 06/08/18 -     LB Steffany Flynn, PT 04/03/18 -     SA Heaven Bautista MS CCC-SLP 04/03/18 -           Wound 01/02/19 1710 Bilateral lateral head incision (Active)   Dressing Appearance open to air 2/1/2019  7:10 AM   Closure Open to air 2/1/2019  7:10 AM   Periwound dry 2/1/2019  7:10 AM   Drainage Amount none 2/1/2019  7:10 AM   Dressing Care, Wound open to air 2/1/2019  7:10 AM         OT Recommendation and Plan                 OT IRF GOALS     Row Name 01/31/19 1200 01/23/19 1100          Transfer Goal 1 (OT-IRF)    Activity/Assistive Device (Transfer Goal 1, OT-IRF)  toilet  -CC  --     [place] Level (Transfer Goal 1, OT-IRF)  contact guard assist  -CC  --     Time Frame (Transfer Goal 1, OT-IRF)  short term goal (STG)  -CC  --     Progress/Outcomes (Transfer Goal 1, OT-IRF)  goal ongoing  -CC  --        Transfer Goal 2 (OT-IRF)    Activity/Assistive Device (Transfer Goal 2, OT-IRF)  toilet  -CC  toilet  -CC     [place] Level (Transfer Goal 2, OT-IRF)  standby assist  -CC  standby assist  -CC     Time Frame (Transfer Goal 2, OT-IRF)  long term goal (LTG)  -CC  long term goal (LTG)  -CC     Progress/Outcomes (Transfer Goal 2, OT-IRF)  goal ongoing  -CC  goal ongoing  -CC        Transfer Goal 3 (OT-IRF)    Activity/Assistive Device (Transfer Goal 3, OT-IRF)  shower chair  -CC  shower chair  -CC     [place] Level (Transfer Goal 3, OT-IRF)  contact guard assist  -CC  contact guard assist  -CC     Time Frame (Transfer Goal 3, OT-IRF)  short term goal (STG)  -CC  short term goal (STG)  -CC     Progress/Outcomes (Transfer Goal 3, OT-IRF)  goal ongoing  -CC  goal ongoing  -CC         Transfer Goal 4 (OT-IRF)    Activity/Assistive Device (Transfer Goal 4, OT-IRF)  shower chair  -CC  shower chair  -CC     [place] Level (Transfer Goal 4, OT-IRF)  standby assist  -CC  standby assist  -CC     Time Frame (Transfer Goal 4, OT-IRF)  long term goal (LTG)  -CC  long term goal (LTG)  -CC     Progress/Outcomes (Transfer Goal 4, OT-IRF)  goal no longer appropriate  -CC  goal revised this date  -CC        Bathing Goal 1 (OT-IRF)    Activity/Device (Bathing Goal 1, OT-IRF)  bathing skills, all  -CC  --     [place] Level (Bathing Goal 1, OT-IRF)  minimum assist (75% or more patient effort);contact guard assist  -CC  --     Time Frame (Bathing Goal 1, OT-IRF)  short term goal (STG)  -CC  --     Progress/Outcomes (Bathing Goal 1, OT-IRF)  goal met  -CC  --        Bathing Goal 2 (OT-IRF)    Activity/Device (Bathing Goal 2, OT-IRF)  bathing skills, all  -CC  bathing skills, all  -CC     [place] Level (Bathing Goal 2, OT-IRF)  contact guard assist;standby assist  -CC  standby assist  -CC     Time Frame (Bathing Goal 2, OT-IRF)  long term goal (LTG)  -CC  long term goal (LTG)  -CC     Progress/Outcomes (Bathing Goal 2, OT-IRF)  goal ongoing  -CC  goal ongoing  -CC        UB Dressing Goal 1 (OT-IRF)    Activity/Device (UB Dressing Goal 1, OT-IRF)  upper body dressing  -CC  upper body dressing  -CC     [place] (UB Dress Goal 1, OT-IRF)  standby assist  -CC  standby assist  -CC     Time Frame (UB Dressing Goal 1, OT-IRF)  long term goal (LTG)  -CC  long term goal (LTG)  -CC     Progress/Outcomes (UB Dressing Goal 1, OT-IRF)  goal ongoing  -CC  goal ongoing  -CC        UB Dressing Goal 2 (OT-IRF)    Activity/Device (UB Dressing Goal 2, OT-IRF)  upper body dressing  -CC  upper body dressing  -CC     [place] (UB Dress Goal 2, OT-IRF)  supervision required  -CC  supervision required  -CC     Time Frame (UB Dressing Goal 2, OT-IRF)  long term goal (LTG)  -CC  long term goal (LTG)  -CC      Progress/Outcomes (UB Dressing Goal 2, OT-IRF)  goal ongoing  -CC  goal ongoing  -CC        LB Dressing Goal 1 (OT-IRF)    Activity/Device (LB Dressing Goal 1, OT-IRF)  lower body dressing  -CC  --     [place] (LB Dressing Goal 1, OT-IRF)  moderate assist (50-74% patient effort)  -CC  --     Time Frame (LB Dressing Goal 1, OT-IRF)  long term goal (LTG)  -CC  --     Progress/Outcomes (LB Dressing Goal 1, OT-IRF)  goal met  -CC  --        LB Dressing Goal 2 (OT-IRF)    Activity/Device (LB Dressing Goal 2, OT-IRF)  lower body dressing  -CC  lower body dressing  -CC     [place] (LB Dressing Goal 2, OT-IRF)  minimum assist (75% or more patient effort);contact guard assist  -CC  minimum assist (75% or more patient effort);contact guard assist  -CC     Time Frame (LB Dressing Goal 2, OT-IRF)  long term goal (LTG)  -CC  long term goal (LTG)  -CC     Progress/Outcomes (LB Dressing Goal 2, OT-IRF)  goal revised this date  -CC  goal revised this date  -CC        Grooming Goal 1 (OT-IRF)    Activity/Device (Grooming Goal 1, OT-IRF)  --  grooming skills, all  -CC     [place] (Grooming Goal 1, OT-IRF)  --  supervision required  -CC     Time Frame (Grooming Goal 1, OT-IRF)  --  long term goal (LTG)  -CC     Progress/Outcomes (Grooming Goal 1, OT-IRF)  --  goal met  -CC        Toileting Goal 1 (OT-IRF)    Activity/Device (Toileting Goal 1, OT-IRF)  --  toileting skills, all  -CC     [place] Level (Toileting Goal 1, OT-IRF)  --  moderate assist (50-74% patient effort)  -CC     Time Frame (Toileting Goal 1, OT-IRF)  --  short term goal (STG)  -CC     Progress/Outcomes (Toileting Goal 1, OT-IRF)  --  goal met  -CC        Toileting Goal 2 (OT-IRF)    Activity/Device (Toileting Goal 2, OT-IRF)  toileting skills, all  -CC  toileting skills, all  -CC     [place] Level (Toileting Goal 2, OT-IRF)  contact guard assist  -CC  contact guard assist  -CC     Time Frame (Toileting Goal 2, OT-IRF)  long term goal (LTG)   -CC  long term goal (LTG)  -CC     Progress/Outcomes (Toileting Goal 2, OT-IRF)  goal revised this date  -CC  goal revised this date  -CC        Strength Goal 1 (OT-IRF)    Strength Goal 1 (OT-IRF)  Pt will increase B UE strength to approx. 4/5 to assist w/ ADLs and transfers.  -CC  Pt will increase B UE strength to approx. 4/5 to assist w/ ADLs and transfers.  -CC     Time Frame (Strength Goal 1, OT-IRF)  long term goal (LTG)  -CC  long term goal (LTG)  -CC     Progress/Outcomes (Strength Goal 1, OT-IRF)  goal ongoing  -CC  goal ongoing  -CC        Direction Following Goal 1 (OT-IRF)    Activity (Direction Following Goal 1, OT-IRF)  follow one step directions  -CC  follow one step directions  -CC     [place]/Cues/Accuracy (Direction Following Goal 1, OT-IRF)  with minimum;verbal cues/redirection  -CC  with minimum;verbal cues/redirection  -CC     Time Frame (Direction Following Goal 1, OT-IRF)  long term goal (LTG)  -CC  long term goal (LTG)  -CC     Progress/Outcomes (Direction Following Goal 1, OT-IRF)  goal ongoing  -CC  goal ongoing  -CC       User Key  (r) = Recorded By, (t) = Taken By, (c) = Cosigned By    Initials Name Provider Type    Genevieve Farooq OTR Occupational Therapist          Occupational Therapy Education     Title: PT OT SLP Therapies (In Progress)     Topic: Occupational Therapy (In Progress)     Point: ADL training (In Progress)     Description: Instruct learner(s) on proper safety adaptation and remediation techniques during self care or transfers.   Instruct in proper use of assistive devices.    Learning Progress Summary           Patient Acceptance, E,TB, NR by GERSON at 1/12/2019 12:08 PM    Acceptance, E, VU,NR by RP at 1/3/2019  3:44 PM    Comment:  OT educ on OT role in therapeutic prcoess and pt's POC.   Significant Other Acceptance, E, VU,NR by RP at 1/3/2019  3:44 PM    Comment:  OT educ on OT role in therapeutic prcoess and pt's POC.                   Point: Home exercise  program (In Progress)     Description: Instruct learner(s) on appropriate technique for monitoring, assisting and/or progressing therapeutic exercises/activities.    Learning Progress Summary           Patient Acceptance, E,TB, NR by GERSON at 1/12/2019 12:08 PM                   Point: Precautions (In Progress)     Description: Instruct learner(s) on prescribed precautions during self-care and functional transfers.    Learning Progress Summary           Patient Acceptance, E,TB, NR by GERSON at 1/12/2019 12:08 PM                   Point: Body mechanics (In Progress)     Description: Instruct learner(s) on proper positioning and spine alignment during self-care, functional mobility activities and/or exercises.    Learning Progress Summary           Patient Acceptance, E,TB, NR by GERSON at 1/12/2019 12:08 PM                               User Key     Initials Effective Dates Name Provider Type Discipline     04/06/17 -  Vickie Pradhan, PT Physical Therapist PT     05/03/18 -  Geetha Posey OT Occupational Therapist OT                       Time Calculation:     Time Calculation- OT     Row Name 02/01/19 1230             Time Calculation- OT    OT Start Time  1230  -CC      OT Stop Time  1300  -CC      OT Time Calculation (min)  30 min  -CC      OT Non-Billable Time (min)  30 min tech  -CC        User Key  (r) = Recorded By, (t) = Taken By, (c) = Cosigned By    Initials Name Provider Type    CC Genevieve Talavera OTR Occupational Therapist          Therapy Suggested Charges     Code   Minutes Charges    None              Therapy Charges for Today     Code Description Service Date Service Provider Modifiers Qty    58508337132 HC OT SELF CARE/MGMT/TRAIN EA 15 MIN 1/31/2019 Genevieve Talavera OTR GO 2    62481227540 HC OT THER SUPP EA 15 MIN 2/1/2019 Genevieve Talavera OTR GO 2    47125055125 HC OT SELF CARE/MGMT/TRAIN EA 15 MIN 2/1/2019 Genevieve Talavera OTR GO 2                   MELISSA Marrero  2/1/2019

## 2019-02-01 NOTE — PLAN OF CARE
Problem: Patient Care Overview  Goal: Plan of Care Review  Outcome: Ongoing (interventions implemented as appropriate)   02/01/19 1409   Patient Care Overview   IRF Plan of Care Review progress ongoing, continue   Progress, Functional Goals demonstrating adequate progress   Coping/Psychosocial   Plan of Care Reviewed With patient   OTHER   Outcome Summary Patient is NPO since midnight for peg tube placement. Wife is at bedside. patient left unit via stretcher for procedure at 1407.       Problem: Skin Injury Risk (Adult)  Goal: Skin Health and Integrity  Outcome: Ongoing (interventions implemented as appropriate)      Problem: Fall Risk (Adult)  Goal: Absence of Fall  Outcome: Ongoing (interventions implemented as appropriate)      Problem: Nausea/Vomiting (Adult)  Goal: Symptom Relief  Outcome: Ongoing (interventions implemented as appropriate)      Problem: Stroke (Hemorrhagic) (Adult)  Goal: Signs and Symptoms of Listed Potential Problems Will be Absent, Minimized or Managed (Stroke)  Outcome: Ongoing (interventions implemented as appropriate)

## 2019-02-01 NOTE — PROGRESS NOTES
Occupational Therapy: Branch    Physical Therapy: Branch    Speech Language Pathology:  Individual: 30 minutes.    Signed by: Heaven Bautista SLP

## 2019-02-01 NOTE — PLAN OF CARE
Problem: Skin Injury Risk (Adult)  Goal: Skin Health and Integrity   02/01/19 0506   Skin Injury Risk (Adult)   Skin Health and Integrity making progress toward outcome       Problem: Fall Risk (Adult)  Goal: Absence of Fall   02/01/19 0506   Fall Risk (Adult)   Absence of Fall making progress toward outcome

## 2019-02-01 NOTE — PROGRESS NOTES
Inpatient Rehabilitation Functional Measures Assessment    Functional Measures  ALBERTO Eating:  [facility] Grooming: [facility] Bathing:  [facility] Upper Body Dressing:  [facility] Lower Body Dressing:  [facility] Toileting:  [facility] Bladder Management  Level of Assistance:  [place]  Frequency/Number of Accidents this Shift:  [facility] Bowel Management  Level of Assistance: [place]  Frequency/Number of Accidents this Shift: [facility] Bed/Chair/Wheelchair Transfer:  [facility] Toilet Transfer:  [facility] Tub/Shower Transfer:  [place]    Previously Documented Mode of Locomotion at Discharge: Field  ALBERTO Expected Mode of Locomotion at Discharge: [facility] Walk/Wheelchair:  [facility] Stairs:  [facility] Comprehension:  Auditory comprehension is the usual mode. Patient does not  comprehend complex/abstract information in their primary language without  assistance from a helper. Comprehension Score = 3, Moderate Prompting. Patient  comprehends basic daily needs or ideas 50-74% of the time. Patient requires  moderate/some prompting. No assistive devices were required.  ALBERTO Expression:  Vocal expression is the usual mode. Expression Score = 6,  Modified Independent.  Patient expresses complex/abstract information in their  primary language, requiring: Additional time.  ALBERTO Social Interaction:  Social Interaction Score = 6, Modified Independent.  Patient is modified independent for social interaction, requiring: Requires  additional time.  AL[place]ERT[place] Problem Solving:  Patient does not make appropriate decisions in order to  solve complex problems without assistance from a helper. Problem Solving Score =  2, Maximal Direction. Patient makes appropriate decisions in order to solve  routine problems 25-49% of the time. Patient requires maximal direction for the  following behavior(s): Difficulty weighing alternatives/making choices.  Inability to follow multi-step commands. Poor judgment.  ALBERTO Memory:  Memory  Score = 3, Moderate Prompting. Patient recognizes and  remembers 50-74% of the time. Patient requires moderate/some prompting  for  memory for the following: Disoriented to person, place, time or situation.  Inability to follow multi-step commands. Needs assistance for use of  environmental cues. Needs assistance for use of memory aids.    Therapy Mode Minutes  Occupational Therapy: Branch  Physical Therapy: Branch  Speech Language Pathology:  Branch    Signed by: Danielle Forte RN

## 2019-02-01 NOTE — PROGRESS NOTES
Inpatient Rehabilitation Functional Measures Assessment    Functional Measures  ALBERTO Eating:  Branch  [facility] Grooming: Branch  [facility] Bathing:  Branch  [facility] Upper Body Dressing:  Branch  [facility] Lower Body Dressing:  Branch  [facility] Toileting:  Branch    [facility] Bladder Management  Level of Assistance:  [place]  Frequency/Number of Accidents this Shift:  [facility] Bowel Management  Level of Assistance: [place]  Frequency/Number of Accidents this Shift: [facility] Bed/Chair/Wheelchair Transfer:  [facility] Toilet Transfer:  [facility] Tub/Shower Transfer:  [place]    Previously Documented Mode of Locomotion at Discharge: Field  ALBERTO Expected Mode of Locomotion at Discharge: [facility] Walk/Wheelchair:  [facility] Stairs:  [facility] Comprehension:  Branch  [facility] Expression:  Branch  [facility] Social Interaction:  [facility] Problem Solving:  [facility] Memory:  [place]    Therapy Mode Minutes  Occupational Therapy: Branch  Physical Therapy: Branch  Speech Language Pathology:  Branch    Signed by: Amy Spring RN

## 2019-02-01 NOTE — THERAPY TREATMENT NOTE
Inpatient Rehabilitation - Physical Therapy Treatment Note  [facility]     Patient Name: Feliciano Light  : 1955  MRN: [id number]    Today's Date: 2019                 Admit Date: 2019      Visit Dx:      ICD-10-CM ICD-9-CM   1. Protein-calorie malnutrition, unspecified severity (CMS/HCC) E46 263.9   2. 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   • SIADH (syndrome of inappropriate ADH production) (CMS/HCC)   • Elevated hemoglobin A1c   • Hyperlipidemia   • Protein-calorie malnutrition (CMS/HCC)       Therapy Treatment    IRF Treatment Summary     Row Name 1930 19 0815          Evaluation/Treatment Time and Intent    Subjective Information  --  complains of;fatigue  -LB     Existing Precautions/Restrictions  --  fall  -LB     Document Type  --  therapy note (daily note)  -LB     Mode of Treatment  speech-language pathology  -SA  physical therapy  -LB     Patient/Family Observations  --  pt during first session, eyes closed with little verbage.  When returned at 1000, pt did wake up and participate in therapy  -LB     Unable to Perform (Evaluation/Treatment)  other (see comments) unable to be aroused; will check back  -  --     Recorded by [SA] Heaven Bautista MS CCC-SLP [LB] Steffany Flynn, PT     Row Name 19 0815             Cognition/Psychosocial- PT/OT    Affect/Mental Status (Cognitive)  confused  -LB      Behavioral Issues (Cognitive)  withdrawn  -LB      Orientation Status (Cognition)  oriented to;person  -LB      Follows Commands (Cognition)  follows one step commands;50-74% accuracy;delayed response/completion;increased processing time needed;initiation impaired  -LB      Personal Safety Interventions  fall prevention program maintained;gait belt;muscle strengthening  facilitated;nonskid shoes/slippers when out of bed  -LB      Attention Deficit (Cognitive)  distractible in noisy environment;distractible in quiet environment  -LB      Safety Deficit (Cognitive)  insight into deficits/self awareness;problem solving;judgment;ability to follow commands  -LB      Recorded by [LB] Steffany Flynn, PT      Row Name 02/01/19 0815             Bed-Chair Transfer    Bed-Chair [place] (Transfers)  verbal cues;minimum assist (75% patient effort)  -LB      Assistive Device (Bed-Chair Transfers)  wheelchair  -LB      Recorded by [LB] Steffany Flynn, PT      Row Name 02/01/19 0815             Chair-Bed Transfer    Chair-Bed [place] (Transfers)  verbal cues;minimum assist (75% patient effort)  -LB      Assistive Device (Chair-Bed Transfers)  wheelchair  -LB      Recorded by [LB] Steffany Flynn, PT      Row Name 02/01/19 0815             Sit-Stand Transfer    Sit-Stand [place] (Transfers)  verbal cues;contact guard;minimum assist (75% patient effort)  -LB      Assistive Device (Sit-Stand Transfers)  walker, front-wheeled  -LB      Recorded by [LB] Steffany Flynn, PT      Row Name 02/01/19 0815             Stand-Sit Transfer    Stand-Sit [place] (Transfers)  verbal cues;contact guard  -LB      Assistive Device (Stand-Sit Transfers)  walker, front-wheeled  -LB      Recorded by [LB] Steffany Flynn, PT      Row Name 02/01/19 0815             Gait/Stairs Assessment/Training    [place] Level (Gait)  minimum assist (75% patient effort)  -LB      Assistive Device (Gait)  walker, front-wheeled  -LB      Distance in Feet (Gait)  160, 80 x 2  -LB      Deviations/Abnormal Patterns (Gait)  gait speed decreased;stride length decreased  -LB      Bilateral Gait Deviations  forward flexed posture;heel strike decreased  -LB      Comment (Gait/Stairs)  as pt fatigues, places walker too far forward and step length decreases as well as foot clearance  -LB      Recorded by [LB] Steffany Flynn, PT       Row Name 02/01/19 0815             Safety Issues, Functional Mobility    Safety Issues Affecting Function (Mobility)  ability to follow commands;judgment;problem solving;safety precautions follow-through/compliance  -LB      Impairments Affecting Function (Mobility)  balance;cognition;visual/perceptual;motor planning  -LB      Recorded by [LB] Steffany Flynn, PT      Row Name 02/01/19 0815             Pain Scale: Numbers Pre/Post-Treatment    Pain Scale: Numbers, Pretreatment  0/10 - no pain  -LB      Pain Scale: Numbers, Post-Treatment  0/10 - no pain  -LB      Recorded by [LB] Steffany Flynn, PT      Row Name 02/01/19 0815             Static Sitting Balance    Level of [place] (Unsupported Sitting, Static Balance)  contact guard assist  -LB      Sitting Position (Unsupported Sitting, Static Balance)  sitting edge of mat  -LB      Time Able to Maintain Position (Unsupported Sitting, Static Balance)  more than 5 minutes  -LB      Comment (Unsupported Sitting, Static Balance)  balloon tapping - pt able to see balloon on both right and left side.  Pt did encouragement to participate in the activity.  After a few minutes pt did complain of nausea which may be related to the visual scanning  -LB      Recorded by [LB] Steffany Flynn, PT      Row Name 02/01/19 0815             Lower Extremity Seated Therapeutic Exercise    Performed, Seated Lower Extremity (Therapeutic Exercise)  hip flexion/extension;knee flexion/extension;LAQ (long arc quad), knee extension;ankle dorsiflexion/plantarflexion  -LB      Exercise Type, Seated Lower Extremity (Therapeutic Exercise)  AROM (active range of motion)  -LB      Sets/Reps Detail, Seated Lower Extremity (Therapeutic Exercise)  1/15  -LB      Recorded by [LB] Steffany Flynn, PT      Row Name 02/01/19 0815             Positioning and Restraints    Pre-Treatment Position  sitting in chair/recliner  -LB      Post Treatment Position  wheelchair  -LB      In Wheelchair  with  family/caregiver  -LB      Recorded by [LB] Steffany Flynn PT      Row Name 02/01/19 0815             Weekly Summary of Progress (PT)    Weekly Outcome Summary: Physical Therapy  Pt was able to progress to performing steps with handrails but not much change in other aspects of mobility.  Plan for PEG today and hopefully increasing nutrition will help improve tolerance to activity.  -LB      Recorded by [LB] Steffany Flynn PT        User Key  (r) = Recorded By, (t) = Taken By, (c) = Cosigned By    Initials Name Effective Dates    Steffany Domingo PT 04/03/18 -     Heaven Hanson, MS CCC-SLP 04/03/18 -         Wound 01/02/19 1710 Bilateral lateral head incision (Active)   Dressing Appearance open to air 2/1/2019  7:10 AM   Closure Open to air 2/1/2019  7:10 AM   Periwound dry 2/1/2019  7:10 AM   Drainage Amount none 2/1/2019  7:10 AM   Dressing Care, Wound open to air 2/1/2019  7:10 AM     Physical Therapy Education     Title: PT OT SLP Therapies (In Progress)     Topic: Physical Therapy (In Progress)     Point: Mobility training (Done)     Learning Progress Summary           Patient Acceptance, E,TB, VU,DU by ERNESTO at 1/31/2019 10:00 AM    Comment:  Wife, Denice able to assist pt with car transfer and ambulation for 80ft with Rwx.  okay to go to MD appt today    Acceptance, E,TB, NR by ERNESTO at 1/29/2019  9:16 AM    Acceptance, E,TB, NR by ERNESTO at 1/28/2019  3:06 PM    Acceptance, E,TB, VU,NR by IRENE at 1/26/2019 12:21 PM    Acceptance, E, DU,NR by AILEEN at 1/25/2019 11:14 AM    Acceptance, E,TB, VU,NR by ERNESTO at 1/24/2019  1:47 PM    Acceptance, E,D, DU,NR by AILEEN at 1/22/2019  3:18 PM    Acceptance, E,TB, NR by ERNESTO at 1/21/2019 12:15 PM    Acceptance, E,TB, NR by ERNESTO at 1/19/2019  9:33 AM    Acceptance, E, NR by ERNESTO1 at 1/18/2019  3:31 PM    Acceptance, E,TB, NR by LB at 1/17/2019  9:22 AM    Acceptance, E, NR by LB1 at 1/16/2019  1:21 PM    Acceptance, E,TB, NR by LB at 1/14/2019  9:21 AM    Acceptance, E,TB, NR by JS at 1/12/2019  12:08 PM    Acceptance, E,TB, NR by LB at 1/11/2019  9:33 AM    Acceptance, E,TB, VU,NR by LB at 1/9/2019  9:37 AM    Acceptance, E,TB, NR by LB at 1/8/2019  9:14 AM    Acceptance, E,TB, NR by LB at 1/7/2019  9:36 AM    Acceptance, E,TB, VU,NR by IRENE at 1/5/2019  2:34 PM    Acceptance, E,TB, NR by LB at 1/4/2019 10:22 AM    Acceptance, E,TB, NR by LB at 1/3/2019 10:39 AM   Significant Other Acceptance, E,TB, VU,DU by LB at 1/31/2019 10:00 AM    Comment:  Wife, Denice able to assist pt with car transfer and ambulation for 80ft with Rwx.  okay to go to MD appt today                   Point: Home exercise program (In Progress)     Learning Progress Summary           Patient Acceptance, E,TB, NR by LB at 1/22/2019  3:25 PM    Acceptance, E,TB, NR by JS at 1/12/2019 12:08 PM                   Point: Precautions (In Progress)     Learning Progress Summary           Patient Acceptance, E,TB, NR by JS at 1/12/2019 12:08 PM    Acceptance, E,TB, NR by LB at 1/8/2019  3:14 PM                               User Key     Initials Effective Dates Name Provider Type Discipline    IRENE 06/08/18 -  Elvira Salter, PT Physical Therapist PT    JS 04/06/17 -  Vickie Pradhan, PT Physical Therapist PT    LB 04/03/18 -  Steffany Flynn, PT Physical Therapist PT    LB1 03/07/18 -  Jessica Oneal, PTA Physical Therapy Assistant PT    JK 04/03/18 -  Emily Jarrett, PT Physical Therapist PT                  PT Recommendation and Plan  Planned Therapy Interventions (PT Eval): bed mobility training, balance training, strengthening, stair training, transfer training, neuromuscular re-education, home exercise program  Frequency of Treatment (PT Eval): 60 minutes per session     Daily Summary of Progress (PT)  Recommendations: Physical Therapy: The patient did get sick at end of session.  RN informed       PT IRF GOALS     Row Name 02/01/19 1200             Bed Mobility Goal 1 (PT-IRF)    Activity/Assistive Device (Bed Mobility Goal 1, PT-IRF)   rolling to left;rolling to right;sit to supine;supine to sit  -LB      [place] Level (Bed Mobility Goal 1, PT-IRF)  contact guard assist  -LB      Progress/Outcomes (Bed Mobility Goal 1, PT-IRF)  goal ongoing  -LB         Transfer Goal 1 (PT-IRF)    Activity/Assistive Device (Transfer Goal 1, PT-IRF)  sit-to-stand/stand-to-sit;bed-to-chair/chair-to-bed  -LB      [place] Level (Transfer Goal 1, PT-IRF)  contact guard assist;minimum assist (75% or more patient effort)  -LB      Progress/Outcomes (Transfer Goal 1, PT-IRF)  goal ongoing  -LB         Transfer Goal 2 (PT-IRF)    Activity/Assistive Device (Transfer Goal 2, PT-IRF)  car transfer  -LB      [place] Level (Transfer Goal 2, PT-IRF)  minimum assist (75% or more patient effort)  -LB      Progress/Outcomes (Transfer Goal 2, PT-IRF)  goal met  -LB         Gait/Walking Locomotion Goal 1 (PT-IRF)    Activity/Assistive Device (Gait/Walking Locomotion Goal 1, PT-IRF)  walker, rolling  -LB      Gait/Walking Locomotion Distance Goal 1 (PT-IRF)  100  -LB      [place] Level (Gait/Walking Locomotion Goal 1, PT-IRF)  contact guard assist;minimum assist (75% or more patient effort)  -LB      Progress/Outcomes (Gait/Walking Locomotion Goal 1, PT-IRF)  goal ongoing  -LB         Stairs Goal 1 (PT-IRF)    Activity/Assistive Device (Stairs Goal 1, PT-IRF)  ascending stairs;descending stairs  -LB      Number of Stairs (Stairs Goal 1, PT-IRF)  4  -LB      [place] Level (Stairs Goal 1, PT-IRF)  minimum assist (75% or more patient effort)  -LB      Progress/Outcomes (Stairs Goal 1, PT-IRF)  goal ongoing  -LB        User Key  (r) = Recorded By, (t) = Taken By, (c) = Cosigned By    Initials Name Provider Type    Steffany Domingo, PT Physical Therapist               Time Calculation:     PT Charges     Row Name 02/01/19 1227 02/01/19 1226          Time Calculation    Start Time  1000  -LB  0800  -LB     Stop Time  1045  -LB  0815  -LB     Time Calculation (min)   45 min  -LB  15 min  -LB     PT Received On  02/01/19  -LB  02/01/19  -LB     PT - Next Appointment  02/02/19  -LB  --     PT Goal Re-Cert Due Date  02/08/19  -LB  --       User Key  (r) = Recorded By, (t) = Taken By, (c) = Cosigned By    Initials Name Provider Type    Steffany Domingo, PT Physical Therapist            Therapy Charges for Today     Code Description Service Date Service Provider Modifiers Qty    69102690596 HC PT THER PROC EA 15 MIN 1/31/2019 Steffany Flynn, PT GP 2    32151130624 HC PT THER PROC EA 15 MIN 2/1/2019 Steffany Flynn, PT GP 4    [id number]  PT CARE PLAN EACH 15 MIN 2/1/2019 Steffany Flynn, PT GP 1                   Steffany Flynn PT  2/1/2019

## 2019-02-01 NOTE — ANESTHESIA POSTPROCEDURE EVALUATION
Patient: Feliciano Light    Procedure Summary     Date:  02/01/19 Room / Location:   [facility]    Anesthesia Start:  1613 Anesthesia Stop:  1647    Procedure:  ESOPHAGOGASTRODUODENOSCOPY WITH PERCUTANEOUS ENDOSCOPIC GASTROSTOMY TUBE INSERTION (N/A Esophagus) Diagnosis:       Protein-calorie malnutrition, unspecified severity (CMS/HCC)      (Protein-calorie malnutrition, unspecified severity (CMS/HCC) [E46])    Surgeon:  Caterina Dotson MD Provider:  Lorenzo Castrejon MD    Anesthesia Type:  MAC ASA Status:  4          Anesthesia Type: MAC  Last vitals  BP   143/85 (02/01/19 1707)   Temp   36.4 °C (97.6 °F) (02/01/19 1420)   Pulse   79 (02/01/19 1707)   Resp   12 (02/01/19 1707)     SpO2   99 % (02/01/19 1707)     Post Anesthesia Care and Evaluation    Patient location during evaluation: bedside  Patient participation: complete - patient participated  Level of consciousness: sleepy but conscious  Pain score: 0  Pain management: adequate  Airway patency: patent  Anesthetic complications: No anesthetic complications    Cardiovascular status: acceptable  Respiratory status: acceptable  Hydration status: acceptable    Comments: /85 (BP Location: Right arm, Patient Position: Lying)   Pulse 79   Temp 36.4 °C (97.6 °F) (Oral)   Resp 12   SpO2 99%

## 2019-02-01 NOTE — DISCHARGE INSTRUCTIONS
Upper Endoscopy, Care After    Refer to this sheet in the next few weeks. These instructions provide you with information about caring for yourself after your procedure. Your health care provider may also give you more specific instructions. Your treatment has been planned according to current medical practices, but problems sometimes occur. Call your health care provider if you have any problems or questions after your procedure.  What can I expect after the procedure?  After the procedure, it is common to have:  · A sore throat.  · Bloating.  · Nausea.    Follow these instructions at home:  · Follow instructions from your health care provider about what to eat or drink after your procedure.  · Return to your normal activities as told by your health care provider. Ask your health care provider what activities are safe for you.  · Take over-the-counter and prescription medicines only as told by your health care provider.  · Do not drive for 24 hours if you received a sedative.  · Keep all follow-up visits as told by your health care provider. This is important.  Contact a health care provider if:  · You have a sore throat that lasts longer than one day.  · You have trouble swallowing.  Get help right away if:  · You have a fever.  · You vomit blood or your vomit looks like coffee grounds.  · You have bloody, black, or tarry stools.  · You have a severe sore throat or you cannot swallow.  · You have difficulty breathing.  · You have severe pain in your chest or belly.  This information is not intended to replace advice given to you by your health care provider. Make sure you discuss any questions you have with your health care provider.  Document Released: 06/18/2013 Document Revised: 05/25/2017 Document Reviewed: 09/29/2016  [employer] Patient Education © 2018 [employer]

## 2019-02-01 NOTE — PROGRESS NOTES
Inpatient Rehabilitation Plan of Care Note    Plan of Care  Care Plan Reviewed - No updates at this time.    Field    Signed by: Amy Spring RN

## 2019-02-01 NOTE — PLAN OF CARE
Problem: Patient Care Overview  Goal: Plan of Care Review   02/01/19 0509   Patient Care Overview   IRF Plan of Care Review progress ongoing, continue   Progress, Functional Goals demonstrating adequate progress   Coping/Psychosocial   Plan of Care Reviewed With patient   OTHER   Outcome Summary slept well, npo, lower extremities warm now, continent tonight, brief on, no emesis, wife chalo bedside

## 2019-02-01 NOTE — PROGRESS NOTES
Inpatient Rehabilitation Plan of Care Note    Plan of Care  Care Plan Reviewed - No updates at this time.    Signed by: Danielle Forte RN

## 2019-02-01 NOTE — THERAPY TREATMENT NOTE
Inpatient Rehabilitation - Speech Language Pathology Treatment Note    [facility]       Patient Name: Feliciano Light  : 1955  MRN: [id number]    Today's Date: 2019           Admit Date: 2019      Visit Dx:        ICD-10-CM ICD-9-CM   1. Protein-calorie malnutrition, unspecified severity (CMS/HCC) E46 263.9   2. 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   • SIADH (syndrome of inappropriate ADH production) (CMS/HCC)   • Elevated hemoglobin A1c   • Hyperlipidemia   • Protein-calorie malnutrition (CMS/HCC)          Therapy Treatment    Evaluation/Coping    Evaluation/Treatment Time and Intent  Subjective Information: fatigue (19 1300 : Heaven Bautista, MS CCC-SLP)  Existing Precautions/Restrictions: fall (19 1300 : Heaven Bautista, MS CCC-SLP)  Document Type: therapy note (daily note) (19 1300 : Heaven Bautista, MS CCC-SLP)  Mode of Treatment: speech-language pathology (19 1300 : Heaven Bautista, MS CCC-SLP)  Patient/Family Observations: up in w/c; RN placing hydration getting ready for PEG Tube placement at 1430. Pt grimmacing; with eyes closed and refused to work with SLP. SHook head no he wasn't going to talk.  (19 1300 : Heaven Bautista, MS CCC-SLP)  Start Time (Evaluation/Treatment): 1100 (19 1100 : Heaven Bautista, MS CCC-SLP)  Stop Time (Evaluation/Treatment): 1130 (19 1100 : Heaven Bautista, MS CCC-SLP)  Unable to Perform (Evaluation/Treatment): patient refused (19 1300 : Heaven Bautista, MS CCC-SLP)    Vitals/Pain/Safety         Cognition/Communication         Oral Motor/Eating         Mobility/Basic Activities/Instrumental Activities/Motor/Modality                   ROM/MMT                   Sensory/Myotome/Dermatome/Edema               Posture/Balance/Special  Tests/Exercise/Transportation/Sexual Function                   Orthotics/Residual Limb/Prosthetic Management              Outcome Summary         EDUCATION    The patient has been educated in the following areas:     Cognitive Impairment.    SLP Recommendation and Plan    SLP Diagnosis: Moderate cognitive deficits    SLP Diagnosis: Moderate cognitive deficits    Rehab Potential/Prognosis: good         Anticipated Dischage Disposition: home with assist, anticipate therapy at next level of care              Predicted Duration Therapy Intervention (Days): until discharge                  SLP GOALS     Row Name 19 1100 19 1400 19 1500       Awareness of Basic Personal Information Goal 1 (SLP)    Improve Awareness of Basic Personal Information Goal 1 (SLP)  answering open-ended questions regarding personal/biographical information;naming self, family members  -SA  answering open-ended questions regarding personal/biographical information;naming self, family members  -SA  answering open-ended questions regarding personal/biographical information;naming self, family members  -SA    Time Frame (Awareness of Basic Personal Information Goal 1, SLP)  by discharge  -SA  by discharge  -SA  by discharge  -SA    Progress (Awareness of Basic Personal Information Goal 1, SLP)  with minimal cues (75-90%)  -SA  with maximum cues (25-49%)  -SA  --    Progress/Outcomes (Awareness of Basic Personal Information Goal 1, SLP)  good progress toward goal  -SA  progress slower than expected  -SA  --    Comment (Awareness of Basic Personal Information Goal 1, SLP)  83% answering name, ; age, wife's name, address and phone number. Unable to answer phone number.   -SA  pt refusing to asnwer questions  -SA  --       Attention Goal 1 (SLP)    Improve Attention by Goal 1 (SLP)  --  complete selective attention task  -SA  complete selective attention task  -SA    Time Frame (Attention Goal 1, SLP)  --  by discharge  -SA  by  discharge  -SA    Progress (Attention Goal 1, SLP)  --  with maximum cues (25-49%);with 1:1 supervision/constant cues  -SA  0%  -SA    Progress/Outcomes (Attention Goal 1, SLP)  --  progress slower than expected  -SA  progress slower than expected  -SA    Comment (Attention Goal 1, SLP)  --  Played Blink game for attending and matching colors. Pt needed max assist to match 6 colors; only used half the deck and this took 20 minutes of session.  -SA  Attmepted sorting activity with specific colors; pt refused and shrugged despite MAX encouragement  -SA       Orientation Goal 1 (SLP)    Improve Orientation Through Goal 1 (SLP)  demonstrating orientation to month;demonstrating orientation to year;demonstrating orientation to place;90%;independently (over 90% accuracy)  -SA  demonstrating orientation to month;demonstrating orientation to year;demonstrating orientation to place;90%;independently (over 90% accuracy)  -SA  demonstrating orientation to month;demonstrating orientation to year;demonstrating orientation to place;90%;independently (over 90% accuracy)  -SA    Time Frame (Orientation Goal 1, SLP)  by discharge  -SA  by discharge  -SA  by discharge  -SA    Progress (Orientation Goal 1, SLP)  0%;with 1:1 supervision/constant cues  -SA  0%  -SA  0%  -SA    Progress/Outcomes (Orientation Goal 1, SLP)  goal ongoing;progress slower than expected  -SA  progress slower than expected  -SA  progress slower than expected  -SA    Comment (Orientation Goal 1, SLP)  ANswered; however, incorrectly  -SA  pt refused to answer any questions.  SLP asked pt question and provided answers re: choice of 2 and yes/no and pt shrugged shoulders. SLP asked if he was going to participate and pt shook head no.   -SA  pt refused to answer any questions orally; SLP asked pt question and provided answers re: choice of 2 and yes/no and pt shrugged  -SA       Organizational Skills Goal 1 (SLP)    Time Frame (Thought Organization Skills Goal 1,  SLP)  by discharge  -SA  --  --    Progress (Thought Organization Skills Goal 1, SLP)  with maximum cues (25-49%)  -SA  --  --    Progress/Outcomes (Thought Organization Skills Goal 1, SLP)  goal ongoing;progress slower than expected  -SA  --  --    Comment (Thought Organization Skills Goal 1, SLP)  0% naming items needed for a task  -SA  --  --       Executive Functional Skills Goal 1 (SLP)    Improve Executive Function Skills Goal 1 (SLP)  time management activity  -SA  --  --    Time Frame (Executive Function Skills Goal 1, SLP)  by discharge  -SA  --  --    Progress (Executive Function Skills Goal 1, SLP)  40%;with maximum cues (25-49%)  -SA  --  --    Progress/Outcomes (Executive Function Skills Goal 1, SLP)  goal ongoing  -SA  --  --    Comment (Executive Function Skills Goal 1, SLP)  Telling time to the hour; max cues re: long and short hand  -SA  --  --       Right Hemisphere Function Goal 1 (SLP)    Improve Right Hemisphere Function Through Goal 1 (SLP)  --  --  complete visuo-spatial activities (visual closure, trail making, mazes  -SA    Time Frame (Right Hemisphere Function Goal 1, SLP)  --  --  by discharge  -SA    Progress (Right Hemisphere Function Goal 1, SLP)  --  --  50%  -SA    Progress/Outcomes (Right Hemisphere Function Goal 1, SLP)  --  --  goal ongoing  -SA    Comment (Right Hemisphere Function Goal 1, SLP)  --  --  Max assist; how many dots in a specific box  -      User Key  (r) = Recorded By, (t) = Taken By, (c) = Cosigned By    Initials Name Provider Type    Heaven Hanson MS CCC-SLP Speech and Language Pathologist                  Time Calculation:       Time Calculation- SLP     Row Name 02/01/19 1251             Time Calculation- SLP    SLP Start Time  1100  -      SLP Stop Time  1130  -      SLP Time Calculation (min)  30 min  -      SLP Received On  02/01/19  -        User Key  (r) = Recorded By, (t) = Taken By, (c) = Cosigned By    Initials Name Provider Type    SA Bautista  MS Heaven CCC-SLP Speech and Language Pathologist            Therapy Charges for Today     Code Description Service Date Service Provider Modifiers Qty    [id number]  ST DEV OF COGN SKILLS EACH 15 MIN 2/1/2019 Heaven Bautista, MS CCC-SLP  2                           Heaven Bautista MS CCC-SLP  2/1/2019

## 2019-02-02 LAB
ALBUMIN SERPL-MCNC: 3.3 G/DL (ref 3.5–5.2)
ALBUMIN/GLOB SERPL: 1.4 G/DL
ALP SERPL-CCNC: 62 U/L (ref 39–117)
ALT SERPL W P-5'-P-CCNC: 36 U/L (ref 1–41)
ANION GAP SERPL CALCULATED.3IONS-SCNC: 12.4 MMOL/L
AST SERPL-CCNC: 19 U/L (ref 1–40)
BASOPHILS # BLD AUTO: 0.05 10*3/MM3 (ref 0–0.2)
BASOPHILS NFR BLD AUTO: 0.3 % (ref 0–1.5)
BILIRUB SERPL-MCNC: 0.8 MG/DL (ref 0.1–1.2)
BUN BLD-MCNC: 11 MG/DL (ref 8–23)
BUN/CREAT SERPL: 19.3 (ref 7–25)
CALCIUM SPEC-SCNC: 9.1 MG/DL (ref 8.6–10.5)
CHLORIDE SERPL-SCNC: 103 MMOL/L (ref 98–107)
CO2 SERPL-SCNC: 24.6 MMOL/L (ref 22–29)
CREAT BLD-MCNC: 0.57 MG/DL (ref 0.76–1.27)
DEPRECATED RDW RBC AUTO: 54.9 FL (ref 37–54)
EOSINOPHIL # BLD AUTO: 0.24 10*3/MM3 (ref 0–0.7)
EOSINOPHIL NFR BLD AUTO: 1.7 % (ref 0.3–6.2)
ERYTHROCYTE [DISTWIDTH] IN BLOOD BY AUTOMATED COUNT: 15.1 % (ref 11.5–14.5)
GFR SERPL CREATININE-BSD FRML MDRD: 143 ML/MIN/1.73
GLOBULIN UR ELPH-MCNC: 2.3 GM/DL
GLUCOSE BLD-MCNC: 107 MG/DL (ref 65–99)
HCT VFR BLD AUTO: 41.5 % (ref 40.4–52.2)
HGB BLD-MCNC: 13.4 G/DL (ref 13.7–17.6)
IMM GRANULOCYTES # BLD AUTO: 0.04 10*3/MM3 (ref 0–0.03)
IMM GRANULOCYTES NFR BLD AUTO: 0.3 % (ref 0–0.5)
LYMPHOCYTES # BLD AUTO: 0.94 10*3/MM3 (ref 0.9–4.8)
LYMPHOCYTES NFR BLD AUTO: 6.5 % (ref 19.6–45.3)
MCH RBC QN AUTO: 32.7 PG (ref 27–32.7)
MCHC RBC AUTO-ENTMCNC: 32.3 G/DL (ref 32.6–36.4)
MCV RBC AUTO: 101.2 FL (ref 79.8–96.2)
MONOCYTES # BLD AUTO: 0.75 10*3/MM3 (ref 0.2–1.2)
MONOCYTES NFR BLD AUTO: 5.2 % (ref 5–12)
NEUTROPHILS # BLD AUTO: 12.41 10*3/MM3 (ref 1.9–8.1)
NEUTROPHILS NFR BLD AUTO: 86 % (ref 42.7–76)
PLATELET # BLD AUTO: 193 10*3/MM3 (ref 140–500)
PMV BLD AUTO: 10.1 FL (ref 6–12)
POTASSIUM BLD-SCNC: 3.9 MMOL/L (ref 3.5–5.2)
PROT SERPL-MCNC: 5.6 G/DL (ref 6–8.5)
RBC # BLD AUTO: 4.1 10*6/MM3 (ref 4.6–6)
SODIUM BLD-SCNC: 140 MMOL/L (ref 136–145)
WBC NRBC COR # BLD: 14.43 10*3/MM3 (ref 4.5–10.7)

## 2019-02-02 PROCEDURE — 80053 COMPREHEN METABOLIC PANEL: CPT | Performed by: PHYSICAL MEDICINE & REHABILITATION

## 2019-02-02 PROCEDURE — 99232 SBSQ HOSP IP/OBS MODERATE 35: CPT | Performed by: INTERNAL MEDICINE

## 2019-02-02 PROCEDURE — G0515 COGNITIVE SKILLS DEVELOPMENT: HCPCS

## 2019-02-02 PROCEDURE — 85025 COMPLETE CBC W/AUTO DIFF WBC: CPT | Performed by: INTERNAL MEDICINE

## 2019-02-02 RX ORDER — ACETAMINOPHEN 160 MG/5ML
650 SOLUTION ORAL EVERY 6 HOURS PRN
Status: DISCONTINUED | OUTPATIENT
Start: 2019-02-02 | End: 2019-02-08 | Stop reason: HOSPADM

## 2019-02-02 RX ADMIN — LACOSAMIDE 100 MG: 10 SOLUTION ORAL at 21:44

## 2019-02-02 RX ADMIN — POTASSIUM CHLORIDE, DEXTROSE MONOHYDRATE AND SODIUM CHLORIDE 75 ML/HR: 150; 5; 900 INJECTION, SOLUTION INTRAVENOUS at 02:19

## 2019-02-02 RX ADMIN — SODIUM CHLORIDE, PRESERVATIVE FREE 3 ML: 5 INJECTION INTRAVENOUS at 21:45

## 2019-02-02 RX ADMIN — MECLIZINE HCL 12.5 MG: 12.5 TABLET ORAL at 07:43

## 2019-02-02 RX ADMIN — MECLIZINE HCL 12.5 MG: 12.5 TABLET ORAL at 12:39

## 2019-02-02 RX ADMIN — ACETAMINOPHEN 650 MG: 160 SOLUTION ORAL at 21:44

## 2019-02-02 RX ADMIN — ACETAMINOPHEN 650 MG: 160 SOLUTION ORAL at 14:45

## 2019-02-02 RX ADMIN — LACOSAMIDE 100 MG: 10 SOLUTION ORAL at 07:43

## 2019-02-02 RX ADMIN — MULTIPLE VITAMINS W/ MINERALS TAB 1 TABLET: TAB at 07:45

## 2019-02-02 RX ADMIN — LANSOPRAZOLE 30 MG: KIT at 07:43

## 2019-02-02 RX ADMIN — SODIUM CHLORIDE, PRESERVATIVE FREE 3 ML: 5 INJECTION INTRAVENOUS at 11:39

## 2019-02-02 RX ADMIN — MECLIZINE HCL 12.5 MG: 12.5 TABLET ORAL at 17:20

## 2019-02-02 NOTE — PLAN OF CARE
Problem: Patient Care Overview  Goal: Plan of Care Review  Outcome: Ongoing (interventions implemented as appropriate)   02/02/19 0714   Patient Care Overview   IRF Plan of Care Review progress ongoing, continue   Progress, Functional Goals demonstrating adequate progress   Coping/Psychosocial   Plan of Care Reviewed With patient;spouse   OTHER   Outcome Summary Started fibersource in peg-tube- at rate of 25ml/hr @1150. IV fluids running at 75. Increased urinary output disrupting pt rest. Wife at bedside.        Problem: Fall Risk (Adult)  Goal: Absence of Fall  Outcome: Ongoing (interventions implemented as appropriate)      Problem: Nausea/Vomiting (Adult)  Goal: Adequate Hydration  Outcome: Ongoing (interventions implemented as appropriate)      Problem: Nutrition, Enteral (Adult)  Goal: Signs and Symptoms of Listed Potential Problems Will be Absent, Minimized or Managed (Nutrition, Enteral)  Outcome: Ongoing (interventions implemented as appropriate)

## 2019-02-02 NOTE — PROGRESS NOTES
Inpatient Rehabilitation Functional Measures Assessment    Functional Measures  ALBERTO Eating:  Eating Score = 5. Patient is supervision/set-up for eating,  requiring: Stand by assistance. No assistive devices were required.  ALBERTO Grooming: Activity was not observed.  ALBERTO Bathing:  Activity was not observed.  ALBERTO Upper Body Dressing:  Activity was not observed.  ALBERTO Lower Body Dressing:  Activity was not observed.  ALBERTO Toileting:  Toileting Score = 4.  Patient requires minimal assistance for  toileting, such as steadying for balance while cleansing or adjusting clothes.  Patient requires the following assistive device(s): Grab bar.    ALBERTO Bladder Management  Level of Assistance:  Bladder Score = 3. Patient performs 50-74% of tasks and  requires moderate assistance for bladder management. [place] provides some assist  to position the urinal, holds it in place, or empties the urinal.  Frequency/Number of Accidents this Shift:  Bladder accidents this shift:  0 .  Patient has not had an accident this shift.    ALBERTO Bowel Management  Level of Assistance: Bowel Score = 7.  Patient is completely independent for  bowel management.  Patient did not have bowel movement.  No  medication/intervention was provided.  Frequency/Number of Accidents this Shift: [place]    ALBERTO Bed/Chair/Wheelchair Transfer:  Bed/chair/wheelchair Transfer Score = 4.  Patient performs 75% or more of effort and minimal assistance (little/incidental  help/lifting of one limb/steadying) for transferring to and from the  bed/chair/wheelchair, requiring: Contact guard. Steadying. Patient requires the  following assistive device(s): Bed rails. Grab bars. Arm rest.  ALBERTO Toilet Transfer:  Toilet Transfer Score = 4.  Patient performs 75% or more  of effort and minimal assistance (little/incidental help/steadying) for  transferring to and from the toilet/commode, requiring: Steadying. Contact  guard. Patient requires the following assistive device(s): Grab bars.  ALBERTO  Tub/Shower Transfer:  Activity was not observed.    Previously Documented Mode of Locomotion at Discharge: Field  ALBERTO Expected Mode of Locomotion at Discharge: [facility] Walk/Wheelchair:  [facility] Stairs:  [facility] Comprehension:  [facility] Expression:  [facility] Social Interaction:  [facility] Problem Solving:  [facility] Memory:  [place]    Therapy Mode Minutes  Occupational Therapy: Branch  Physical Therapy: Branch  Speech Language Pathology:  [place]    Signed by: GENESIS Burgos

## 2019-02-02 NOTE — THERAPY TREATMENT NOTE
Inpatient Rehabilitation - Occupational Therapy Treatment Note    [facility]     Patient Name: Feliciano Light  : 1955  MRN: [id number]    Today's Date: 2019                 Admit Date: 2019      Visit Dx:    ICD-10-CM ICD-9-CM   1. Protein-calorie malnutrition, unspecified severity (CMS/HCC) E46 263.9   2. 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 RN requested to allow pt to rest.   -CC  --     Recorded by [CC] Genevieve Talavera OTR [LT] Caterina Roy MS CCC-SLP       User Key  (r) = Recorded By, (t) = Taken By, (c) = Cosigned By    Initials Name Effective Dates    CC Genevieve Talavera OTR 18 -     LT Caterina Roy MS CCC-SLP 18 -           Wound 19 1710 Bilateral lateral head incision (Active)   Dressing Appearance open to air 2019  7:41 AM         OT Recommendation and Plan                 OT IRF GOALS     Row Name 19 1200 19 1100          Transfer Goal 1 (OT-IRF)    Activity/Assistive Device (Transfer Goal 1, OT-IRF)  toilet  -CC  --     [place] Level (Transfer Goal 1, OT-IRF)  contact guard assist  -CC  --     Time Frame (Transfer Goal 1, OT-IRF)  short term goal (STG)  -CC  --     Progress/Outcomes (Transfer Goal 1, OT-IRF)  goal ongoing  -CC  --         Transfer Goal 2 (OT-IRF)    Activity/Assistive Device (Transfer Goal 2, OT-IRF)  toilet  -CC  toilet  -CC     [place] Level (Transfer Goal 2, OT-IRF)  standby assist  -CC  standby assist  -CC     Time Frame (Transfer Goal 2, OT-IRF)  long term goal (LTG)  -CC  long term goal (LTG)  -CC     Progress/Outcomes (Transfer Goal 2, OT-IRF)  goal ongoing  -CC  goal ongoing  -CC        Transfer Goal 3 (OT-IRF)    Activity/Assistive Device (Transfer Goal 3, OT-IRF)  shower chair  -CC  shower chair  -CC     [place] Level (Transfer Goal 3, OT-IRF)  contact guard assist  -CC  contact guard assist  -CC     Time Frame (Transfer Goal 3, OT-IRF)  short term goal (STG)  -CC  short term goal (STG)  -CC     Progress/Outcomes (Transfer Goal 3, OT-IRF)  goal ongoing  -CC  goal ongoing  -CC        Transfer Goal 4 (OT-IRF)    Activity/Assistive Device (Transfer Goal 4, OT-IRF)  shower chair  -CC  shower chair  -CC     [place] Level (Transfer Goal 4, OT-IRF)  standby assist  -CC  standby assist  -CC     Time Frame (Transfer Goal 4, OT-IRF)  long term goal (LTG)  -CC  long term goal (LTG)  -CC     Progress/Outcomes (Transfer Goal 4, OT-IRF)  goal no longer appropriate  -CC  goal revised this date  -CC        Bathing Goal 1 (OT-IRF)    Activity/Device (Bathing Goal 1, OT-IRF)  bathing skills, all  -CC  --     [place] Level (Bathing Goal 1, OT-IRF)  minimum assist (75% or more patient effort);contact guard assist  -CC  --     Time Frame (Bathing Goal 1, OT-IRF)  short term goal (STG)  -CC  --     Progress/Outcomes (Bathing Goal 1, OT-IRF)  goal met  -CC  --        Bathing Goal 2 (OT-IRF)    Activity/Device (Bathing Goal 2, OT-IRF)  bathing skills, all  -CC  bathing skills, all  -CC     [place] Level (Bathing Goal 2, OT-IRF)  contact guard assist;standby assist  -CC  standby assist  -CC     Time Frame (Bathing Goal 2, OT-IRF)  long term goal (LTG)  -CC  long term goal (LTG)  -CC     Progress/Outcomes (Bathing Goal 2,  OT-IRF)  goal ongoing  -CC  goal ongoing  -CC        UB Dressing Goal 1 (OT-IRF)    Activity/Device (UB Dressing Goal 1, OT-IRF)  upper body dressing  -CC  upper body dressing  -CC     [place] (UB Dress Goal 1, OT-IRF)  standby assist  -CC  standby assist  -CC     Time Frame (UB Dressing Goal 1, OT-IRF)  long term goal (LTG)  -CC  long term goal (LTG)  -CC     Progress/Outcomes (UB Dressing Goal 1, OT-IRF)  goal ongoing  -CC  goal ongoing  -CC        UB Dressing Goal 2 (OT-IRF)    Activity/Device (UB Dressing Goal 2, OT-IRF)  upper body dressing  -CC  upper body dressing  -CC     [place] (UB Dress Goal 2, OT-IRF)  supervision required  -CC  supervision required  -CC     Time Frame (UB Dressing Goal 2, OT-IRF)  long term goal (LTG)  -CC  long term goal (LTG)  -CC     Progress/Outcomes (UB Dressing Goal 2, OT-IRF)  goal ongoing  -CC  goal ongoing  -CC        LB Dressing Goal 1 (OT-IRF)    Activity/Device (LB Dressing Goal 1, OT-IRF)  lower body dressing  -CC  --     [place] (LB Dressing Goal 1, OT-IRF)  moderate assist (50-74% patient effort)  -CC  --     Time Frame (LB Dressing Goal 1, OT-IRF)  long term goal (LTG)  -CC  --     Progress/Outcomes (LB Dressing Goal 1, OT-IRF)  goal met  -CC  --        LB Dressing Goal 2 (OT-IRF)    Activity/Device (LB Dressing Goal 2, OT-IRF)  lower body dressing  -CC  lower body dressing  -CC     [place] (LB Dressing Goal 2, OT-IRF)  minimum assist (75% or more patient effort);contact guard assist  -CC  minimum assist (75% or more patient effort);contact guard assist  -CC     Time Frame (LB Dressing Goal 2, OT-IRF)  long term goal (LTG)  -CC  long term goal (LTG)  -CC     Progress/Outcomes (LB Dressing Goal 2, OT-IRF)  goal revised this date  -CC  goal revised this date  -CC        Grooming Goal 1 (OT-IRF)    Activity/Device (Grooming Goal 1, OT-IRF)  --  grooming skills, all  -CC     [place] (Grooming Goal 1, OT-IRF)  --  supervision required  -CC     Time  Frame (Grooming Goal 1, OT-IRF)  --  long term goal (LTG)  -CC     Progress/Outcomes (Grooming Goal 1, OT-IRF)  --  goal met  -CC        Toileting Goal 1 (OT-IRF)    Activity/Device (Toileting Goal 1, OT-IRF)  --  toileting skills, all  -CC     [place] Level (Toileting Goal 1, OT-IRF)  --  moderate assist (50-74% patient effort)  -CC     Time Frame (Toileting Goal 1, OT-IRF)  --  short term goal (STG)  -CC     Progress/Outcomes (Toileting Goal 1, OT-IRF)  --  goal met  -CC        Toileting Goal 2 (OT-IRF)    Activity/Device (Toileting Goal 2, OT-IRF)  toileting skills, all  -CC  toileting skills, all  -CC     [place] Level (Toileting Goal 2, OT-IRF)  contact guard assist  -CC  contact guard assist  -CC     Time Frame (Toileting Goal 2, OT-IRF)  long term goal (LTG)  -CC  long term goal (LTG)  -CC     Progress/Outcomes (Toileting Goal 2, OT-IRF)  goal revised this date  -CC  goal revised this date  -CC        Strength Goal 1 (OT-IRF)    Strength Goal 1 (OT-IRF)  Pt will increase B UE strength to approx. 4/5 to assist w/ ADLs and transfers.  -CC  Pt will increase B UE strength to approx. 4/5 to assist w/ ADLs and transfers.  -CC     Time Frame (Strength Goal 1, OT-IRF)  long term goal (LTG)  -CC  long term goal (LTG)  -CC     Progress/Outcomes (Strength Goal 1, OT-IRF)  goal ongoing  -CC  goal ongoing  -CC        Direction Following Goal 1 (OT-IRF)    Activity (Direction Following Goal 1, OT-IRF)  follow one step directions  -CC  follow one step directions  -CC     [place]/Cues/Accuracy (Direction Following Goal 1, OT-IRF)  with minimum;verbal cues/redirection  -CC  with minimum;verbal cues/redirection  -CC     Time Frame (Direction Following Goal 1, OT-IRF)  long term goal (LTG)  -CC  long term goal (LTG)  -CC     Progress/Outcomes (Direction Following Goal 1, OT-IRF)  goal ongoing  -CC  goal ongoing  -CC       User Key  (r) = Recorded By, (t) = Taken By, (c) = Cosigned By    Initials Name Provider  Type    CC Genevieve Talavera OTR Occupational Therapist          Occupational Therapy Education     Title: PT OT SLP Therapies (In Progress)     Topic: Occupational Therapy (In Progress)     Point: ADL training (In Progress)     Description: Instruct learner(s) on proper safety adaptation and remediation techniques during self care or transfers.   Instruct in proper use of assistive devices.    Learning Progress Summary           Patient Acceptance, E,TB, NR by JS at 1/12/2019 12:08 PM    Acceptance, E, VU,NR by RP at 1/3/2019  3:44 PM    Comment:  OT educ on OT role in therapeutic prcoess and pt's POC.   Significant Other Acceptance, E, VU,NR by RP at 1/3/2019  3:44 PM    Comment:  OT educ on OT role in therapeutic prcoess and pt's POC.                   Point: Home exercise program (In Progress)     Description: Instruct learner(s) on appropriate technique for monitoring, assisting and/or progressing therapeutic exercises/activities.    Learning Progress Summary           Patient Acceptance, E,TB, NR by GERSON at 1/12/2019 12:08 PM                   Point: Precautions (In Progress)     Description: Instruct learner(s) on prescribed precautions during self-care and functional transfers.    Learning Progress Summary           Patient Acceptance, E,TB, NR by GERSON at 1/12/2019 12:08 PM                   Point: Body mechanics (In Progress)     Description: Instruct learner(s) on proper positioning and spine alignment during self-care, functional mobility activities and/or exercises.    Learning Progress Summary           Patient Acceptance, E,TB, NR by GERSON at 1/12/2019 12:08 PM                               User Key     Initials Effective Dates Name Provider Type Discipline    GERSON 04/06/17 -  Vickie Pradhan PT Physical Therapist PT    RP 05/03/18 -  Geetha Posey OT Occupational Therapist OT                       Time Calculation:         Therapy Suggested Charges     Code   Minutes Charges    None              Therapy  Charges for Today     Code Description Service Date Service Provider Modifiers Qty    [id number] HC OT THER SUPP EA 15 MIN 2/1/2019 Genevieve Talavera OTR GO 2    [id number] HC OT SELF CARE/MGMT/TRAIN EA 15 MIN 2/1/2019 Genevieve Talavera OTR GO 2                   MELISSA Marrero  2/2/2019

## 2019-02-02 NOTE — THERAPY TREATMENT NOTE
Inpatient Rehabilitation - Speech Language Pathology Treatment Note  [facility]     Patient Name: Feliciano Light  : 1955  MRN: [id number]  Today's Date: 2019         Admit Date: 2019    Visit Dx:      ICD-10-CM ICD-9-CM   1. Protein-calorie malnutrition, unspecified severity (CMS/HCC) E46 263.9   2. 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    SLP Recommendation and Plan                                    SLP GOALS     Row Name 19 0800 19 1100 19 1400       Awareness of Basic Personal Information Goal 1 (SLP)    Improve Awareness of Basic Personal Information Goal 1 (SLP)  answering yes/no questions regarding personal/biographical information;naming self, family members  -LT  answering open-ended questions regarding personal/biographical information;naming self, family members  -SA  answering open-ended questions regarding personal/biographical information;naming self, family members  -SA    Time Frame (Awareness of Basic Personal Information Goal 1, SLP)  --  by discharge  -SA  by discharge  -SA    Progress (Awareness of Basic Personal Information Goal 1, SLP)  with maximum cues (25-49%);with 1:1 supervision/constant cues;10% pt stated name, refused to open eyes or participate further  -LT  with minimal cues (75-90%)  -SA  with maximum cues (25-49%)  -SA    Progress/Outcomes (Awareness of Basic Personal Information Goal 1, SLP)  --  good progress toward goal  -SA  progress slower than expected  -SA    Comment (Awareness  of Basic Personal Information Goal 1, SLP)  --  83% answering name, ; age, wife's name, address and phone number. Unable to answer phone number.   -SA  pt refusing to asnwer questions  -SA       Attention Goal 1 (SLP)    Improve Attention by Goal 1 (SLP)  --  --  complete selective attention task  -SA    Time Frame (Attention Goal 1, SLP)  --  --  by discharge  -SA    Progress (Attention Goal 1, SLP)  --  --  with maximum cues (25-49%);with 1:1 supervision/constant cues  -SA    Progress/Outcomes (Attention Goal 1, SLP)  --  --  progress slower than expected  -SA    Comment (Attention Goal 1, SLP)  --  --  Played Blink game for attending and matching colors. Pt needed max assist to match 6 colors; only used half the deck and this took 20 minutes of session.  -SA       Orientation Goal 1 ([facility])    Improve Orientation Through Goal 1 ([facility])  demonstrating orientation to day;demonstrating orientation to month;demonstrating orientation to year  -LT  demonstrating orientation to month;demonstrating orientation to year;demonstrating orientation to place;90%;independently (over 90% accuracy)  -SA  demonstrating orientation to month;demonstrating orientation to year;demonstrating orientation to place;90%;independently (over 90% accuracy)  -SA    Time Frame (Orientation Goal 1, [facility])  --  by discharge  -SA  by discharge  -SA    Progress (Orientation Goal 1, SLP)  0%;with 1:1 supervision/constant cues  -LT  0%;with 1:1 supervision/constant cues  -SA  0%  -SA    Progress/Outcomes (Orientation Goal 1, SLP)  --  goal ongoing;progress slower than expected  -SA  progress slower than expected  -SA    Comment (Orientation Goal 1, SLP)  --  ANswered; however, incorrectly  -SA  pt refused to answer any questions.  SLP asked pt question and provided answers re: choice of 2 and yes/no and pt shrugged shoulders. SLP asked if he was going to participate and pt shook head no.   -SA       Organizational Skills Goal 1 (SLP)    Time Frame  (Thought Organization Skills Goal 1, SLP)  --  by discharge  -SA  --    Progress (Thought Organization Skills Goal 1, SLP)  --  with maximum cues (25-49%)  -SA  --    Progress/Outcomes (Thought Organization Skills Goal 1, SLP)  --  goal ongoing;progress slower than expected  -SA  --    Comment (Thought Organization Skills Goal 1, SLP)  --  0% naming items needed for a task  -SA  --       Executive Functional Skills Goal 1 (SLP)    Improve Executive Function Skills Goal 1 (SLP)  --  time management activity  -SA  --    Time Frame (Executive Function Skills Goal 1, SLP)  --  by discharge  -SA  --    Progress (Executive Function Skills Goal 1, SLP)  --  40%;with maximum cues (25-49%)  -SA  --    Progress/Outcomes (Executive Function Skills Goal 1, SLP)  --  goal ongoing  -SA  --    Comment (Executive Function Skills Goal 1, SLP)  --  Telling time to the hour; max cues re: long and short hand  -SA  --      User Key  (r) = Recorded By, (t) = Taken By, (c) = Cosigned By    Initials Name Provider Type     Caterina Roy MS CCC-SLP Speech and Language Pathologist    Heaven Hanson MS CCC-SLP Speech and Language Pathologist              Time Calculation:     Time Calculation- SLP     Row Name 02/02/19 0858             Time Calculation- SLP    SLP Start Time  0830  -LT      SLP Stop Time  0900  -LT      SLP Time Calculation (min)  30 min  -LT        User Key  (r) = Recorded By, (t) = Taken By, (c) = Cosigned By    Initials Name Provider Type     Caterina Roy, MS CCC-SLP Speech and Language Pathologist          Therapy Charges for Today     Code Description Service Date Service Provider Modifiers Qty    [id number] [facility] DEV OF COGN SKILLS EACH 15 MIN 2/2/2019 Caterina Roy, MS CCC-SLP  2                     Caterina Roy MS CCC-SLP  2/2/2019

## 2019-02-02 NOTE — PROGRESS NOTES
PEG tube placed today.   Esophagus, stomach, and examined duodenum normal.  To start using at 10 PM for initiation of tube feeds initially at 25 cc/hour and meds.  To resume PO diet tomorrow 7 AM.  Regular diet - Pills whole with water; one at at time; 90 degrees; strict supervision by staff or wife.  High salt diet. ( can probably d/c Ensure tid with meals as will be on tube feeds).      2 Liters IVF - now running 75 cc/hour - completes at 9:00 AM.    [Once tolerating continuous tube feeds at goal, may change to a bolus regimen as outlined by dietician yesterday - recommend -   Nutren 1.5, 360 cc x 3 per day + 240 cc at HS  Flush with 100 cc water q feeding  If pt eats well at a meal (50% or more), can hold bolus.    If pt receives full amount of TF, will receive 1980 kcals, 89 gm pro, 1003 cc free water + 400 cc in flushes. Nutrition to continue following].    Will recheck BUE venous duplex on Vish Feb 3 and if not recurrence of DVT ( will be two months out at that point) but not resume Eliquis as LUE DVT was line associated.

## 2019-02-02 NOTE — PROGRESS NOTES
Inpatient Rehabilitation Functional Measures Assessment    Functional Measures  ALBERTO Eating:  Eating Score = 1, Total Assistance. Patient requires total  assistance for complete nutrition or supplemental nutrition/hydration.  ALBERTO Grooming: [facility] Bathing:  [facility] Upper Body Dressing:  [facility] Lower Body Dressing:  [facility] Toileting:  [facility] Bladder Management  Level of Assistance:  [place]  Frequency/Number of Accidents this Shift:  [facility] Bowel Management  Level of Assistance: [place]  Frequency/Number of Accidents this Shift: [facility] Bed/Chair/Wheelchair Transfer:  [facility] Toilet Transfer:  [facility] Tub/Shower Transfer:  [place]    Previously Documented Mode of Locomotion at Discharge: Field  ALBERTO Expected Mode of Locomotion at Discharge: [facility] Walk/Wheelchair:  [facility] Stairs:  [facility] Comprehension:  Auditory comprehension is the usual mode. Patient does not  comprehend complex/abstract information in their primary language without  assistance from a helper. Comprehension Score = 2, Maximal Prompting. Patient  comprehends basic daily needs 25-49% of the time. Patient understands simple  information via single words or gestures. Requires maximal/a lot of prompting  (most of the time). No assistive devices were required.  ALBERTO Expression:  Vocal expression is the usual mode. Patient does not express  complex/abstract information in their primary language without a helper.  Expression Score = 2, Maximal Prompting. Patient expresses basic daily needs  25-49% of the time. Patient uses only single words or gestures and requires  maximal/a lot of prompting (most of the time). No assistive devices were  required.  ALBERTO Social Interaction:  Activity was not observed.  ALBERTO Problem Solving:  Patient does not make appropriate decisions in order to  solve complex problems without assistance from a helper. Problem Solving Score =  2, Maximal Direction. Patient makes  appropriate decisions in order to solve  routine problems 25-49% of the time. Patient requires maximal direction for the  following behavior(s): Impulsivity. Poor judgment.  ALBERTO Memory:  Activity was not observed.    Therapy Mode Minutes  Occupational Therapy: Branch  Physical Therapy: Branch  Speech Language Pathology:  Branch    Signed by: Leonardo Martinez RN

## 2019-02-02 NOTE — PLAN OF CARE
Problem: Patient Care Overview  Goal: Plan of Care Review  Outcome: Ongoing (interventions implemented as appropriate)   02/02/19 1545   Patient Care Overview   IRF Plan of Care Review progress ongoing, continue   Progress, Functional Goals demonstrating adequate progress   Coping/Psychosocial   Plan of Care Reviewed With patient   OTHER   Outcome Summary Patient has been drowsy most of today, did get up for breakfest this AM and went to therapy started vomiting in physcial therapy so laid back down to rest, Dr. Martin saw patient and ordered to hold tube feedings until 12noon and then restart at 10cc/hr for the night and can increase back to 25cc/hr alcides if patient tollerates, Tylenol given for peg site soreness.      Goal: Coping Plan  Outcome: Ongoing (interventions implemented as appropriate)   02/02/19 1545   Coping Plan   Demonstration of Effective Coping Strategies demonstrating adequate progress       Problem: Skin Injury Risk (Adult)  Goal: Skin Health and Integrity  Outcome: Ongoing (interventions implemented as appropriate)   02/02/19 1545   Skin Injury Risk (Adult)   Skin Health and Integrity making progress toward outcome       Problem: Fall Risk (Adult)  Goal: Absence of Fall  Outcome: Ongoing (interventions implemented as appropriate)   02/02/19 1545   Fall Risk (Adult)   Absence of Fall making progress toward outcome       Problem: Nausea/Vomiting (Adult)  Goal: Symptom Relief  Outcome: Ongoing (interventions implemented as appropriate)   02/02/19 1545   Nausea/Vomiting (Adult)   Symptom Relief making progress toward outcome       Problem: Stroke (Hemorrhagic) (Adult)  Goal: Signs and Symptoms of Listed Potential Problems Will be Absent, Minimized or Managed (Stroke)  Outcome: Ongoing (interventions implemented as appropriate)   02/02/19 1545   Goal/Outcome Evaluation   Problems Assessed (Stroke (Hemorrhagic)) all   Problems Present (Stroke (Hemorrhagic)) motor/sensory impairment

## 2019-02-02 NOTE — CONSULTS
Adult Nutrition  Assessment/PES    Patient Name:  Feliciano Light  YOB: 1955  MRN: [id number]  Admit Date:  1/2/2019    Assessment Date:  2/2/2019    Comments:  PEG placed yesterday. Tube feeding of Fibersource HN at 25 ml/hr initiated this morning. Patient began vomiting so tube feeding held. To restart at 10 ml/hr at 12N until 2/3. It will then be increased to 25 if tolerating 10. Tube feeding formula changed to Nutren 1.5. Goal rate continuous if 55 ml/hr, water flush 100 ml every 4 hours.    Reason for Assessment     Row Name 02/02/19 1155          Reason for Assessment    Reason For Assessment  physician consult;TF/PN         Nutrition/Diet History     Row Name 02/02/19 1155          Nutrition/Diet History    Typical Food/Fluid Intake  Regular diet. PO this morning 25% breakfast. Fibersource HN tube feeding initiated via PEG at 25 ml/hr. Patient began vomiting. Tube feeding on hold until 12 N and to be restarted at 10 until 2/3. It will be increased to 25 if tolerateing 10.           Labs/Tests/Procedures/Meds     Row Name 02/02/19 1158          Labs/Procedures/Meds    Lab Results Reviewed  reviewed     Lab Results Comments  Hgb low; WBC high        Diagnostic Tests/Procedures    Diagnostic Test/Procedure Reviewed  reviewed        Medications    Pertinent Medications Reviewed  reviewed     Pertinent Medications Comments  antacid, anticonvulsant, laxative, MVI         Physical Findings     Row Name 02/02/19 1159          Physical Findings    Gastrointestinal  nausea;vomiting;feeding tube     Tubes  PEG     Skin  other (see comments) head ics healed; Cristian score 17           Nutrition Prescription Ordered     Row Name 02/02/19 1200          Nutrition Prescription PO    Current PO Diet  Regular     Fluid Consistency  Thin     Supplement  Mighty Shake     Supplement Frequency  2 times a day        Nutrition Prescription EN    Enteral Route  PEG         Evaluation of Received Nutrient/Fluid Intake      Row Name 02/02/19 1200          PO Evaluation    Number of Days PO Intake Evaluated  1 day     Number of Meals  1     % PO Intake  25%        EN Evaluation    TF Changes  Held               Problem/Interventions:            Intervention Goal     Row Name 02/02/19 1203          Intervention Goal    General  Maintain nutrition;Nutrition support treatment     PO  Tolerate PO;Increase intake     PO Intake %  50 %     TF/PN  Inititiate TF/PN     Weight  Appropriate weight gain         Nutrition Intervention     Row Name 02/02/19 1204          Nutrition Intervention    RD/Tech Action  Follow Tx progress;Care plan reviewd         Nutrition Prescription     Row Name 02/02/19 1204          Nutrition Prescription PO    PO Prescription  Discontinue supplement        Nutrition Prescription EN    Enteral Prescription  Enteral begin/change;Enteral to supply     Enteral Route  PEG     Product  -- Nutren 1.5     TF Delivery Method  Continuous     Continuous TF Goal Rate (mL/hr)  55 mL/hr     Continuous TF Starting Rate (mL/hr)  10 mL/hr     Continuous TF Goal Volume (mL)  1320 mL     Water flush (mL)   100 mL     Water Flush Frequency  Other (comment) every 6 hours     New EN Prescription Ordered?  No, recommended        EN to Supply    Kcal/Day  1980 Kcal/Day     Kcal/Kg  34 Kcal/Kg     Kcal/Kg Weight Method  Actual weight     Protein (gm/day)  89 gm/day     Meet Estimated Kcal Need (%)  100 %     Meet Estimated Protein Need (%)  100 %     TF Free H2O (mL)  1008 mL     Total Free H2O (mL/day)  1408 mL/day         Education/Evaluation     Row Name 02/02/19 1208          Monitor/Evaluation    Monitor  Per protocol           Electronically signed by:  Ally Townsend RD  02/02/19 12:14 PM

## 2019-02-02 NOTE — SIGNIFICANT NOTE
Dr. Martin notified of vomiting at bedside now, he ordered to hold tube feedings until 1200 noon and restart at 10cc/hr until alcides 2/3/19, we can increase to 25cc/hr if tolerating 10cc/hr

## 2019-02-02 NOTE — PROGRESS NOTES
[facility]  Inpatient Progress Note    Reason for Follow Up:  PEG Placed yesterday    Subjective     Interval History:   Vomited today after exertion with physical therapy, was tolerating 25 cc of tube feeds plus he ate food on top of it    Current Facility-Administered Medications:   •  acetaminophen (TYLENOL) 160 MG/5ML solution 650 mg, 650 mg, Per G Tube, Q6H PRN, Nilson Win MD, 650 mg at 02/02/19 1445  •  bisacodyl (DULCOLAX) suppository 10 mg, 10 mg, Rectal, Daily PRN, Nilson Win MD, 10 mg at 01/08/19 1811  •  Influenza Vac Subunit Quad (FLUCELVAX) injection 0.5 mL, 0.5 mL, Intramuscular, Once, Nilson Win MD  •  lacosamide (VIMPAT) oral solution 100 mg, 100 mg, Oral, Q12H, Nilson Win MD, 100 mg at 02/02/19 0743  •  lansoprazole (FIRST) oral suspension 30 mg, 30 mg, Oral, QAM, Nilson Win MD, 30 mg at 02/02/19 0743  •  meclizine (ANTIVERT) tablet 12.5 mg, 12.5 mg, Oral, TID AC, Nilson Win MD, 12.5 mg at 02/02/19 1239  •  multivitamin with minerals 1 tablet, 1 tablet, Oral, Daily, Nilson Win MD, 1 tablet at 02/02/19 0745  •  ondansetron (ZOFRAN) injection 4 mg, 4 mg, Intravenous, Q6H PRN, Nilson Win MD, 4 mg at 01/27/19 2102  •  ondansetron ODT (ZOFRAN-ODT) disintegrating tablet 4 mg, 4 mg, Oral, Q4H PRN, Nilson Win MD, 4 mg at 01/19/19 0513  •  polyethylene glycol (MIRALAX) powder 17 g, 17 g, Oral, Daily, Nilson Win MD, 17 g at 01/31/19 1704  •  promethazine (PHENERGAN) tablet 12.5 mg, 12.5 mg, Oral, Q8H PRN, Nilson Win MD, 12.5 mg at 01/19/19 2017  •  sodium chloride 0.9 % flush 3 mL, 3 mL, Intravenous, Q12H, Sahara Yu MD, 3 mL at 02/02/19 1139  •  sodium chloride 0.9 % flush 5 mL, 5 mL, Intravenous, PRN, Sahara Yu MD  Review of Systems:    He has a bit of weakness and fatigue all other systems reviewed and negative    Objective     Vital  Signs  Temp:  [97.5 °F (36.4 °C)-98.3 °F (36.8 °C)] 97.5 °F (36.4 °C)  Heart Rate:  [79-98] 96  Resp:  [12-18] 18  BP: (110-143)/(69-85) 117/80  Body mass index is 22.02 kg/m².    Intake/Output Summary (Last 24 hours) at 2/2/2019 1519  Last data filed at 2/2/2019 1245  Gross per 24 hour   Intake 1661 ml   Output 700 ml   Net 961 ml     I/O this shift:  In: 431 [P.O.:60; Other:200; NG/GT:171]  Out: 100 [Urine:100]     Physical Exam:   General: patient awake, alert and cooperative   Eyes: Normal lids and lashes, no scleral icterus   Neck: supple, normal ROM   Skin: warm and dry, not jaundiced   Cardiovascular: regular rhythm and rate, no murmurs auscultated   Pulm: clear to auscultation bilaterally, regular and unlabored   Abdomen: soft, nontender, nondistended; normal bowel sounds, site is clean dry and intact   Rectal: deferred   Extremities: no rash or edema   Psychiatric: Normal mood and behavior; memory intact     Results Review:     I reviewed the patient's new clinical results.    Results from last 7 days   Lab Units 02/02/19  0646 02/01/19  0724 01/28/19  0723   WBC 10*3/mm3 14.43* 6.24 5.62   HEMOGLOBIN g/dL 13.4* 15.2 12.7*   HEMATOCRIT % 41.5 44.8 38.5*   PLATELETS 10*3/mm3 193 227 228     Results from last 7 days   Lab Units 02/01/19  0724 01/30/19  0622 01/28/19  0653   SODIUM mmol/L 139 139 139   POTASSIUM mmol/L 4.1 3.8 4.1   CHLORIDE mmol/L 102 100 104   CO2 mmol/L 22.3 23.3 24.1   BUN mg/dL 12 10 20   CREATININE mg/dL 0.58* 0.56* 0.62*   CALCIUM mg/dL 9.3 9.2 8.6   GLUCOSE mg/dL 93 88 84     Results from last 7 days   Lab Units 02/01/19  0724   INR  1.09     No results found for: LIPASE    Radiology:  NM Gastric Emptying   Final Result   Negative.       This report was finalized on 1/30/2019 6:00 PM by Dr. Nilson Viveros M.D.          FL Upper GI With Air Contrast & SBFT   Final Result      CT Head Without Contrast   Final Result   Evaluation of posterior fossa is limited somewhat by    beam-hardening artifact from the radiopaque embolic material. No obvious   hemorrhage is identified involving the posterior fossa. There is   increased attenuation present within the occipital horns bilaterally.   This appears decreased in volume as compared to earlier examinations.   Occipital horns are smaller. The temporal horns and remaining portions   of the lateral ventricles appear similar to the prior examination,   slightly prominent. There is no evidence of a new area of decreased   attenuation to suggest acute infarction.       Radiation dose reduction techniques were utilized, including automated   exposure control and exposure modulation based on body size.       This report was finalized on 1/24/2019 4:51 PM by Dr. Jose Paige M.D.          MRI Brain Without Contrast   Final Result   1. Overall no significant change when compared to head CT 6 days ago on   01/10/2019.   2. 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 There is some residual hypertrophied vessels   along in the perimesencephalic cisterns along the margins of the wily   that may be enlarged vessels from previously embolized AVM although   residual AVM cannot be excluded on a standard MRI of the brain, can be   reevaluated at some point with an arteriogram.   3. This patient had 2 ventriculostomy catheters that were placed and   subsequently removed, one from the posterior inferior right parietal   bone through the right parietal parenchyma and along the posterior    medial body of the right lateral ventricle and there is a 6 cm long 3 mm   in diameter tubular tract that is FLAIR and diffusion hyperintense   likely and is dark on the gradient echo images, likely has some blood   products along the margins of the tract. The 2nd  ventriculostomy   catheter extended from the superior right coronal sutures through the   anterior superior lateral right frontal lobe parenchyma to the   anterosuperior body of the right lateral ventricle, and this has some   diffusion hyperintensity in the tract but also there is some FLAIR and   diffusion hyperintensity circumscribing the catheter tract within the   right anterosuperior lateral frontal white matter measuring up to 12 x 8   x 9 mm surrounding FLAIR and diffusion hyperintensity could be edema   related to the placement and removal or could be some focal cerebritis.   The lateral, 3rd and superior 4th ventricle remain mildly enlarged   compatible with mild hydrocephalus unchanged since January 10, 2019 but   the  ventricles are clearly slightly larger than they were on prior   outside head CTs on 12/25/2018 and 12/27/2018 after the removal of the   ventriculostomy catheters. There is some residual subacute   intraventricular hemorrhage in the posterior tips of the trigones and   occipital horns of the lateral ventricles bilaterally.  The remainder of   the MRI of the head is unremarkable.       The results were reviewed by telephone with Dr. Win on 01/16/2019 at   2 PM.       This report was finalized on 1/17/2019 8:56 AM by Dr. Feliciano Leigh M.D.          XR Abdomen KUB   Final Result   Feeding tube as described.       This report was finalized on 1/14/2019 6:02 PM by Dr. Nilson Viveros M.D.          XR Abdomen KUB   Final Result       As described.       This report was finalized on 1/14/2019 2:33 PM by Dr. Kyrie Solis M.D.          CT Head Without Contrast   Final Result   Postoperative changes as described. Stable  area of   encephalomalacia in the cerebellar vermis. No acute intracranial   abnormality is identified.       This report was finalized on 1/10/2019 6:15 PM by Dr. Peña Delgado M.D.          CT Head Without Contrast   Final Result   1. No significant interval change when compared to prior head CT 2 days   ago from [facility] on 01/07/2019.    2. Patient has multiple hyperdense embolic agents studding the   cerebellar vermis from previously embolized AVM fed by the right PICA,   as well as embolized right PICA aneurysm. There is a 3.5 x 3 cm area of   low-density and volume loss, compatible with encephalomalacia in the   cerebellar vermis along the margins of the embolic agents. There are   tubular tracts through the anterior superolateral right frontal bone,   posterior right parietal bone where there was previous placement and   subsequent removal of ventriculostomy catheters, and there is tubular   encephalomalacia in the anterolateral right frontal lobe parenchyma   along the tract of the previously removed right frontal approach   ventriculostomy catheter.   3. The lateral, third and fourth ventricles remain enlarged, compatible   with hydrocephalus. This is stable compared to 01/07/2019. The   ventricular size has slightly increased since outside head CT 12/23/2018   and 12/25/2018. This finding needs to be correlated clinically, at   minimum continued follow-up is warranted. The results were communicated   to Dr. Nilson Win by telephone on 01/09/2019 at 8:30 AM       Radiation dose reduction techniques were utilized, including automated   exposure control and exposure modulation based on body size.       This report was finalized on 1/9/2019 10:18 AM by Dr. Feliciano Leigh M.D.          US Abdomen Complete   Final Result      CT Head Without Contrast   Final Result   Patient has multiple hyperintense liquid embolic agents for   previously embolized AVM fed by the right PICA and has had  placement and   subsequent removal of multiple ventriculostomy catheters.  There is a   tubular tract through the anterolateral right frontal lobe parenchyma,   compatible with some mild encephalomalacia along the tract of the   previously removed right frontal approach ventriculostomy catheter.   There is stable to equivocal slight interval increase in size in the   lateral and third ventricles when compared to prior head CT 11 days ago   on 12/27/2018 compatible with mild hydrocephalus. There is some low   density and volume loss along the margins of the hyperdense liquid   embolic agents of the cerebellar vermi, compatible with areas of   infarcted cerebellar vermis measuring up to 3.5 x 3 x 2.8 cm, unchanged.   No residual acute intracranial hemorrhage is seen.       Radiation dose reduction techniques were utilized, including automated   exposure control and exposure modulation based on body size.       This report was finalized on 1/8/2019 2:54 PM by Dr. Feliciano Leigh M.D.              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   • SIADH (syndrome of inappropriate ADH production) (CMS/HCC)   • Elevated hemoglobin A1c   • Hyperlipidemia   • Protein-calorie malnutrition (CMS/HCC)     Plan:    A bit of leukocytosis postprocedure, I wouldn't start antibiotics unless he spikes a temperature over 101, I'll ask the primary team to call me if that occurs and I will start antibiotics to  cover skin microbials  Hold tube feeds 2 hours restart at  10 cc an hour keep it at that rate and I'll check on him tomorrow  I see no indication for CAT scan or other testing at this time for the mild leukocytosis or vomiting    I discussed the patients findings and my recommendations with patient, family and nursing staff.    Tunde Leal MD

## 2019-02-02 NOTE — SIGNIFICANT NOTE
02/02/19 1008   Rehab Treatment   Discipline speech language pathologist   Reason Treatment Not Performed unable to treat, medical status change  (RN reports nausea, vomiting. MD orders for bedrest today.)

## 2019-02-02 NOTE — PROGRESS NOTES
Inpatient Rehabilitation Functional Measures Assessment    Functional Measures  ALBERTO Eating:  [facility] Grooming: [facility] Bathing:  [facility] Upper Body Dressing:  [facility] Lower Body Dressing:  [facility] Toileting:  [facility] Bladder Management  Level of Assistance:  [place]  Frequency/Number of Accidents this Shift:  [facility] Bowel Management  Level of Assistance: [place]  Frequency/Number of Accidents this Shift: [facility] Bed/Chair/Wheelchair Transfer:  [facility] Toilet Transfer:  [facility] Tub/Shower Transfer:  [place]    Previously Documented Mode of Locomotion at Discharge: Field  ALBERTO Expected Mode of Locomotion at Discharge: [facility] Walk/Wheelchair:  [facility] Stairs:  [facility] Comprehension:  Auditory comprehension is the usual mode. Patient does not  comprehend complex/abstract information in their primary language without  assistance from a helper. Comprehension Score = 3, Moderate Prompting. Patient  comprehends basic daily needs or ideas 50-74% of the time. Patient requires  moderate/some prompting. No assistive devices were required.  ALBERTO Expression:  Vocal expression is the usual mode. Expression Score = 6,  Modified Independent.  Patient expresses complex/abstract information in their  primary language, requiring: Additional time.  ALBERTO Social Interaction:  Social Interaction Score = 3, Moderate Direction.  Patient interacts appropriately 50-74% of the time.  Patient requires  moderate/some direction for the following behavior(s): Non-interactive.  ALBERTO Problem Solving:  Activity was not observed.  ALBERTO Memory:  Memory Score = 3, Moderate Prompting. Patient recognizes and  remembers 50-74% of the time. Patient requires moderate/some prompting  for  memory for the following: Inability to follow multi-step commands. Disoriented  to person, place, time or situation.    Therapy Mode Minutes  Occupational Therapy: [facility]  Physical Therapy: [facility]  Speech Language  Pathology:  [facility]    Signed by: Alisa Adler RN

## 2019-02-02 NOTE — PROGRESS NOTES
LOS: 31 days   Patient Care Team:  Jesus Bautista MD as PCP - General (Family Medicine)    Chief Complaint: same    Subjective     History of Present Illness    Subjective Pt is sleeping soundly. Wife present. Pt has experienced episode of N/V. Dr Win has been involved over the phone.     History taken from: patient    Objective     Vital Signs  Temp:  [97.6 °F (36.4 °C)-98.3 °F (36.8 °C)] 98.3 °F (36.8 °C)  Heart Rate:  [79-98] 98  Resp:  [12-18] 18  BP: (110-143)/(69-85) 110/69    Objective exam unchanged    Results Review:     I reviewed the patient's new clinical results.    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 Tf has been changed.     Deshaun Georges MD  02/02/19  2:01 PM    Time:

## 2019-02-03 ENCOUNTER — APPOINTMENT (OUTPATIENT)
Dept: CARDIOLOGY | Facility: HOSPITAL | Age: 64
End: 2019-02-03
Attending: PHYSICAL MEDICINE & REHABILITATION

## 2019-02-03 LAB
ANION GAP SERPL CALCULATED.3IONS-SCNC: 9.9 MMOL/L
BASOPHILS # BLD AUTO: 0.06 10*3/MM3 (ref 0–0.2)
BASOPHILS NFR BLD AUTO: 0.9 % (ref 0–1.5)
BH CV UPPER VENOUS LEFT BRACHIAL COMPRESS: NORMAL
BH CV UPPER VENOUS LEFT CEPHALIC FOREARM COLOR: 1
BH CV UPPER VENOUS LEFT CEPHALIC FOREARM COMPRESS: NORMAL
BH CV UPPER VENOUS LEFT CEPHALIC FOREARM THROMBUS: NORMAL
BH CV UPPER VENOUS LEFT CEPHALIC UPPER COMPRESS: NORMAL
BH CV UPPER VENOUS LEFT INTERNAL JUGULAR AUGMENT: NORMAL
BH CV UPPER VENOUS LEFT INTERNAL JUGULAR COMPETENT: NORMAL
BH CV UPPER VENOUS LEFT INTERNAL JUGULAR COMPRESS: NORMAL
BH CV UPPER VENOUS LEFT INTERNAL JUGULAR PHASIC: NORMAL
BH CV UPPER VENOUS LEFT INTERNAL JUGULAR SPONT: NORMAL
BH CV UPPER VENOUS LEFT RADIAL COMPRESS: NORMAL
BH CV UPPER VENOUS LEFT SUBCLAVIAN AUGMENT: NORMAL
BH CV UPPER VENOUS LEFT SUBCLAVIAN COMPETENT: NORMAL
BH CV UPPER VENOUS LEFT SUBCLAVIAN PHASIC: NORMAL
BH CV UPPER VENOUS LEFT SUBCLAVIAN SPONT: NORMAL
BH CV UPPER VENOUS LEFT ULNAR COMPRESS: NORMAL
BH CV UPPER VENOUS RIGHT AXILLARY AUGMENT: NORMAL
BH CV UPPER VENOUS RIGHT AXILLARY COMPETENT: NORMAL
BH CV UPPER VENOUS RIGHT AXILLARY COMPRESS: NORMAL
BH CV UPPER VENOUS RIGHT AXILLARY PHASIC: NORMAL
BH CV UPPER VENOUS RIGHT AXILLARY SPONT: NORMAL
BH CV UPPER VENOUS RIGHT BASILIC FOREARM COMPRESS: NORMAL
BH CV UPPER VENOUS RIGHT BASILIC UPPER COMPRESS: NORMAL
BH CV UPPER VENOUS RIGHT BRACHIAL COMPRESS: NORMAL
BH CV UPPER VENOUS RIGHT CEPHALIC FOREARM COMPRESS: NORMAL
BH CV UPPER VENOUS RIGHT CEPHALIC UPPER COLOR: 1
BH CV UPPER VENOUS RIGHT CEPHALIC UPPER COMPRESS: NORMAL
BH CV UPPER VENOUS RIGHT CEPHALIC UPPER THROMBUS: NORMAL
BH CV UPPER VENOUS RIGHT INTERNAL JUGULAR AUGMENT: NORMAL
BH CV UPPER VENOUS RIGHT INTERNAL JUGULAR COMPETENT: NORMAL
BH CV UPPER VENOUS RIGHT INTERNAL JUGULAR COMPRESS: NORMAL
BH CV UPPER VENOUS RIGHT INTERNAL JUGULAR PHASIC: NORMAL
BH CV UPPER VENOUS RIGHT INTERNAL JUGULAR SPONT: NORMAL
BH CV UPPER VENOUS RIGHT RADIAL COMPRESS: NORMAL
BH CV UPPER VENOUS RIGHT SUBCLAVIAN AUGMENT: NORMAL
BH CV UPPER VENOUS RIGHT SUBCLAVIAN COMPETENT: NORMAL
BH CV UPPER VENOUS RIGHT SUBCLAVIAN COMPRESS: NORMAL
BH CV UPPER VENOUS RIGHT SUBCLAVIAN PHASIC: NORMAL
BH CV UPPER VENOUS RIGHT SUBCLAVIAN SPONT: NORMAL
BH CV UPPER VENOUS RIGHT ULNAR COMPRESS: NORMAL
BUN BLD-MCNC: 14 MG/DL (ref 8–23)
BUN/CREAT SERPL: 23.3 (ref 7–25)
CALCIUM SPEC-SCNC: 9 MG/DL (ref 8.6–10.5)
CHLORIDE SERPL-SCNC: 105 MMOL/L (ref 98–107)
CO2 SERPL-SCNC: 25.1 MMOL/L (ref 22–29)
CREAT BLD-MCNC: 0.6 MG/DL (ref 0.76–1.27)
DEPRECATED RDW RBC AUTO: 55.4 FL (ref 37–54)
EOSINOPHIL # BLD AUTO: 0.32 10*3/MM3 (ref 0–0.7)
EOSINOPHIL NFR BLD AUTO: 4.6 % (ref 0.3–6.2)
ERYTHROCYTE [DISTWIDTH] IN BLOOD BY AUTOMATED COUNT: 15.2 % (ref 11.5–14.5)
GFR SERPL CREATININE-BSD FRML MDRD: 135 ML/MIN/1.73
GLUCOSE BLD-MCNC: 107 MG/DL (ref 65–99)
HCT VFR BLD AUTO: 38.2 % (ref 40.4–52.2)
HGB BLD-MCNC: 12.6 G/DL (ref 13.7–17.6)
IMM GRANULOCYTES # BLD AUTO: 0.02 10*3/MM3 (ref 0–0.03)
IMM GRANULOCYTES NFR BLD AUTO: 0.3 % (ref 0–0.5)
LYMPHOCYTES # BLD AUTO: 1.24 10*3/MM3 (ref 0.9–4.8)
LYMPHOCYTES NFR BLD AUTO: 17.7 % (ref 19.6–45.3)
MCH RBC QN AUTO: 32.8 PG (ref 27–32.7)
MCHC RBC AUTO-ENTMCNC: 33 G/DL (ref 32.6–36.4)
MCV RBC AUTO: 99.5 FL (ref 79.8–96.2)
MONOCYTES # BLD AUTO: 0.63 10*3/MM3 (ref 0.2–1.2)
MONOCYTES NFR BLD AUTO: 9 % (ref 5–12)
NEUTROPHILS # BLD AUTO: 4.76 10*3/MM3 (ref 1.9–8.1)
NEUTROPHILS NFR BLD AUTO: 67.8 % (ref 42.7–76)
PLATELET # BLD AUTO: 194 10*3/MM3 (ref 140–500)
PMV BLD AUTO: 10.3 FL (ref 6–12)
POTASSIUM BLD-SCNC: 3.8 MMOL/L (ref 3.5–5.2)
RBC # BLD AUTO: 3.84 10*6/MM3 (ref 4.6–6)
SODIUM BLD-SCNC: 140 MMOL/L (ref 136–145)
WBC NRBC COR # BLD: 7.01 10*3/MM3 (ref 4.5–10.7)

## 2019-02-03 PROCEDURE — 85025 COMPLETE CBC W/AUTO DIFF WBC: CPT | Performed by: PHYSICAL MEDICINE & REHABILITATION

## 2019-02-03 PROCEDURE — 99232 SBSQ HOSP IP/OBS MODERATE 35: CPT | Performed by: INTERNAL MEDICINE

## 2019-02-03 PROCEDURE — 93970 EXTREMITY STUDY: CPT

## 2019-02-03 PROCEDURE — 80048 BASIC METABOLIC PNL TOTAL CA: CPT | Performed by: PHYSICAL MEDICINE & REHABILITATION

## 2019-02-03 RX ADMIN — MECLIZINE HCL 12.5 MG: 12.5 TABLET ORAL at 17:11

## 2019-02-03 RX ADMIN — LACOSAMIDE 100 MG: 10 SOLUTION ORAL at 08:01

## 2019-02-03 RX ADMIN — MECLIZINE HCL 12.5 MG: 12.5 TABLET ORAL at 05:54

## 2019-02-03 RX ADMIN — POLYETHYLENE GLYCOL 3350 17 G: 17 POWDER, FOR SOLUTION ORAL at 08:01

## 2019-02-03 RX ADMIN — LACOSAMIDE 100 MG: 10 SOLUTION ORAL at 20:13

## 2019-02-03 RX ADMIN — LANSOPRAZOLE 30 MG: KIT at 05:54

## 2019-02-03 RX ADMIN — MECLIZINE HCL 12.5 MG: 12.5 TABLET ORAL at 11:27

## 2019-02-03 RX ADMIN — MULTIPLE VITAMINS W/ MINERALS TAB 1 TABLET: TAB at 08:02

## 2019-02-03 RX ADMIN — ACETAMINOPHEN 650 MG: 160 SOLUTION ORAL at 17:11

## 2019-02-03 NOTE — PROGRESS NOTES
DAVID doppler completed. Prelim is pos for maxime chronic superficial thrombus called to Alisa HUTCHINS.

## 2019-02-03 NOTE — PROGRESS NOTES
Inpatient Rehabilitation Functional Measures Assessment    Functional Measures  ALBERTO Eating:  Eating Score = 1, Total Assistance. Patient requires total  assistance for complete nutrition or supplemental nutrition/hydration.  ALBERTO Grooming: [facility] Bathing:  [facility] Upper Body Dressing:  [facility] Lower Body Dressing:  [facility] Toileting:  [facility] Bladder Management  Level of Assistance:  [place]  Frequency/Number of Accidents this Shift:  [facility] Bowel Management  Level of Assistance: [place]  Frequency/Number of Accidents this Shift: [facility] Bed/Chair/Wheelchair Transfer:  [facility] Toilet Transfer:  [facility] Tub/Shower Transfer:  [place]    Previously Documented Mode of Locomotion at Discharge: Field  ALBERTO Expected Mode of Locomotion at Discharge: [facility] Walk/Wheelchair:  [facility] Stairs:  [facility] Comprehension:  Auditory comprehension is the usual mode. Patient does not  comprehend complex/abstract information in their primary language without  assistance from a helper. Comprehension Score = 4, Minimal Prompting. Patient  comprehends basic daily needs or ideas 75-90% of the time. Patient requires  minimal/occasional prompting. No assistive devices were required.  ALBERTO Expression:  Vocal expression is the usual mode. Patient does not express  complex/abstract information in their primary language without a helper.  Expression Score = 2, Maximal Prompting. Patient expresses basic daily needs  25-49% of the time. Patient uses only single words or gestures and requires  maximal/a lot of prompting (most of the time). No assistive devices were  required.  ALBERTO Social Interaction:  Activity was not observed.  ALBERTO Problem Solving:  [facility] Memory:  Memory Score = 4, Minimal Prompting. Patient recognizes and  remembers 75-90% of the time. Patient requires minimal/occasional prompting for  memory for the following: Disoriented to person, place, time or situation.  Limited  recall of daily routine.    Therapy Mode Minutes  Occupational Therapy: [facility]  Physical Therapy: [facility]  Speech Language Pathology:  [facility]    Signed by: Leonardo Martinez RN

## 2019-02-03 NOTE — PROGRESS NOTES
Inpatient Rehabilitation Functional Measures Assessment    Functional Measures  ALBERTO Eating:  [facility] Grooming: [facility] Bathing:  [facility] Upper Body Dressing:  [facility] Lower Body Dressing:  [facility] Toileting:  [facility] Bladder Management  Level of Assistance:  [place]  Frequency/Number of Accidents this Shift:  [facility] Bowel Management  Level of Assistance: [place]  Frequency/Number of Accidents this Shift: [facility] Bed/Chair/Wheelchair Transfer:  [facility] Toilet Transfer:  [facility] Tub/Shower Transfer:  [place]    Previously Documented Mode of Locomotion at Discharge: Field  ALBERTO Expected Mode of Locomotion at Discharge: [facility] Walk/Wheelchair:  [facility] Stairs:  [facility] Comprehension:  Auditory comprehension is the usual mode. Patient does not  comprehend complex/abstract information in their primary language without  assistance from a helper. Comprehension Score = 3, Moderate Prompting. Patient  comprehends basic daily needs or ideas 50-74% of the time. Patient requires  moderate/some prompting. No assistive devices were required.  ALBERTO Expression:  Vocal expression is the usual mode. Patient does not express  complex/abstract information in their primary language without a helper.  Expression Score = 3, Moderate Prompting. Patient expresses basic daily needs or  ideas 50-74% of the time. Patient requires moderate/some prompting. No assistive  devices were required.  ALBERTO Social Interaction:  Social Interaction Score = 5, Supervision.  Patient may  require encouragement to initiate participation. Patient requires directing only  under stressful or unfamiliar conditions, but less than 10% of the time, for the  following behavior(s):  ALBERTO Problem Solving:  Activity was not observed.  ALBERTO Memory:  Memory Score = 6, Modified [place].  Patient is modified  independent for memory, requiring: Requires additional time.    Therapy Mode Minutes  Occupational Therapy:  [facility]anch  Physical Therapy: [facility]  Speech Language Pathology:  [facility]    Signed by: Alisa Adler RN

## 2019-02-03 NOTE — PROGRESS NOTES
Inpatient Rehabilitation Plan of Care Note    Plan of Care  Care Plan Reviewed - No updates at this time.    Psychosocial    Performed Intervention(s)  Verbalizes needs & concerns &  prn      Safety    Performed Intervention(s)  Items in reach, Falls precautions/ protocol  Safety rounds & Bed/chair alarms      Sphincter Control    Performed Intervention(s)  Monitor intake, output, & bowel movements  Use incontinence products/ equipment, & perineal care  Encourage appropriate diet & fluid intake      Nutrition    Performed Intervention(s)  Monitor oral intake with each meal.  Administer tube feed per order    Signed by: Alisa Adler RN

## 2019-02-03 NOTE — PROGRESS NOTES
LOS: 32 days   Patient Care Team:  Jesus Bautista MD as PCP - General (Family Medicine)    Chief Complaint: same    Subjective     History of Present Illness    Subjective Pt is currently sleeping in WC. Family member reports pt was awake all AM. No vomiting episodes since yesterday    History taken from: patient    Objective     Vital Signs  Temp:  [96.6 °F (35.9 °C)-97.5 °F (36.4 °C)] 96.6 °F (35.9 °C)  Heart Rate:  [96-98] 97  Resp:  [18] 18  BP: (110-117)/(72-81) 110/72    Objective exam unchanged    Results Review:     I reviewed the patient's new clinical results.    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

## 2019-02-03 NOTE — PLAN OF CARE
Problem: Patient Care Overview  Goal: Plan of Care Review  Outcome: Ongoing (interventions implemented as appropriate)   02/03/19 0350   Patient Care Overview   IRF Plan of Care Review progress ongoing, continue   Progress, Functional Goals demonstrating adequate progress   Coping/Psychosocial   Plan of Care Reviewed With patient   OTHER   Outcome Summary Pt rested well this shift. Tolerate tube feed/meds through tube. Administered tylenol for discomfort.        Problem: Skin Injury Risk (Adult)  Goal: Skin Health and Integrity  Outcome: Ongoing (interventions implemented as appropriate)      Problem: Fall Risk (Adult)  Goal: Absence of Fall  Outcome: Ongoing (interventions implemented as appropriate)      Problem: Stroke (Hemorrhagic) (Adult)  Goal: Signs and Symptoms of Listed Potential Problems Will be Absent, Minimized or Managed (Stroke)  Outcome: Ongoing (interventions implemented as appropriate)

## 2019-02-03 NOTE — PROGRESS NOTES
Inpatient Rehabilitation Plan of Care Note    Plan of Care  Care Plan Reviewed - No updates at this time.    Psychosocial    Performed Intervention(s)  Verbalizes needs & concerns &  prn      Safety    Performed Intervention(s)  Safety rounds & Bed/chair alarms      Sphincter Control    Performed Intervention(s)  Use incontinence products/ equipment, & perineal care      Nutrition    Performed Intervention(s)  Administer tube feed per order    Signed by: Leonardo Martinez RN

## 2019-02-03 NOTE — PROGRESS NOTES
[facility]  Inpatient Progress Note    Reason for Follow Up:  Status post PEG placement    Subjective     Interval History:   No further vomiting, tolerating tube feeds, leukocytosis resolved    Current Facility-Administered Medications:   •  acetaminophen (TYLENOL) 160 MG/5ML solution 650 mg, 650 mg, Per G Tube, Q6H PRN, Nilson Win MD, 650 mg at 02/02/19 2144  •  bisacodyl (DULCOLAX) suppository 10 mg, 10 mg, Rectal, Daily PRN, Nilson Win MD, 10 mg at 01/08/19 1811  •  Influenza Vac Subunit Quad (FLUCELVAX) injection 0.5 mL, 0.5 mL, Intramuscular, Once, Nilson Win MD  •  lacosamide (VIMPAT) oral solution 100 mg, 100 mg, Oral, Q12H, Nilson Win MD, 100 mg at 02/03/19 0801  •  lansoprazole (FIRST) oral suspension 30 mg, 30 mg, Oral, QAM, Nilson Win MD, 30 mg at 02/03/19 0554  •  meclizine (ANTIVERT) tablet 12.5 mg, 12.5 mg, Oral, TID AC, Nilson Win MD, 12.5 mg at 02/03/19 1127  •  multivitamin with minerals 1 tablet, 1 tablet, Oral, Daily, Nilson Win MD, 1 tablet at 02/03/19 0802  •  ondansetron (ZOFRAN) injection 4 mg, 4 mg, Intravenous, Q6H PRN, Nilson Win MD, 4 mg at 01/27/19 2102  •  ondansetron ODT (ZOFRAN-ODT) disintegrating tablet 4 mg, 4 mg, Oral, Q4H PRN, Nilson Win MD, 4 mg at 01/19/19 0547  •  polyethylene glycol (MIRALAX) powder 17 g, 17 g, Oral, Daily, Nilson Win MD, 17 g at 02/03/19 0801  •  promethazine (PHENERGAN) tablet 12.5 mg, 12.5 mg, Oral, Q8H PRN, Nilson Win MD, 12.5 mg at 01/19/19 2017  •  sodium chloride 0.9 % flush 3 mL, 3 mL, Intravenous, Q12H, Sahara Yu MD, 3 mL at 02/02/19 2145  •  sodium chloride 0.9 % flush 5 mL, 5 mL, Intravenous, PRN, Sahara Yu MD  Review of Systems:    A bit of weakness and fatigue all other systems reviewed and negative    Objective     Vital Signs  Temp:  [96.6 °F (35.9 °C)-97.5 °F (36.4 °C)]  96.6 °F (35.9 °C)  Heart Rate:  [96-98] 97  Resp:  [18] 18  BP: (110-117)/(72-81) 110/72  Body mass index is 22.02 kg/m².    Intake/Output Summary (Last 24 hours) at 2/3/2019 1223  Last data filed at 2/3/2019 1132  Gross per 24 hour   Intake 585 ml   Output 220 ml   Net 365 ml     I/O this shift:  In: 435 [Other:160; NG/GT:275]  Out: 170 [Urine:170]     Physical Exam:   General: patient awake, alert and cooperative   Eyes: Normal lids and lashes, no scleral icterus   Neck: supple, normal ROM   Skin: warm and dry, not jaundiced   Cardiovascular: regular rhythm and rate, no murmurs auscultated   Pulm: clear to auscultation bilaterally, regular and unlabored   Abdomen: soft, nontender, nondistended; normal bowel sounds   Rectal: deferred   Extremities: no rash or edema   Psychiatric: Normal mood and behavior; memory intact     Results Review:     I reviewed the patient's new clinical results.    Results from last 7 days   Lab Units 02/03/19  0500 02/02/19  0646 02/01/19  0724   WBC 10*3/mm3 7.01 14.43* 6.24   HEMOGLOBIN g/dL 12.6* 13.4* 15.2   HEMATOCRIT % 38.2* 41.5 44.8   PLATELETS 10*3/mm3 194 193 227     Results from last 7 days   Lab Units 02/03/19  0500 02/02/19  1440 02/01/19  0724   SODIUM mmol/L 140 140 139   POTASSIUM mmol/L 3.8 3.9 4.1   CHLORIDE mmol/L 105 103 102   CO2 mmol/L 25.1 24.6 22.3   BUN mg/dL 14 11 12   CREATININE mg/dL 0.60* 0.57* 0.58*   CALCIUM mg/dL 9.0 9.1 9.3   BILIRUBIN mg/dL  --  0.8  --    ALK PHOS U/L  --  62  --    ALT (SGPT) U/L  --  36  --    AST (SGOT) U/L  --  19  --    GLUCOSE mg/dL 107* 107* 93     Results from last 7 days   Lab Units 02/01/19  0724   INR  1.09     No results found for: LIPASE    Radiology:  NM Gastric Emptying   Final Result   Negative.       This report was finalized on 1/30/2019 6:00 PM by Dr. Nilson Viveros M.D.          FL Upper GI With Air Contrast & SBFT   Final Result      CT Head Without Contrast   Final Result   Evaluation of posterior fossa is  limited somewhat by   beam-hardening artifact from the radiopaque embolic material. No obvious   hemorrhage is identified involving the posterior fossa. There is   increased attenuation present within the occipital horns bilaterally.   This appears decreased in volume as compared to earlier examinations.   Occipital horns are smaller. The temporal horns and remaining portions   of the lateral ventricles appear similar to the prior examination,   slightly prominent. There is no evidence of a new area of decreased   attenuation to suggest acute infarction.       Radiation dose reduction techniques were utilized, including automated   exposure control and exposure modulation based on body size.       This report was finalized on 1/24/2019 4:51 PM by Dr. Jose Paige M.D.          MRI Brain Without Contrast   Final Result   1. Overall no significant change when compared to head CT 6 days ago on   01/10/2019.   2. 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 There is some residual hypertrophied vessels   along in the perimesencephalic cisterns along the margins of the wily   that may be enlarged vessels from previously embolized AVM although   residual AVM cannot be excluded on a standard MRI of the brain, can be   reevaluated at some point with an arteriogram.   3. This patient had 2 ventriculostomy catheters that were placed and   subsequently removed, one from the posterior inferior right parietal   bone through the right parietal parenchyma and  along the posterior   medial body of the right lateral ventricle and there is a 6 cm long 3 mm   in diameter tubular tract that is FLAIR and diffusion hyperintense   likely and is dark on the gradient echo images, likely has some blood   products along the margins of the tract. The 2nd  ventriculostomy   catheter extended from the superior right coronal sutures through the   anterior superior lateral right frontal lobe parenchyma to the   anterosuperior body of the right lateral ventricle, and this has some   diffusion hyperintensity in the tract but also there is some FLAIR and   diffusion hyperintensity circumscribing the catheter tract within the   right anterosuperior lateral frontal white matter measuring up to 12 x 8   x 9 mm surrounding FLAIR and diffusion hyperintensity could be edema   related to the placement and removal or could be some focal cerebritis.   The lateral, 3rd and superior 4th ventricle remain mildly enlarged   compatible with mild hydrocephalus unchanged since January 10, 2019 but   the  ventricles are clearly slightly larger than they were on prior   outside head CTs on 12/25/2018 and 12/27/2018 after the removal of the   ventriculostomy catheters. There is some residual subacute   intraventricular hemorrhage in the posterior tips of the trigones and   occipital horns of the lateral ventricles bilaterally.  The remainder of   the MRI of the head is unremarkable.       The results were reviewed by telephone with Dr. Win on 01/16/2019 at   2 PM.       This report was finalized on 1/17/2019 8:56 AM by Dr. Feliciano Leigh M.D.          XR Abdomen KUB   Final Result   Feeding tube as described.       This report was finalized on 1/14/2019 6:02 PM by Dr. Nilson Viveros M.D.          XR Abdomen KUB   Final Result       As described.       This report was finalized on 1/14/2019 2:33 PM by Dr. Kyrie Solis M.D.          CT Head Without Contrast   Final Result   Postoperative changes as  described. Stable area of   encephalomalacia in the cerebellar vermis. No acute intracranial   abnormality is identified.       This report was finalized on 1/10/2019 6:15 PM by Dr. Peña Delgado M.D.          CT Head Without Contrast   Final Result   1. No significant interval change when compared to prior head CT 2 days   ago from [facility] on 01/07/2019.    2. Patient has multiple hyperdense embolic agents studding the   cerebellar vermis from previously embolized AVM fed by the right PICA,   as well as embolized right PICA aneurysm. There is a 3.5 x 3 cm area of   low-density and volume loss, compatible with encephalomalacia in the   cerebellar vermis along the margins of the embolic agents. There are   tubular tracts through the anterior superolateral right frontal bone,   posterior right parietal bone where there was previous placement and   subsequent removal of ventriculostomy catheters, and there is tubular   encephalomalacia in the anterolateral right frontal lobe parenchyma   along the tract of the previously removed right frontal approach   ventriculostomy catheter.   3. The lateral, third and fourth ventricles remain enlarged, compatible   with hydrocephalus. This is stable compared to 01/07/2019. The   ventricular size has slightly increased since outside head CT 12/23/2018   and 12/25/2018. This finding needs to be correlated clinically, at   minimum continued follow-up is warranted. The results were communicated   to Dr. Nilson Win by telephone on 01/09/2019 at 8:30 AM       Radiation dose reduction techniques were utilized, including automated   exposure control and exposure modulation based on body size.       This report was finalized on 1/9/2019 10:18 AM by Dr. Feliciano Leigh M.D.          US Abdomen Complete   Final Result      CT Head Without Contrast   Final Result   Patient has multiple hyperintense liquid embolic agents for   previously embolized AVM fed by the right PICA  and has had placement and   subsequent removal of multiple ventriculostomy catheters.  There is a   tubular tract through the anterolateral right frontal lobe parenchyma,   compatible with some mild encephalomalacia along the tract of the   previously removed right frontal approach ventriculostomy catheter.   There is stable to equivocal slight interval increase in size in the   lateral and third ventricles when compared to prior head CT 11 days ago   on 12/27/2018 compatible with mild hydrocephalus. There is some low   density and volume loss along the margins of the hyperdense liquid   embolic agents of the cerebellar vermi, compatible with areas of   infarcted cerebellar vermis measuring up to 3.5 x 3 x 2.8 cm, unchanged.   No residual acute intracranial hemorrhage is seen.       Radiation dose reduction techniques were utilized, including automated   exposure control and exposure modulation based on body size.       This report was finalized on 1/8/2019 2:54 PM by Dr. Feliciano Leigh M.D.              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    Tunde Leal MD

## 2019-02-03 NOTE — PLAN OF CARE
Problem: Patient Care Overview  Goal: Plan of Care Review  Outcome: Ongoing (interventions implemented as appropriate)   02/03/19 1534   Patient Care Overview   IRF Plan of Care Review progress ongoing, continue   Progress, Functional Goals demonstrating adequate progress   Coping/Psychosocial   Plan of Care Reviewed With patient   OTHER   Outcome Summary Patient doing ok this afternoon tube feedings increased by 10cc q 6 hours until we meet goal rate per Dr. Leal patient had very scant amount of emesis this afternoon but immediatly after felt ok with no complaints of nausea very small amount of residual when checked 5-10mls went down for follow up doppler to Bilateral upper extremities today, visiting with sister this afternoon.       Goal: Coping Plan  Outcome: Ongoing (interventions implemented as appropriate)   02/03/19 1534   Coping Plan   Demonstration of Effective Coping Strategies demonstrating adequate progress       Problem: Skin Injury Risk (Adult)  Goal: Skin Health and Integrity  Outcome: Ongoing (interventions implemented as appropriate)   02/03/19 1534   Skin Injury Risk (Adult)   Skin Health and Integrity making progress toward outcome       Problem: Fall Risk (Adult)  Goal: Absence of Fall  Outcome: Ongoing (interventions implemented as appropriate)   02/03/19 1534   Fall Risk (Adult)   Absence of Fall making progress toward outcome       Problem: Nausea/Vomiting (Adult)  Goal: Symptom Relief  Outcome: Ongoing (interventions implemented as appropriate)   02/03/19 1534   Nausea/Vomiting (Adult)   Symptom Relief making progress toward outcome       Problem: Stroke (Hemorrhagic) (Adult)  Goal: Signs and Symptoms of Listed Potential Problems Will be Absent, Minimized or Managed (Stroke)  Outcome: Ongoing (interventions implemented as appropriate)      Problem: Nutrition, Enteral (Adult)  Goal: Signs and Symptoms of Listed Potential Problems Will be Absent, Minimized or Managed (Nutrition,  Enteral)  Outcome: Ongoing (interventions implemented as appropriate)   02/03/19 4836   Goal/Outcome Evaluation   Problems Assessed (Enteral Nutrition) all   Problems Present (Enteral Nutrition) none

## 2019-02-04 LAB
ANION GAP SERPL CALCULATED.3IONS-SCNC: 12.6 MMOL/L
BASOPHILS # BLD AUTO: 0.03 10*3/MM3 (ref 0–0.2)
BASOPHILS NFR BLD AUTO: 0.5 % (ref 0–1.5)
BUN BLD-MCNC: 17 MG/DL (ref 8–23)
BUN/CREAT SERPL: 30.9 (ref 7–25)
CALCIUM SPEC-SCNC: 9.1 MG/DL (ref 8.6–10.5)
CHLORIDE SERPL-SCNC: 105 MMOL/L (ref 98–107)
CO2 SERPL-SCNC: 22.4 MMOL/L (ref 22–29)
CREAT BLD-MCNC: 0.55 MG/DL (ref 0.76–1.27)
DEPRECATED RDW RBC AUTO: 57.4 FL (ref 37–54)
EOSINOPHIL # BLD AUTO: 0.25 10*3/MM3 (ref 0–0.7)
EOSINOPHIL NFR BLD AUTO: 4.1 % (ref 0.3–6.2)
ERYTHROCYTE [DISTWIDTH] IN BLOOD BY AUTOMATED COUNT: 15 % (ref 11.5–14.5)
GFR SERPL CREATININE-BSD FRML MDRD: 149 ML/MIN/1.73
GLUCOSE BLD-MCNC: 139 MG/DL (ref 65–99)
HCT VFR BLD AUTO: 41 % (ref 40.4–52.2)
HGB BLD-MCNC: 12.9 G/DL (ref 13.7–17.6)
IMM GRANULOCYTES # BLD AUTO: 0.01 10*3/MM3 (ref 0–0.03)
IMM GRANULOCYTES NFR BLD AUTO: 0.2 % (ref 0–0.5)
LYMPHOCYTES # BLD AUTO: 1.21 10*3/MM3 (ref 0.9–4.8)
LYMPHOCYTES NFR BLD AUTO: 19.9 % (ref 19.6–45.3)
MCH RBC QN AUTO: 32.7 PG (ref 27–32.7)
MCHC RBC AUTO-ENTMCNC: 31.5 G/DL (ref 32.6–36.4)
MCV RBC AUTO: 104.1 FL (ref 79.8–96.2)
MONOCYTES # BLD AUTO: 0.35 10*3/MM3 (ref 0.2–1.2)
MONOCYTES NFR BLD AUTO: 5.8 % (ref 5–12)
NEUTROPHILS # BLD AUTO: 4.23 10*3/MM3 (ref 1.9–8.1)
NEUTROPHILS NFR BLD AUTO: 69.7 % (ref 42.7–76)
PLATELET # BLD AUTO: 195 10*3/MM3 (ref 140–500)
PMV BLD AUTO: 10.4 FL (ref 6–12)
POTASSIUM BLD-SCNC: 4 MMOL/L (ref 3.5–5.2)
RBC # BLD AUTO: 3.94 10*6/MM3 (ref 4.6–6)
SODIUM BLD-SCNC: 140 MMOL/L (ref 136–145)
WBC NRBC COR # BLD: 6.07 10*3/MM3 (ref 4.5–10.7)

## 2019-02-04 PROCEDURE — 80048 BASIC METABOLIC PNL TOTAL CA: CPT | Performed by: PHYSICAL MEDICINE & REHABILITATION

## 2019-02-04 PROCEDURE — G0515 COGNITIVE SKILLS DEVELOPMENT: HCPCS

## 2019-02-04 PROCEDURE — 85025 COMPLETE CBC W/AUTO DIFF WBC: CPT | Performed by: PHYSICAL MEDICINE & REHABILITATION

## 2019-02-04 PROCEDURE — 97535 SELF CARE MNGMENT TRAINING: CPT

## 2019-02-04 PROCEDURE — 97110 THERAPEUTIC EXERCISES: CPT

## 2019-02-04 RX ORDER — MECLIZINE HCL 12.5 MG/1
12.5 TABLET ORAL 3 TIMES DAILY PRN
Status: DISCONTINUED | OUTPATIENT
Start: 2019-02-04 | End: 2019-02-08 | Stop reason: HOSPADM

## 2019-02-04 RX ORDER — LACOSAMIDE 10 MG/ML
50 SOLUTION ORAL DAILY
Status: DISCONTINUED | OUTPATIENT
Start: 2019-02-06 | End: 2019-02-04

## 2019-02-04 RX ORDER — LACOSAMIDE 10 MG/ML
50 SOLUTION ORAL EVERY 12 HOURS SCHEDULED
Status: DISCONTINUED | OUTPATIENT
Start: 2019-02-04 | End: 2019-02-04

## 2019-02-04 RX ORDER — LACOSAMIDE 10 MG/ML
50 SOLUTION ORAL NIGHTLY
Status: COMPLETED | OUTPATIENT
Start: 2019-02-04 | End: 2019-02-05

## 2019-02-04 RX ADMIN — LANSOPRAZOLE 30 MG: KIT at 06:16

## 2019-02-04 RX ADMIN — POLYETHYLENE GLYCOL 3350 17 G: 17 POWDER, FOR SOLUTION ORAL at 10:11

## 2019-02-04 RX ADMIN — MECLIZINE HCL 12.5 MG: 12.5 TABLET ORAL at 06:16

## 2019-02-04 RX ADMIN — LACOSAMIDE 50 MG: 10 SOLUTION ORAL at 20:53

## 2019-02-04 RX ADMIN — MAGNESIUM HYDROXIDE 10 ML: 2400 SUSPENSION ORAL at 17:46

## 2019-02-04 NOTE — PLAN OF CARE
Problem: Nutrition, Enteral (Adult)  Goal: Signs and Symptoms of Listed Potential Problems Will be Absent, Minimized or Managed (Nutrition, Enteral)   02/04/19 0152   Goal/Outcome Evaluation   Problems Assessed (Enteral Nutrition) all   Problems Present (Enteral Nutrition) none

## 2019-02-04 NOTE — PROGRESS NOTES
Inpatient Rehabilitation Plan of Care Note    Plan of Care  Branch    Field    Signed by: Amy Spring RN

## 2019-02-04 NOTE — PROGRESS NOTES
"   LOS: 33 days   Patient Care Team:  Jesus Bautista MD as PCP - General (Family Medicine)     Chief Complaint:   1. 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  2. 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  3. Status post right  shunt removal secondary to infection- completed course of antibiotics on 01/02/2019.  4. Status post external ventricular drain and removal.  5. Left upper extremity brachial vein DVT-on Eliquis.  6. Hyponatremia.   7. Neural stimulation-       Subjective          Subjective   He continues on tube feedings with slow titration up.  He had nausea for the weekend.  No nausea so far today.  Was very petits morning, possibly related to Vimpat.  Discussed tapering off of Vimpat as previously planned changing Antivert  To as needed to see if one is having any cognitive side effect       History taken from: patient    Objective     Vital Signs  Temp:  [96.8 °F (36 °C)-98.5 °F (36.9 °C)] 98.5 °F (36.9 °C)  Heart Rate:  [] 92  Resp:  [18-20] 18  BP: (105-118)/(72-86) 105/72    Objective:  Vital signs: (most recent): Blood pressure 105/72, pulse 92, temperature 98.5 °F (36.9 °C), temperature source Oral, resp. rate 18, height 162.6 cm (64\"), weight 58.8 kg (129 lb 10.1 oz), SpO2 98 %.            GENERAL: The patient is a 63-year-old male who is awake, alert   cooperative in no acute distress.  HEENT:   Sclerae anicteric. Conjunctivae pink. Oropharynx moist.     LUNGS: Clear to auscultation bilaterally without wheezes, rales or rhonchi. No accessory muscle use.  HEART: RRR  ABDOMEN: Normoactive bowel sounds. Soft, PEG tube under Binder.  EXTREMITIES: Without edema or cyanosis.   NEUROLOGIC: Slow processing   . Converses.    Impaired recall. Takes resistance bilaterally            Results Review:     I reviewed the patient's new clinical results.  Results from last 7 days   Lab Units " 02/04/19  0530 02/03/19  0500 02/02/19  1440   SODIUM mmol/L 140 140 140   POTASSIUM mmol/L 4.0 3.8 3.9   CHLORIDE mmol/L 105 105 103   CO2 mmol/L 22.4 25.1 24.6   BUN mg/dL 17 14 11   CREATININE mg/dL 0.55* 0.60* 0.57*   CALCIUM mg/dL 9.1 9.0 9.1   BILIRUBIN mg/dL  --   --  0.8   ALK PHOS U/L  --   --  62   ALT (SGPT) U/L  --   --  36   AST (SGOT) U/L  --   --  19   GLUCOSE mg/dL 139* 107* 107*     Results from last 7 days   Lab Units 02/04/19  0530 02/03/19  0500 02/02/19  0646   WBC 10*3/mm3 6.07 7.01 14.43*   HEMOGLOBIN g/dL 12.9* 12.6* 13.4*   HEMATOCRIT % 41.0 38.2* 41.5   PLATELETS 10*3/mm3 195 194 193       MRI brain - Jan 16, 2019  Portions summarized -   The lateral 3rd and superior 4th ventricle remain mildly enlarged  compatible with mild hydrocephalus unchanged since 10/09/2019  , the   ventricles are clearly slightly larger than they were on prior outside  head CTs on 12/25/2018 and 12/27/2018 after the removal of the  ventriculostomy catheters.  3.8 x 2.9 cm area of FLAIR hyperintensity and mixed diffusion  hyperintensity extending from the inferior medial cerebellar white  matter into the cerebellar vermis that is compatible with subacute  infarct of brain along the margins of the William used to embolize the  vermian AVM  The 2nd  ventriculostomy catheter extended from the superior right coronal sutures through the  anterior superior lateral right frontal lobe parenchyma to the  anterosuperior body of the right lateral ventricle, and this has some  diffusion hyperintensity in the tract but also there is some FLAIR and  diffusion hyperintensity circumscribing the catheter tract within the  right anterosuperior lateral frontal white matter measuring up to 12 x 8  x 9 mm surrounding FLAIR and diffusion hyperintensity could be edema  related to the placement and removal or could be some focal cerebritis.    CT head - Jan 24 , 2019  -There is mild prominence of the temporal horns similar to the  prior  examination. Beam hardening artifact from the involved material limits  evaluation of the posterior fossa somewhat, but there is no convincing  evidence of hemorrhage involving the posterior fossa. There is resolving  intraventricular blood appreciated. The occipital horns are smaller than  the prior examinations. The remaining portions of the lateral ventricles  as well as the 3rd and 4th ventricle appear unchanged. There is no  evidence of transependymal migration of fluid. There is no evidence of  intra-axial hemorrhage.    Medication Review: done  Scheduled Meds:    influenza vaccine 0.5 mL Intramuscular Once   lacosamide 50 mg Per G Tube Nightly   lansoprazole 30 mg Oral QAM   meclizine 12.5 mg Oral TID AC   multivitamin with minerals 1 tablet Oral Daily   polyethylene glycol 17 g Oral Daily   sodium chloride 3 mL Intravenous Q12H     Continuous Infusions:     PRN Meds:.•  acetaminophen  •  bisacodyl  •  ondansetron  •  ondansetron ODT  •  promethazine  •  sodium chloride      Assessment/Plan       Cerebellar hemorrhage (CMS/HCC)    Intraventricular hemorrhage (CMS/HCC)    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    SIADH (syndrome of inappropriate ADH production) (CMS/HCC)    Elevated hemoglobin A1c    Hyperlipidemia      Assessment & Plan  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    Status post hydrocephalus. S/p removal infected  shunt. Features of hydrocephalus worsening impaired memory, balance , bladder incontinence reviewed with patient and wife in event he develops any of those symptoms in future.   Jan 7 - recheck CT head with patient's lethargy / persistent nausea ( also had at outside hospital) to assess for any worsening of hydrocephalus with shunt removed.  Addendum-There is stable to equivocal slight interval increase in size in the lateral and third ventricles when compared to prior  head CT 11 days ago on 12/27/2018 compatible with mild hydrocephalus. There is some low density and volume loss along the margins of the hyperdense liquid  embolic agents of the cerebellar vermi, compatible with areas of infarcted cerebellar vermis measuring up to 3.5 x 3 x 2.8 cm, unchanged.No residual acute intracranial hemorrhage is seen.  Jan 9 - Nursing called - patient got up on his own, bed alarm went off, in room near nursing station.  Fell and struck head on bedside dresser and bed that wife was in.  Nursing reports no change seen from recent baseline neuro. Got stat CT head ( no chnages from two days ago, no new bleed) and repeat CT  in 24 hours as on Eliquis, neuro checks q 2 hours, sitter ordered. Discontinued Eliquis which he is on for LUE brachial DVT and re-assess regarding anticoagulation after CT tomorrow.   He does not describe any new complaint. Baseline nausea, headache. He did better yesterday after first dose of Ritalin and ate more at dinner. Ate 50% breakfast today per sitter.   Selvin 10 - neuro stable. Given head trauma yesterday on Eliquis, will repeat CT head this afternoon to rule out any bleed before resuming Eliquis for DVT. Add:  F/U CT this afternoon - no acute hemorrhage.  Will resume Eliquis for LUE DVT  Selvin 15 - Patient with variable performance, more alert at times per nursing earlier today,  but then other times more fatigued with therapies.   On amantadine and ritalin.  Na level stable yesterday. To recheck in AM.  WBC normal yesterday. No fever.  On Eliquis for LUE DVT on venous duplex Dec 4 and still present on venous duplex Dec 26.  Concern at any point would be for potential for SDH over time.   At this point, as he is 6 weeks out from LUE DVT on Dec 4, will hold Eliquis for now, recheck BUE venous duplex tomorrow, and then decide on ongoing anti-coagulation,  and if any persistent decline with performance will check CT head. Will not check CT head this evening as not any  dramatic change on physical exam .    Jan 16 - Patient with confused comments, said an odd name randomly, mis-identified where wife worked. SLP also notes some confused comments. Wife reports he complains of headache, but  denied having headache presently to examiner. Wife reports he complained of ear pain but may have been related to tape for feeding tube. His nausea is better and eating better. He reports nausea is low . Does not complain of any new weakness on exam.   Na 129 - not a dramatic change but will d/c water flushes with tube feeds. Will get MRI brain with and without contrast stat- assess for any acute changes from prior infection or any acute bleed. MRI states can get timely once screening sheet completed. Recheck WBC.  Will d/c scopalamine patch - done. (add:  Discussed with neurosurgery and neuroradiology - patient below the threshold in terms of number of CT scan for any concern for cumulative effect - can get repeat CT scans as need). (add:  MRI done without contrast as not able to get IV site).    Add:  MRI portions summarized above. No acute bleed. Subacute infarct - 3.8 x 2.9 cm area of FLAIR hyperintensity and mixed diffusion hyperintensity extending from the inferior medial cerebellar white matter into the cerebellar vermis that is compatible with subacute infarct of brain along the margins of the [place] used to embolize the  vermian AVM . Mild hydrocephalus - Neurosurgery wanted to hold of placing  shunt in near term due to infection with  shunt. Along tract of previous ventriculostomy changes of either edema or focal cerebritis (will monitor as no fever or leukocytosis).   Jan 17 - The patient's imaging was reviewed with [facility] neuro radiology and [place] neurosurgery service.  Neurosurgery evaluating ventricle size as well as the area along the track of the previous frontal ventriculostomy of either edema or focal cerebritis.  On the previous CAT scan that appeared to represent possibly  an area of CSF backflow that improved with subsequent repeat ventriculostomy placement posteriorly.  Jan 19 - Patient with temp 100.2 earlier today. Later was 99.0 / 98.4.   He continues with baseline nausea. Ate only 1/5 of breakfast. He describes some posterior headache.  Wife reports he will do confused action, talk about people who don't live here.   He continues more alert. Does not describe any focal weakness. Dysphonia better.   Will recheck CBC with diff, procalcitonin, ESR, CRP, CMP and consult ID to see.  Addendum-infectious disease does not feel there was any active infectious process.  Continues to monitor  Jan 24 - Ct shows ventricles stable to slightly smaller size  Jan 31 - f/u with [facility] -  [facility] note reviewed.  Per EDIL, can come off AED and see how does in terms of seizures.  He received first dose of Vimpat liquid yesterday but was not continued on MAR as q 12 hours. Got second dose today and back on MAR as q 12 hours. Last dose Keppra yesterday.  Will continue Vimpat through procedure tomorrow and can then look at taper off.           Status post meningitis.Completed antibiotics at outside hospital.  Jan 4 - recheck CBC in AM.  Jan 5 - WBC 5K  Jan 7 - WBC 5.9. No fever. Will check procalcitonin/ESR, CRP with lethargy to assess for any recurrent infection.  Addendum-unremarkable.  CRP 0.12, sedimentation rate 14, pro-calcitonin 0.05, WBC 5.93  Jan 19 - temp 100.2. Recheck labs and ask ID to see. Addendum-infectious disease does not feel there was any active infectious process.  Continues to monitor  Jan 21 - afberile. WBC 7.69      Nausea - Jan 14 . Has been on Zofran. Persistent nausea s/p posterior fossa changes. Previously held off on scopolamine patch given potential cognitive side effects, but with persistent nausea that is affecting nutrition, will add.   Jan 16 - Scopolamine helped nausea but had to d/c scopolamine due to cognitive side effect.   Jan 18 - Tolerates tube  feeds. Still nauseated. Discussed trial of Phenergan after therapies tomorrow due to sedative side effect.    January 20- he took Phenergan twice yesterday, patient reports nausea better, wife notes that had emesis after dinner.  He is on Zofran scheduled before meal time  Jan 21 - Has not take phenergan since Jan 19. Emesis again with breakfast.Discussed observe off amantadine today to see if any effect on confused comments.  Discussed tomorrow will change from Zofran scheduled to antivert 12.5 mg scheduled tid before meals.  Also add Remeron tomorrow PM to see if helps with appetite. Other option would include Topamax 25 mg HS for headache/nausea.   Jan 24 - Antivert helps nausea but ? If cognitive side effect. Will adjust other meds first.  Jan 25 - Get gastroenterology input on any options for nausea.  Jan 28 - UGI with SBFT today. Possibly gastric emptying study depending on results.   Jan 29 - UGI with SBFT overall unremarkable yesterday. To go for gastric emptying study tomorrow at 7:30 AM  Jan 30 - GES results - normal. Denies nausea on Anitvert.   Feb 1 - if tolerates tube feeds after PEG placed, may change Antivert to 12.5 mg tid prn on Vish Feb 3 to see if any cognitive side effect.   Feb 4 - change antivert to 12.5 mg tid prn to see if cognitive side effect.       Hyponatremia. SIADH.   Selvin 3 - stable at 130.   Jan 5 - Serum sodium has gone up from 130 to 131.  Serum osmolarity is low at 272 but urine osmolarity is inappropriately concentrated at 847/759.  Thyroid function test is normal.  Results are consistent with SIADH.   Placed on a 1500 mL per day fluid restriction ( but he does not approach that on his own).  Jan 6 - . Urine osmolality 277. BUN 9, Cr 0.67.   Jan 8 - Endocrinology increase dietary salt and continue fluid restriction.  Jan 9 - salt tabs added  Jan 14 -   Jan 18 - Na 133. On salt tabs. No water flushes with tube feeds.   Jan 22 - salt tabs decreased to bid  Jan 23 - Na  136. Salt tabs d/'d as difficulty taking by mouth  Jan 28 - started on normal saline yesterday with decrease appetite and NPO after midnight for meds. Na 139 today.   Jan 29 - IVF d/c'd  Feb 1 - PEG not until afternoon. Add IVF x 2 liters D5 NS with 20 KCL at 100 cc per hour.       Adrenal - Evaluation against adrenal insufficiency.  Endocrinology discontinuing decadron and will re-evaluate further.   Jan 7 - worsened lethargy in therapies today, ? If could be related to being off steroids?  Jan 8- repeat ACTH test  January 11-adrenal insufficiency    Left upper extremity DVT-on Eliquis.  DVT prophylaxis-SCD and Eliquis.  Jan 9 - Eliquis on hold for at least 24 hours with recent fall with trauma to end. Re-assess after CT on Selvin 10.   Selvin 15 - Concern at any point would be for potential for SDH over time any time he shows a fluctuation. At this point, as he is 6 weeks out from LUE DVT on Dec 4, will hold Eliquis for now, recheck BUE venous duplex tomorrow, and then decide on ongoing anticoagulation.   Jan 16 - left brachial vein DVT resolved. BUE superficial venous thrombosis. No bleed on MRI. Resume Eliquis.   Jan 26 - Will d/c Eliquis after  Friday Jan 25 PM dose in preparation for any possible endoscopic evaluation/ procedure first of the week. . BUE venous duplex Jan 16  showed resolution of LUE brachial vein DVT. Had acute and chronic RUE SVT and chronic LUE SVT.   Feb 3 - BUE venous duplex BUE SVT. Patient did not extend LUE for evaluation of left axillary / basilic vein. Left brachial vein no DVT- will not resume Eliquis as DVT 2 months ago and line associated.      Encephalopathy / Neuro- stimulation-admitted on amantadine.  Jan 8 - depression may be a component. SSRI may affect appetite or sodium level. Possibly add Ritalin, could affect appetite but if more alert could possibly eat more.  Will add Ritalin 5 mg every 8 AM and 12 noon.  Selvin 10 - more alert and interactive  Jan 20 - he has some confused  comments.  Neurosurgery evaluated imaging.  Seen by ID and not felt to have any acute infectious process.  Discussed with patient and wife,  may possibly have some cognitive side effects from amantadine, decreased to 100 mg today and then discontinue starting with tomorrow and see if there is any improvement.  Doubt confusion would be related to Ritalin.  He has had it before starting of Phenergan. Na level okay 133.  Other consideration would be Keppra side effect contributing.  Jan 22 - no apparent change yet off amantadine.   Jan 24 - continues with increased confusion/ behavioral changes.   Patient has been more confused, more difficulty with tasks from cognitive standpoint.  Making odd, confused comments. Will perseverate on some tasks.  Also increased irritability at times, shaking fists.  He has had some hallucinations as well  He denies any headache. Denies any nausea but does not remember emesis from this morning.  His wife notes that nausea is noticeably better since antivert and nursing reports same, but we discussed could be having some confusion from antivert. Other possibilities could be Ritalin although showed initial improvement with that vs hydrocephalus. Discussed discontinue Remeron and Ritalin and recheck CT head. Will also check UA. Continue antivert for now as does help nausea.   Add: CBC unremarkable. UA pending. CT head stable with - There is resolvingintraventricular blood appreciated. The occipital horns are smaller than the prior examinations. The remaining portions of the lateral ventricles as well as the 3rd and 4th ventricle appear unchanged. There is no evidence of transependymal migration of fluid.  Jan 25 - still confusion , aggressive behavior at night. He had confusion last weekend before antivert but could have symptoms of underlying changes in brain made more prominent by medication side effect. Discussed see how does off other meds for next couple days and if confusion/  hallucinations continue, then change Keppra to Vimpat. UA unremarkable. BUN/CR, NA okay.   Jan 28 - had planned change to Vimpat but patient unable to swallow pill that large and liquid form not available here.  Jan 30 - gets more confused / irritable later in the days. Still with hallucinations at times per wife. Patient not able to describe. Vimpat liquid to be available this afternoon - will transition from Keppra to see if helps cognition/hallucinaiton/ mood. Last dose Keppra tonight.   Feb 4- taper off Vimpat after tomorrow night         Seizure prophylaxis-Keppra.  Jan 30 - Vimpat liquid to be available this afternoon - will transition from Keppra to see if helps cognition/hallucinaiton/ mood.   Feb 1 - per [facility] EDIL can come off seizure prophylaxis - will taper off Vimpat in couple days after recovery period after PEG placement   Feb 4- drowsy - may be related to Vimpat, did not take this AM dose, taper off with 50 mg tonight and tomorrow night and then stop.     Nutrition - Jan 5 -  poor intake. Staff encouraging intake. ( He was placed on 1500 cc fluid restriction but does not appraoch that on his own. Calorie count in progress. May possibly add Remeron. Would avoid Megace given its thrombogenic potential and patient's history of LUE DVT.  Jan 7 - will change back to scheduled Zofran prior to meals. Assess ventricles with CT, labs to assess for infection.    Jan 8 - RUQ ultrasound ordered - see report.  Consider Remeron but sedative side effect could be an issue. Nausea probably related to posterior fossa CNS   Jan 9 - PO intake remains poor.  Will discuss with patient and wife Cortrak feeding tube.   Add: patient and spouse is agreeable. He ate about 50% at supper. Nursing feels he is showing some improvement in intake. Discussed with patient and wife that with next meal that he does not show good intake will place Cortrak. They are in agreement.   Selvin 10 - eating 50% breakfast and lunch so hold on  Cortrak presently.   Jan 14 -  Persistent nausea. Had emesis yesterday and again today.  Intake decreased again over weekend.  Neuro without change. No headache, dizziness, abd pain. Previous follow up CTs without significant change.  Discussed with patient and wife - agreeable with Cortrak tube placement to help with nutrition given weight loss.  On Zofran scheduled before meals. Concern with adding Phenergan is sedative side effect. Will try scopalamine patch.   Selvin 15 - nausea better per patient report. Tolerates tube feeds.   Jan 18 - continue tube feeds through weekend and then re-assess.   Jan 21 - Intake by mouth still limited.  Reviewed with dietician, patient, wife and will continue Cortrak tube feeds.  Jan 22- adding remeron for appetite tonight.   Jan 23 - patient pulled Cortrak early AM - discussed see how eats with tube out  Jan 24 -  Discussed replacing Cortrak tube in AM for nutritional support as less likely to pull out during the day.   Jan 25 - replace Cortrak- addendum: Patient not able to tolerate replacement of Cortrak tube. His intake has remained poor. Discussed option of Reglan to see if helps appetite but concern would be for cognitive side effects. Discussed with patient / wife looking at G-tube placement. Will ask Gastroenterology to see for assessment for options on nausea and PEG tube placement.. His Eliquis can be held at any time for the procedure.   Jan 29 - gastric emptying swallow study tomorrow to determine if place PEG or PEJ tube  Jan 30 - await GES results. Patient has appointment with his Neurosurgery at outside facility tomorrow at 1PM which would affect timing of tube placement.   Feb 1 - PEG placement today.   Feb 4 - titrate up on PEG tube feeds.           Impaired cognition, mobility and self-care. Now admitted for comprehensive inpatient rehabilitation program with physical therapy 1 hour, occupational therapy 1 hour, speech therapy 1 hour, 5 days a week. Areas to be  addressed include cognition/executive function, swallowing, functional mobility, gross and fine motor control, ADL training, transfers, balance, gait. Rehabilitation nursing for carryover and monitoring of his neurologic status, bowel, bladder, skin. Ongoing physician followup. Weekly team conferences. Goal for him to achieve a level of supervision with his mobility and self-care and improvement in his cognition. Rehabilitation prognosis fair. Medical prognosis fair. Estimated length stay is indeterminate at this time.     Jan 3 - initiated acute inpt rehab  TEAM CONF - JAN 4 - BED MIN. TRANSFERS MIN. GAIT 80 FEET MIN ASSIST RW. MAX - DEPENDENT WITH ADLS, CUES TO KEEP EYES OPEN AND PARTICIPATE. DIFFICULTY WITH VISUAL TASKS.  MODERATE SEVERE  COGNITIVE DEFICITS. BNE PENDING. SWALLOW - TOLERATES SMALL PILLS WITH WATER. AMANTADINE FOR NEUROSTIM. CONTINENT BOWEL AND BLADDER. 30 LBS WEIGHT LOSS DURING HOSPITAL STAY.NAUSEA - HAS BEEN TAKING ZOFRAN. WAS SCHEDULED AT [facility] AS WELL AS PRN. WILL ADD SCHEDULED DOSE Q AM HERE AND CONTINUE PRN.  NUTRITION - NOT EATING OR DRINKING - 25% WITH CUES.  . SLEPT FROM 6 PM TO 7 AM. HYPONATREMIA - SIADH - STABLE.  ADRENAL INSUFFICIENCY EVALUATION.    ELOS- 3 WEEKS    TEAM CONF - JAN 10 - Executive Functions severely impaired-impaired attention, thought organization, visuospatial skills  Memory moderately impaired- impaired immediate and delayed  verbal recall, slightly better with visual recall  Bed/Chair/Wheelchair Min  Bed Mobility  Min  Walk 80 ft Rwx Min  Toilet Transfers  Min A  Bathing Mod A  Dressing Lower MAX  Dressing Upper Min  Toileting Dep  Continent mostly, but some incontinence  Sitter for safety  ELOS - two weeks    TEAM CONF - JAN 17 -TRANSFERS MIN-MOD ASSIST.  GAIT 80 FEET MIN-MOD 2.  LBD MOD-MAX.  UBD MIN ASSIST.  BATH MOD ASSIST. MAX CUES FOR TASKS WITH SLP.   MORE DIFFICULTY WITH TASKS. POOR INITIATION. NOT MAKING PROGRESS.  BLADDER INCONTINENT. ON CORTRAK  TUBE FEEDS.   WILL ASK [facility] TO REVIEW RECENT IMAGING.   ELOS - TWO WEEKS    TEAM CONF - JAN 24 - NAUSEA AND EMESIS AFTER MED THIS AM.  RENAL DISCONTINUED SALT TABS.  FATIGUE . VARIABLE PARTICIPATION. BED MIN ASSIST. GAIT 80 FEET MIN ASSIST. ADLS MIN ASSIST FOR BATHING AND DRESSING. COGNITIVE TASKS - FLUCTUATING SKILLS DAY TO DAY. SEVERELY IMPAIRED MEMORY. ON RITALIN FOR NEUROSTIMULATION. ADDED REMERON LOW DOSE FOR APPETITE.  PULLED OUT CORTRAK - ATE 25-50% YESTERDAY. ON ANTIVERT PRIOR TO MEALS FOR NAUSEA.  WILL DISCUSS WITH PATIENT AND WIFE REGARDING POSSIBLE G-TUBE.  CONTINENT BLADDER.   ELOS - ONE WEEK -MAY NEED TO LOOK AT SUBACUTE.      Nilson Win MD  02/04/19  9:59 AM    Time:

## 2019-02-04 NOTE — THERAPY TREATMENT NOTE
Inpatient Rehabilitation - Physical Therapy Treatment Note  [facility]     Patient Name: Feliciano Light  : 1955  MRN: [id number]    Today's Date: 2019                 Admit Date: 2019      Visit Dx:      ICD-10-CM ICD-9-CM   1. Protein-calorie malnutrition, unspecified severity (CMS/HCC) E46 263.9   2. 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   • SIADH (syndrome of inappropriate ADH production) (CMS/HCC)   • Elevated hemoglobin A1c   • Hyperlipidemia   • Protein-calorie malnutrition (CMS/HCC)       Therapy Treatment    IRF Treatment Summary     Row Name 19 1430 19 1300 19 0900       Evaluation/Treatment Time and Intent    Subjective Information  -- pt mostly nonverbal but responds at times  -LB  no complaints  -SA  no complaints  -SA    Existing Precautions/Restrictions  fall  -LB  fall  -SA  fall  -SA    Document Type  therapy note (daily note)  -LB  therapy note (daily note)  -SA  therapy note (daily note)  -SA    Mode of Treatment  physical therapy  -LB  speech-language pathology  -SA  speech-language pathology  -SA    Patient/Family Observations  pt in wheelchair after ST  -LB  up in w/c; better effort  -SA  up in w/c; wife states pt is slow going this am; cooperative for tx  -SA    Start Time (Evaluation/Treatment)  --  1300  -SA  0900  -SA    Stop Time (Evaluation/Treatment)  --  1330  -SA  0930  -SA    Recorded by [LB] Steffany Flynn, PT [SA] Heaven Bautista MS CCC-SLP [SA] Heaven Bautista MS CCC-SLP    Row Name 19 0847             Evaluation/Treatment Time and Intent    Unable to Perform (Evaluation/Treatment)  other (see comments)  -LB      Recorded by [LB] Steffany Flynn, PT      Row Name 19 0847             Coping/Community Reintegration    Additional Documentation  --  wife requested to wait for MD, feels pt is not the same  -LB      Recorded by [LB] Steffany Flynn, PT      Row Name 02/04/19 0825             Coping Strategies    Counseling (Coping Strategies)  active listening utilized;self-care encouraged;verbalization of feelings encouraged  -LB      Recorded by [LB] Steffany Flynn, PT      Row Name 02/04/19 1430             Cognition/Psychosocial- PT/OT    Affect/Mental Status (Cognitive)  confused  -LB      Behavioral Issues (Cognitive)  withdrawn  -LB      Orientation Status (Cognition)  oriented to;person  -LB      Follows Commands (Cognition)  follows one step commands;50-74% accuracy;delayed response/completion;increased processing time needed;initiation impaired  -LB      Personal Safety Interventions  fall prevention program maintained;gait belt;muscle strengthening facilitated;nonskid shoes/slippers when out of bed  -LB      Attention Deficit (Cognitive)  distractible in noisy environment;distractible in quiet environment  -LB      Safety Deficit (Cognitive)  insight into deficits/self awareness;judgment;problem solving  -LB      Recorded by [LB] Steffany Flynn, PT      Row Name 02/04/19 1430             Bed-Chair Transfer    Bed-Chair [place] (Transfers)  verbal cues;moderate assist (50% patient effort)  -LB      Recorded by [LB] Steffany Flynn, PT      Row Name 02/04/19 1430             Sit-Stand Transfer    Sit-Stand [place] (Transfers)  verbal cues;minimum assist (75% patient effort);moderate assist (50% patient effort)  -LB      Assistive Device (Sit-Stand Transfers)  walker, front-wheeled  -LB      Recorded by [LB] Steffany Flynn, PT      Row Name 02/04/19 1430             Stand-Sit Transfer    Stand-Sit [place] (Transfers)  verbal cues;minimum assist (75% patient effort)  -LB      Assistive Device (Stand-Sit Transfers)  walker, front-wheeled  -LB      Recorded by [LB] Steffany Flynn, PT      Row Name 02/04/19 1430             Gait/Stairs  Assessment/Training    [place] Level (Gait)  minimum assist (75% patient effort)  -LB      Assistive Device (Gait)  walker, front-wheeled  -LB      Distance in Feet (Gait)  80  -LB      Deviations/Abnormal Patterns (Gait)  gait speed decreased;stride length decreased  -LB      Bilateral Gait Deviations  forward flexed posture;heel strike decreased  -LB      [place] Level (Stairs)  contact guard;minimum assist (75% patient effort)  -LB      Handrail Location (Stairs)  both sides  -LB      Number of Steps (Stairs)  4 x 2  -LB      Ascending Technique (Stairs)  step-to-step  -LB      Descending Technique (Stairs)  step-to-step  -LB      Stairs, Safety Issues  sequencing ability decreased  -LB      Stairs, Impairments  strength decreased;impaired balance  -LB      Recorded by [LB] Steffany Flynn, PT      Row Name 02/04/19 1430             Wheelchair Mobility/Management    Method of Wheelchair Locomotion (Mobility)  bipedal (lower extremity) propulsion  -LB      Mobility Activities (Wheelchair)  forward propulsion;turning  -LB      Forward Propulsion [place] (Wheelchair)  stand by assist  -LB      Steering [place] (Wheelchair)  stand by assist;minimum assist (75% patient effort)  -LB      Turning [place] (Wheelchair)  minimum assist (75% patient effort)  -LB      Distance Propelled in Feet (Wheelchair)  200 many starts and stops  -LB      Comment, Mobility Activities (Wheelchair)  Demonstrated to spouse and she was able to complete how to bump wheelchair up/down 4 steps. Also provided tips on how to place wheelchair in car.  Educated spouse  on  wheelchair  -LB      Recorded by [LB] Steffany Flynn, PT      Row Name 02/04/19 1430             Safety Issues, Functional Mobility    Safety Issues Affecting Function (Mobility)  ability to follow commands;problem solving;sequencing abilities;safety precaution awareness;judgment  -LB      Impairments Affecting Function (Mobility)   balance;cognition;visual/perceptual;motor planning  -LB      Recorded by [LB] Steffany Flynn PT      Row Name 02/04/19 1430             Pain Scale: Numbers Pre/Post-Treatment    Pain Scale: Numbers, Pretreatment  0/10 - no pain  -LB      Pain Scale: Numbers, Post-Treatment  0/10 - no pain  -LB      Recorded by [LB] Steffany Flynn, PT      Row Name 02/04/19 1430             Lower Extremity Seated Therapeutic Exercise    Performed, Seated Lower Extremity (Therapeutic Exercise)  LAQ (long arc quad), knee extension  -LB      Exercise Type, Seated Lower Extremity (Therapeutic Exercise)  AAROM (active assistive range of motion)  -LB      Sets/Reps Detail, Seated Lower Extremity (Therapeutic Exercise)  1/10  -LB      Recorded by [LB] Steffany Flynn PT      Row Name 02/04/19 1430             Positioning and Restraints    Pre-Treatment Position  sitting in chair/recliner  -LB      Post Treatment Position  wheelchair  -LB      In Wheelchair  call light within reach;encouraged to call for assist;exit alarm on;with OT  -LB      Recorded by [ERNESTO] Steffany Flynn PT        User Key  (r) = Recorded By, (t) = Taken By, (c) = Cosigned By    Initials Name Effective Dates    LB Steffany Flynn, PT 04/03/18 -     Heaven Hanson MS CCC-SLP 04/03/18 -         Wound 01/02/19 1710 Bilateral lateral head incision (Active)   Dressing Appearance open to air 2/4/2019  8:00 AM   Closure Open to air 2/3/2019  8:13 PM   Drainage Amount none 2/3/2019  8:13 PM     Physical Therapy Education     Title: PT OT SLP Therapies (In Progress)     Topic: Physical Therapy (In Progress)     Point: Mobility training (Done)     Learning Progress Summary           Patient Acceptance, E,TB,D, VU,DU by ERNESTO at 2/4/2019  3:05 PM    Comment:  Denice able to assist pt on steps, transfers and gait with RWx    Acceptance, E,TB, VU,DU by ERNESTO at 1/31/2019 10:00 AM    Comment:  Wife, Denice able to assist pt with car transfer and ambulation for 80ft with Rwx.  okay to go to MD  appt today    Acceptance, E,TB, NR by LB at 1/29/2019  9:16 AM    Acceptance, E,TB, NR by LB at 1/28/2019  3:06 PM    Acceptance, E,TB, VU,NR by IRENE at 1/26/2019 12:21 PM    Acceptance, E, DU,NR by JK at 1/25/2019 11:14 AM    Acceptance, E,TB, VU,NR by LB at 1/24/2019  1:47 PM    Acceptance, E,D, DU,NR by JK at 1/22/2019  3:18 PM    Acceptance, E,TB, NR by LB at 1/21/2019 12:15 PM    Acceptance, E,TB, NR by LB at 1/19/2019  9:33 AM    Acceptance, E, NR by LB1 at 1/18/2019  3:31 PM    Acceptance, E,TB, NR by LB at 1/17/2019  9:22 AM    Acceptance, E, NR by LB1 at 1/16/2019  1:21 PM    Acceptance, E,TB, NR by LB at 1/14/2019  9:21 AM    Acceptance, E,TB, NR by JS at 1/12/2019 12:08 PM    Acceptance, E,TB, NR by LB at 1/11/2019  9:33 AM    Acceptance, E,TB, VU,NR by LB at 1/9/2019  9:37 AM    Acceptance, E,TB, NR by LB at 1/8/2019  9:14 AM    Acceptance, E,TB, NR by LB at 1/7/2019  9:36 AM    Acceptance, E,TB, VU,NR by IRENE at 1/5/2019  2:34 PM    Acceptance, E,TB, NR by LB at 1/4/2019 10:22 AM    Acceptance, E,TB, NR by LB at 1/3/2019 10:39 AM   Significant Other Acceptance, E,TB,D, VU,DU by LB at 2/4/2019  3:05 PM    Comment:  Denice able to assist pt on steps, transfers and gait with RWx    Acceptance, E,TB, VU,DU by LB at 1/31/2019 10:00 AM    Comment:  Wife, Denice able to assist pt with car transfer and ambulation for 80ft with Rwx.  okay to go to MD appt today                   Point: Home exercise program (In Progress)     Learning Progress Summary           Patient Acceptance, E,TB, NR by LB at 1/22/2019  3:25 PM    Acceptance, E,TB, NR by JS at 1/12/2019 12:08 PM                   Point: Precautions (In Progress)     Learning Progress Summary           Patient Acceptance, E,TB, NR by JS at 1/12/2019 12:08 PM    Acceptance, E,TB, NR by LB at 1/8/2019  3:14 PM                               User Key     Initials Effective Dates Name Provider Type Discipline    IRENE 06/08/18 -  Elvira Salter, PT Physical Therapist  PT    JS 04/06/17 -  Vickie Pradhan, PT Physical Therapist PT    LB 04/03/18 -  Steffany Flynn, PT Physical Therapist PT    LB1 03/07/18 -  Jessica Oneal PTA Physical Therapy Assistant PT    JK 04/03/18 -  Emily Jarrett, PT Physical Therapist PT                  PT Recommendation and Plan  Planned Therapy Interventions (PT Eval): bed mobility training, balance training, strengthening, stair training, transfer training, neuromuscular re-education, home exercise program  Frequency of Treatment (PT Eval): 60 minutes per session     Daily Summary of Progress (PT)  Recommendations: Physical Therapy: The patient did get sick at end of session.  RN informed                Time Calculation:     PT Charges     Row Name 02/04/19 1506             Time Calculation    Start Time  1330  -LB      Stop Time  1430  -LB      Time Calculation (min)  60 min  -LB      PT Received On  02/04/19  -LB      PT - Next Appointment  02/05/19  -LB        User Key  (r) = Recorded By, (t) = Taken By, (c) = Cosigned By    Initials Name Provider Type    Steffany Domingo, PT Physical Therapist            Therapy Charges for Today     Code Description Service Date Service Provider Modifiers Qty    90734903125 HC PT THER PROC EA 15 MIN 2/4/2019 Steffany Flynn, PT GP 4                   Steffany SALAS. DEBORAH Flynn  2/4/2019

## 2019-02-04 NOTE — PROGRESS NOTES
Occupational Therapy: Branch    Physical Therapy: Branch    Speech Language Pathology:  Individual: 60 minutes.    Signed by: Heaven Bautista SLP

## 2019-02-04 NOTE — PROGRESS NOTES
Inpatient Rehabilitation Functional Measures Assessment    Functional Measures  ALBERTO Eating:  Eating Score (Parenteral/Enteral) = 1, Total Assistance. Patient  requires total assistance for complete nutrition or supplemental  nutrition/hydration.   Patient requires total assistance for picking up utensils. Patient requires  total assistance for scooping food. Patient requires total assistance for  bringing food to mouth. Patient requires minimal assistance for bringing  cup/glass to mouth. Patient requires no physical assistance for chewing and  swallowing. Patient performs 35 % of eating tasks. Eating Score (by Mouth) = 2,  Maximal Assistance. No assistive devices were required.   1 = Eating Score (lowest), Total Assistance. Patient requires total assistance  for complete nutrition or supplemental nutrition/hydration.  [facility] Grooming: [facility] Bathing:  [facility] Upper Body Dressing:  [facility] Lower Body Dressing:  [facility] Toileting:  [facility] Bladder Management  Level of Assistance:  [place]  Frequency/Number of Accidents this Shift:  [facility] Bowel Management  Level of Assistance: [place]  Frequency/Number of Accidents this Shift: [facility] Bed/Chair/Wheelchair Transfer:  [facility] Toilet Transfer:  [facility] Tub/Shower Transfer:  [place]    Previously Documented Mode of Locomotion at Discharge: Field  ALBERTO Expected Mode of Locomotion at Discharge: [facility] Walk/Wheelchair:  [facility] Stairs:  [facility] Comprehension:  Auditory comprehension is the usual mode. Patient does not  comprehend complex/abstract information in their primary language without  assistance from a helper. Comprehension Score = 4, Minimal Prompting. Patient  comprehends basic daily needs or ideas 75-90% of the time. Patient requires  minimal/occasional prompting. No assistive devices were required.  ALBERTO Expression:  Vocal expression is the usual mode. Patient does not express  complex/abstract information in  their primary language without a helper.  Expression Score = 2, Maximal Prompting. Patient expresses basic daily needs  25-49% of the time. Patient uses only single words or gestures and requires  maximal/a lot of prompting (most of the time). No assistive devices were  required.  ALBERTO Social Interaction:  Social Interaction Score = 6, Modified Independent.  Patient is modified independent for social interaction, requiring: Requires  additional time.  ALBERTO Problem Solving:  Activity was not observed.  ALBERTO Memory:  Memory Score = 3, Moderate Prompting. Patient recognizes and  remembers 50-74% of the time. Patient requires moderate/some prompting  for  memory for the following:    Therapy Mode Minutes  Occupational Therapy: Branch  Physical Therapy: Branch  Speech Language Pathology:  Branch    Signed by: Solange Cruz RN

## 2019-02-04 NOTE — PROGRESS NOTES
Inpatient Rehabilitation Functional Measures Assessment    Functional Measures  ALBERTO Eating:  Branch  ALBERTO Grooming: Branch  ALBERTO Bathing:  Branch  ALBERTO Upper Body Dressing:  Branch  ALBERTO Lower Body Dressing:  Branch  [facility] Toileting:  Branch    ALBERTO Bladder Management  Level of Assistance:  Branch  Frequency/Number of Accidents this Shift:  Branch    ALBERTO Bowel Management  Level of Assistance: [place]  Frequency/Number of Accidents this Shift: Branch    ALBERTO Bed/Chair/Wheelchair Transfer:  Activity was not observed.  ALBERTO Toilet Transfer:  Branch  [facility] Tub/Shower Transfer:  [place]    Previously Documented Mode of Locomotion at Discharge: Field  ALBERTO Expected Mode of Locomotion at Discharge: Branch  [facility] Walk/Wheelchair:  WHEELCHAIR OBSERVATION   Wheelchair locomotion was observed using a manual wheelchair. Wheelchair  Distance Scale = 3.  Distance traveled in wheelchair is greater than 150 feet.  Wheelchair Score = 5.  Patient is supervision or set up only for propelling  wheelchair, requiring: Stand by assistance. Patient was able to propel a  distance of 200 feet in a wheelchair. No other assistive devices were required.    WALK OBSERVATION   Walk Distance Scale = 2.  Distance walked is 50 -149 feet. Walk Score = 2.  Patient performs 50-74% of effort and requires moderate assistance. Patient  walked a distance of 50 feet. Patient requires the following assistive  device(s): Rolling walker.  ALBERTO Stairs:  Stairs Score = 2.  Incidental assistance with lifting or lowering,  contact guard or steadying was provided. Patient performs 75% or more of effort  and requires minimal contact assistance. Patient negotiated  4 stairs. Patient  requires the following assistive device(s): Handrail(s).    ALBERTO Comprehension:  Branch  ALBERTO Expression:  Branch  ALBERTO Social Interaction:  Branch  [facility] Problem Solving:  Branch  [facility] Memory:  [place]    Therapy Mode Minutes  Occupational Therapy: Branch  Physical Therapy: Individual: 60 minutes.  Speech  Language Pathology:  Branch    Signed by: Steffany lFynn, PT

## 2019-02-04 NOTE — THERAPY TREATMENT NOTE
Inpatient Rehabilitation - Occupational Therapy Treatment Note    [facility]     Patient Name: Feliciano Light  : 1955  MRN: [id number]    Today's Date: 2019                 Admit Date: 2019      Visit Dx:    ICD-10-CM ICD-9-CM   1. Protein-calorie malnutrition, unspecified severity (CMS/HCC) E46 263.9   2. 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   • SIADH (syndrome of inappropriate ADH production) (CMS/HCC)   • Elevated hemoglobin A1c   • Hyperlipidemia   • Protein-calorie malnutrition (CMS/HCC)         Therapy Treatment    IRF Treatment Summary     Row Name 19 1500 19 1430 19 1300       Evaluation/Treatment Time and Intent    Subjective Information  no complaints  -CC  -- pt mostly nonverbal but responds at times  -LB  no complaints  -SA    Existing Precautions/Restrictions  fall  -CC  fall  -LB  fall  -SA    Document Type  therapy note (daily note)  -CC  therapy note (daily note)  -LB  therapy note (daily note)  -SA    Mode of Treatment  occupational therapy  -CC  physical therapy  -LB  speech-language pathology  -SA    Patient/Family Observations  --  pt in wheelchair after ST  -LB  up in w/c; better effort  -SA    Start Time (Evaluation/Treatment)  --  --  1300  -SA    Stop Time (Evaluation/Treatment)  --  --  1330  -SA    Recorded by [CC] Genevieve Talavera, NICOLASR [LB] Steffany Flynn, PT [SA] Heaven Bautista MS CCC-SLP    Row Name 19 0900 19 0847          Evaluation/Treatment Time and Intent    Subjective Information  no complaints  -SA  --     Existing Precautions/Restrictions  fall  -SA  --     Document Type  therapy note (daily note)  -SA  --     Mode of Treatment  speech-language pathology  -SA  --     Patient/Family Observations  up in w/c; wife states pt is slow going  this am; cooperative for tx  -SA  --     Start Time (Evaluation/Treatment)  0900  -SA  --     Stop Time (Evaluation/Treatment)  0930  -SA  --     Unable to Perform (Evaluation/Treatment)  --  other (see comments)  -LB     Recorded by [SA] Heaven Bautista MS CCC-SLP [LB] Steffany Flynn, PT     Row Name 02/04/19 0853             Coping/Community Reintegration    Additional Documentation  -- wife requested to wait for MD, feels pt is not the same  -LB      Recorded by [LB] Steffany Flynn, PT      Row Name 02/04/19 0847             Coping Strategies    Counseling (Coping Strategies)  active listening utilized;self-care encouraged;verbalization of feelings encouraged  -LB      Recorded by [LB] Steffany Flynn, PT      Row Name 02/04/19 1500 02/04/19 1430          Cognition/Psychosocial- PT/OT    Affect/Mental Status (Cognitive)  confused  -CC  confused  -LB     Behavioral Issues (Cognitive)  withdrawn  -CC  withdrawn  -LB     Orientation Status (Cognition)  --  oriented to;person  -LB     Follows Commands (Cognition)  follows one step commands;50-74% accuracy;delayed response/completion;increased processing time needed;initiation impaired;physical/tactile prompts required;repetition of directions required;verbal cues/prompting required  -CC  follows one step commands;50-74% accuracy;delayed response/completion;increased processing time needed;initiation impaired  -LB     Personal Safety Interventions  fall prevention program maintained;gait belt;nonskid shoes/slippers when out of bed  -CC  fall prevention program maintained;gait belt;muscle strengthening facilitated;nonskid shoes/slippers when out of bed  -LB     Cognitive Function (Cognitive)  safety deficit  -CC  --     Attention Deficit (Cognitive)  --  distractible in noisy environment;distractible in quiet environment  -LB     Safety Deficit (Cognitive)  --  insight into deficits/self awareness;judgment;problem solving  -LB     Recorded by [CC] Genevieve Talavera OTR [LB]  Steffany Flynn, PT     Row Name 02/04/19 1500             Bed Mobility Assessment/Treatment    Comment (Bed Mobility)  in w/c  -CC      Recorded by [CC] Genevieve Talavera OTR      Row Name 02/04/19 1430             Bed-Chair Transfer    Bed-Chair [place] (Transfers)  verbal cues;moderate assist (50% patient effort)  -LB      Recorded by [LB] Steffany Flynn, PT      Row Name 02/04/19 1430             Sit-Stand Transfer    Sit-Stand [place] (Transfers)  verbal cues;minimum assist (75% patient effort);moderate assist (50% patient effort)  -LB      Assistive Device (Sit-Stand Transfers)  walker, front-wheeled  -LB      Recorded by [LB] Steffany Flynn, PT      Row Name 02/04/19 1430             Stand-Sit Transfer    Stand-Sit [place] (Transfers)  verbal cues;minimum assist (75% patient effort)  -LB      Assistive Device (Stand-Sit Transfers)  walker, front-wheeled  -LB      Recorded by [LB] Steffany Flynn, PT      Row Name 02/04/19 1500             Shower Transfer    Type (Shower Transfer)  stand pivot/stand step;sit-stand;stand-sit  -CC      [place] Level (Shower Transfer)  minimum assist (75% patient effort);moderate assist (50% patient effort) resistant  -CC      Assistive Device (Shower Transfer)  grab bars/tub rail;shower chair  -CC      Recorded by [CC] Genevieve Talavera OTR      Row Name 02/04/19 1430             Gait/Stairs Assessment/Training    [place] Level (Gait)  minimum assist (75% patient effort)  -LB      Assistive Device (Gait)  walker, front-wheeled  -LB      Distance in Feet (Gait)  80  -LB      Deviations/Abnormal Patterns (Gait)  gait speed decreased;stride length decreased  -LB      Bilateral Gait Deviations  forward flexed posture;heel strike decreased  -LB      [place] Level (Stairs)  contact guard;minimum assist (75% patient effort)  -LB      Handrail Location (Stairs)  both sides  -LB      Number of Steps (Stairs)  4 x 2  -LB      Ascending Technique (Stairs)   step-to-step  -LB      Descending Technique (Stairs)  step-to-step  -LB      Stairs, Safety Issues  sequencing ability decreased  -LB      Stairs, Impairments  strength decreased;impaired balance  -LB      Recorded by [LB] Steffany Flynn PT      Row Name 02/04/19 1430             Wheelchair Mobility/Management    Method of Wheelchair Locomotion (Mobility)  bipedal (lower extremity) propulsion  -LB      Mobility Activities (Wheelchair)  forward propulsion;turning  -LB      Forward Propulsion [place] (Wheelchair)  stand by assist  -LB      Steering [place] (Wheelchair)  stand by assist;minimum assist (75% patient effort)  -LB      Turning [place] (Wheelchair)  minimum assist (75% patient effort)  -LB      Distance Propelled in Feet (Wheelchair)  200 many starts and stops  -LB      Comment, Mobility Activities (Wheelchair)  Demonstrated to spouse and she was able to complete how to bump wheelchair up/down 4 steps. Also provided tips on how to place wheelchair in car.  Educated spouse  on  wheelchair  -LB      Recorded by [LB] Steffany Flynn PT      Row Name 02/04/19 1430             Safety Issues, Functional Mobility    Safety Issues Affecting Function (Mobility)  ability to follow commands;problem solving;sequencing abilities;safety precaution awareness;judgment  -LB      Impairments Affecting Function (Mobility)  balance;cognition;visual/perceptual;motor planning  -LB      Recorded by [LB] Steffany Flynn, PT      Row Name 02/04/19 1500             Bathing Assessment/Treatment    Bathing [place] Level  bathing skills;lower body;upper body;verbal cues;nonverbal cues (demo/gesture);contact guard assist;moderate assist (50% patient effort)  -CC      Assistive Device (Bathing)  grab bar/tub rail;hand held shower spray hose;tub bench  -CC      Bathing Position  supported sitting;supported standing  -CC      Bathing Setup Assistance  adjust water temperature;obtain supplies  -CC      Recorded by  [CC] Genevieve Talavera OTR      Row Name 02/04/19 1500             Upper Body Dressing Assessment/Treatment    Upper Body Dressing Task  upper body dressing skills;doff;don;pull over garment;maximal assist (25-49% patient effort)  -CC      Upper Body Dressing Position  supported sitting  -CC      Set-up Assistance (Upper Body Dressing)  obtain clothing  -CC      Recorded by [CC] Genevieve Talavera OTR      Row Name 02/04/19 1500             Lower Body Dressing Assessment/Treatment    Lower Body Dressing [place] Level  don;socks;verbal cues;maximum assist (25% patient effort)  -CC      Lower Body Dressing Position  supported sitting  -CC      Lower Body Dressing Setup Assistance  obtain clothing  -CC      Recorded by [CC] Genevieve Talavera OTR      Row Name 02/04/19 1500             Grooming Assessment/Treatment    Grooming [place] Level  grooming skills;deodorant application;oral care regimen;wash face, hands;set up;supervision  -CC      Grooming Position  supported sitting  -CC      Grooming Setup Assistance  obtain supplies  -CC      Recorded by [CC] Genevieve Talavera OTR      Row Name 02/04/19 1500 02/04/19 1430          Pain Scale: Numbers Pre/Post-Treatment    Pain Scale: Numbers, Pretreatment  0/10 - no pain  -CC  0/10 - no pain  -LB     Pain Scale: Numbers, Post-Treatment  0/10 - no pain  -CC  0/10 - no pain  -LB     Recorded by [CC] Genevieve Talavera, MELISSA [LB] Steffany Flynn, PT     Row Name 02/04/19 1500             Upper Extremity Seated Therapeutic Exercise    Performed, Seated Upper Extremity (Therapeutic Exercise)  scapular protraction/retraction;elbow flexion/extension  -CC      Device, Seated Upper Extremity (Therapeutic Exercise)  free weights, barbell;free weights, cuff;restorator/arm bike  -CC      Exercise Type, Seated Upper Extremity (Therapeutic Exercise)  resistive exercise  -CC      Expected Outcomes, Seated Upper Extremity (Therapeutic Exercise)  improve functional tolerance, self-care  activity  -CC      Sets/Reps Detail, Seated Upper Extremity (Therapeutic Exercise)  10x2 arm bike 3 min  -CC      Comment, Seated Upper Extremity (Therapeutic Exercise)  hanfd over hand for any participation   -CC      Recorded by [CC] Genevieve Talavera OTR      Row Name 02/04/19 1430             Lower Extremity Seated Therapeutic Exercise    Performed, Seated Lower Extremity (Therapeutic Exercise)  LAQ (long arc quad), knee extension  -LB      Exercise Type, Seated Lower Extremity (Therapeutic Exercise)  AAROM (active assistive range of motion)  -LB      Sets/Reps Detail, Seated Lower Extremity (Therapeutic Exercise)  1/10  -LB      Recorded by [LB] Steffany Flynn, PT      Row Name 02/04/19 1500 02/04/19 1430          Positioning and Restraints    Pre-Treatment Position  sitting in chair/recliner  -CC  sitting in chair/recliner  -LB     Post Treatment Position  wheelchair  -CC  wheelchair  -LB     In Wheelchair  sitting;call light within reach;encouraged to call for assist;exit alarm on;with family/caregiver  -CC  call light within reach;encouraged to call for assist;exit alarm on;with OT  -LB     Recorded by [CC] Genevieve Talavera OTR [LB] Steffany Flynn, PT       User Key  (r) = Recorded By, (t) = Taken By, (c) = Cosigned By    Initials Name Effective Dates    CC Genevieve Talavera OTR 06/08/18 -     Steffany Domingo, PT 04/03/18 -     Heaven Hanson, MS CCC-SLP 04/03/18 -           Wound 01/02/19 1710 Bilateral lateral head incision (Active)   Dressing Appearance open to air 2/4/2019  8:00 AM   Closure Open to air 2/3/2019  8:13 PM   Drainage Amount none 2/3/2019  8:13 PM         OT Recommendation and Plan                 OT IRF GOALS     Row Name 01/31/19 1200 01/23/19 1100          Transfer Goal 1 (OT-IRF)    Activity/Assistive Device (Transfer Goal 1, OT-IRF)  toilet  -CC  --     [place] Level (Transfer Goal 1, OT-IRF)  contact guard assist  -CC  --     Time Frame (Transfer Goal 1, OT-IRF)  short term goal  (STG)  -CC  --     Progress/Outcomes (Transfer Goal 1, OT-IRF)  goal ongoing  -CC  --        Transfer Goal 2 (OT-IRF)    Activity/Assistive Device (Transfer Goal 2, OT-IRF)  toilet  -CC  toilet  -CC     [place] Level (Transfer Goal 2, OT-IRF)  standby assist  -CC  standby assist  -CC     Time Frame (Transfer Goal 2, OT-IRF)  long term goal (LTG)  -CC  long term goal (LTG)  -CC     Progress/Outcomes (Transfer Goal 2, OT-IRF)  goal ongoing  -CC  goal ongoing  -CC        Transfer Goal 3 (OT-IRF)    Activity/Assistive Device (Transfer Goal 3, OT-IRF)  shower chair  -CC  shower chair  -CC     [place] Level (Transfer Goal 3, OT-IRF)  contact guard assist  -CC  contact guard assist  -CC     Time Frame (Transfer Goal 3, OT-IRF)  short term goal (STG)  -CC  short term goal (STG)  -CC     Progress/Outcomes (Transfer Goal 3, OT-IRF)  goal ongoing  -CC  goal ongoing  -CC        Transfer Goal 4 (OT-IRF)    Activity/Assistive Device (Transfer Goal 4, OT-IRF)  shower chair  -CC  shower chair  -CC     [place] Level (Transfer Goal 4, OT-IRF)  standby assist  -CC  standby assist  -CC     Time Frame (Transfer Goal 4, OT-IRF)  long term goal (LTG)  -CC  long term goal (LTG)  -CC     Progress/Outcomes (Transfer Goal 4, OT-IRF)  goal no longer appropriate  -CC  goal revised this date  -CC        Bathing Goal 1 (OT-IRF)    Activity/Device (Bathing Goal 1, OT-IRF)  bathing skills, all  -CC  --     [place] Level (Bathing Goal 1, OT-IRF)  minimum assist (75% or more patient effort);contact guard assist  -CC  --     Time Frame (Bathing Goal 1, OT-IRF)  short term goal (STG)  -CC  --     Progress/Outcomes (Bathing Goal 1, OT-IRF)  goal met  -CC  --        Bathing Goal 2 (OT-IRF)    Activity/Device (Bathing Goal 2, OT-IRF)  bathing skills, all  -CC  bathing skills, all  -CC     [place] Level (Bathing Goal 2, OT-IRF)  contact guard assist;standby assist  -CC  standby assist  -CC     Time Frame (Bathing Goal 2, OT-IRF)   long term goal (LTG)  -CC  long term goal (LTG)  -CC     Progress/Outcomes (Bathing Goal 2, OT-IRF)  goal ongoing  -CC  goal ongoing  -CC        UB Dressing Goal 1 (OT-IRF)    Activity/Device (UB Dressing Goal 1, OT-IRF)  upper body dressing  -CC  upper body dressing  -CC     [place] (UB Dress Goal 1, OT-IRF)  standby assist  -CC  standby assist  -CC     Time Frame (UB Dressing Goal 1, OT-IRF)  long term goal (LTG)  -CC  long term goal (LTG)  -CC     Progress/Outcomes (UB Dressing Goal 1, OT-IRF)  goal ongoing  -CC  goal ongoing  -CC        UB Dressing Goal 2 (OT-IRF)    Activity/Device (UB Dressing Goal 2, OT-IRF)  upper body dressing  -CC  upper body dressing  -CC     [place] (UB Dress Goal 2, OT-IRF)  supervision required  -CC  supervision required  -CC     Time Frame (UB Dressing Goal 2, OT-IRF)  long term goal (LTG)  -CC  long term goal (LTG)  -CC     Progress/Outcomes (UB Dressing Goal 2, OT-IRF)  goal ongoing  -CC  goal ongoing  -CC        LB Dressing Goal 1 (OT-IRF)    Activity/Device (LB Dressing Goal 1, OT-IRF)  lower body dressing  -CC  --     [place] (LB Dressing Goal 1, OT-IRF)  moderate assist (50-74% patient effort)  -CC  --     Time Frame (LB Dressing Goal 1, OT-IRF)  long term goal (LTG)  -CC  --     Progress/Outcomes (LB Dressing Goal 1, OT-IRF)  goal met  -CC  --        LB Dressing Goal 2 (OT-IRF)    Activity/Device (LB Dressing Goal 2, OT-IRF)  lower body dressing  -CC  lower body dressing  -CC     [place] (LB Dressing Goal 2, OT-IRF)  minimum assist (75% or more patient effort);contact guard assist  -CC  minimum assist (75% or more patient effort);contact guard assist  -CC     Time Frame (LB Dressing Goal 2, OT-IRF)  long term goal (LTG)  -CC  long term goal (LTG)  -CC     Progress/Outcomes (LB Dressing Goal 2, OT-IRF)  goal revised this date  -CC  goal revised this date  -CC        Grooming Goal 1 (OT-IRF)    Activity/Device (Grooming Goal 1, OT-IRF)  --  grooming skills,  all  -CC     [place] (Grooming Goal 1, OT-IRF)  --  supervision required  -CC     Time Frame (Grooming Goal 1, OT-IRF)  --  long term goal (LTG)  -CC     Progress/Outcomes (Grooming Goal 1, OT-IRF)  --  goal met  -CC        Toileting Goal 1 (OT-IRF)    Activity/Device (Toileting Goal 1, OT-IRF)  --  toileting skills, all  -CC     [place] Level (Toileting Goal 1, OT-IRF)  --  moderate assist (50-74% patient effort)  -CC     Time Frame (Toileting Goal 1, OT-IRF)  --  short term goal (STG)  -CC     Progress/Outcomes (Toileting Goal 1, OT-IRF)  --  goal met  -CC        Toileting Goal 2 (OT-IRF)    Activity/Device (Toileting Goal 2, OT-IRF)  toileting skills, all  -CC  toileting skills, all  -CC     [place] Level (Toileting Goal 2, OT-IRF)  contact guard assist  -CC  contact guard assist  -CC     Time Frame (Toileting Goal 2, OT-IRF)  long term goal (LTG)  -CC  long term goal (LTG)  -CC     Progress/Outcomes (Toileting Goal 2, OT-IRF)  goal revised this date  -CC  goal revised this date  -CC        Strength Goal 1 (OT-IRF)    Strength Goal 1 (OT-IRF)  Pt will increase B UE strength to approx. 4/5 to assist w/ ADLs and transfers.  -CC  Pt will increase B UE strength to approx. 4/5 to assist w/ ADLs and transfers.  -CC     Time Frame (Strength Goal 1, OT-IRF)  long term goal (LTG)  -CC  long term goal (LTG)  -CC     Progress/Outcomes (Strength Goal 1, OT-IRF)  goal ongoing  -CC  goal ongoing  -CC        Direction Following Goal 1 (OT-IRF)    Activity (Direction Following Goal 1, OT-IRF)  follow one step directions  -CC  follow one step directions  -CC     [place]/Cues/Accuracy (Direction Following Goal 1, OT-IRF)  with minimum;verbal cues/redirection  -CC  with minimum;verbal cues/redirection  -CC     Time Frame (Direction Following Goal 1, OT-IRF)  long term goal (LTG)  -CC  long term goal (LTG)  -CC     Progress/Outcomes (Direction Following Goal 1, OT-IRF)  goal ongoing  -CC  goal ongoing  -CC        User Key  (r) = Recorded By, (t) = Taken By, (c) = Cosigned By    Initials Name Provider Type    CC Genevieve Talavera OTR Occupational Therapist          Occupational Therapy Education     Title: PT OT SLP Therapies (In Progress)     Topic: Occupational Therapy (In Progress)     Point: ADL training (In Progress)     Description: Instruct learner(s) on proper safety adaptation and remediation techniques during self care or transfers.   Instruct in proper use of assistive devices.    Learning Progress Summary           Patient Acceptance, E,TB, NR by GERSON at 1/12/2019 12:08 PM    Acceptance, E, VU,NR by CHRISTIANE at 1/3/2019  3:44 PM    Comment:  OT educ on OT role in therapeutic prcoess and pt's POC.   Significant Other Acceptance, E, VU,NR by CHRISTIANE at 1/3/2019  3:44 PM    Comment:  OT educ on OT role in therapeutic prcoess and pt's POC.                   Point: Home exercise program (In Progress)     Description: Instruct learner(s) on appropriate technique for monitoring, assisting and/or progressing therapeutic exercises/activities.    Learning Progress Summary           Patient Acceptance, E,TB, NR by GERSON at 1/12/2019 12:08 PM                   Point: Precautions (In Progress)     Description: Instruct learner(s) on prescribed precautions during self-care and functional transfers.    Learning Progress Summary           Patient Acceptance, E,TB, NR by GERSON at 1/12/2019 12:08 PM                   Point: Body mechanics (In Progress)     Description: Instruct learner(s) on proper positioning and spine alignment during self-care, functional mobility activities and/or exercises.    Learning Progress Summary           Patient Acceptance, E,TB, NR by GERSON at 1/12/2019 12:08 PM                               User Key     Initials Effective Dates Name Provider Type Discipline     04/06/17 -  Vickie Pradhan, PT Physical Therapist PT    CHRISTIANE 05/03/18 -  Geetha Posey OT Occupational Therapist OT                       Time Calculation:      Time Calculation- OT     Row Name 02/04/19 1430 02/04/19 1230          Time Calculation- OT    OT Start Time  1430  -CC  1230  -CC     OT Stop Time  1500  -CC  1300  -CC     OT Time Calculation (min)  30 min  -CC  30 min  -CC       User Key  (r) = Recorded By, (t) = Taken By, (c) = Cosigned By    Initials Name Provider Type    CC Genevieve Talavera OTR Occupational Therapist          Therapy Suggested Charges     Code   Minutes Charges    None              Therapy Charges for Today     Code Description Service Date Service Provider Modifiers Qty    52641929023 HC OT SELF CARE/MGMT/TRAIN EA 15 MIN 2/4/2019 Genevieve Talavera OTR GO 2    [id number] HC OT THER PROC EA 15 MIN 2/4/2019 Genevieve Talavera OTR GO 2                   MELISSA Marrero  2/4/2019

## 2019-02-04 NOTE — PROGRESS NOTES
"   LOS: 33 days   Patient Care Team:  Jesus Bautista MD as PCP - General (Family Medicine)    Chief Complaint/ Reason for encounter: hyponatremia      Subjective     Medical history reviewed:  History of Present Illness    Subjective:  Symptoms:  Improved.  No shortness of breath or chest pain.  (He is doing better, status post PEG tube, remains on tube feeds which is tolerating well  Continues to work with therapy, slow progress).    Diet:  Poor intake.  No nausea.    Activity level: Returning to normal.    Pain:  He reports no pain.          History taken from: Patient and chart    Objective     Vital Signs  Temp:  [96.8 °F (36 °C)-98.5 °F (36.9 °C)] 98.5 °F (36.9 °C)  Heart Rate:  [] 92  Resp:  [18-20] 18  BP: (105-118)/(72-86) 105/72       Wt Readings from Last 1 Encounters:   02/04/19 0531 58.8 kg (129 lb 10.1 oz)   01/31/19 0533 58.2 kg (128 lb 4.9 oz)   01/30/19 1907 58.5 kg (128 lb 15.5 oz)   01/29/19 0529 58.6 kg (129 lb 3 oz)   01/28/19 0523 56.7 kg (125 lb 0 oz)   01/26/19 0644 55.5 kg (122 lb 5.7 oz)   01/25/19 0649 56.7 kg (125 lb 0 oz)   01/21/19 0931 58 kg (127 lb 12.8 oz)   01/20/19 0947 56.2 kg (124 lb)   01/18/19 0527 57.9 kg (127 lb 10.3 oz)   01/17/19 0742 58.2 kg (128 lb 4.8 oz)   01/15/19 0814 56.2 kg (123 lb 14.4 oz)   01/14/19 0708 55.5 kg (122 lb 4.1 oz)   01/12/19 1018 57.6 kg (127 lb)   01/12/19 0529 49 kg (108 lb 0.4 oz)   01/06/19 0540 56.4 kg (124 lb 5.4 oz)   01/05/19 0625 56.1 kg (123 lb 10.9 oz)   01/04/19 1726 56.9 kg (125 lb 7.1 oz)       Objective:  General Appearance:  Comfortable, in no acute distress and not in pain (Thin, chronically ill-appearing).    Vital signs: (most recent): Blood pressure 105/72, pulse 92, temperature 98.5 °F (36.9 °C), temperature source Oral, resp. rate 18, height 162.6 cm (64\"), weight 58.8 kg (129 lb 10.1 oz), SpO2 98 %.  Vital signs are normal.  No fever.    Output: Producing urine.    HEENT: Normal HEENT exam.    Lungs:  Normal effort and " normal respiratory rate.  Breath sounds clear to auscultation.  He is not in respiratory distress.  No rales, decreased breath sounds or rhonchi.    Heart: Normal rate.  Regular rhythm.  S1 normal.    Abdomen: Abdomen is soft.  Bowel sounds are normal.   There is no abdominal tenderness.     Extremities: Normal range of motion.    Neurological: Patient is alert and oriented to person, place and time.    Pupils:  Pupils are equal, round, and reactive to light.    Skin:  Warm and dry.  No rash or cyanosis.             Results Review:    Intake/Output:     Intake/Output Summary (Last 24 hours) at 2/4/2019 1117  Last data filed at 2/4/2019 1000  Gross per 24 hour   Intake 990 ml   Output 250 ml   Net 740 ml         DATA:  Radiology and Labs:  The following labs independently reviewed by me. Additional labs ordered for tomorrow a.m.  Interval notes, chart personally reviewed by me.   D/w family at bedside    Labs:   Recent Results (from the past 24 hour(s))   Duplex Venous Upper Extremity - Bilateral CAR    Collection Time: 02/03/19  1:55 PM   Result Value Ref Range    Right Internal Jugular Augment Y     Right Internal Jugular Competent Y     Right Internal Jugular Compress C     Right Internal Jugular Phasic Y     Right Internal Jugular Spont Y     Left Internal Jugular Augment Y     Left Internal Jugular Competent Y     Left Internal Jugular Compress C     Left Internal Jugular Phasic Y     Left Internal Jugular Spont Y     Right Subclavian Augment Y     Right Subclavian Competent Y     Right Subclavian Phasic Y     Right Subclavian Spont Y     Left Subclavian Augment Y     Left Subclavian Competent Y     Left Subclavian Phasic Y     Left Subclavian Spont Y     Right Axillary Augment Y     Right Axillary Competent Y     Right Axillary Compress C     Right Axillary Phasic Y     Right Axillary Spont Y     Right Brachial Compress C     Left Brachial Compress C     Right Radial Compress C     Left Radial Compress C      Right Ulnar Compress C     Left Ulnar Compress C     Right Basilic Upper Compress C     Right Basilic Forearm Compress C     Right Cephalic Upper Compress P     Right Cephalic Upper Thrombus C     Right Cephalic Upper Color 1     Left Cephalic Upper Compress C     Right Cephalic Forearm Compress C     Left Cephalic Forearm Compress P     Left Cephalic Forearm Thrombus C     Left Cephalic Forearm Color 1     Right Subclavian Compress C    Basic Metabolic Panel    Collection Time: 02/04/19  5:30 AM   Result Value Ref Range    Glucose 139 (H) 65 - 99 mg/dL    BUN 17 8 - 23 mg/dL    Creatinine 0.55 (L) 0.76 - 1.27 mg/dL    Sodium 140 136 - 145 mmol/L    Potassium 4.0 3.5 - 5.2 mmol/L    Chloride 105 98 - 107 mmol/L    CO2 22.4 22.0 - 29.0 mmol/L    Calcium 9.1 8.6 - 10.5 mg/dL    eGFR Non African Amer 149 >60 mL/min/1.73    BUN/Creatinine Ratio 30.9 (H) 7.0 - 25.0    Anion Gap 12.6 mmol/L   CBC Auto Differential    Collection Time: 02/04/19  5:30 AM   Result Value Ref Range    WBC 6.07 4.50 - 10.70 10*3/mm3    RBC 3.94 (L) 4.60 - 6.00 10*6/mm3    Hemoglobin 12.9 (L) 13.7 - 17.6 g/dL    Hematocrit 41.0 40.4 - 52.2 %    .1 (H) 79.8 - 96.2 fL    MCH 32.7 27.0 - 32.7 pg    MCHC 31.5 (L) 32.6 - 36.4 g/dL    RDW 15.0 (H) 11.5 - 14.5 %    RDW-SD 57.4 (H) 37.0 - 54.0 fl    MPV 10.4 6.0 - 12.0 fL    Platelets 195 140 - 500 10*3/mm3    Neutrophil % 69.7 42.7 - 76.0 %    Lymphocyte % 19.9 19.6 - 45.3 %    Monocyte % 5.8 5.0 - 12.0 %    Eosinophil % 4.1 0.3 - 6.2 %    Basophil % 0.5 0.0 - 1.5 %    Immature Grans % 0.2 0.0 - 0.5 %    Neutrophils, Absolute 4.23 1.90 - 8.10 10*3/mm3    Lymphocytes, Absolute 1.21 0.90 - 4.80 10*3/mm3    Monocytes, Absolute 0.35 0.20 - 1.20 10*3/mm3    Eosinophils, Absolute 0.25 0.00 - 0.70 10*3/mm3    Basophils, Absolute 0.03 0.00 - 0.20 10*3/mm3    Immature Grans, Absolute 0.01 0.00 - 0.03 10*3/mm3       Radiology:  Imaging Results (last 24 hours)     Procedure Component Value Units Date/Time     FL Upper GI Single Contrast SBFT [[id number]] Resulted:  01/28/19 1447     Updated:  02/04/19 1052    Narrative:       CLINICAL HISTORY: 63-year-old male with nausea and lack of appetite  possibly related to cerebellar brain infarct. Evaluating for  obstruction.     EXAM: UPPER GI SERIES AND SMALL BOWEL FOLLOW-THROUGH DATED 1/28/2019      FINDINGS: The preliminary radiographs of the abdomen and pelvis  demonstrate some stool throughout much of the colon and rectum. Traces  of gas are present in the stomach and small bowel. No dilated bowel is  seen. Mild degenerative changes are present in the spine.     UPPER GI SERIES: The patient cannot stand safely and the studies were  performed with patient on the x-ray table in various projections and  with the x-ray table and patient in variably horizontal to mild reverse  Trendelenburg orientations. The patient ingested thin barium liquid  mixture and water for the current examinations. Rapid sequence imaging  of the neck in lateral and AP projection as the patient ingested barium  liquid mixtures demonstrated silent laryngeal penetration without  aspiration. Slight AP narrowing of cervical esophagus and some  transverse caliber narrowing of midportion of cervical esophagus were  demonstrated. Correlate with clinical exam of the neck to exclude  thyromegaly or cervical lymphadenopathy. Radiographs of thoracic  esophagus demonstrate some tertiary contractions. The esophageal A-line  has luminal diameter of at least 1.5 cm, and the esophagogastric  junction has luminal diameter of greater than 2 cm. A small sliding  hiatal herniation of the upper stomach was demonstrated. No  gastroesophageal reflux was seen, even with maneuvers intended to elicit  reflux. Barium passed freely and promptly through the esophagus, through  the small hiatal hernia, and into the subdiaphragmatic stomach where  there was immediate onset of gastric emptying of barium into the  duodenum with  prompt propulsion of barium through the duodenum and into  the upper jejunum. No esophageal, gastric, or duodenal mass or mucosal  ulcer was seen.     SMALL BOWEL FOLLOW-THROUGH: Progress of the barium meal through the  mesenteric small bowel was followed by exposure of overhead AP supine  abdominal radiographs. Barium had reached the midportion of mesenteric  small bowel within 30 minutes following initial administration of  barium. Barium had opacified the remainder of the mesenteric small bowel  and two thirds of the colon by the 60 minute radiograph. Compression  fluoroscopy and radiography of the small bowel were performed by Dr. Rivera with patient in multiple projections on the x-ray table. Final  overhead bilateral oblique and AP projection radiographs of the small  bowel were obtained to complete the exam. No abnormality of caliber or  mucosal contour of the mesenteric small bowel was demonstrated. Terminal  ileum has normal caliber, contour, and peristalsis.     A total of 3 minutes 23 seconds fluoroscopy was used on the current  exam. A total of 10 digital overhead radiographs and 70 digital  fluoroscopic spot image radiographs were acquired.     CONCLUSION: There is some slight AP narrowing of caliber and slightly  irregular transverse caliber narrowing of the cervical esophagus as  described above. Barium passed freely through this area. Correlate with  clinical exam and consider CT scanning of the neck if clinically  warranted. No thyromegaly or lymphadenopathy to account for the findings  is seen on CT angiographic study of the neck dated 12/07/2018. Small  hiatal hernia without gastroesophageal reflux is demonstrated. Mild  aging changes in esophageal motility were demonstrated. No gastric or  duodenal mass or mucosal ulcer was seen. Small bowel transit time was  between 30 minutes and 1 hour following initial administration of oral  barium. No abnormality of caliber or mucosal contour of the  mesenteric  small bowel was seen.     This report was finalized on 1/28/2019 2:47 PM by Dr. Kei Rivera M.D.                Medications have been reviewed:  Current Facility-Administered Medications   Medication Dose Route Frequency Provider Last Rate Last Dose   • acetaminophen (TYLENOL) 160 MG/5ML solution 650 mg  650 mg Per G Tube Q6H PRN Nilson Win MD   650 mg at 02/03/19 1711   • bisacodyl (DULCOLAX) suppository 10 mg  10 mg Rectal Daily PRN Nilson Win MD   10 mg at 01/08/19 1811   • Influenza Vac Subunit Quad (FLUCELVAX) injection 0.5 mL  0.5 mL Intramuscular Once Nilson Win MD       • lacosamide (VIMPAT) oral solution 50 mg  50 mg Per G Tube Nightly Nilson Win MD       • lansoprazole (FIRST) oral suspension 30 mg  30 mg Oral QAM Nilson Win MD   30 mg at 02/04/19 0616   • meclizine (ANTIVERT) tablet 12.5 mg  12.5 mg Per G Tube TID PRN Nilson Win MD       • multivitamin with minerals 1 tablet  1 tablet Oral Daily Nilson Win MD   1 tablet at 02/03/19 0802   • ondansetron (ZOFRAN) injection 4 mg  4 mg Intravenous Q6H PRN Nilson Win MD   4 mg at 01/27/19 2102   • ondansetron ODT (ZOFRAN-ODT) disintegrating tablet 4 mg  4 mg Oral Q4H PRN Nilson Win MD   4 mg at 01/19/19 0547   • polyethylene glycol (MIRALAX) powder 17 g  17 g Oral Daily Nilson Win MD   17 g at 02/04/19 1011   • promethazine (PHENERGAN) tablet 12.5 mg  12.5 mg Oral Q8H PRN Nilson Win MD   12.5 mg at 01/19/19 2017   • sodium chloride 0.9 % flush 3 mL  3 mL Intravenous Q12H Sahara Yu MD   3 mL at 02/02/19 2145   • sodium chloride 0.9 % flush 5 mL  5 mL Intravenous PRN Sahara Yu MD           ASSESSMENT:  Hyponatremia, improved, stable, 140  SIADH, sodium improving   Cerebellar hemorrhage, slow progress with rehabilitation  Intraventricular hemorrhage, status post coil embolization AVM      Plan:  His  sodium level remains stable and 140  Continue current tube feed regimen  Change lab checks to weekly  Continue rehabilitation efforts  I'll see him again next Monday if he is still in rehab    Angelito Burciaga MD   [facility]   Office phone number: [phone]  Answering service phone number: [phone]    02/04/19  11:17 AM      Dictation performed using Dragon dictation software

## 2019-02-04 NOTE — THERAPY TREATMENT NOTE
Inpatient Rehabilitation - Speech Language Pathology Treatment Note    [facility]       Patient Name: Feliciano Light  : 1955  MRN: [id number]    Today's Date: 2019           Admit Date: 2019      Visit Dx:        ICD-10-CM ICD-9-CM   1. Protein-calorie malnutrition, unspecified severity (CMS/HCC) E46 263.9   2. 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   • SIADH (syndrome of inappropriate ADH production) (CMS/HCC)   • Elevated hemoglobin A1c   • Hyperlipidemia   • Protein-calorie malnutrition (CMS/HCC)          Therapy Treatment    Evaluation/Coping    Evaluation/Treatment Time and Intent  Subjective Information: no complaints (19 1300 : Heaven Bautista MS CCC-SLP)  Existing Precautions/Restrictions: fall (19 1300 : Heaven Bautista MS CCC-SLP)  Document Type: therapy note (daily note) (19 1300 : Heaven Bautista MS CCC-SLP)  Mode of Treatment: speech-language pathology (19 1300 : Heaven Bautista MS CCC-SLP)  Patient/Family Observations: up in w/c; better effort (19 1300 : Heaven Bautista MS CCC-SLP)  Start Time (Evaluation/Treatment): 1300 (19 1300 : Heaven Bautista, MS CCC-SLP)  Stop Time (Evaluation/Treatment): 1330 (19 1300 : Heaven Bautista MS CCC-SLP)    Vitals/Pain/Safety         Cognition/Communication         Oral Motor/Eating         Mobility/Basic Activities/Instrumental Activities/Motor/Modality                   ROM/MMT                   Sensory/Myotome/Dermatome/Edema               Posture/Balance/Special Tests/Exercise/Transportation/Sexual Function                   Orthotics/Residual Limb/Prosthetic Management              Outcome Summary         EDUCATION    The patient has been educated in the following areas:     Cognitive Impairment.    SLP Recommendation and  Plan    SLP Diagnosis: Moderate cognitive deficits    SLP Diagnosis: Moderate cognitive deficits    Rehab Potential/Prognosis: good         Anticipated Dischage Disposition: home with assist, anticipate therapy at next level of care              Predicted Duration Therapy Intervention (Days): until discharge                  SLP GOALS     Row Name 19 1300 19 0900 19 0800       Awareness of Basic Personal Information Goal 1 (SLP)    Improve Awareness of Basic Personal Information Goal 1 (SLP)  --  answering open-ended questions regarding personal/biographical information  -SA  answering yes/no questions regarding personal/biographical information;naming self, family members  -LT    Time Frame (Awareness of Basic Personal Information Goal 1, SLP)  --  by discharge  -SA  --    Progress (Awareness of Basic Personal Information Goal 1, SLP)  --  50%;with moderate cues (50-74%)  -SA  with maximum cues (25-49%);with 1:1 supervision/constant cues;10% pt stated name, refused to open eyes or participate further  -LT    Progress/Outcomes (Awareness of Basic Personal Information Goal 1, SLP)  --  goal ongoing  -SA  --    Comment (Awareness of Basic Personal Information Goal 1, SLP)  --  50% independently; 67% with mod cues; answering name, ; age, wife's name, address and phone number  -SA  --       Orientation Goal 1 (SLP)    Improve Orientation Through Goal 1 (SLP)  --  demonstrating orientation to day;demonstrating orientation to month;demonstrating orientation to year  -SA  demonstrating orientation to day;demonstrating orientation to month;demonstrating orientation to year  -LT    Time Frame (Orientation Goal 1, SLP)  --  by discharge  -SA  --    Progress (Orientation Goal 1, SLP)  --  with 1:1 supervision/constant cues  -SA  0%;with 1:1 supervision/constant cues  -LT    Progress/Outcomes (Orientation Goal 1, SLP)  --  goal ongoing  -SA  --    Comment (Orientation Goal 1, SLP)  --  17% with MAX cues   -SA  --       Organizational Skills Goal 1 (SLP)    Time Frame (Thought Organization Skills Goal 1, SLP)  by discharge  -SA  --  --    Progress (Thought Organization Skills Goal 1, SLP)  with minimal cues (75-90%);with moderate cues (50-74%)  -SA  --  --    Progress/Outcomes (Thought Organization Skills Goal 1, SLP)  goal ongoing  -SA  --  --    Comment (Thought Organization Skills Goal 1, SLP)  75%; sequential thought organization adding endings to complete sentences  -SA  --  --       Reasoning Goal 1 (SLP)    Improve Reasoning Through Goal 1 (SLP)  complete basic reasoning task  -SA  --  --    Time Frame (Reasoning Goal 1, SLP)  by discharge  -SA  --  --    Progress (Reasoning Goal 1, SLP)  20%;with maximum cues (25-49%);with 1:1 supervision/constant cues  -SA  --  --    Progress/Outcomes (Reasoning Goal 1, SLP)  goal ongoing  -SA  --  --    Comment (Reasoning Goal 1, SLP)  naming concrete categorization  -SA  --  --       Executive Functional Skills Goal 1 (SLP)    Improve Executive Function Skills Goal 1 (SLP)  --  time management activity  -SA  --    Time Frame (Executive Function Skills Goal 1, SLP)  --  by discharge  -SA  --    Progress (Executive Function Skills Goal 1, SLP)  --  20%;with 1:1 supervision/constant cues  -SA  --    Progress/Outcomes (Executive Function Skills Goal 1, SLP)  --  goal ongoing  -SA  --    Comment (Executive Function Skills Goal 1, SLP)  --  telling time to hour with choice of 3 answers  -SA  --       Right Hemisphere Function Goal 1 (SLP)    Improve Right Hemisphere Function Through Goal 1 (SLP)  complete visuo-spatial activities (visual closure, trail making, mazes  -SA  --  --    Time Frame (Right Hemisphere Function Goal 1, SLP)  by discharge  -SA  --  --    Progress (Right Hemisphere Function Goal 1, SLP)  with maximum cues (25-49%)  -SA  --  --    Progress/Outcomes (Right Hemisphere Function Goal 1, SLP)  goal ongoing  -SA  --  --    Comment (Right Hemisphere Function Goal  1, SLP)  75%; Max assist with SLP pointing to dots on page with pt able to answer orally how many dots there were  -  --  --      User Key  (r) = Recorded By, (t) = Taken By, (c) = Cosigned By    Initials Name Provider Type    Caterina Hernandez MS CCC-SLP Speech and Language Pathologist    Heaven Hanson MS CCC-SLP Speech and Language Pathologist                  Time Calculation:       Time Calculation- SLP     Row Name 02/04/19 1331 02/04/19 1149          Time Calculation- SLP    SLP Start Time  1300  -  0900  -     SLP Stop Time  1330  -  0930  -     SLP Time Calculation (min)  30 min  -  30 min  -     SLP Received On  --  02/04/19  -       User Key  (r) = Recorded By, (t) = Taken By, (c) = Cosigned By    Initials Name Provider Type    Heaven Hanson MS CCC-SLP Speech and Language Pathologist            Therapy Charges for Today     Code Description Service Date Service Provider Modifiers Qty    [id number]  ST DEV OF COGN SKILLS EACH 15 MIN 2/4/2019 Heaven Bautista MS CCC-SLP  4                           Heaven Bautista MS CCC-SLP  2/4/2019

## 2019-02-04 NOTE — PROGRESS NOTES
Inpatient Rehabilitation Plan of Care Note    Plan of Care  Care Plan Reviewed - Updates as Follows    Nutrition    [RN] Nutrition Management(Active)  Current Status(02/04/2019): Patient removed Cortrak. 2/1/19 Pt received peg  tube.  Continuous tube feed at 55cc/hr.  Weekly Goal(02/11/2019): Patient's nausea will be decreased and oral intake  increased. Tolerte tube feed.  Discharge Goal: Patient's nausea will be decreased and oral intake increased.  Wife able to perform TF if needed.    Performed Intervention(s)  Monitor oral intake with each meal.  Administer tube feed per order      Psychosocial    [RN] Coping/Adjustment(Active)  Current Status(02/04/2019): Flat affect, quiet. supportive wife.  Weekly Goal(02/11/2019): Allow opportunity to express concerns regarding current  status.  Discharge Goal: Adequate coping regarding life changes with ongoing support.    Performed Intervention(s)  Verbalizes needs & concerns &  prn      Safety    [RN] Potential for Injury(Active)  Current Status(02/04/2019): No unsafe behaviors; pt fell 1/9/19. Assist of 1  with transfers. Wife assists pt.  Weekly Goal(02/11/2019): Instruct family regarding safety precautions & need for  close supervision.  Discharge Goal: Patient/ family will be aware of risk of fall & safety in the  home setting    Performed Intervention(s)  Items in reach, Falls precautions/ protocol  Safety rounds & Bed/chair alarms, items within reach      Sphincter Control    [RN] Bladder & Bowel Management(Active)  Current Status(02/04/2019): Cont/incontinent at times  Weekly Goal(02/11/2019): continent 100%. BM q1-3 days  Discharge Goal: Continence 100%    Performed Intervention(s)  Monitor intake, output, & bowel movements  Use incontinence products/ equipment, & perineal care  Encourage appropriate diet & fluid intake    Signed by: Solange Cruz RN

## 2019-02-04 NOTE — PLAN OF CARE
Problem: Skin Injury Risk (Adult)  Goal: Skin Health and Integrity   02/04/19 0153   Skin Injury Risk (Adult)   Skin Health and Integrity making progress toward outcome

## 2019-02-04 NOTE — PLAN OF CARE
Problem: Nausea/Vomiting (Adult)  Goal: Symptom Relief   02/04/19 0152   Nausea/Vomiting (Adult)   Symptom Relief making progress toward outcome

## 2019-02-04 NOTE — PROGRESS NOTES
Inpatient Rehabilitation Functional Measures Assessment    Functional Measures  ALBERTO Eating:  [facility] Grooming: [facility] Bathing:  [facility] Upper Body Dressing:  [facility] Lower Body Dressing:  [facility] Toileting:  [facility] Bladder Management  Level of Assistance:  [place]  Frequency/Number of Accidents this Shift:  [facility] Bowel Management  Level of Assistance: [place]  Frequency/Number of Accidents this Shift: [facility] Bed/Chair/Wheelchair Transfer:  [facility] Toilet Transfer:  [facility] Tub/Shower Transfer:  [place]    Previously Documented Mode of Locomotion at Discharge: Field  ALBERTO Expected Mode of Locomotion at Discharge: [facility] Walk/Wheelchair:  [facility] Stairs:  [facility] Comprehension:  Visual comprehension is the usual mode. Patient does not  comprehend complex/abstract information in their primary language without  assistance from a helper. Comprehension Score = 2, Maximal Prompting. Patient  comprehends basic daily needs 25-49% of the time. Patient understands simple  information via single words or gestures. Requires maximal/a lot of prompting  (most of the time). No assistive devices were required.  ALBERTO Expression:  Activity was not observed.  ALBERTO Social Interaction:  Activity was not observed.  ALBERTO Problem Solving:  Activity was not observed.  ALBERTO Memory:  Activity was not observed.    Therapy Mode Minutes  Occupational Therapy: [place]  Physical Therapy: [place]  Speech Language Pathology:  [place]    Signed by: Amy Spring RN

## 2019-02-04 NOTE — PROGRESS NOTES
"Pt's wife reports pt has been lethargic and would not communicate with her over the weekend. She has been trying to get him to eat, but he either refuses or \"holds\" food in his mouth.  Wife is very frustrated by pt's inability (or unwillingness) to talk with her and the fact that he needs max encouragement to complete most activities.   In spite of her frustration, she expresses strong commitment to caring for pt at home and is making appropriate plans for discharge. She has moved a bed to the first floor of their home, purchased a baby monitor and a bed alarm.  She tells me that pt's sister and sister-in-law are working on a schedule to provide regular respite and her contractor will be installing handrails at the outside steps of the home this week.  Will continue to provide support and assist with d/c plans.    "

## 2019-02-04 NOTE — PLAN OF CARE
Problem: Fall Risk (Adult)  Goal: Absence of Fall   02/04/19 0152   Fall Risk (Adult)   Absence of Fall making progress toward outcome

## 2019-02-04 NOTE — PLAN OF CARE
Problem: Patient Care Overview  Goal: Plan of Care Review  Outcome: Ongoing (interventions implemented as appropriate)   02/04/19 1705   Patient Care Overview   IRF Plan of Care Review progress ongoing, continue   Progress, Functional Goals demonstrating adequate progress   Coping/Psychosocial   Plan of Care Reviewed With patient   OTHER   Outcome Summary Lethargic this am but more alert as day progressed. Speaks very little and in soft tone, follow commands. Transfers assist of 1. no bm several days, refusing suppository. TF at goal rate of 55cchr , tolerating without residuals. No unsafe behavior. NO nausea or vomiting.       Problem: Skin Injury Risk (Adult)  Goal: Skin Health and Integrity  Outcome: Ongoing (interventions implemented as appropriate)      Problem: Fall Risk (Adult)  Goal: Absence of Fall  Outcome: Ongoing (interventions implemented as appropriate)      Problem: Nausea/Vomiting (Adult)  Goal: Symptom Relief  Outcome: Ongoing (interventions implemented as appropriate)      Problem: Stroke (Hemorrhagic) (Adult)  Goal: Signs and Symptoms of Listed Potential Problems Will be Absent, Minimized or Managed (Stroke)  Outcome: Ongoing (interventions implemented as appropriate)      Problem: Nutrition, Enteral (Adult)  Goal: Signs and Symptoms of Listed Potential Problems Will be Absent, Minimized or Managed (Nutrition, Enteral)  Outcome: Ongoing (interventions implemented as appropriate)

## 2019-02-04 NOTE — PLAN OF CARE
Problem: Patient Care Overview  Goal: Plan of Care Review   02/04/19 0153   Patient Care Overview   IRF Plan of Care Review progress ongoing, continue   Progress, Functional Goals demonstrating adequate progress;progress more gradual than expected   Coping/Psychosocial   Plan of Care Reviewed With patient   OTHER   Outcome Summary slept all shift, like normal, residual 10cc, increased at 815pm to 30 hour, increase in 6 hours, inc of urine, monitor skin, peg site cleanse with soap and water

## 2019-02-05 PROCEDURE — 97110 THERAPEUTIC EXERCISES: CPT

## 2019-02-05 PROCEDURE — 97110 THERAPEUTIC EXERCISES: CPT | Performed by: PHYSICAL THERAPIST

## 2019-02-05 PROCEDURE — G0515 COGNITIVE SKILLS DEVELOPMENT: HCPCS

## 2019-02-05 PROCEDURE — 97535 SELF CARE MNGMENT TRAINING: CPT

## 2019-02-05 RX ADMIN — LACOSAMIDE 50 MG: 10 SOLUTION ORAL at 21:18

## 2019-02-05 RX ADMIN — LANSOPRAZOLE 30 MG: KIT at 06:14

## 2019-02-05 RX ADMIN — MULTIPLE VITAMINS W/ MINERALS TAB 1 TABLET: TAB at 08:41

## 2019-02-05 NOTE — PLAN OF CARE
Problem: Patient Care Overview  Goal: Plan of Care Review  Outcome: Ongoing (interventions implemented as appropriate)   02/04/19 1705 02/04/19 2053 02/05/19 0428   Patient Care Overview   IRF Plan of Care Review progress ongoing, continue --  --    Progress, Functional Goals demonstrating adequate progress --  --    Coping/Psychosocial   Plan of Care Reviewed With --  patient;spouse --    OTHER   Outcome Summary --  --  Disoriented. Answers simple questions. Follows commands.Assist with urinal. Tolerating TF at 55cc/hr. Wife stayed in room.     Goal: Individualization and Mutuality  Outcome: Ongoing (interventions implemented as appropriate)    Goal: Discharge Needs Assessment  Outcome: Ongoing (interventions implemented as appropriate)    Goal: Coping Plan  Outcome: Ongoing (interventions implemented as appropriate)      Problem: Skin Injury Risk (Adult)  Goal: Skin Health and Integrity  Outcome: Ongoing (interventions implemented as appropriate)      Problem: Fall Risk (Adult)  Goal: Absence of Fall  Outcome: Ongoing (interventions implemented as appropriate)      Problem: Nausea/Vomiting (Adult)  Goal: Symptom Relief  Outcome: Ongoing (interventions implemented as appropriate)    Goal: Adequate Hydration  Outcome: Ongoing (interventions implemented as appropriate)      Problem: Stroke (Hemorrhagic) (Adult)  Goal: Signs and Symptoms of Listed Potential Problems Will be Absent, Minimized or Managed (Stroke)  Outcome: Ongoing (interventions implemented as appropriate)      Problem: Nutrition, Enteral (Adult)  Goal: Signs and Symptoms of Listed Potential Problems Will be Absent, Minimized or Managed (Nutrition, Enteral)  Outcome: Ongoing (interventions implemented as appropriate)

## 2019-02-05 NOTE — PROGRESS NOTES
Inpatient Rehabilitation Functional Measures Assessment and Plan of Care    Plan of Care  Updated Problems/Interventions  Field    Functional Measures  ALBERTO Eating:  [facility] Grooming: [facility] Bathing:  [facility] Upper Body Dressing:  [facility] Lower Body Dressing:  [facility] Toileting:  [facility] Bladder Management  Level of Assistance:  [place]  Frequency/Number of Accidents this Shift:  [facility] Bowel Management  Level of Assistance: [place]  Frequency/Number of Accidents this Shift: [facility] Bed/Chair/Wheelchair Transfer:  [facility] Toilet Transfer:  [facility] Tub/Shower Transfer:  [place]    Previously Documented Mode of Locomotion at Discharge: Field  ALBERTO Expected Mode of Locomotion at Discharge: [facility] Walk/Wheelchair:  [facility] Stairs:  [facility] Comprehension:  [facility] Expression:  [facility] Social Interaction:  [facility] Problem Solving:  [facility] Memory:  [place]    Therapy Mode Minutes  Occupational Therapy: Individual: 60 minutes.  Physical Therapy: [place]  Speech Language Pathology:  [place]    Signed by: MELISSA Marrero/ERNESTO

## 2019-02-05 NOTE — PROGRESS NOTES
Inpatient Rehabilitation Plan of Care Note    Plan of Care  Care Plan Reviewed - No updates at this time.    Psychosocial    Performed Intervention(s)  Verbalizes needs & concerns &  prn      Safety    Performed Intervention(s)  Items in reach, Falls precautions/ protocol  Safety rounds & Bed/chair alarms, items within reach      Sphincter Control    Performed Intervention(s)  Monitor intake, output, & bowel movements  Use incontinence products/ equipment, & perineal care  Encourage appropriate diet & fluid intake      Nutrition    Performed Intervention(s)  Monitor oral intake with each meal.  Administer tube feed per order    Signed by: Katie Wick RN

## 2019-02-05 NOTE — CONSULTS
Nutrition Services    Patient Name:  Fleiciano Light  YOB: 1955  MRN: [id number]  Admit Date:  1/2/2019    Asked for a Bolus regimen for home TF's.  Pt will need 5 1/2 cans of Nutren 1.5 per day and 400cc total water flushes.   Options include:  1 1/2 cans(360cc) x 3 per day          1 can(240cc) x 1 per day     Each Bolus feeding to have 100cc    water flushed prefer 50cc before and    50cc after.    Electronically signed by:  Marah Orona RD  02/05/19 1:43 PM
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
## 2019-02-05 NOTE — PROGRESS NOTES
Inpatient Rehabilitation Functional Measures Assessment    Functional Measures  ALBERTO Eating:  [place]  [facility] Grooming: [place]  [facility] Bathing:  [place]  [facility] Upper Body Dressing:  [place]  [facility] Lower Body Dressing:  [place]  [facility] Toileting:  [facility] Bladder Management  Level of Assistance:  [place]  Frequency/Number of Accidents this Shift:  [facility] Bowel Management  Level of Assistance: [place]  Frequency/Number of Accidents this Shift: [place]    [facility] Bed/Chair/Wheelchair Transfer:  Activity was not observed.  ALBERTO Toilet Transfer:  [facility] Tub/Shower Transfer:  [place]    Previously Documented Mode of Locomotion at Discharge: Field  ALBERTO Expected Mode of Locomotion at Discharge: [facility] Walk/Wheelchair:  WHEELCHAIR OBSERVATION   Wheelchair did not occur.    WALK OBSERVATION   Walk Distance Scale = 3.  Distance walked is greater than 150 feet. Walk Score  = 1.  Patient performs 75% or more of effort and requires minimal assistance of  two or more people for walking. to help steer wx, guard pt and assist with  feeding tube pole . Patient walked a distance of  150 feet. Patient requires the  following assistive device(s): Rolling walker.  ALBERTO Stairs:  Stairs did not occur.    ALBERTO Comprehension:  [facility] Expression:  [facility] Social Interaction:  [facility] Problem Solving:  [facility] Memory:  [place]    Therapy Mode Minutes  Occupational Therapy: [place]  Physical Therapy: Individual: 60 minutes.  Speech Language Pathology:  [place]    Signed by: Emily Jarrett PT

## 2019-02-05 NOTE — THERAPY TREATMENT NOTE
Inpatient Rehabilitation - Occupational Therapy Treatment Note    [facility]     Patient Name: Feliciano Light  : 1955  MRN: [id number]    Today's Date: 2019                 Admit Date: 2019      Visit Dx:    ICD-10-CM ICD-9-CM   1. Protein-calorie malnutrition, unspecified severity (CMS/HCC) E46 263.9   2. 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   • SIADH (syndrome of inappropriate ADH production) (CMS/HCC)   • Elevated hemoglobin A1c   • Hyperlipidemia   • Protein-calorie malnutrition (CMS/HCC)         Therapy Treatment    IRF Treatment Summary     Row Name 19 1209 19 1039          Evaluation/Treatment Time and Intent    Subjective Information  no complaints  -CC  no complaints  -JK     Existing Precautions/Restrictions  fall  -CC  fall  -JK     Document Type  therapy note (daily note)  -CC  therapy note (daily note)  -JK     Mode of Treatment  occupational therapy  -CC  physical therapy  -JK     Patient/Family Observations  pt vomitted ta end of session. RN notified in w/c with spouse  -CC  [facility]  -JK     Recorded by [CC] Genevieve Talavera OTR [JK] Emily Jarrett PT     Row Name 19 1209 19 1039          Cognition/Psychosocial- PT/OT    Affect/Mental Status (Cognitive)  confused;flat/blunted affect  -CC  confused;flat/blunted affect  -JK     Behavioral Issues (Cognitive)  withdrawn  -CC  withdrawn  -JK     Orientation Status (Cognition)  --  oriented to;person  -JK     Follows Commands (Cognition)  follows one step commands;50-74% accuracy;delayed response/completion;increased processing time needed;initiation impaired;physical/tactile prompts required;repetition of directions required;verbal cues/prompting required  -CC  follows one step commands;50-74%  accuracy;delayed response/completion;increased processing time needed;initiation impaired;physical/tactile prompts required;repetition of directions required;verbal cues/prompting required  -JK     Personal Safety Interventions  fall prevention program maintained;gait belt;nonskid shoes/slippers when out of bed  -  fall prevention program maintained;gait belt  -     Cognitive Function (Cognitive)  --  safety deficit  -     Attention Deficit (Cognitive)  --  distractible in noisy environment;distractible in quiet environment  -JK     Safety Deficit (Cognitive)  --  insight into deficits/self awareness;judgment;problem solving;awareness of need for assistance  -JK     Recorded by [CC] Genevieve Talavera OTR [JK] Emily Jarrett, PT     Row Name 02/05/19 1209 02/05/19 1039          Sit-Stand Transfer    Sit-Stand [place] (Transfers)  verbal cues;minimum assist (75% patient effort)  -  verbal cues;minimum assist (75% patient effort)  -J     Assistive Device (Sit-Stand Transfers)  wheelchair  -  walker, front-wheeled  -JK     Recorded by [CC] Genevieve Talavera OTR [JK] Emily Jarrett, PT     Row Name 02/05/19 1209 02/05/19 1039          Stand-Sit Transfer    Stand-Sit [place] (Transfers)  verbal cues;minimum assist (75% patient effort)  -  verbal cues;minimum assist (75% patient effort)  -     Assistive Device (Stand-Sit Transfers)  wheelchair  -  walker, front-wheeled  -JK     Recorded by [CC] Genevieve Talavera OTR [JK] Emily Jarrett, PT     Row Name 02/05/19 1209             Shower Transfer    Type (Shower Transfer)  stand pivot/stand step;sit-stand;stand-sit  -      [place] Level (Shower Transfer)  minimum assist (75% patient effort);moderate assist (50% patient effort) resistant  -      Assistive Device (Shower Transfer)  grab bars/tub rail;transfer board;wheelchair  -      Recorded by [CC] Genevieve Talavera OTR      Row Name 02/05/19 1039             Gait/Stairs  Assessment/Training    [place] Level (Gait)  minimum assist (75% patient effort)  -JK      Assistive Device (Gait)  walker, front-wheeled  -JK      Distance in Feet (Gait)  80  -JK      Deviations/Abnormal Patterns (Gait)  gait speed decreased;stride length decreased  -JK      Bilateral Gait Deviations  forward flexed posture;heel strike decreased  -JK      Comment (Gait/Stairs)  min A to steer RW  -JK      Recorded by [JK] Emily Jarrett PT      Row Name 02/05/19 1209             Bathing Assessment/Treatment    Bathing [place] Level  bathing skills;lower body;upper body;verbal cues;nonverbal cues (demo/gesture);moderate assist (50% patient effort);maximum assist (25% patient effort)  -CC      Assistive Device (Bathing)  grab bar/tub rail;hand held shower spray hose;tub bench  -CC      Bathing Position  supported sitting;supported standing  -CC      Bathing Setup Assistance  adjust water temperature;obtain supplies  -CC      Comment (Bathing)  -- resistant to participate  -CC      Recorded by [CC] Genevieve Talavera OTR      Row Name 02/05/19 1209             Upper Body Dressing Assessment/Treatment    Upper Body Dressing Task  upper body dressing skills;doff;don;pull over garment;minimum assist (75% or more patient effort)  -CC      Upper Body Dressing Position  supported sitting  -CC      Set-up Assistance (Upper Body Dressing)  obtain clothing  -CC      Recorded by [CC] Genevieve Talavera OTR      Row Name 02/05/19 1209             Lower Body Dressing Assessment/Treatment    Lower Body Dressing [place] Level  don;socks;verbal cues;nonverbal cues (demo/gesture);moderate assist (50% patient effort)  -CC      Lower Body Dressing Position  supported sitting;supported standing  -CC      Lower Body Dressing Setup Assistance  obtain clothing  -CC      Recorded by [CC] Genevieve Talavera OTR      Row Name 02/05/19 1209             Grooming Assessment/Treatment    Grooming [place] Level  grooming  skills;deodorant application;wash face, hands;set up;supervision  -CC      Grooming Position  supported sitting  -CC      Grooming Setup Assistance  obtain supplies  -CC      Comment (Grooming)  refused oral care  -CC      Recorded by [CC] Genevieve Talavera OTR      Row Name 02/05/19 1209 02/05/19 1039          Pain Scale: Numbers Pre/Post-Treatment    Pain Scale: Numbers, Pretreatment  0/10 - no pain  -CC  0/10 - no pain  -JK     Pain Scale: Numbers, Post-Treatment  0/10 - no pain  -CC  0/10 - no pain  -JK     Recorded by [CC] Genevieve Talavera OTR [JK] Emily Jarrett, PT     Row Name 02/05/19 1209             Upper Extremity Seated Therapeutic Exercise    Performed, Seated Upper Extremity (Therapeutic Exercise)  scapular protraction/retraction;elbow flexion/extension;forearm supination/pronation;wrist flexion/extension  -CC      Device, Seated Upper Extremity (Therapeutic Exercise)  free weights, barbell;free weights, cuff  -CC      Exercise Type, Seated Upper Extremity (Therapeutic Exercise)  resistive exercise  -CC      Expected Outcomes, Seated Upper Extremity (Therapeutic Exercise)  improve functional tolerance, self-care activity  -CC      Sets/Reps Detail, Seated Upper Extremity (Therapeutic Exercise)  10x3 2# hand wt 2# dowel hand helper 20 reps  -CC      Recorded by [CC] Genevieve Talavera OTR      Row Name 02/05/19 1209 02/05/19 1039          Positioning and Restraints    Pre-Treatment Position  sitting in chair/recliner  -CC  sitting in chair/recliner  -JK     Post Treatment Position  wheelchair  -CC  wheelchair  -JK     In Wheelchair  with nsg  -CC  sitting;with nsg;exit alarm on;heels elevated  -JK     Recorded by [CC] Genevieve Talavera OTR [JK] Emily Jarrett, PT       User Key  (r) = Recorded By, (t) = Taken By, (c) = Cosigned By    Initials Name Effective Dates    CC Genevieve Talavera OTR 06/08/18 -     Emily Gallegos, PT 04/03/18 -           Wound 01/02/19 1710 Bilateral lateral head incision  (Active)   Dressing Appearance open to air 2/4/2019  8:53 PM   Drainage Amount none 2/5/2019  7:12 AM   Dressing Care, Wound open to air 2/5/2019  7:12 AM         OT Recommendation and Plan                 OT IRF GOALS     Row Name 01/31/19 1200 01/23/19 1100          Transfer Goal 1 (OT-IRF)    Activity/Assistive Device (Transfer Goal 1, OT-IRF)  toilet  -CC  --     [place] Level (Transfer Goal 1, OT-IRF)  contact guard assist  -CC  --     Time Frame (Transfer Goal 1, OT-IRF)  short term goal (STG)  -CC  --     Progress/Outcomes (Transfer Goal 1, OT-IRF)  goal ongoing  -CC  --        Transfer Goal 2 (OT-IRF)    Activity/Assistive Device (Transfer Goal 2, OT-IRF)  toilet  -CC  toilet  -CC     [place] Level (Transfer Goal 2, OT-IRF)  standby assist  -CC  standby assist  -CC     Time Frame (Transfer Goal 2, OT-IRF)  long term goal (LTG)  -CC  long term goal (LTG)  -CC     Progress/Outcomes (Transfer Goal 2, OT-IRF)  goal ongoing  -CC  goal ongoing  -CC        Transfer Goal 3 (OT-IRF)    Activity/Assistive Device (Transfer Goal 3, OT-IRF)  shower chair  -CC  shower chair  -CC     [place] Level (Transfer Goal 3, OT-IRF)  contact guard assist  -CC  contact guard assist  -CC     Time Frame (Transfer Goal 3, OT-IRF)  short term goal (STG)  -CC  short term goal (STG)  -CC     Progress/Outcomes (Transfer Goal 3, OT-IRF)  goal ongoing  -CC  goal ongoing  -CC        Transfer Goal 4 (OT-IRF)    Activity/Assistive Device (Transfer Goal 4, OT-IRF)  shower chair  -CC  shower chair  -CC     [place] Level (Transfer Goal 4, OT-IRF)  standby assist  -CC  standby assist  -CC     Time Frame (Transfer Goal 4, OT-IRF)  long term goal (LTG)  -CC  long term goal (LTG)  -CC     Progress/Outcomes (Transfer Goal 4, OT-IRF)  goal no longer appropriate  -CC  goal revised this date  -CC        Bathing Goal 1 (OT-IRF)    Activity/Device (Bathing Goal 1, OT-IRF)  bathing skills, all  -CC  --     [place] Level (Bathing Goal  1, OT-IRF)  minimum assist (75% or more patient effort);contact guard assist  -CC  --     Time Frame (Bathing Goal 1, OT-IRF)  short term goal (STG)  -CC  --     Progress/Outcomes (Bathing Goal 1, OT-IRF)  goal met  -CC  --        Bathing Goal 2 (OT-IRF)    Activity/Device (Bathing Goal 2, OT-IRF)  bathing skills, all  -CC  bathing skills, all  -CC     [place] Level (Bathing Goal 2, OT-IRF)  contact guard assist;standby assist  -CC  standby assist  -CC     Time Frame (Bathing Goal 2, OT-IRF)  long term goal (LTG)  -CC  long term goal (LTG)  -CC     Progress/Outcomes (Bathing Goal 2, OT-IRF)  goal ongoing  -CC  goal ongoing  -CC        UB Dressing Goal 1 (OT-IRF)    Activity/Device (UB Dressing Goal 1, OT-IRF)  upper body dressing  -CC  upper body dressing  -CC     [place] (UB Dress Goal 1, OT-IRF)  standby assist  -CC  standby assist  -CC     Time Frame (UB Dressing Goal 1, OT-IRF)  long term goal (LTG)  -CC  long term goal (LTG)  -CC     Progress/Outcomes (UB Dressing Goal 1, OT-IRF)  goal ongoing  -CC  goal ongoing  -CC        UB Dressing Goal 2 (OT-IRF)    Activity/Device (UB Dressing Goal 2, OT-IRF)  upper body dressing  -CC  upper body dressing  -CC     [place] (UB Dress Goal 2, OT-IRF)  supervision required  -CC  supervision required  -CC     Time Frame (UB Dressing Goal 2, OT-IRF)  long term goal (LTG)  -CC  long term goal (LTG)  -CC     Progress/Outcomes (UB Dressing Goal 2, OT-IRF)  goal ongoing  -CC  goal ongoing  -CC        LB Dressing Goal 1 (OT-IRF)    Activity/Device (LB Dressing Goal 1, OT-IRF)  lower body dressing  -CC  --     [place] (LB Dressing Goal 1, OT-IRF)  moderate assist (50-74% patient effort)  -CC  --     Time Frame (LB Dressing Goal 1, OT-IRF)  long term goal (LTG)  -CC  --     Progress/Outcomes (LB Dressing Goal 1, OT-IRF)  goal met  -CC  --        LB Dressing Goal 2 (OT-IRF)    Activity/Device (LB Dressing Goal 2, OT-IRF)  lower body dressing  -CC  lower body  dressing  -CC     [place] (LB Dressing Goal 2, OT-IRF)  minimum assist (75% or more patient effort);contact guard assist  -CC  minimum assist (75% or more patient effort);contact guard assist  -CC     Time Frame (LB Dressing Goal 2, OT-IRF)  long term goal (LTG)  -CC  long term goal (LTG)  -CC     Progress/Outcomes (LB Dressing Goal 2, OT-IRF)  goal revised this date  -CC  goal revised this date  -CC        Grooming Goal 1 (OT-IRF)    Activity/Device (Grooming Goal 1, OT-IRF)  --  grooming skills, all  -CC     [place] (Grooming Goal 1, OT-IRF)  --  supervision required  -CC     Time Frame (Grooming Goal 1, OT-IRF)  --  long term goal (LTG)  -CC     Progress/Outcomes (Grooming Goal 1, OT-IRF)  --  goal met  -CC        Toileting Goal 1 (OT-IRF)    Activity/Device (Toileting Goal 1, OT-IRF)  --  toileting skills, all  -CC     [place] Level (Toileting Goal 1, OT-IRF)  --  moderate assist (50-74% patient effort)  -CC     Time Frame (Toileting Goal 1, OT-IRF)  --  short term goal (STG)  -CC     Progress/Outcomes (Toileting Goal 1, OT-IRF)  --  goal met  -CC        Toileting Goal 2 (OT-IRF)    Activity/Device (Toileting Goal 2, OT-IRF)  toileting skills, all  -CC  toileting skills, all  -CC     [place] Level (Toileting Goal 2, OT-IRF)  contact guard assist  -CC  contact guard assist  -CC     Time Frame (Toileting Goal 2, OT-IRF)  long term goal (LTG)  -CC  long term goal (LTG)  -CC     Progress/Outcomes (Toileting Goal 2, OT-IRF)  goal revised this date  -CC  goal revised this date  -CC        Strength Goal 1 (OT-IRF)    Strength Goal 1 (OT-IRF)  Pt will increase B UE strength to approx. 4/5 to assist w/ ADLs and transfers.  -CC  Pt will increase B UE strength to approx. 4/5 to assist w/ ADLs and transfers.  -CC     Time Frame (Strength Goal 1, OT-IRF)  long term goal (LTG)  -CC  long term goal (LTG)  -CC     Progress/Outcomes (Strength Goal 1, OT-IRF)  goal ongoing  -CC  goal ongoing  -CC         Direction Following Goal 1 (OT-IRF)    Activity (Direction Following Goal 1, OT-IRF)  follow one step directions  -CC  follow one step directions  -CC     [place]/Cues/Accuracy (Direction Following Goal 1, OT-IRF)  with minimum;verbal cues/redirection  -CC  with minimum;verbal cues/redirection  -CC     Time Frame (Direction Following Goal 1, OT-IRF)  long term goal (LTG)  -CC  long term goal (LTG)  -CC     Progress/Outcomes (Direction Following Goal 1, OT-IRF)  goal ongoing  -CC  goal ongoing  -CC       User Key  (r) = Recorded By, (t) = Taken By, (c) = Cosigned By    Initials Name Provider Type    Genevieve Farooq OTR Occupational Therapist          Occupational Therapy Education     Title: PT OT SLP Therapies (In Progress)     Topic: Occupational Therapy (In Progress)     Point: ADL training (In Progress)     Description: Instruct learner(s) on proper safety adaptation and remediation techniques during self care or transfers.   Instruct in proper use of assistive devices.    Learning Progress Summary           Patient Acceptance, E,TB, NR by GERSON at 1/12/2019 12:08 PM    Acceptance, E, VU,NR by RP at 1/3/2019  3:44 PM    Comment:  OT educ on OT role in therapeutic prcoess and pt's POC.   Significant Other Acceptance, E, VU,NR by RP at 1/3/2019  3:44 PM    Comment:  OT educ on OT role in therapeutic prcoess and pt's POC.                   Point: Home exercise program (In Progress)     Description: Instruct learner(s) on appropriate technique for monitoring, assisting and/or progressing therapeutic exercises/activities.    Learning Progress Summary           Patient Acceptance, E,TB, NR by GERSON at 1/12/2019 12:08 PM                   Point: Precautions (In Progress)     Description: Instruct learner(s) on prescribed precautions during self-care and functional transfers.    Learning Progress Summary           Patient Acceptance, E,TB, NR by GERSON at 1/12/2019 12:08 PM                   Point: Body mechanics (In  Progress)     Description: Instruct learner(s) on proper positioning and spine alignment during self-care, functional mobility activities and/or exercises.    Learning Progress Summary           Patient Acceptance, E,TB, NR by GEROSN at 1/12/2019 12:08 PM                               User Key     Initials Effective Dates Name Provider Type Discipline    GERSON 04/06/17 -  Vickie Pradhan, PT Physical Therapist PT    CHRISTIANE 05/03/18 -  Geetha Posey OT Occupational Therapist OT                       Time Calculation:     Time Calculation- OT     Row Name 02/05/19 1100 02/05/19 0900          Time Calculation- OT    OT Start Time  1100  -CC  0900  -CC     OT Stop Time  1130  -CC  0930  -CC     OT Time Calculation (min)  30 min  -CC  30 min  -CC       User Key  (r) = Recorded By, (t) = Taken By, (c) = Cosigned By    Initials Name Provider Type    CC Genevieve Talavera OTR Occupational Therapist          Therapy Suggested Charges     Code   Minutes Charges    None              Therapy Charges for Today     Code Description Service Date Service Provider Modifiers Qty    76943587497 HC OT SELF CARE/MGMT/TRAIN EA 15 MIN 2/4/2019 Genevieve Talavera OTR GO 2    62442495004 HC OT THER PROC EA 15 MIN 2/4/2019 Genevieve Talavera OTR GO 2    45806649024 HC OT THER PROC EA 15 MIN 2/5/2019 Genevieve Talavera OTR GO 2    48496204857 HC OT SELF CARE/MGMT/TRAIN EA 15 MIN 2/5/2019 Genevieve Talavera OTR GO 2                   MELISSA Marrero  2/5/2019

## 2019-02-05 NOTE — PROGRESS NOTES
Inpatient Rehabilitation Plan of Care Note    Plan of Care  Care Plan Reviewed - No updates at this time.    Psychosocial    Performed Intervention(s)  Verbalizes needs & concerns &  prn      Safety    Performed Intervention(s)  Items in reach, Falls precautions/ protocol  Safety rounds & Bed/chair alarms, items within reach      Sphincter Control    Performed Intervention(s)  Monitor intake, output, & bowel movements  Use incontinence products/ equipment, & perineal care  Encourage appropriate diet & fluid intake      Nutrition    Performed Intervention(s)  Monitor oral intake with each meal.  Administer tube feed per order    Signed by: Kanwal Garcia RN

## 2019-02-05 NOTE — THERAPY TREATMENT NOTE
"Inpatient Rehabilitation - Physical Therapy Treatment Note  [facility]     Patient Name: Feliciano Light  : 1955  MRN: [id number]    Today's Date: 2019                 Admit Date: 2019      Visit Dx:      ICD-10-CM ICD-9-CM   1. Protein-calorie malnutrition, unspecified severity (CMS/HCC) E46 263.9   2. 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   • SIADH (syndrome of inappropriate ADH production) (CMS/HCC)   • Elevated hemoglobin A1c   • Hyperlipidemia   • Protein-calorie malnutrition (CMS/HCC)       Therapy Treatment    IRF Treatment Summary     Row Name 19 1209 19 1039 19 0930       Evaluation/Treatment Time and Intent    Subjective Information  no complaints  -CC  no complaints  -JK  no complaints  -SA    Existing Precautions/Restrictions  fall  -CC  fall  -JK  fall  -SA    Document Type  therapy note (daily note)  -CC  therapy note (daily note)  -JK  therapy note (daily note)  -SA    Mode of Treatment  occupational therapy  -CC  physical therapy  -JK  speech-language pathology  -SA    Patient/Family Observations  pt vomitted ta end of session. RN notified in w/c with spouse  -CC  [facility] [facility]  -JK  in w/c; wife stated pt was \"evil\" this am to her.   -SA    Start Time (Evaluation/Treatment)  --  --  0930  -SA    Stop Time (Evaluation/Treatment)  --  --  1000  -SA    Recorded by [CC] Genevieve Talavera OTR [JK] Emily Jarrett, PT [SA] Heaven Bautista, MS CCC-SLP    Row Name 19 1209 19 1039          Cognition/Psychosocial- PT/OT    Affect/Mental Status (Cognitive)  confused;flat/blunted affect  -CC  confused;flat/blunted affect  -JK     Behavioral Issues (Cognitive)  withdrawn  -CC  withdrawn  -JK     Orientation Status (Cognition)  --  oriented to;person  -JK     " Follows Commands (Cognition)  follows one step commands;50-74% accuracy;delayed response/completion;increased processing time needed;initiation impaired;physical/tactile prompts required;repetition of directions required;verbal cues/prompting required  -  follows one step commands;50-74% accuracy;delayed response/completion;increased processing time needed;initiation impaired;physical/tactile prompts required;repetition of directions required;verbal cues/prompting required  -JK     Personal Safety Interventions  fall prevention program maintained;gait belt;nonskid shoes/slippers when out of bed  -CC  fall prevention program maintained;gait belt  -     Cognitive Function (Cognitive)  --  safety deficit  -     Attention Deficit (Cognitive)  --  distractible in noisy environment;distractible in quiet environment  -JK     Safety Deficit (Cognitive)  --  insight into deficits/self awareness;judgment;problem solving;awareness of need for assistance  -JK     Recorded by [CC] Genevieve Talavera OTR [JK] Emily Jarrett, PT     Row Name 02/05/19 1209 02/05/19 1039          Sit-Stand Transfer    Sit-Stand [place] (Transfers)  verbal cues;minimum assist (75% patient effort)  -  verbal cues;minimum assist (75% patient effort)  -JK     Assistive Device (Sit-Stand Transfers)  wheelchair  -CC  walker, front-wheeled  -JK     Recorded by [CC] Genevieve Talavera OTR [JK] Emily Jarrett, PT     Row Name 02/05/19 1209 02/05/19 1039          Stand-Sit Transfer    Stand-Sit [place] (Transfers)  verbal cues;minimum assist (75% patient effort)  -  verbal cues;minimum assist (75% patient effort)  -JK     Assistive Device (Stand-Sit Transfers)  wheelchair  -CC  walker, front-wheeled  -JK     Recorded by [CC] Genevieve Talavera OTR [JK] Emily Jarrett, PT     Row Name 02/05/19 1209             Shower Transfer    Type (Shower Transfer)  stand pivot/stand step;sit-stand;stand-sit  -      [place] Level (Shower Transfer)   minimum assist (75% patient effort);moderate assist (50% patient effort) resistant  -CC      Assistive Device (Shower Transfer)  grab bars/tub rail;transfer board;wheelchair  -CC      Recorded by [CC] Genevieve Talavera OTR      Row Name 02/05/19 1039             Gait/Stairs Assessment/Training    [place] Level (Gait)  minimum assist (75% patient effort)  -JK      Assistive Device (Gait)  walker, front-wheeled  -JK      Distance in Feet (Gait)  80' x 2; 150'  -JK      Deviations/Abnormal Patterns (Gait)  gait speed decreased;stride length decreased  -JK      Bilateral Gait Deviations  forward flexed posture;heel strike decreased  -JK      Comment (Gait/Stairs)  min to Mod  A to steer RW  -JK      Recorded by [JK] Emily Jarrett, PT      Row Name 02/05/19 1039             Wheelchair Mobility/Management    Forward Propulsion [place] (Wheelchair)  not tested  -JK      Recorded by [JK] Emily Jarrett, PT      Row Name 02/05/19 1039             Safety Issues, Functional Mobility    Safety Issues Affecting Function (Mobility)  ability to follow commands;judgment;problem solving;sequencing abilities;safety precautions follow-through/compliance  -JK      Impairments Affecting Function (Mobility)  balance;cognition;visual/perceptual;motor planning  -JK      Recorded by [JK] Emily Jarrett, PT      Row Name 02/05/19 1209             Bathing Assessment/Treatment    Bathing [place] Level  bathing skills;lower body;upper body;verbal cues;nonverbal cues (demo/gesture);moderate assist (50% patient effort);maximum assist (25% patient effort)  -CC      Assistive Device (Bathing)  grab bar/tub rail;hand held shower spray hose;tub bench  -CC      Bathing Position  supported sitting;supported standing  -CC      Bathing Setup Assistance  adjust water temperature;obtain supplies  -CC      Comment (Bathing)  -- resistant to participate  -CC      Recorded by [CC] Genevieve Talavera OTR      Row Name 02/05/19 1206              Upper Body Dressing Assessment/Treatment    Upper Body Dressing Task  upper body dressing skills;doff;don;pull over garment;minimum assist (75% or more patient effort)  -CC      Upper Body Dressing Position  supported sitting  -CC      Set-up Assistance (Upper Body Dressing)  obtain clothing  -CC      Recorded by [CC] Genevieve Talavera OTR      Row Name 02/05/19 1209             Lower Body Dressing Assessment/Treatment    Lower Body Dressing [place] Level  don;socks;verbal cues;nonverbal cues (demo/gesture);moderate assist (50% patient effort)  -CC      Lower Body Dressing Position  supported sitting;supported standing  -CC      Lower Body Dressing Setup Assistance  obtain clothing  -CC      Recorded by [CC] Genevieve Talavera OTR      Row Name 02/05/19 1209             Grooming Assessment/Treatment    Grooming [place] Level  grooming skills;deodorant application;wash face, hands;set up;supervision  -CC      Grooming Position  supported sitting  -CC      Grooming Setup Assistance  obtain supplies  -CC      Comment (Grooming)  refused oral care  -CC      Recorded by [CC] Genevieve Talavera OTR      Row Name 02/05/19 1209 02/05/19 1039          Pain Scale: Numbers Pre/Post-Treatment    Pain Scale: Numbers, Pretreatment  0/10 - no pain  -CC  0/10 - no pain  -JK     Pain Scale: Numbers, Post-Treatment  0/10 - no pain  -CC  0/10 - no pain  -JK     Recorded by [CC] Genevieve Talavera OTR [JK] Emily Jarrett, PT     Row Name 02/05/19 1209             Upper Extremity Seated Therapeutic Exercise    Performed, Seated Upper Extremity (Therapeutic Exercise)  scapular protraction/retraction;elbow flexion/extension;forearm supination/pronation;wrist flexion/extension  -CC      Device, Seated Upper Extremity (Therapeutic Exercise)  free weights, barbell;free weights, cuff  -CC      Exercise Type, Seated Upper Extremity (Therapeutic Exercise)  resistive exercise  -CC      Expected Outcomes, Seated Upper Extremity  (Therapeutic Exercise)  improve functional tolerance, self-care activity  -CC      Sets/Reps Detail, Seated Upper Extremity (Therapeutic Exercise)  10x3 2# hand wt 2# dowel hand helper 20 reps  -CC      Recorded by [CC] Genevieve Talavera OTR      Row Name 02/05/19 1209 02/05/19 1039          Positioning and Restraints    Pre-Treatment Position  sitting in chair/recliner  -CC  sitting in chair/recliner  -JK     Post Treatment Position  wheelchair  -CC  wheelchair  -JK     In Wheelchair  with nsg  -CC  sitting;with nsg;exit alarm on;heels elevated  -JK     Recorded by [CC] Genevieve Talavera OTR [JK] Emily Jarrett, PT       User Key  (r) = Recorded By, (t) = Taken By, (c) = Cosigned By    Initials Name Effective Dates    CC Genevieve Talavera OTR 06/08/18 -     Emily Gallegos PT 04/03/18 -     Heaven Hanson MS CCC-SLP 04/03/18 -         Wound 01/02/19 1710 Bilateral lateral head incision (Active)   Dressing Appearance open to air 2/4/2019  8:53 PM   Drainage Amount none 2/5/2019  7:12 AM   Dressing Care, Wound open to air 2/5/2019  7:12 AM     Physical Therapy Education     Title: PT OT SLP Therapies (In Progress)     Topic: Physical Therapy (In Progress)     Point: Mobility training (In Progress)     Learning Progress Summary           Patient Acceptance, E, NR by AILEEN at 2/5/2019  2:22 PM    Acceptance, E, DU,NR by AILEEN at 2/5/2019 10:41 AM    Acceptance, E,TB,D, VU,DU by ERNESTO at 2/4/2019  3:05 PM    Comment:  Denice able to assist pt on steps, transfers and gait with RWx    Acceptance, E,TB, VU,DU by ERNESTO at 1/31/2019 10:00 AM    Comment:  Wife, Denice able to assist pt with car transfer and ambulation for 80ft with Rwx.  okay to go to MD appt today    Acceptance, E,TB, NR by ERNESTO at 1/29/2019  9:16 AM    Acceptance, E,TB, NR by ERNESTO at 1/28/2019  3:06 PM    Acceptance, E,TB, VU,NR by IRENE at 1/26/2019 12:21 PM    Acceptance, LUCIANA KELLY,NR by AILEEN at 1/25/2019 11:14 AM    Acceptance, RAFFI KELLY VU,NR by ERNESTO at 1/24/2019  1:47 PM     Acceptance, E,D, DU,NR by JK at 1/22/2019  3:18 PM    Acceptance, E,TB, NR by LB at 1/21/2019 12:15 PM    Acceptance, E,TB, NR by LB at 1/19/2019  9:33 AM    Acceptance, E, NR by LB1 at 1/18/2019  3:31 PM    Acceptance, E,TB, NR by LB at 1/17/2019  9:22 AM    Acceptance, E, NR by LB1 at 1/16/2019  1:21 PM    Acceptance, E,TB, NR by LB at 1/14/2019  9:21 AM    Acceptance, E,TB, NR by JS at 1/12/2019 12:08 PM    Acceptance, E,TB, NR by LB at 1/11/2019  9:33 AM    Acceptance, E,TB, VU,NR by LB at 1/9/2019  9:37 AM    Acceptance, E,TB, NR by LB at 1/8/2019  9:14 AM    Acceptance, E,TB, NR by LB at 1/7/2019  9:36 AM    Acceptance, E,TB, VU,NR by IRENE at 1/5/2019  2:34 PM    Acceptance, E,TB, NR by LB at 1/4/2019 10:22 AM    Acceptance, E,TB, NR by LB at 1/3/2019 10:39 AM   Significant Other Acceptance, E,TB,D, VU,DU by LB at 2/4/2019  3:05 PM    Comment:  Denice able to assist pt on steps, transfers and gait with RWx    Acceptance, E,TB, VU,DU by LB at 1/31/2019 10:00 AM    Comment:  Wife, Denice able to assist pt with car transfer and ambulation for 80ft with Rwx.  okay to go to MD appt today                   Point: Home exercise program (In Progress)     Learning Progress Summary           Patient Acceptance, E,TB, NR by LB at 1/22/2019  3:25 PM    Acceptance, E,TB, NR by JS at 1/12/2019 12:08 PM                   Point: Precautions (In Progress)     Learning Progress Summary           Patient Acceptance, E,TB, NR by JS at 1/12/2019 12:08 PM    Acceptance, E,TB, NR by LB at 1/8/2019  3:14 PM                               User Key     Initials Effective Dates Name Provider Type Discipline    IRENE 06/08/18 -  Passanisi, Elvira C, PT Physical Therapist PT    JS 04/06/17 -  Vickie Pradhan, PT Physical Therapist PT    LB 04/03/18 -  Steffany Flynn, PT Physical Therapist PT    LB1 03/07/18 -  Jessica Oneal, PTA Physical Therapy Assistant PT    JK 04/03/18 -  Emily Jarrett, PT Physical Therapist PT                  PT  Recommendation and Plan                        Time Calculation:     PT Charges     Row Name 02/05/19 1422 02/05/19 1421          Time Calculation    Start Time  1330  -JK  1030  -JK     Stop Time  1400  -JK  1100  -JK     Time Calculation (min)  30 min  -JK  30 min  -JK       User Key  (r) = Recorded By, (t) = Taken By, (c) = Cosigned By    Initials Name Provider Type    Emily Gallegos, PT Physical Therapist            Therapy Charges for Today     Code Description Service Date Service Provider Modifiers Qty    [id number]  PT THER PROC EA 15 MIN 2/5/2019 Emily Jarrett, PT GP 4                   Emily Jarrett, PT  2/5/2019

## 2019-02-05 NOTE — PROGRESS NOTES
Inpatient Rehabilitation Functional Measures Assessment    Functional Measures  ALBERTO Eating:  [facility] Grooming: [facility] Bathing:  [facility] Upper Body Dressing:  [facility] Lower Body Dressing:  [facility] Toileting:  [facility] Bladder Management  Level of Assistance:  [place]  Frequency/Number of Accidents this Shift:  [facility] Bowel Management  Level of Assistance: [place]  Frequency/Number of Accidents this Shift: [place]    [facility] Bed/Chair/Wheelchair Transfer:  [facility] Toilet Transfer:  [facility] Tub/Shower Transfer:  [place]    Previously Documented Mode of Locomotion at Discharge: Field  ALBERTO Expected Mode of Locomotion at Discharge: [facility] Walk/Wheelchair:  [facility] Stairs:  [facility] Comprehension:  Auditory comprehension is the usual mode. Patient does not  comprehend complex/abstract information in their primary language without  assistance from a helper. Comprehension Score = 4, Minimal Prompting. Patient  comprehends basic daily needs or ideas 75-90% of the time. Patient requires  minimal/occasional prompting. No assistive devices were required.  ALBERTO Expression:  Vocal expression is the usual mode. Expression Score = 6,  Modified Independent.  Patient expresses complex/abstract information in their  primary language, requiring:  [facility] Social Interaction:  Social Interaction Score = 6, Modified Independent.  Patient is modified independent for social interaction, requiring:  ALBERT[place] Problem Solving:  Patient does not make appropriate decisions in order to  solve complex problems without assistance from a helper. Problem Solving Score =  4, Minimal Direction. Patient makes appropriate decisions in order to solve  routine problems 75-90% of the time. Patient requires minimal/occasional  direction for the following behavior(s):  ALBERTO Memory:  Memory Score = 3, Moderate Prompting. Patient recognizes and  remembers 50-74% of the time. Patient requires moderate/some prompting   for  memory for the following:    Therapy Mode Minutes  Occupational Therapy: Branch  Physical Therapy: [place]  Speech Language Pathology:  [place]    Signed by: Kanwal Garcia RN

## 2019-02-05 NOTE — DISCHARGE PLACEMENT REQUEST
"Feliciano Light (63 y.o. Male)     Date of Birth Social Security Number Address Home Phone MRN    1955  [id number] [id number] [phone] [id number]    S[place] Marital Status          Unknown        Admission Date Admission Type Admitting Provider Attending Provider Department, Room/Bed    1/2/19 Elective Nilson Win MD Gormley, John Michael, MD [facility], 4407/1    Discharge Date Discharge Disposition Discharge Destination                       Attending Provider:  Nilson Win MD    Allergies:  Amoxicillin, Latex, Other, Penicillins    Isolation:  None   Infection:  None   Code Status:  Not on file    Ht:  162.6 cm (64\")   Wt:  58.8 kg (129 lb 10.1 oz)    Admission Cmt:  None   Principal Problem:  Cerebellar hemorrhage (CMS/HCC) [I61.4]                 Active Insurance as of 1/2/2019     Primary Coverage     Payor Plan Insurance Group Employer/Plan Group    MEDICARE MEDICARE A & B      Payor Plan Address Payor Plan Phone Number Payor Plan Fax Number Effective Dates    [address] [phone]  5/1/2018 - None Entered    [facility] [id number]       Subscriber Name Subscriber Birth Date Member ID       FELICIANO LIGHT 1955 [id number]           Secondary Coverage     Payor Plan Insurance Group Employer/Plan Group    [facility] SUPP [id number]     Payor Plan Address Payor Plan Phone Number Payor Plan Fax Number Effective Dates    [address]   5/1/2018 - None Entered    [facility] [id number]       Subscriber Name Subscriber Birth Date Member ID       FELICIANO LIGHT 1955 [id number]                 Emergency Contacts      (Rel.) Home Phone Work Phone Mobile Phone    RAMIRO LIHGT (Spouse) [phone] -- --              "

## 2019-02-05 NOTE — PROGRESS NOTES
Spoke with pt's wife by phone. She seemed unaware of the d/c date planned for 2/7 even though we have discussed this date several times.  She is feeling overwhelmed but expresses determination to try to care for pt at home.   Today she purchased bed rails for home and states the handrail for the steps has been installed.  She has already moved a bed to the first floor of the home.    Home health referral discussed and wife requests referral to A. Referral made today.

## 2019-02-05 NOTE — PROGRESS NOTES
Inpatient Rehabilitation Functional Measures Assessment    Functional Measures  ALBERTO Eating:  [facility] Grooming: [facility] Bathing:  [facility] Upper Body Dressing:  [place]  [facility] Lower Body Dressing:  [place]  [facility] Toileting:  [facility] Bladder Management  Level of Assistance:  [place]  Frequency/Number of Accidents this Shift:  [facility] Bowel Management  Level of Assistance: [place]  Frequency/Number of Accidents this Shift: [facility] Bed/Chair/Wheelchair Transfer:  [facility] Toilet Transfer:  [facility] Tub/Shower Transfer:  [place]    Previously Documented Mode of Locomotion at Discharge: Field  ALBERTO Expected Mode of Locomotion at Discharge: [facility] Walk/Wheelchair:  [facility] Stairs:  [facility] Comprehension:  Auditory comprehension is the usual mode. Patient does not  comprehend complex/abstract information in their primary language without  assistance from a helper. Comprehension Score = 4, Minimal Prompting. Patient  comprehends basic daily needs or ideas 75-90% of the time. Patient requires  minimal/occasional prompting. No assistive devices were required.  ALBERTO Expression:  Vocal expression is the usual mode. Patient does not express  complex/abstract information in their primary language without a helper.  Expression Score = 3, Moderate Prompting. Patient expresses basic daily needs or  ideas 50-74% of the time. Patient requires moderate/some prompting. No assistive  devices were required.  ALBERTO Social Interaction:  Social Interaction Score = 3, Moderate Direction.  Patient interacts appropriately 50-74% of the time.  Patient requires  moderate/some direction for the following behavior(s): Non-interactive.  AL[place]ERT[place] Problem Solving:  Patient does not make appropriate decisions in order to  solve complex problems without assistance from a helper. Problem Solving Score =  2, Maximal Direction. Patient makes appropriate decisions in order to solve  routine problems 25-49% of the time. Patient  requires maximal direction for the  following behavior(s): Decreased awareness of performance. Difficulty completing  tasks. Difficulty with self-monitoring. Difficulty with self-correction.  Impulsivity. Poor judgment.  ALBERTO Memory:  Activity was not observed.    Therapy Mode Minutes  Occupational Therapy: Branch  Physical Therapy: Branch  Speech Language Pathology:  [place]anch    Signed by: Katie Wick RN

## 2019-02-05 NOTE — PROGRESS NOTES
"   LOS: 34 days   Patient Care Team:  Jesus Bautista MD as PCP - General (Family Medicine)     Chief Complaint:   1. 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  2. 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  3. Status post right  shunt removal secondary to infection- completed course of antibiotics on 01/02/2019.  4. Status post external ventricular drain and removal.  5. Left upper extremity brachial vein DVT-on Eliquis.  6. Hyponatremia.   7. Neural stimulation-       Subjective          Subjective  Patient seen earlier today on rounds.  He continues on tube feedings 55 cc per hour.  He had emesis around 11AM but then reportedly had lunch okay. He does not describe any abd pain or nausea. He gives confused responses, has lap belt for wheelchair and confused with what this is for, asking about his license repeatedly. Patient was irritable with wife earlier today.  Has not taken any prn Antivert past two days, comes off Vimpat after dose tonight.          History taken from: patient    Objective     Vital Signs  Temp:  [96.7 °F (35.9 °C)-98.5 °F (36.9 °C)] 96.7 °F (35.9 °C)  Heart Rate:  [] 119  Resp:  [16-18] 18  BP: (112-149)/(74-96) 149/96    Objective:  Vital signs: (most recent): Blood pressure 149/96, pulse 119, temperature 96.7 °F (35.9 °C), temperature source Oral, resp. rate 18, height 162.6 cm (64\"), weight 58.8 kg (129 lb 10.1 oz), SpO2 99 %.            GENERAL: The patient is a 63-year-old male who is awake, alert  , in no acute distress.  HEENT:   Sclerae anicteric. Conjunctivae pink. Oropharynx moist.     LUNGS: Clear to auscultation bilaterally without wheezes, rales or rhonchi. No accessory muscle use.  HEART: RRR  ABDOMEN: Normoactive bowel sounds. Soft, PEG tube under B[place].  EXTREMITIES: Without edema or cyanosis.   NEUROLOGIC: Slow processing   . Converses.    Impaired recall. " Takes resistance bilaterally  He gives confused responses, has lap belt for wheelchair and confused with what this is for, asking about his license repeatedly.             Results Review:     I reviewed the patient's new clinical results.  Results from last 7 days   Lab Units 02/04/19  0530 02/03/19  0500 02/02/19  1440   SODIUM mmol/L 140 140 140   POTASSIUM mmol/L 4.0 3.8 3.9   CHLORIDE mmol/L 105 105 103   CO2 mmol/L 22.4 25.1 24.6   BUN mg/dL 17 14 11   CREATININE mg/dL 0.55* 0.60* 0.57*   CALCIUM mg/dL 9.1 9.0 9.1   BILIRUBIN mg/dL  --   --  0.8   ALK PHOS U/L  --   --  62   ALT (SGPT) U/L  --   --  36   AST (SGOT) U/L  --   --  19   GLUCOSE mg/dL 139* 107* 107*     Results from last 7 days   Lab Units 02/04/19  0530 02/03/19  0500 02/02/19  0646   WBC 10*3/mm3 6.07 7.01 14.43*   HEMOGLOBIN g/dL 12.9* 12.6* 13.4*   HEMATOCRIT % 41.0 38.2* 41.5   PLATELETS 10*3/mm3 195 194 193       MRI brain - Jan 16, 2019  Portions summarized -   The lateral 3rd and superior 4th ventricle remain mildly enlarged  compatible with mild hydrocephalus unchanged since 10/09/2019  , the   ventricles are clearly slightly larger than they were on prior outside  head CTs on 12/25/2018 and 12/27/2018 after the removal of the  ventriculostomy catheters.  3.8 x 2.9 cm area of FLAIR hyperintensity and mixed diffusion  hyperintensity extending from the inferior medial cerebellar white  matter into the cerebellar vermis that is compatible with subacute  infarct of brain along the margins of the [place] used to embolize the  vermian AVM  The 2nd  ventriculostomy catheter extended from the superior right coronal sutures through the  anterior superior lateral right frontal lobe parenchyma to the  anterosuperior body of the right lateral ventricle, and this has some  diffusion hyperintensity in the tract but also there is some FLAIR and  diffusion hyperintensity circumscribing the catheter tract within the  right anterosuperior lateral frontal white  matter measuring up to 12 x 8  x 9 mm surrounding FLAIR and diffusion hyperintensity could be edema  related to the placement and removal or could be some focal cerebritis.    CT head - Jan 24 , 2019  -There is mild prominence of the temporal horns similar to the prior  examination. Beam hardening artifact from the involved material limits  evaluation of the posterior fossa somewhat, but there is no convincing  evidence of hemorrhage involving the posterior fossa. There is resolving  intraventricular blood appreciated. The occipital horns are smaller than  the prior examinations. The remaining portions of the lateral ventricles  as well as the 3rd and 4th ventricle appear unchanged. There is no  evidence of transependymal migration of fluid. There is no evidence of  intra-axial hemorrhage.    Medication Review: done  Scheduled Meds:    influenza vaccine 0.5 mL Intramuscular Once   lacosamide 50 mg Per G Tube Nightly   lansoprazole 30 mg Oral QAM   multivitamin with minerals 1 tablet Oral Daily   polyethylene glycol 17 g Oral Daily   sodium chloride 3 mL Intravenous Q12H     Continuous Infusions:     PRN Meds:.•  acetaminophen  •  bisacodyl  •  magnesium hydroxide  •  meclizine  •  ondansetron  •  ondansetron ODT  •  promethazine  •  sodium chloride      Assessment/Plan       Cerebellar hemorrhage (CMS/HCC)    Intraventricular hemorrhage (CMS/HCC)    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    SIADH (syndrome of inappropriate ADH production) (CMS/HCC)    Elevated hemoglobin A1c    Hyperlipidemia      Assessment & Plan  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    Status post hydrocephalus. S/p removal infected  shunt. Features of hydrocephalus worsening impaired memory, balance , bladder incontinence reviewed with patient and wife in event he develops any of those symptoms in future.   Jan 7 - recheck CT head with  patient's lethargy / persistent nausea ( also had at outside hospital) to assess for any worsening of hydrocephalus with shunt removed.  Addendum-There is stable to equivocal slight interval increase in size in the lateral and third ventricles when compared to prior head CT 11 days ago on 12/27/2018 compatible with mild hydrocephalus. There is some low density and volume loss along the margins of the hyperdense liquid  embolic agents of the cerebellar vermi, compatible with areas of infarcted cerebellar vermis measuring up to 3.5 x 3 x 2.8 cm, unchanged.No residual acute intracranial hemorrhage is seen.  Jan 9 - Nursing called - patient got up on his own, bed alarm went off, in room near nursing station.  Fell and struck head on bedside dresser and bed that wife was in.  Nursing reports no change seen from recent baseline neuro. Got stat CT head ( no chnages from two days ago, no new bleed) and repeat CT  in 24 hours as on Eliquis, neuro checks q 2 hours, sitter ordered. Discontinued Eliquis which he is on for LUE brachial DVT and re-assess regarding anticoagulation after CT tomorrow.   He does not describe any new complaint. Baseline nausea, headache. He did better yesterday after first dose of Ritalin and ate more at dinner. Ate 50% breakfast today per sitter.   Selvin 10 - neuro stable. Given head trauma yesterday on Eliquis, will repeat CT head this afternoon to rule out any bleed before resuming Eliquis for DVT. Add:  F/U CT this afternoon - no acute hemorrhage.  Will resume Eliquis for LUE DVT  Selvin 15 - Patient with variable performance, more alert at times per nursing earlier today,  but then other times more fatigued with therapies.   On amantadine and ritalin.  Na level stable yesterday. To recheck in AM.  WBC normal yesterday. No fever.  On Eliquis for LUE DVT on venous duplex Dec 4 and still present on venous duplex Dec 26.  Concern at any point would be for potential for SDH over time.   At this point, as  he is 6 weeks out from LUE DVT on Dec 4, will hold Eliquis for now, recheck BUE venous duplex tomorrow, and then decide on ongoing anti-coagulation,  and if any persistent decline with performance will check CT head. Will not check CT head this evening as not any dramatic change on physical exam .    Jan 16 - Patient with confused comments, said an odd name randomly, mis-identified where wife worked. SLP also notes some confused comments. Wife reports he complains of headache, but  denied having headache presently to examiner. Wife reports he complained of ear pain but may have been related to tape for feeding tube. His nausea is better and eating better. He reports nausea is low . Does not complain of any new weakness on exam.   Na 129 - not a dramatic change but will d/c water flushes with tube feeds. Will get MRI brain with and without contrast stat- assess for any acute changes from prior infection or any acute bleed. MRI states can get timely once screening sheet completed. Recheck WBC.  Will d/c scopalamine patch - done. (add:  Discussed with neurosurgery and neuroradiology - patient below the threshold in terms of number of CT scan for any concern for cumulative effect - can get repeat CT scans as need). (add:  MRI done without contrast as not able to get IV site).    Add:  MRI portions summarized above. No acute bleed. Subacute infarct - 3.8 x 2.9 cm area of FLAIR hyperintensity and mixed diffusion hyperintensity extending from the inferior medial cerebellar white matter into the cerebellar vermis that is compatible with subacute infarct of brain along the margins of the [place] used to embolize the  vermian AVM . Mild hydrocephalus - Neurosurgery wanted to hold of placing  shunt in near term due to infection with  shunt. Along tract of previous ventriculostomy changes of either edema or focal cerebritis (will monitor as no fever or leukocytosis).   Jan 17 - The patient's imaging was reviewed with [facility]  neuro radiology and [facility] service.  Neurosurgery evaluating ventricle size as well as the area along the track of the previous frontal ventriculostomy of either edema or focal cerebritis.  On the previous CAT scan that appeared to represent possibly an area of CSF backflow that improved with subsequent repeat ventriculostomy placement posteriorly.  Jan 19 - Patient with temp 100.2 earlier today. Later was 99.0 / 98.4.   He continues with baseline nausea. Ate only 1/5 of breakfast. He describes some posterior headache.  Wife reports he will do confused action, talk about people who don't live here.   He continues more alert. Does not describe any focal weakness. Dysphonia better.   Will recheck CBC with diff, procalcitonin, ESR, CRP, CMP and consult ID to see.  Addendum-infectious disease does not feel there was any active infectious process.  Continues to monitor  Jan 24 - Ct shows ventricles stable to slightly smaller size  Jan 31 - f/u with Karel SOLO -  [facility] note reviewed.  Per EDIL, can come off AED and see how does in terms of seizures.  He received first dose of Vimpat liquid yesterday but was not continued on MAR as q 12 hours. Got second dose today and back on MAR as q 12 hours. Last dose Keppra yesterday.  Will continue Vimpat through procedure tomorrow and can then look at taper off.   Feb 5 - tapers off Vimpat tonight          Status post meningitis.Completed antibiotics at outside hospital.  Jan 4 - recheck CBC in AM.  Jan 5 - WBC 5K  Jan 7 - WBC 5.9. No fever. Will check procalcitonin/ESR, CRP with lethargy to assess for any recurrent infection.  Addendum-unremarkable.  CRP 0.12, sedimentation rate 14, pro-calcitonin 0.05, WBC 5.93  Jan 19 - temp 100.2. Recheck labs and ask ID to see. Addendum-infectious disease does not feel there was any active infectious process.  Continues to monitor  Jan 21 - afberile. WBC 7.69      Nausea - Jan 14 . Has been on Zofran. Persistent nausea  s/p posterior fossa changes. Previously held off on scopolamine patch given potential cognitive side effects, but with persistent nausea that is affecting nutrition, will add.   Jan 16 - Scopolamine helped nausea but had to d/c scopolamine due to cognitive side effect.   Jan 18 - Tolerates tube feeds. Still nauseated. Discussed trial of Phenergan after therapies tomorrow due to sedative side effect.    January 20- he took Phenergan twice yesterday, patient reports nausea better, wife notes that had emesis after dinner.  He is on Zofran scheduled before meal time  Jan 21 - Has not take phenergan since Jan 19. Emesis again with breakfast.Discussed observe off amantadine today to see if any effect on confused comments.  Discussed tomorrow will change from Zofran scheduled to antivert 12.5 mg scheduled tid before meals.  Also add Remeron tomorrow PM to see if helps with appetite. Other option would include Topamax 25 mg HS for headache/nausea.   Jan 24 - Antivert helps nausea but ? If cognitive side effect. Will adjust other meds first.  Jan 25 - Get gastroenterology input on any options for nausea.  Jan 28 - UGI with SBFT today. Possibly gastric emptying study depending on results.   Jan 29 - UGI with SBFT overall unremarkable yesterday. To go for gastric emptying study tomorrow at 7:30 AM  Jan 30 - GES results - normal. Denies nausea on Anitvert.   Feb 1 - if tolerates tube feeds after PEG placed, may change Antivert to 12.5 mg tid prn on Vish Feb 3 to see if any cognitive side effect.   Feb 4 - change antivert to 12.5 mg tid prn to see if cognitive side effect.       Hyponatremia. SIADH.   Selivn 3 - stable at 130.   Jan 5 - Serum sodium has gone up from 130 to 131.  Serum osmolarity is low at 272 but urine osmolarity is inappropriately concentrated at 847/759.  Thyroid function test is normal.  Results are consistent with SIADH.   Placed on a 1500 mL per day fluid restriction ( but he does not approach that on his  own).  Jan 6 - . Urine osmolality 277. BUN 9, Cr 0.67.   Jan 8 - Endocrinology increase dietary salt and continue fluid restriction.  Jan 9 - salt tabs added  Jan 14 -   Jan 18 - Na 133. On salt tabs. No water flushes with tube feeds.   Jan 22 - salt tabs decreased to bid  Jan 23 - Na 136. Salt tabs d/'d as difficulty taking by mouth  Jan 28 - started on normal saline yesterday with decrease appetite and NPO after midnight for meds. Na 139 today.   Jan 29 - IVF d/c'd  Feb 1 - PEG not until afternoon. Add IVF x 2 liters D5 NS with 20 KCL at 100 cc per hour.       Adrenal - Evaluation against adrenal insufficiency.  Endocrinology discontinuing decadron and will re-evaluate further.   Jan 7 - worsened lethargy in therapies today, ? If could be related to being off steroids?  Jan 8- repeat ACTH test  January 11-adrenal insufficiency    Left upper extremity DVT-on Eliquis.  DVT prophylaxis-SCD and Eliquis.  Jan 9 - Eliquis on hold for at least 24 hours with recent fall with trauma to end. Re-assess after CT on Selvin 10.   Selvin 15 - Concern at any point would be for potential for SDH over time any time he shows a fluctuation. At this point, as he is 6 weeks out from LUE DVT on Dec 4, will hold Eliquis for now, recheck BUE venous duplex tomorrow, and then decide on ongoing anticoagulation.   Jan 16 - left brachial vein DVT resolved. BUE superficial venous thrombosis. No bleed on MRI. Resume Eliquis.   Jan 26 - Will d/c Eliquis after  Friday Jan 25 PM dose in preparation for any possible endoscopic evaluation/ procedure first of the week. . BUE venous duplex Jan 16  showed resolution of LUE brachial vein DVT. Had acute and chronic RUE SVT and chronic LUE SVT.   Feb 3 - BUE venous duplex BUE SVT. Patient did not extend LUE for evaluation of left axillary / basilic vein. Left brachial vein no DVT- will not resume Eliquis as DVT 2 months ago and line associated.      Encephalopathy / Neuro- stimulation-admitted on  amantadine.  Jan 8 - depression may be a component. SSRI may affect appetite or sodium level. Possibly add Ritalin, could affect appetite but if more alert could possibly eat more.  Will add Ritalin 5 mg every 8 AM and 12 noon.  Selvin 10 - more alert and interactive  Jan 20 - he has some confused comments.  Neurosurgery evaluated imaging.  Seen by ID and not felt to have any acute infectious process.  Discussed with patient and wife,  may possibly have some cognitive side effects from amantadine, decreased to 100 mg today and then discontinue starting with tomorrow and see if there is any improvement.  Doubt confusion would be related to Ritalin.  He has had it before starting of Phenergan. Na level okay 133.  Other consideration would be Keppra side effect contributing.  Jan 22 - no apparent change yet off amantadine.   Jan 24 - continues with increased confusion/ behavioral changes.   Patient has been more confused, more difficulty with tasks from cognitive standpoint.  Making odd, confused comments. Will perseverate on some tasks.  Also increased irritability at times, shaking fists.  He has had some hallucinations as well  He denies any headache. Denies any nausea but does not remember emesis from this morning.  His wife notes that nausea is noticeably better since antivert and nursing reports same, but we discussed could be having some confusion from antivert. Other possibilities could be Ritalin although showed initial improvement with that vs hydrocephalus. Discussed discontinue Remeron and Ritalin and recheck CT head. Will also check UA. Continue antivert for now as does help nausea.   Add: CBC unremarkable. UA pending. CT head stable with - There is resolvingintraventricular blood appreciated. The occipital horns are smaller than the prior examinations. The remaining portions of the lateral ventricles as well as the 3rd and 4th ventricle appear unchanged. There is no evidence of transependymal migration of  fluid.  Jan 25 - still confusion , aggressive behavior at night. He had confusion last weekend before antivert but could have symptoms of underlying changes in brain made more prominent by medication side effect. Discussed see how does off other meds for next couple days and if confusion/ hallucinations continue, then change Keppra to Vimpat. UA unremarkable. BUN/CR, NA okay.   Jan 28 - had planned change to Vimpat but patient unable to swallow pill that large and liquid form not available here.  Jan 30 - gets more confused / irritable later in the days. Still with hallucinations at times per wife. Patient not able to describe. Vimpat liquid to be available this afternoon - will transition from Keppra to see if helps cognition/hallucinaiton/ mood. Last dose Keppra tonight.   Feb 4- taper off Vimpat after tomorrow night   Feb 5 - He gives confused responses, has lap belt for wheelchair and confused with what this is for, asking about his license repeatedly. Patient was irritable with wife earlier today.  Has not taken any prn Antivert past two days, comes off Vimpat after dose tonight.        Seizure prophylaxis-Initially on Keppra.  Jan 30 - Vimpat liquid to be available this afternoon - will transition from Keppra to see if helps cognition/hallucinaiton/ mood.   Feb 1 - per Karel SOLO can come off seizure prophylaxis - will taper off Vimpat in couple days after recovery period after PEG placement   Feb 4- drowsy - may be related to Vimpat, did not take this AM dose, taper off with 50 mg tonight and tomorrow night and then stop.     Nutrition - Jan 5 -  poor intake. Staff encouraging intake. ( He was placed on 1500 cc fluid restriction but does not appraoch that on his own. Calorie count in progress. May possibly add Remeron. Would avoid Megace given its thrombogenic potential and patient's history of LUE DVT.  Jan 7 - will change back to scheduled Zofran prior to meals. Assess ventricles with CT, labs to assess for  infection.    Jan 8 - RUQ ultrasound ordered - see report.  Consider Remeron but sedative side effect could be an issue. Nausea probably related to posterior fossa CNS   Jan 9 - PO intake remains poor.  Will discuss with patient and wife Cortrak feeding tube.   Add: patient and spouse is agreeable. He ate about 50% at supper. Nursing feels he is showing some improvement in intake. Discussed with patient and wife that with next meal that he does not show good intake will place Cortrak. They are in agreement.   Selvin 10 - eating 50% breakfast and lunch so hold on Cortrak presently.   Jan 14 -  Persistent nausea. Had emesis yesterday and again today.  Intake decreased again over weekend.  Neuro without change. No headache, dizziness, abd pain. Previous follow up CTs without significant change.  Discussed with patient and wife - agreeable with Cortrak tube placement to help with nutrition given weight loss.  On Zofran scheduled before meals. Concern with adding Phenergan is sedative side effect. Will try scopalamine patch.   Selvin 15 - nausea better per patient report. Tolerates tube feeds.   Jan 18 - continue tube feeds through weekend and then re-assess.   Jan 21 - Intake by mouth still limited.  Reviewed with dietician, patient, wife and will continue Cortrak tube feeds.  Jan 22- adding remeron for appetite tonight.   Jan 23 - patient pulled Cortrak early AM - discussed see how eats with tube out  Jan 24 -  Discussed replacing Cortrak tube in AM for nutritional support as less likely to pull out during the day.   Jan 25 - replace Cortrak- addendum: Patient not able to tolerate replacement of Cortrak tube. His intake has remained poor. Discussed option of Reglan to see if helps appetite but concern would be for cognitive side effects. Discussed with patient / wife looking at G-tube placement. Will ask Gastroenterology to see for assessment for options on nausea and PEG tube placement.. His Eliquis can be held at any time  for the procedure.   Jan 29 - gastric emptying swallow study tomorrow to determine if place PEG or PEJ tube  Jan 30 - await GES results. Patient has appointment with his Neurosurgery at outside facility tomorrow at 1PM which would affect timing of tube placement.   Feb 1 - PEG placement today.   Feb 4 - titrate up on PEG tube feeds.   Feb 5 - tolerates tube feeds at 55 cc/hour          Impaired cognition, mobility and self-care. Now admitted for comprehensive inpatient rehabilitation program with physical therapy 1 hour, occupational therapy 1 hour, speech therapy 1 hour, 5 days a week. Areas to be addressed include cognition/executive function, swallowing, functional mobility, gross and fine motor control, ADL training, transfers, balance, gait. Rehabilitation nursing for carryover and monitoring of his neurologic status, bowel, bladder, skin. Ongoing physician followup. Weekly team conferences. Goal for him to achieve a level of supervision with his mobility and self-care and improvement in his cognition. Rehabilitation prognosis fair. Medical prognosis fair. Estimated length stay is indeterminate at this time.     Jan 3 - initiated acute inpt rehab  TEAM CONF - JAN 4 - BED MIN. TRANSFERS MIN. GAIT 80 FEET MIN ASSIST RW. MAX - DEPENDENT WITH ADLS, CUES TO KEEP EYES OPEN AND PARTICIPATE. DIFFICULTY WITH VISUAL TASKS.  MODERATE SEVERE  COGNITIVE DEFICITS. BNE PENDING. SWALLOW - TOLERATES SMALL PILLS WITH WATER. AMANTADINE FOR NEUROSTIM. CONTINENT BOWEL AND BLADDER. 30 LBS WEIGHT LOSS DURING HOSPITAL STAY.NAUSEA - HAS BEEN TAKING ZOFRAN. WAS SCHEDULED AT [facility] AS WELL AS PRN. WILL ADD SCHEDULED DOSE Q AM HERE AND CONTINUE PRN.  NUTRITION - NOT EATING OR DRINKING - 25% WITH CUES.  . SLEPT FROM 6 PM TO 7 AM. HYPONATREMIA - SIADH - STABLE.  ADRENAL INSUFFICIENCY EVALUATION.    ELOS- 3 WEEKS    TEAM CONF - JAN 10 - Executive Functions severely impaired-impaired attention, thought organization, visuospatial  skills  Memory moderately impaired- impaired immediate and delayed  verbal recall, slightly better with visual recall  Bed/Chair/Wheelchair Min  Bed Mobility  Min  Walk 80 ft Rwx Min  Toilet Transfers  Min A  Bathing Mod A  Dressing Lower MAX  Dressing Upper Min  Toileting Dep  Continent mostly, but some incontinence  Sitter for safety  ELOS - two weeks    TEAM CONF - JAN 17 -TRANSFERS MIN-MOD ASSIST.  GAIT 80 FEET MIN-MOD 2.  LBD MOD-MAX.  UBD MIN ASSIST.  BATH MOD ASSIST. MAX CUES FOR TASKS WITH SLP.   MORE DIFFICULTY WITH TASKS. POOR INITIATION. NOT MAKING PROGRESS.  BLADDER INCONTINENT. ON CORTRAK TUBE FEEDS.   WILL ASK [facility] TO REVIEW RECENT IMAGING.   ELOS - TWO WEEKS    TEAM CONF - JAN 24 - NAUSEA AND EMESIS AFTER MED THIS AM.  RENAL DISCONTINUED SALT TABS.  FATIGUE . VARIABLE PARTICIPATION. BED MIN ASSIST. GAIT 80 FEET MIN ASSIST. ADLS MIN ASSIST FOR BATHING AND DRESSING. COGNITIVE TASKS - FLUCTUATING SKILLS DAY TO DAY. SEVERELY IMPAIRED MEMORY. ON RITALIN FOR NEUROSTIMULATION. ADDED REMERON LOW DOSE FOR APPETITE.  PULLED OUT CORTRAK - ATE 25-50% YESTERDAY. ON ANTIVERT PRIOR TO MEALS FOR NAUSEA.  WILL DISCUSS WITH PATIENT AND WIFE REGARDING POSSIBLE G-TUBE.  CONTINENT BLADDER.   ELOS - ONE WEEK -MAY NEED TO LOOK AT SUBACUTE.      TEAM CONF - JAN 31 - F/U EDIL TODAY. DRY HEAVE AT BREAKFAST BUT NO EMESIS. ATE 25%. PEG TUBE POSSIBLY TOMORROW. AUDITORY PROCESSING DEFICITS. DOES NOT AVOID OBJECTS OR GO BACK TO CHAIR, NEED TO GUID PATIENT. BED MIN. TRANSFERS MIN. 80 FEET RW MIN-MOD ASSIST. TOILET TRANSFERS MIN ASSIST. SHOWER TRANSFERS MIN ASSIST. VARIABLE PARTICIPATION WITH ALDS WITH FATIGUE. TOOK OFF RITALIN LAST WEEK DUE TO HALLUCINATIONS BUT DOES NOT APPEAR RELATED AS PERSIST, MAY ADD BACK NEXT WEEK. SEE HOW DOES WITH NUTRITIONAL SUPPORT  ELOS - PEG TUBE PROBABLY TOMORROW, HOME POSSIBLY ON NEXT Thursday. PLAN HOME HEALTH           Nilson Win MD  02/05/19  5:07 PM    Time:

## 2019-02-05 NOTE — PROGRESS NOTES
Inpatient Rehabilitation Functional Measures Assessment    Functional Measures  ALBERTO Eating:  Eating Score (Parenteral/Enteral) = 2, Maximal Assistance. Patient  performs 25-49% of parenteral or gastrostomy feeding tasks.   Eating Score (by Mouth) = 7. Patient is completely independent for eating.  There are no activity limitations.   2 = Eating Score (lowest), Maximal Assistance. Patient performs 25-49% of  parenteral or gastrostomy feeding tasks.  ALBERTO Grooming: Branch  [facility] Bathing:  Branch  [facility] Upper Body Dressing:  [facility] Lower Body Dressing:  [facility] Toileting:  [facility] Bladder Management  Level of Assistance:  [place]  Frequency/Number of Accidents this Shift:  [facility] Bowel Management  Level of Assistance: [place]  Frequency/Number of Accidents this Shift: [facility] Bed/Chair/Wheelchair Transfer:  [facility] Toilet Transfer:  [facility] Tub/Shower Transfer:  [place]    Previously Documented Mode of Locomotion at Discharge: Field  ALBERTO Expected Mode of Locomotion at Discharge: [facility] Walk/Wheelchair:  Branch  [facility] Stairs:  Branch    [facility] Comprehension:  Branch  [facility] Expression:  [facility] Social Interaction:  [facility] Problem Solving:  [facility] Memory:  [place]    Therapy Mode Minutes  Occupational Therapy: Branch  Physical Therapy: Branch  Speech Language Pathology:  Branch    Signed by: Kanwal Garcia RN

## 2019-02-06 PROCEDURE — 97535 SELF CARE MNGMENT TRAINING: CPT

## 2019-02-06 PROCEDURE — G0515 COGNITIVE SKILLS DEVELOPMENT: HCPCS

## 2019-02-06 PROCEDURE — 97110 THERAPEUTIC EXERCISES: CPT

## 2019-02-06 RX ADMIN — LANSOPRAZOLE 30 MG: KIT at 05:51

## 2019-02-06 NOTE — NURSING NOTE
Spoke with Marah Orona, dietician, she is going to recommend bolus feeding in order to prepare patient ofr discharge this week. I talked to Dr Win about this recommendation, he will most likely start 2/6.

## 2019-02-06 NOTE — PROGRESS NOTES
Inpatient Rehabilitation Functional Measures Assessment and Plan of Care    Plan of Care  Updated Problems/Interventions  Mobility    [PT] Bed/Chair/Wheelchair(Active)  Current Status(02/06/2019): Min  Weekly Goal(02/11/2019): min/CGA  Discharge Goal: CGA/Min    [PT] Bed Mobility(Active)  Current Status(02/06/2019): Min  Weekly Goal(02/11/2019): min/CGA  Discharge Goal: CGA/Min    [PT] Walk(Active)  Current Status(02/06/2019):  ft Rwx, min  Weekly Goal(02/11/2019): PT only  Discharge Goal: 100 ft Rwx Min/CGA    [PT] Stairs(Active)  Current Status(02/06/2019): 4 HRs Min  Weekly Goal(03/11/2019): PT only  Discharge Goal: 4 HRS Min    Functional Measures  ALBERTO Eating:  Branch  ALBERTO Grooming: Branch  ALBERTO Bathing:  Branch  ALBERTO Upper Body Dressing:  Branch  [facility] Lower Body Dressing:  Branch  [facility] Toileting:  Branch    [facility] Bladder Management  Level of Assistance:  [place]  Frequency/Number of Accidents this Shift:  Branch    ALBERTO Bowel Management  Level of Assistance: [place]  Frequency/Number of Accidents this Shift: Branch    ALBERTO Bed/Chair/Wheelchair Transfer:  Branch  ALBERTO Toilet Transfer:  Branch  ALBERTO Tub/Shower Transfer:  Branch    Previously Documented Mode of Locomotion at Discharge: Field  [facility] Expected Mode of Locomotion at Discharge: Branch  [facility] Walk/Wheelchair:  Branch  ALBERTO Stairs:  Branch    ALBERTO Comprehension:  Branch  ALBERTO Expression:  Branch  [facility] Social Interaction:  Branch  [facility] Problem Solving:  Branch  ALBERTO Memory:  Branch    Therapy Mode Minutes  Occupational Therapy: Branch  Physical Therapy: Individual: 60 minutes.  Speech Language Pathology:  Branch    Signed by: Steffany Flynn PT

## 2019-02-06 NOTE — THERAPY TREATMENT NOTE
Inpatient Rehabilitation - Occupational Therapy Treatment Note    [facility]     Patient Name: Feliciano Light  : 1955  MRN: [id number]    Today's Date: 2019                 Admit Date: 2019      Visit Dx:    ICD-10-CM ICD-9-CM   1. Protein-calorie malnutrition, unspecified severity (CMS/HCC) E46 263.9   2. 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   • SIADH (syndrome of inappropriate ADH production) (CMS/HCC)   • Elevated hemoglobin A1c   • Hyperlipidemia   • Protein-calorie malnutrition (CMS/HCC)         Therapy Treatment    IRF Treatment Summary     Row Name 19 1500 19 1300 19 1156       Evaluation/Treatment Time and Intent    Subjective Information  no complaints  -CC  no complaints  -KB  no complaints  -CC    Existing Precautions/Restrictions  fall  -CC  fall  -KB  fall  -CC    Document Type  therapy note (daily note)  -CC  therapy note (daily note)  -KB  therapy note (daily note)  -CC    Mode of Treatment  occupational therapy  -CC  speech-language pathology  -KB  occupational therapy  -CC    Patient/Family Observations  --  pt in wc, wife present d/t increased confusion  -KB  -- seated in w/c pt vomitted at beginning of session  -CC    Start Time (Evaluation/Treatment)  --  1300  -KB  --    Stop Time (Evaluation/Treatment)  --  1330  -KB  --    Recorded by [CC] Genevieve Talavera OTR [KB] Bruton, Katherine L [CC] Genevieve Talavera OTR    Row Name 19 1100 19 0845          Evaluation/Treatment Time and Intent    Subjective Information  no complaints  -KB  no complaints  -LB     Existing Precautions/Restrictions  fall  -KB  fall  -LB     Document Type  therapy note (daily note)  -KB  therapy note (daily note)  -LB     Mode of Treatment  speech-language pathology  -KB   physical therapy  -LB     Patient/Family Observations  seated in wc in his room; agreeable to therapy  -KB  pt in wc, confused  -LB     Start Time (Evaluation/Treatment)  1100  -KB  --     Stop Time (Evaluation/Treatment)  1130  -KB  --     Recorded by [KB] Bruton, Katherine L [LB] Steffany Flynn, PT     Row Name 02/06/19 1500 02/06/19 1156 02/06/19 0845       Cognition/Psychosocial- PT/OT    Affect/Mental Status (Cognitive)  agitated;confused  -CC  confused;flat/blunted affect  -CC  confused;flat/blunted affect  -LB    Behavioral Issues (Cognitive)  --  -- engaged more this am  -CC  withdrawn  -LB    Follows Commands (Cognition)  follows one step commands;50-74% accuracy;delayed response/completion;increased processing time needed;initiation impaired;physical/tactile prompts required;repetition of directions required;verbal cues/prompting required  -CC  follows one step commands;50-74% accuracy;delayed response/completion;increased processing time needed;initiation impaired;physical/tactile prompts required;repetition of directions required;verbal cues/prompting required  -CC  follows one step commands;50-74% accuracy;delayed response/completion;increased processing time needed;initiation impaired;physical/tactile prompts required;repetition of directions required;verbal cues/prompting required  -LB    Personal Safety Interventions  fall prevention program maintained;gait belt;nonskid shoes/slippers when out of bed  -CC  fall prevention program maintained;gait belt;nonskid shoes/slippers when out of bed  -CC  fall prevention program maintained;gait belt;muscle strengthening facilitated;nonskid shoes/slippers when out of bed  -LB    Cognitive Function (Cognitive)  safety deficit  -CC  safety deficit  -CC  --    Attention Deficit (Cognitive)  --  --  distractible in noisy environment;distractible in quiet environment  -LB    Safety Deficit (Cognitive)  --  --  moderate deficit;awareness of need for assistance;insight  into deficits/self awareness;judgment;problem solving  -LB    Recorded by [CC] Genevieve Talavera OTR [CC] Genevieve Talavera OTR [LB] Steffany Flynn, PT    Row Name 02/06/19 1156             Bed Mobility Assessment/Treatment    Comment (Bed Mobility)  in w/c  -CC      Recorded by [CC] Genevieve Talavera OTR      Row Name 02/06/19 1156 02/06/19 0845          Sit-Stand Transfer    Sit-Stand [place] (Transfers)  verbal cues;contact guard;minimum assist (75% patient effort)  -CC  verbal cues;minimum assist (75% patient effort);contact guard  -LB     Assistive Device (Sit-Stand Transfers)  wheelchair;walker, front-wheeled  -CC  wheelchair;walker, front-wheeled  -LB     Recorded by [CC] Genevieve Talavera OTR [LB] Steffany Flynn, PT     Row Name 02/06/19 1156 02/06/19 0845          Stand-Sit Transfer    Stand-Sit [place] (Transfers)  verbal cues;minimum assist (75% patient effort);contact guard  -CC  verbal cues;minimum assist (75% patient effort);contact guard  -LB     Assistive Device (Stand-Sit Transfers)  walker, front-wheeled  -CC  walker, front-wheeled  -LB     Recorded by [CC] Genevieve Talavera OTR [LB] Steffany Flynn, PT     Row Name 02/06/19 1156             Shower Transfer    Type (Shower Transfer)  stand pivot/stand step;sit-stand;stand-sit  -CC      [place] Level (Shower Transfer)  minimum assist (75% patient effort) resistant  -CC      Assistive Device (Shower Transfer)  grab bars/tub rail;transfer board;wheelchair  -CC      Recorded by [CC] Genevieve Talavera OTR      Row Name 02/06/19 0845             Gait/Stairs Assessment/Training    [place] Level (Gait)  minimum assist (75% patient effort)  -LB      Assistive Device (Gait)  walker, front-wheeled assist to guide Rwx  -LB      Distance in Feet (Gait)  80 x 2  -LB      Deviations/Abnormal Patterns (Gait)  gait speed decreased;stride length decreased  -LB      Bilateral Gait Deviations  forward flexed posture;heel strike decreased  -LB       Recorded by [LB] Steffany Flynn PT      Row Name 02/06/19 0845             Safety Issues, Functional Mobility    Safety Issues Affecting Function (Mobility)  ability to follow commands;problem solving;judgment  -LB      Impairments Affecting Function (Mobility)  balance;cognition;visual/perceptual;motor planning  -LB      Recorded by [LB] Steffany Flynn PT      Row Name 02/06/19 1156             Bathing Assessment/Treatment    Bathing [place] Level  bathing skills;lower body;upper body;verbal cues;nonverbal cues (demo/gesture);minimum assist (75% patient effort)  -      Assistive Device (Bathing)  grab bar/tub rail;hand held shower spray hose;shower chair  -      Bathing Position  supported sitting;supported standing  -CC      Bathing Setup Assistance  adjust water temperature;obtain supplies  -CC      Recorded by [CC] Genevieve Talavera OTR      Row Name 02/06/19 1156             Upper Body Dressing Assessment/Treatment    Upper Body Dressing Task  upper body dressing skills;doff;don;pull over garment;supervision;verbal cues  -CC      Upper Body Dressing Position  supported sitting  -CC      Set-up Assistance (Upper Body Dressing)  obtain clothing  -CC      Recorded by [CC] Genevieve Talavera OTR      Row Name 02/06/19 1156             Lower Body Dressing Assessment/Treatment    Lower Body Dressing [place] Level  don;socks;verbal cues;nonverbal cues (demo/gesture);minimum assist (75% patient effort)  -      Lower Body Dressing Position  supported sitting;supported standing  -      Lower Body Dressing Setup Assistance  obtain clothing  -CC      Recorded by [CC] Genevieve Talavera OTR      Row Name 02/06/19 1156             Grooming Assessment/Treatment    Grooming [place] Level  grooming skills;deodorant application;wash face, hands;set up;supervision  -CC      Grooming Position  supported sitting  -CC      Grooming Setup Assistance  adjust water temperature/flow;obtain supplies  -       Recorded by [CC] Genevieve Talavera OTR      Row Name 02/06/19 1500 02/06/19 1300 02/06/19 1156       Pain Scale: Numbers Pre/Post-Treatment    Pain Scale: Numbers, Pretreatment  0/10 - no pain  -CC  0/10 - no pain  -KB  0/10 - no pain  -CC    Pain Scale: Numbers, Post-Treatment  0/10 - no pain  -CC  0/10 - no pain  -KB  0/10 - no pain  -CC    Recorded by [CC] Genevieve Talavera OTR [KB] Bruton, Katherine L [CC] Genevieve Talavera OTR    Row Name 02/06/19 1100 02/06/19 0845          Pain Scale: Numbers Pre/Post-Treatment    Pain Scale: Numbers, Pretreatment  0/10 - no pain  -KB  0/10 - no pain  -LB     Pain Scale: Numbers, Post-Treatment  0/10 - no pain  -KB  0/10 - no pain  -LB     Recorded by [KB] Bruton, Katherine L [LB] Steffany Flynn, PT     Row Name 02/06/19 1500             Upper Extremity Seated Therapeutic Exercise    Performed, Seated Upper Extremity (Therapeutic Exercise)  shoulder flexion/extension;scapular protraction/retraction;elbow flexion/extension  -CC      Device, Seated Upper Extremity (Therapeutic Exercise)  free weights, barbell;free weights, cuff  -CC      Exercise Type, Seated Upper Extremity (Therapeutic Exercise)  resistive exercise  -CC      Expected Outcomes, Seated Upper Extremity (Therapeutic Exercise)  improve functional tolerance, self-care activity  -CC      Sets/Reps Detail, Seated Upper Extremity (Therapeutic Exercise)  10x3; 2# dowel and 2# hand wt   -CC      Recorded by [CC] Genevieve Talavera OTR      Row Name 02/06/19 0845             Lower Extremity Seated Therapeutic Exercise    Performed, Seated Lower Extremity (Therapeutic Exercise)  LAQ (long arc quad), knee extension;hip flexion/extension  -LB      Exercise Type, Seated Lower Extremity (Therapeutic Exercise)  AAROM (active assistive range of motion)  -LB      Sets/Reps Detail, Seated Lower Extremity (Therapeutic Exercise)  1/10  -LB      Recorded by [LB] Steffany Flynn, PT      Row Name 02/06/19 1500 02/06/19 1156 02/06/19 0845        Positioning and Restraints    Pre-Treatment Position  sitting in chair/recliner  -CC  sitting in chair/recliner  -CC  sitting in chair/recliner  -LB    Post Treatment Position  wheelchair  -CC  wheelchair  -CC  wheelchair  -LB    In Bed  with family/caregiver;with SLP  -CC  --  --    In Wheelchair  --  sitting;with family/caregiver  -CC  with family/caregiver  -LB    Recorded by [CC] Genevieve Talavera OTR [CC] Genevieve Talavera OTR [LB] Steffany Flynn, PT      User Key  (r) = Recorded By, (t) = Taken By, (c) = Cosigned By    Initials Name Effective Dates    CC Genevieve Talavera OTR 06/08/18 -     Steffany Domingo, PT 04/03/18 -     KB Bruton, Katherine L 03/07/18 -           Wound 01/02/19 1710 Bilateral lateral head incision (Active)   Dressing Appearance open to air 2/5/2019  9:18 PM         OT Recommendation and Plan                 OT IRF GOALS     Row Name 02/06/19 1100 01/31/19 1200          Transfer Goal 1 (OT-IRF)    Activity/Assistive Device (Transfer Goal 1, OT-IRF)  --  toilet  -CC     [place] Level (Transfer Goal 1, OT-IRF)  --  contact guard assist  -CC     Time Frame (Transfer Goal 1, OT-IRF)  --  short term goal (STG)  -CC     Progress/Outcomes (Transfer Goal 1, OT-IRF)  --  goal ongoing  -CC        Transfer Goal 2 (OT-IRF)    Activity/Assistive Device (Transfer Goal 2, OT-IRF)  --  toilet  -CC     [place] Level (Transfer Goal 2, OT-IRF)  --  standby assist  -CC     Time Frame (Transfer Goal 2, OT-IRF)  --  long term goal (LTG)  -CC     Progress/Outcomes (Transfer Goal 2, OT-IRF)  --  goal ongoing  -CC        Transfer Goal 3 (OT-IRF)    Activity/Assistive Device (Transfer Goal 3, OT-IRF)  --  shower chair  -CC     [place] Level (Transfer Goal 3, OT-IRF)  --  contact guard assist  -CC     Time Frame (Transfer Goal 3, OT-IRF)  --  short term goal (STG)  -CC     Progress/Outcomes (Transfer Goal 3, OT-IRF)  --  goal ongoing  -CC        Transfer Goal 4 (OT-IRF)    Activity/Assistive  Device (Transfer Goal 4, OT-IRF)  --  shower chair  -CC     [place] Level (Transfer Goal 4, OT-IRF)  --  standby assist  -CC     Time Frame (Transfer Goal 4, OT-IRF)  --  long term goal (LTG)  -CC     Progress/Outcomes (Transfer Goal 4, OT-IRF)  --  goal no longer appropriate  -CC        Bathing Goal 1 (OT-IRF)    Activity/Device (Bathing Goal 1, OT-IRF)  --  bathing skills, all  -CC     [place] Level (Bathing Goal 1, OT-IRF)  --  minimum assist (75% or more patient effort);contact guard assist  -CC     Time Frame (Bathing Goal 1, OT-IRF)  --  short term goal (STG)  -CC     Progress/Outcomes (Bathing Goal 1, OT-IRF)  --  goal met  -CC        Bathing Goal 2 (OT-IRF)    Activity/Device (Bathing Goal 2, OT-IRF)  bathing skills, all  -CC  bathing skills, all  -CC     [place] Level (Bathing Goal 2, OT-IRF)  contact guard assist;standby assist  -CC  contact guard assist;standby assist  -CC     Time Frame (Bathing Goal 2, OT-IRF)  long term goal (LTG)  -CC  long term goal (LTG)  -CC     Progress/Outcomes (Bathing Goal 2, OT-IRF)  goal ongoing  -CC  goal ongoing  -CC        UB Dressing Goal 1 (OT-IRF)    Activity/Device (UB Dressing Goal 1, OT-IRF)  upper body dressing  -CC  upper body dressing  -CC     [place] (UB Dress Goal 1, OT-IRF)  standby assist  -CC  standby assist  -CC     Time Frame (UB Dressing Goal 1, OT-IRF)  long term goal (LTG)  -CC  long term goal (LTG)  -CC     Progress/Outcomes (UB Dressing Goal 1, OT-IRF)  goal met  -CC  goal ongoing  -CC        UB Dressing Goal 2 (OT-IRF)    Activity/Device (UB Dressing Goal 2, OT-IRF)  upper body dressing  -CC  upper body dressing  -CC     [place] (UB Dress Goal 2, OT-IRF)  supervision required  -CC  supervision required  -CC     Time Frame (UB Dressing Goal 2, OT-IRF)  long term goal (LTG)  -CC  long term goal (LTG)  -CC     Progress/Outcomes (UB Dressing Goal 2, OT-IRF)  goal met  -CC  goal ongoing  -CC        LB Dressing Goal 1 (OT-IRF)     Activity/Device (LB Dressing Goal 1, OT-IRF)  lower body dressing  -CC  lower body dressing  -CC     [place] (LB Dressing Goal 1, OT-IRF)  moderate assist (50-74% patient effort)  -CC  moderate assist (50-74% patient effort)  -CC     Time Frame (LB Dressing Goal 1, OT-IRF)  long term goal (LTG)  -CC  long term goal (LTG)  -CC     Progress/Outcomes (LB Dressing Goal 1, OT-IRF)  goal met  -CC  goal met  -CC        LB Dressing Goal 2 (OT-IRF)    Activity/Device (LB Dressing Goal 2, OT-IRF)  lower body dressing  -CC  lower body dressing  -CC     [place] (LB Dressing Goal 2, OT-IRF)  minimum assist (75% or more patient effort);contact guard assist  -CC  minimum assist (75% or more patient effort);contact guard assist  -CC     Time Frame (LB Dressing Goal 2, OT-IRF)  long term goal (LTG)  -CC  long term goal (LTG)  -CC     Progress/Outcomes (LB Dressing Goal 2, OT-IRF)  goal met  -CC  goal revised this date  -CC        Toileting Goal 2 (OT-IRF)    Activity/Device (Toileting Goal 2, OT-IRF)  toileting skills, all  -CC  toileting skills, all  -CC     [place] Level (Toileting Goal 2, OT-IRF)  contact guard assist  -CC  contact guard assist  -CC     Time Frame (Toileting Goal 2, OT-IRF)  long term goal (LTG)  -CC  long term goal (LTG)  -CC     Progress/Outcomes (Toileting Goal 2, OT-IRF)  goal ongoing  -CC  goal revised this date  -CC        Strength Goal 1 (OT-IRF)    Strength Goal 1 (OT-IRF)  Pt will increase B UE strength to approx. 4/5 to assist w/ ADLs and transfers.  -CC  Pt will increase B UE strength to approx. 4/5 to assist w/ ADLs and transfers.  -CC     Time Frame (Strength Goal 1, OT-IRF)  long term goal (LTG)  -CC  long term goal (LTG)  -CC     Progress/Outcomes (Strength Goal 1, OT-IRF)  goal ongoing  -CC  goal ongoing  -CC        Direction Following Goal 1 (OT-IRF)    Activity (Direction Following Goal 1, OT-IRF)  --  follow one step directions  -CC     [place]/Cues/Accuracy (Direction  Following Goal 1, OT-IRF)  --  with minimum;verbal cues/redirection  -CC     Time Frame (Direction Following Goal 1, OT-IRF)  --  long term goal (LTG)  -CC     Progress/Outcomes (Direction Following Goal 1, OT-IRF)  --  goal ongoing  -CC       User Key  (r) = Recorded By, (t) = Taken By, (c) = Cosigned By    Initials Name Provider Type    CC Genevieve Talavera OTR Occupational Therapist          Occupational Therapy Education     Title: PT OT SLP Therapies (In Progress)     Topic: Occupational Therapy (In Progress)     Point: ADL training (In Progress)     Description: Instruct learner(s) on proper safety adaptation and remediation techniques during self care or transfers.   Instruct in proper use of assistive devices.    Learning Progress Summary           Patient Acceptance, E,TB, NR by GERSON at 1/12/2019 12:08 PM    Acceptance, E, VU,NR by CHRISTIANE at 1/3/2019  3:44 PM    Comment:  OT educ on OT role in therapeutic prcoess and pt's POC.   Significant Other Acceptance, E, VU,NR by RP at 1/3/2019  3:44 PM    Comment:  OT educ on OT role in therapeutic prcoess and pt's POC.                   Point: Home exercise program (In Progress)     Description: Instruct learner(s) on appropriate technique for monitoring, assisting and/or progressing therapeutic exercises/activities.    Learning Progress Summary           Patient Acceptance, E,TB, NR by GERSON at 1/12/2019 12:08 PM                   Point: Precautions (In Progress)     Description: Instruct learner(s) on prescribed precautions during self-care and functional transfers.    Learning Progress Summary           Patient Acceptance, E,TB, NR by GERSON at 1/12/2019 12:08 PM                   Point: Body mechanics (In Progress)     Description: Instruct learner(s) on proper positioning and spine alignment during self-care, functional mobility activities and/or exercises.    Learning Progress Summary           Patient Acceptance, E,TB, NR by GERSON at 1/12/2019 12:08 PM                                User Key     Initials Effective Dates Name Provider Type Discipline    JS 04/06/17 -  Vickie Pradhan, DEBORAH Physical Therapist PT    RP 05/03/18 -  Geetha Posey OT Occupational Therapist OT                       Time Calculation:     Time Calculation- OT     Row Name 02/06/19 1230 02/06/19 0900          Time Calculation- OT    OT Start Time  1230  -CC  0900  -CC     OT Stop Time  1300  -CC  0930  -CC     OT Time Calculation (min)  30 min  -CC  30 min  -CC       User Key  (r) = Recorded By, (t) = Taken By, (c) = Cosigned By    Initials Name Provider Type    CC Genevieve Talavera OTR Occupational Therapist          Therapy Suggested Charges     Code   Minutes Charges    None              Therapy Charges for Today     Code Description Service Date Service Provider Modifiers Qty    26565110927 HC OT THER PROC EA 15 MIN 2/5/2019 Genevieve Talavera OTR GO 2    67189177752 HC OT SELF CARE/MGMT/TRAIN EA 15 MIN 2/5/2019 Genevieve Talavera OTR GO 2    41344339599 HC OT SELF CARE/MGMT/TRAIN EA 15 MIN 2/6/2019 Genevieve Talavera OTR GO 2    73906354685 HC OT THER PROC EA 15 MIN 2/6/2019 Genevieve Talavera OTR GO 2                   MELISSA Marrero  2/6/2019

## 2019-02-06 NOTE — PLAN OF CARE
Problem: Patient Care Overview  Goal: Plan of Care Review  Outcome: Ongoing (interventions implemented as appropriate)   02/05/19 1952   Patient Care Overview   IRF Plan of Care Review progress ongoing, continue   Progress, Functional Goals demonstrating adequate progress   Coping/Psychosocial   Plan of Care Reviewed With patient   OTHER   Outcome Summary Patient confused at times, impuisive and 2 episodes of vomiting today, He is continent of bladder most of the time, but incontinent in therapy and once this afternoon. No pain, but did have an elevated HR, MD aware. Patient was agitated with wife this morning, so wife went home for a while, she returned about dinner time.        Problem: Skin Injury Risk (Adult)  Goal: Skin Health and Integrity  Outcome: Ongoing (interventions implemented as appropriate)   02/05/19 1952   Skin Injury Risk (Adult)   Skin Health and Integrity making progress toward outcome       Problem: Fall Risk (Adult)  Goal: Absence of Fall  Outcome: Ongoing (interventions implemented as appropriate)   02/05/19 1952   Fall Risk (Adult)   Absence of Fall making progress toward outcome       Problem: Nausea/Vomiting (Adult)  Goal: Symptom Relief  Outcome: Ongoing (interventions implemented as appropriate)   02/05/19 1952   Nausea/Vomiting (Adult)   Symptom Relief making progress toward outcome       Problem: Stroke (Hemorrhagic) (Adult)  Goal: Signs and Symptoms of Listed Potential Problems Will be Absent, Minimized or Managed (Stroke)  Outcome: Ongoing (interventions implemented as appropriate)   02/05/19 1952   Goal/Outcome Evaluation   Problems Assessed (Stroke (Hemorrhagic)) all   Problems Present (Stroke (Hemorrhagic)) bladder/bowel dysfunction;cognitive impairment;communication impairment;motor/sensory impairment

## 2019-02-06 NOTE — PLAN OF CARE
Problem: Patient Care Overview  Goal: Plan of Care Review  Outcome: Ongoing (interventions implemented as appropriate)   02/06/19 3830   Patient Care Overview   IRF Plan of Care Review progress ongoing, continue   Coping/Psychosocial   Plan of Care Reviewed With patient   OTHER   Outcome Summary Alert, flat affect. Talkative today but very confused. Wife transferring pt. Had vomiting episode this am after gagging on water. Had vomiting episode 2 hrs after first bolus TF given at noon. Was feeding self supper pot pie and vomited after that. Dr. Win aware. Dr. Win saw pt, will continue continuous TFs for now. Dr. Win talked to pt and wife about home pass tomorrow afternoon.       Problem: Skin Injury Risk (Adult)  Goal: Skin Health and Integrity  Outcome: Ongoing (interventions implemented as appropriate)      Problem: Fall Risk (Adult)  Goal: Absence of Fall  Outcome: Ongoing (interventions implemented as appropriate)      Problem: Nausea/Vomiting (Adult)  Goal: Symptom Relief  Outcome: Ongoing (interventions implemented as appropriate)      Problem: Stroke (Hemorrhagic) (Adult)  Goal: Signs and Symptoms of Listed Potential Problems Will be Absent, Minimized or Managed (Stroke)  Outcome: Ongoing (interventions implemented as appropriate)      Problem: Nutrition, Enteral (Adult)  Goal: Signs and Symptoms of Listed Potential Problems Will be Absent, Minimized or Managed (Nutrition, Enteral)  Outcome: Ongoing (interventions implemented as appropriate)

## 2019-02-06 NOTE — PROGRESS NOTES
Inpatient Rehabilitation Functional Measures Assessment and Plan of Care    Plan of Care  Updated Problems/Interventions  Mobility    [OT] Toilet Transfers(Active)  Current Status(02/06/2019): Min  Weekly Goal(02/14/2019): CGA  Discharge Goal: CGA    [OT] Tub/Shower Transfers(Active)  Current Status(02/06/2019): Min  Weekly Goal(02/13/2019): CGA  Discharge Goal: CGA        Self Care    [OT] Bathing(Active)  Current Status(02/06/2019): Min w max vc  Weekly Goal(02/14/2019): CGA w vc  Discharge Goal: CGA w vc    [OT] Dressing (Lower)(Active)  Current Status(02/06/2019): Min w max vc  Weekly Goal(02/13/2019): CGa  Discharge Goal: CGA    [OT] Dressing (Upper)(Active)  Current Status(02/06/2019): Min/SBA w max vc  Weekly Goal(02/14/2019): SBA  Discharge Goal: SBA    [OT] Grooming(Active)  Current Status(02/06/2019): SBa w max vc  Weekly Goal(02/06/2019): SBA w vc  Discharge Goal: SBA w vc    [OT] Toileting(Active)  Current Status(02/06/2019): Min  Weekly Goal(02/14/2019): Min/CGa  Discharge Goal: CGA/Min    Functional Measures  ALBERTO Eating:  Branch  ALBERTO Grooming: Branch  ALBERTO Bathing:  Branch  ALBERTO Upper Body Dressing:  Branch  ALBERTO Lower Body Dressing:  Branch  ALBERTO Toileting:  Branch    ALBERTO Bladder Management  Level of Assistance:  Branch  Frequency/Number of Accidents this Shift:  Branch    ALBERTO Bowel Management  Level of Assistance: Branch  Frequency/Number of Accidents this Shift: Branch    ALBERTO Bed/Chair/Wheelchair Transfer:  Branch  ALBERTO Toilet Transfer:  Branch  ALBERTO Tub/Shower Transfer:  Branch    Previously Documented Mode of Locomotion at Discharge: Field  [facility] Expected Mode of Locomotion at Discharge: Branch  [facility] Walk/Wheelchair:  Branch  [facility] Stairs:  Branch    [facility] Comprehension:  Branch  [facility] Expression:  Branch  [facility] Social Interaction:  Branch  [facility] Problem Solving:  Branch  [facility] Memory:  Branch    Therapy Mode Minutes  Occupational Therapy: Branch  Physical Therapy: Branch  Speech Language Pathology:  Branch    [facility]  by: Genevieve Talavera, OTR/L

## 2019-02-06 NOTE — PROGRESS NOTES
Inpatient Rehabilitation Functional Measures Assessment    Functional Measures  ALBERTO Eating:  Eating Score (Parenteral/Enteral) = 1, Total Assistance. Patient  requires total assistance for complete nutrition or supplemental  nutrition/hydration.   Eating Score (by Mouth) = 5. Patient is supervision/set-up for eating,  requiring: Coaxing. No assistive devices were required.   1 = Eating Score (lowest), Total Assistance. Patient requires total assistance  for complete nutrition or supplemental nutrition/hydration.  [facility] Grooming: [facility] Bathing:  [facility] Upper Body Dressing:  [facility] Lower Body Dressing:  [facility] Toileting:  [facility] Bladder Management  Level of Assistance:  [place]  Frequency/Number of Accidents this Shift:  [facility] Bowel Management  Level of Assistance: [place]  Frequency/Number of Accidents this Shift: [facility] Bed/Chair/Wheelchair Transfer:  [facility] Toilet Transfer:  [facility] Tub/Shower Transfer:  [facility] Documented Mode of Locomotion at Discharge: Field  ALBERTO Expected Mode of Locomotion at Discharge: [facility] Walk/Wheelchair:  [facility] Stairs:  [facility] Comprehension:  Auditory comprehension is the usual mode. Patient does not  comprehend complex/abstract information in their primary language without  assistance from a helper. Comprehension Score = 3, Moderate Prompting. Patient  comprehends basic daily needs or ideas 50-74% of the time. Patient requires  moderate/some prompting. No assistive devices were required.  ALBERTO Expression:  Vocal expression is the usual mode. Patient does not express  complex/abstract information in their primary language without a helper.  Expression Score = 1, Total Assistance. Patient expresses basic daily needs less  than 25% of the time, or does not express basic needs appropriately or  consistently despite prompting. No assistive devices were required.  ALBERTO Social Interaction:  Social Interaction Score =  6, Modified Independent.  Patient is modified independent for social interaction, requiring: Requires  additional time. Requires a structured or modified environment.  ALBERTO Problem Solving:  Activity was not observed.  ALBERTO Memory:  Memory Score = 1, Total Assistance. Patient recognizes and  remembers less than 25% of the time. Patient requires total assistance  (prompting all or almost all of the time) for memory for the following:    Therapy Mode Minutes  Occupational Therapy: Branch  Physical Therapy: Branch  Speech Language Pathology:  Branch    Signed by: Solange Cruz RN

## 2019-02-06 NOTE — PROGRESS NOTES
Case Management  Inpatient Rehabilitation Plan of Care and Discharge Plan Note    Rehabilitation Diagnosis:  Branch  Date of Onset:  [place]    Medical Summary:  Branch  Past Medical History: Branch    Plan of Care  Updated Problems/Interventions  Field    Expected Intensity:  Branch  Interdisciplinary Team:  [place]  Estimated Length of Stay/Anticipated Discharge Date: Branch  Anticipated Discharge Destination:  Anticipated discharge destination from inpatient rehabilitation is community  discharge with assistance. Pt lives with his wife in a 2 story home with 5 steps  to enter. Bed and full bath on the 2nd level. Pt's wife will be his primary  caregiver at d/c. Family conference was held on Wednesday, 1/30/2019.  D/C plan is home with wife. [facility] to provide home NSG, PT, OT, ST.      Based on the patient's medical and functional status, their prognosis and  expected level of functional improvement is:  [place]    Signed by: BLANKA Briones

## 2019-02-06 NOTE — PROGRESS NOTES
Inpatient Rehabilitation Functional Measures Assessment    Functional Measures  ALBERTO Eating:  Branch  [facility] Grooming: Branch  [facility] Bathing:  Branch  [facility] Upper Body Dressing:  Branch  [facility] Lower Body Dressing:  Branch  [facility] Toileting:  Activity was not observed.    ALBERTO Bladder Management  Level of Assistance:  Bladder Score = 1. Patient performs less than 25% of tasks  and requires total assistance for bladder management. [place] provides total  assist to completely apply and remove brief.  Frequency/Number of Accidents this Shift:  Bladder accidents this shift:  2 .    ALBERTO Bowel Management  Level of Assistance: Activity was not observed.  Frequency/Number of Accidents this Shift: [facility] Bed/Chair/Wheelchair Transfer:  Activity was not observed.  ALBERTO Toilet Transfer:  Branch  [facility] Tub/Shower Transfer:  Branch    Previously Documented Mode of Locomotion at Discharge: Field  ALBERTO Expected Mode of Locomotion at Discharge: [facility] Walk/Wheelchair:  [facility] Stairs:  Branch    [facility] Comprehension:  [facility] Expression:  [facility] Social Interaction:  [facility] Problem Solving:  [facility] Memory:  Branch    Therapy Mode Minutes  Occupational Therapy: Branch  Physical Therapy: Branch  Speech Language Pathology:  Branch    Signed by: GENESIS Wolfe
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
## 2019-02-06 NOTE — THERAPY TREATMENT NOTE
Inpatient Rehabilitation - Occupational Therapy Progress Note    [facility]     Patient Name: Feliciano Light  : 1955  MRN: [id number]    Today's Date: 2019                 Admit Date: 2019      Visit Dx:    ICD-10-CM ICD-9-CM   1. Protein-calorie malnutrition, unspecified severity (CMS/HCC) E46 263.9   2. 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   • SIADH (syndrome of inappropriate ADH production) (CMS/HCC)   • Elevated hemoglobin A1c   • Hyperlipidemia   • Protein-calorie malnutrition (CMS/HCC)         Therapy Treatment    IRF Treatment Summary     Row Name 19 1156 19 1100 19 0845       Evaluation/Treatment Time and Intent    Subjective Information  no complaints  -CC  no complaints  -KB  no complaints  -LB    Existing Precautions/Restrictions  fall  -CC  fall  -KB  fall  -LB    Document Type  therapy note (daily note)  -CC  therapy note (daily note)  -KB  therapy note (daily note)  -LB    Mode of Treatment  occupational therapy  -CC  speech-language pathology  -KB  physical therapy  -LB    Patient/Family Observations  -- seated in w/c pt vomitted at beginning of session  -CC  seated in wc in his room; agreeable to therapy  -KB  pt in wc, confused  -LB    Start Time (Evaluation/Treatment)  --  1100  -KB  --    Stop Time (Evaluation/Treatment)  --  1130  -KB  --    Recorded by [CC] Genevieve Talavera OTR [KB] Bruton, Katherine L [LB] Steffany Flynn, PT    Row Name 19 1156 19 0845          Cognition/Psychosocial- PT/OT    Affect/Mental Status (Cognitive)  confused;flat/blunted affect  -CC  confused;flat/blunted affect  -LB     Behavioral Issues (Cognitive)  -- engaged more this am  -CC  withdrawn  -LB     Follows Commands (Cognition)  follows one step commands;50-74%  accuracy;delayed response/completion;increased processing time needed;initiation impaired;physical/tactile prompts required;repetition of directions required;verbal cues/prompting required  -CC  follows one step commands;50-74% accuracy;delayed response/completion;increased processing time needed;initiation impaired;physical/tactile prompts required;repetition of directions required;verbal cues/prompting required  -LB     Personal Safety Interventions  fall prevention program maintained;gait belt;nonskid shoes/slippers when out of bed  -CC  fall prevention program maintained;gait belt;muscle strengthening facilitated;nonskid shoes/slippers when out of bed  -LB     Cognitive Function (Cognitive)  safety deficit  -CC  --     Attention Deficit (Cognitive)  --  distractible in noisy environment;distractible in quiet environment  -LB     Safety Deficit (Cognitive)  --  moderate deficit;awareness of need for assistance;insight into deficits/self awareness;judgment;problem solving  -LB     Recorded by [CC] Genevieve Talavera OTR [LB] Steffany Flynn PT     Row Name 02/06/19 1156             Bed Mobility Assessment/Treatment    Comment (Bed Mobility)  in w/c  -CC      Recorded by [CC] Genevieve Talavera OTR      Row Name 02/06/19 1156 02/06/19 0845          Sit-Stand Transfer    Sit-Stand [place] (Transfers)  verbal cues;contact guard;minimum assist (75% patient effort)  -CC  verbal cues;minimum assist (75% patient effort);contact guard  -LB     Assistive Device (Sit-Stand Transfers)  wheelchair;walker, front-wheeled  -CC  wheelchair;walker, front-wheeled  -LB     Recorded by [CC] Genevieve Talavera OTR [LB] Steffany Flynn PT     Row Name 02/06/19 1156 02/06/19 0845          Stand-Sit Transfer    Stand-Sit [place] (Transfers)  verbal cues;minimum assist (75% patient effort);contact guard  -CC  verbal cues;minimum assist (75% patient effort);contact guard  -LB     Assistive Device (Stand-Sit Transfers)  walker,  front-wheeled  -CC  walker, front-wheeled  -LB     Recorded by [CC] Genevieve Talavera OTR [LB] Steffany Flynn, PT     Row Name 02/06/19 1156             Shower Transfer    Type (Shower Transfer)  stand pivot/stand step;sit-stand;stand-sit  -CC      [place] Level (Shower Transfer)  minimum assist (75% patient effort) resistant  -CC      Assistive Device (Shower Transfer)  grab bars/tub rail;transfer board;wheelchair  -CC      Recorded by [CC] Genevieve Talavera OTR      Row Name 02/06/19 0837             Gait/Stairs Assessment/Training    [place] Level (Gait)  minimum assist (75% patient effort)  -LB      Assistive Device (Gait)  walker, front-wheeled assist to guide Rwx  -LB      Distance in Feet (Gait)  80 x 2  -LB      Deviations/Abnormal Patterns (Gait)  gait speed decreased;stride length decreased  -LB      Bilateral Gait Deviations  forward flexed posture;heel strike decreased  -LB      Recorded by [LB] Steffany Flynn, PT      Row Name 02/06/19 2855             Safety Issues, Functional Mobility    Safety Issues Affecting Function (Mobility)  ability to follow commands;problem solving;judgment  -LB      Impairments Affecting Function (Mobility)  balance;cognition;visual/perceptual;motor planning  -LB      Recorded by [LB] Steffany Flynn, PT      Row Name 02/06/19 1156             Bathing Assessment/Treatment    Bathing [place] Level  bathing skills;lower body;upper body;verbal cues;nonverbal cues (demo/gesture);minimum assist (75% patient effort)  -CC      Assistive Device (Bathing)  grab bar/tub rail;hand held shower spray hose;shower chair  -CC      Bathing Position  supported sitting;supported standing  -CC      Bathing Setup Assistance  adjust water temperature;obtain supplies  -CC      Recorded by [CC] Genevieve Talavera OTR      Row Name 02/06/19 1156             Upper Body Dressing Assessment/Treatment    Upper Body Dressing Task  upper body dressing skills;doff;don;pull over  garment;supervision;verbal cues  -CC      Upper Body Dressing Position  supported sitting  -CC      Set-up Assistance (Upper Body Dressing)  obtain clothing  -CC      Recorded by [CC] Genevieve Talavera OTR      Row Name 02/06/19 1156             Lower Body Dressing Assessment/Treatment    Lower Body Dressing [place] Level  don;socks;verbal cues;nonverbal cues (demo/gesture);minimum assist (75% patient effort)  -CC      Lower Body Dressing Position  supported sitting;supported standing  -CC      Lower Body Dressing Setup Assistance  obtain clothing  -CC      Recorded by [CC] Genevieve Talavera OTR      Row Name 02/06/19 1156             Grooming Assessment/Treatment    Grooming [place] Level  grooming skills;deodorant application;wash face, hands;set up;supervision  -CC      Grooming Position  supported sitting  -CC      Grooming Setup Assistance  adjust water temperature/flow;obtain supplies  -CC      Recorded by [CC] Genevieve Talavera OTR      Row Name 02/06/19 1156 02/06/19 1100 02/06/19 0845       Pain Scale: Numbers Pre/Post-Treatment    Pain Scale: Numbers, Pretreatment  0/10 - no pain  -CC  0/10 - no pain  -KB  0/10 - no pain  -LB    Pain Scale: Numbers, Post-Treatment  0/10 - no pain  -CC  0/10 - no pain  -KB  0/10 - no pain  -LB    Recorded by [CC] Genevieve Talavera OTR [KB] Bruton, Katherine L [LB] Steffany Flynn, PT    Row Name 02/06/19 0845             Lower Extremity Seated Therapeutic Exercise    Performed, Seated Lower Extremity (Therapeutic Exercise)  LAQ (long arc quad), knee extension;hip flexion/extension  -LB      Exercise Type, Seated Lower Extremity (Therapeutic Exercise)  AAROM (active assistive range of motion)  -LB      Sets/Reps Detail, Seated Lower Extremity (Therapeutic Exercise)  1/10  -LB      Recorded by [LB] Steffany Flynn PT      Row Name 02/06/19 1156 02/06/19 0845          Positioning and Restraints    Pre-Treatment Position  sitting in chair/recliner  -CC  sitting in  chair/recliner  -LB     Post Treatment Position  wheelchair  -CC  wheelchair  -LB     In Wheelchair  sitting;with family/caregiver  -CC  with family/caregiver  -LB     Recorded by [CC] Genevieve Talavera OTR [LB] Steffany Flynn, PT       User Key  (r) = Recorded By, (t) = Taken By, (c) = Cosigned By    Initials Name Effective Dates    CC Genevieve Talavera OTR 06/08/18 -     Steffany Domingo, PT 04/03/18 -     KB Bruton, Katherine L 03/07/18 -           Wound 01/02/19 1710 Bilateral lateral head incision (Active)   Dressing Appearance open to air 2/5/2019  9:18 PM         OT Recommendation and Plan                 OT IRF GOALS     Row Name 02/06/19 1100 01/31/19 1200          Transfer Goal 1 (OT-IRF)    Activity/Assistive Device (Transfer Goal 1, OT-IRF)  --  toilet  -CC     [place] Level (Transfer Goal 1, OT-IRF)  --  contact guard assist  -CC     Time Frame (Transfer Goal 1, OT-IRF)  --  short term goal (STG)  -CC     Progress/Outcomes (Transfer Goal 1, OT-IRF)  --  goal ongoing  -CC        Transfer Goal 2 (OT-IRF)    Activity/Assistive Device (Transfer Goal 2, OT-IRF)  --  toilet  -CC     [place] Level (Transfer Goal 2, OT-IRF)  --  standby assist  -CC     Time Frame (Transfer Goal 2, OT-IRF)  --  long term goal (LTG)  -CC     Progress/Outcomes (Transfer Goal 2, OT-IRF)  --  goal ongoing  -CC        Transfer Goal 3 (OT-IRF)    Activity/Assistive Device (Transfer Goal 3, OT-IRF)  --  shower chair  -CC     [place] Level (Transfer Goal 3, OT-IRF)  --  contact guard assist  -CC     Time Frame (Transfer Goal 3, OT-IRF)  --  short term goal (STG)  -CC     Progress/Outcomes (Transfer Goal 3, OT-IRF)  --  goal ongoing  -CC        Transfer Goal 4 (OT-IRF)    Activity/Assistive Device (Transfer Goal 4, OT-IRF)  --  shower chair  -CC     [place] Level (Transfer Goal 4, OT-IRF)  --  standby assist  -CC     Time Frame (Transfer Goal 4, OT-IRF)  --  long term goal (LTG)  -CC     Progress/Outcomes (Transfer  Goal 4, OT-IRF)  --  goal no longer appropriate  -CC        Bathing Goal 1 (OT-IRF)    Activity/Device (Bathing Goal 1, OT-IRF)  --  bathing skills, all  -CC     [place] Level (Bathing Goal 1, OT-IRF)  --  minimum assist (75% or more patient effort);contact guard assist  -CC     Time Frame (Bathing Goal 1, OT-IRF)  --  short term goal (STG)  -CC     Progress/Outcomes (Bathing Goal 1, OT-IRF)  --  goal met  -CC        Bathing Goal 2 (OT-IRF)    Activity/Device (Bathing Goal 2, OT-IRF)  bathing skills, all  -CC  bathing skills, all  -CC     [place] Level (Bathing Goal 2, OT-IRF)  contact guard assist;standby assist  -CC  contact guard assist;standby assist  -CC     Time Frame (Bathing Goal 2, OT-IRF)  long term goal (LTG)  -CC  long term goal (LTG)  -CC     Progress/Outcomes (Bathing Goal 2, OT-IRF)  goal ongoing  -CC  goal ongoing  -CC        UB Dressing Goal 1 (OT-IRF)    Activity/Device (UB Dressing Goal 1, OT-IRF)  upper body dressing  -CC  upper body dressing  -CC     [place] (UB Dress Goal 1, OT-IRF)  standby assist  -CC  standby assist  -CC     Time Frame (UB Dressing Goal 1, OT-IRF)  long term goal (LTG)  -CC  long term goal (LTG)  -CC     Progress/Outcomes (UB Dressing Goal 1, OT-IRF)  goal met  -CC  goal ongoing  -CC        UB Dressing Goal 2 (OT-IRF)    Activity/Device (UB Dressing Goal 2, OT-IRF)  upper body dressing  -CC  upper body dressing  -CC     [place] (UB Dress Goal 2, OT-IRF)  supervision required  -CC  supervision required  -CC     Time Frame (UB Dressing Goal 2, OT-IRF)  long term goal (LTG)  -CC  long term goal (LTG)  -CC     Progress/Outcomes (UB Dressing Goal 2, OT-IRF)  goal met  -CC  goal ongoing  -CC        LB Dressing Goal 1 (OT-IRF)    Activity/Device (LB Dressing Goal 1, OT-IRF)  lower body dressing  -CC  lower body dressing  -CC     [place] (LB Dressing Goal 1, OT-IRF)  moderate assist (50-74% patient effort)  -CC  moderate assist (50-74% patient effort)  -CC      Time Frame (LB Dressing Goal 1, OT-IRF)  long term goal (LTG)  -CC  long term goal (LTG)  -CC     Progress/Outcomes (LB Dressing Goal 1, OT-IRF)  goal met  -CC  goal met  -CC        LB Dressing Goal 2 (OT-IRF)    Activity/Device (LB Dressing Goal 2, OT-IRF)  lower body dressing  -CC  lower body dressing  -CC     [place] (LB Dressing Goal 2, OT-IRF)  minimum assist (75% or more patient effort);contact guard assist  -CC  minimum assist (75% or more patient effort);contact guard assist  -CC     Time Frame (LB Dressing Goal 2, OT-IRF)  long term goal (LTG)  -CC  long term goal (LTG)  -CC     Progress/Outcomes (LB Dressing Goal 2, OT-IRF)  goal met  -CC  goal revised this date  -CC        Toileting Goal 2 (OT-IRF)    Activity/Device (Toileting Goal 2, OT-IRF)  toileting skills, all  -CC  toileting skills, all  -CC     [place] Level (Toileting Goal 2, OT-IRF)  contact guard assist  -CC  contact guard assist  -CC     Time Frame (Toileting Goal 2, OT-IRF)  long term goal (LTG)  -CC  long term goal (LTG)  -CC     Progress/Outcomes (Toileting Goal 2, OT-IRF)  goal ongoing  -CC  goal revised this date  -CC        Strength Goal 1 (OT-IRF)    Strength Goal 1 (OT-IRF)  Pt will increase B UE strength to approx. 4/5 to assist w/ ADLs and transfers.  -CC  Pt will increase B UE strength to approx. 4/5 to assist w/ ADLs and transfers.  -CC     Time Frame (Strength Goal 1, OT-IRF)  long term goal (LTG)  -CC  long term goal (LTG)  -CC     Progress/Outcomes (Strength Goal 1, OT-IRF)  goal ongoing  -CC  goal ongoing  -CC        Direction Following Goal 1 (OT-IRF)    Activity (Direction Following Goal 1, OT-IRF)  --  follow one step directions  -CC     [place]/Cues/Accuracy (Direction Following Goal 1, OT-IRF)  --  with minimum;verbal cues/redirection  -CC     Time Frame (Direction Following Goal 1, OT-IRF)  --  long term goal (LTG)  -CC     Progress/Outcomes (Direction Following Goal 1, OT-IRF)  --  goal ongoing  -CC        User Key  (r) = Recorded By, (t) = Taken By, (c) = Cosigned By    Initials Name Provider Type    CC Genevieve Talavera OTR Occupational Therapist          Occupational Therapy Education     Title: PT OT SLP Therapies (In Progress)     Topic: Occupational Therapy (In Progress)     Point: ADL training (In Progress)     Description: Instruct learner(s) on proper safety adaptation and remediation techniques during self care or transfers.   Instruct in proper use of assistive devices.    Learning Progress Summary           Patient Acceptance, E,TB, NR by GERSON at 1/12/2019 12:08 PM    Acceptance, E, VU,NR by CHRISTIANE at 1/3/2019  3:44 PM    Comment:  OT educ on OT role in therapeutic prcoess and pt's POC.   Significant Other Acceptance, E, VU,NR by CHRISTIANE at 1/3/2019  3:44 PM    Comment:  OT educ on OT role in therapeutic prcoess and pt's POC.                   Point: Home exercise program (In Progress)     Description: Instruct learner(s) on appropriate technique for monitoring, assisting and/or progressing therapeutic exercises/activities.    Learning Progress Summary           Patient Acceptance, E,TB, NR by GERSON at 1/12/2019 12:08 PM                   Point: Precautions (In Progress)     Description: Instruct learner(s) on prescribed precautions during self-care and functional transfers.    Learning Progress Summary           Patient Acceptance, E,TB, NR by GERSON at 1/12/2019 12:08 PM                   Point: Body mechanics (In Progress)     Description: Instruct learner(s) on proper positioning and spine alignment during self-care, functional mobility activities and/or exercises.    Learning Progress Summary           Patient Acceptance, E,TB, NR by GERSON at 1/12/2019 12:08 PM                               User Key     Initials Effective Dates Name Provider Type Discipline     04/06/17 -  Vickie Pradhan, PT Physical Therapist PT    CHRISTIANE 05/03/18 -  Geetha Posey OT Occupational Therapist OT                       Time Calculation:      Time Calculation- OT     Row Name 02/06/19 0900             Time Calculation- OT    OT Start Time  0900  -CC      OT Stop Time  0930  -CC      OT Time Calculation (min)  30 min  -CC        User Key  (r) = Recorded By, (t) = Taken By, (c) = Cosigned By    Initials Name Provider Type    CC Genevieve Talavera OTR Occupational Therapist          Therapy Suggested Charges     Code   Minutes Charges    None              Therapy Charges for Today     Code Description Service Date Service Provider Modifiers Qty    72969735023 HC OT THER PROC EA 15 MIN 2/5/2019 Genevieve Talavera OTR GO 2    89608638641 HC OT SELF CARE/MGMT/TRAIN EA 15 MIN 2/5/2019 Genevieve Talavera OTR GO 2    08613908835 HC OT SELF CARE/MGMT/TRAIN EA 15 MIN 2/6/2019 Genevieve Talavera OTR GO 2                   MELISSA Marrero  2/6/2019

## 2019-02-06 NOTE — PLAN OF CARE
Problem: Patient Care Overview  Goal: Plan of Care Review  Outcome: Ongoing (interventions implemented as appropriate)   02/06/19 6742   OTHER   Outcome Summary Due to the patient's cognitive issues and continued balance issues, the patient is not safe to independently ambulate with any type of assistive device from canes to walkers. The pt requires at least Min Assist for safe transfers and gait. The patient needs a lightweight wheelchair for safe household mobility which the patient can propel with bilateral lower extremities with CGA.

## 2019-02-06 NOTE — PLAN OF CARE
Problem: Patient Care Overview  Goal: Plan of Care Review  Outcome: Ongoing (interventions implemented as appropriate)      Problem: Skin Injury Risk (Adult)  Goal: Skin Health and Integrity  Outcome: Ongoing (interventions implemented as appropriate)      Problem: Fall Risk (Adult)  Goal: Absence of Fall  Outcome: Ongoing (interventions implemented as appropriate)      Problem: Nausea/Vomiting (Adult)  Goal: Symptom Relief  Outcome: Ongoing (interventions implemented as appropriate)

## 2019-02-06 NOTE — THERAPY TREATMENT NOTE
Inpatient Rehabilitation - Physical Therapy Treatment Note  [facility]     Patient Name: Feliciano Light  : 1955  MRN: [id number]    Today's Date: 2019                 Admit Date: 2019      Visit Dx:      ICD-10-CM ICD-9-CM   1. Protein-calorie malnutrition, unspecified severity (CMS/HCC) E46 263.9   2. 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   • SIADH (syndrome of inappropriate ADH production) (CMS/HCC)   • Elevated hemoglobin A1c   • Hyperlipidemia   • Protein-calorie malnutrition (CMS/HCC)       Therapy Treatment    IRF Treatment Summary     Row Name 19 1500 19 1300 19 1156       Evaluation/Treatment Time and Intent    Subjective Information  no complaints  -CC  no complaints  -KB  no complaints  -CC    Existing Precautions/Restrictions  fall  -CC  fall  -KB  fall  -CC    Document Type  therapy note (daily note)  -CC  therapy note (daily note)  -KB  therapy note (daily note)  -CC    Mode of Treatment  occupational therapy  -CC  speech-language pathology  -KB  occupational therapy  -CC    Patient/Family Observations  --  pt in wc, wife present d/t increased confusion  -KB  -- seated in w/c pt vomitted at beginning of session  -CC    Start Time (Evaluation/Treatment)  --  1300  -KB  --    Stop Time (Evaluation/Treatment)  --  1330  -KB  --    Recorded by [CC] Genevieve Talavera OTR [KB] Bruton, Katherine L [CC] Genevieve Talavera OTR    Row Name 19 1100 19 0845          Evaluation/Treatment Time and Intent    Subjective Information  no complaints  -KB  no complaints  -LB     Existing Precautions/Restrictions  fall  -KB  fall  -LB     Document Type  therapy note (daily note)  -KB  therapy note (daily note)  -LB     Mode of Treatment  speech-language pathology  -KB  physical  therapy  -LB     Patient/Family Observations  seated in wc in his room; agreeable to therapy  -KB  pt in wc, confused  -LB     Start Time (Evaluation/Treatment)  1100  -KB  --     Stop Time (Evaluation/Treatment)  1130  -KB  --     Recorded by [KB] Bruton, Katherine L [LB] Steffany Flynn, PT     Row Name 02/06/19 1500 02/06/19 1156 02/06/19 0845       Cognition/Psychosocial- PT/OT    Affect/Mental Status (Cognitive)  agitated;confused  -CC  confused;flat/blunted affect  -CC  confused;flat/blunted affect  -LB    Behavioral Issues (Cognitive)  --  -- engaged more this am  -CC  withdrawn  -LB    Follows Commands (Cognition)  follows one step commands;50-74% accuracy;delayed response/completion;increased processing time needed;initiation impaired;physical/tactile prompts required;repetition of directions required;verbal cues/prompting required  -CC  follows one step commands;50-74% accuracy;delayed response/completion;increased processing time needed;initiation impaired;physical/tactile prompts required;repetition of directions required;verbal cues/prompting required  -CC  follows one step commands;50-74% accuracy;delayed response/completion;increased processing time needed;initiation impaired;physical/tactile prompts required;repetition of directions required;verbal cues/prompting required  -LB    Personal Safety Interventions  fall prevention program maintained;gait belt;nonskid shoes/slippers when out of bed  -CC  fall prevention program maintained;gait belt;nonskid shoes/slippers when out of bed  -CC  fall prevention program maintained;gait belt;muscle strengthening facilitated;nonskid shoes/slippers when out of bed  -LB    Cognitive Function (Cognitive)  safety deficit  -CC  safety deficit  -CC  --    Attention Deficit (Cognitive)  --  --  distractible in noisy environment;distractible in quiet environment  -LB    Safety Deficit (Cognitive)  --  --  moderate deficit;awareness of need for assistance;insight into  deficits/self awareness;judgment;problem solving  -LB    Recorded by [CC] Genevieve Talavera OTR [CC] Genevieve Talavera OTR [LB] Steffany Flynn, PT    Row Name 02/06/19 1156             Bed Mobility Assessment/Treatment    Comment (Bed Mobility)  in w/c  -CC      Recorded by [CC] Genevieve Talavera OTR      Row Name 02/06/19 1156 02/06/19 0845          Sit-Stand Transfer    Sit-Stand [place] (Transfers)  verbal cues;contact guard;minimum assist (75% patient effort)  -CC  verbal cues;minimum assist (75% patient effort);contact guard  -LB     Assistive Device (Sit-Stand Transfers)  wheelchair;walker, front-wheeled  -CC  wheelchair;walker, front-wheeled  -LB     Recorded by [CC] Genevieve Talavera OTR [LB] Steffany Flynn, PT     Row Name 02/06/19 1156 02/06/19 0845          Stand-Sit Transfer    Stand-Sit [place]rio (Transfers)  verbal cues;minimum assist (75% patient effort);contact guard  -CC  verbal cues;minimum assist (75% patient effort);contact guard  -LB     Assistive Device (Stand-Sit Transfers)  walker, front-wheeled  -CC  walker, front-wheeled  -LB     Recorded by [CC] Geneiveve Talavera OTR [LB] Steffany Flynn, PT     Row Name 02/06/19 1156             Shower Transfer    Type (Shower Transfer)  stand pivot/stand step;sit-stand;stand-sit  -CC      Frio Level (Shower Transfer)  minimum assist (75% patient effort) resistant  -CC      Assistive Device (Shower Transfer)  grab bars/tub rail;transfer board;wheelchair  -CC      Recorded by [CC] Genevieve Talavera OTR      Row Name 02/06/19 0845             Gait/Stairs Assessment/Training    [place] Level (Gait)  minimum assist (75% patient effort)  -LB      Assistive Device (Gait)  walker, front-wheeled assist to guide Rwx  -LB      Distance in Feet (Gait)  80 x 3  -LB      Deviations/Abnormal Patterns (Gait)  gait speed decreased;stride length decreased  -LB      Bilateral Gait Deviations  forward flexed posture;heel strike decreased  -LB      Recorded  by [LB] Steffany Flynn PT      Row Name 02/06/19 0845             Safety Issues, Functional Mobility    Safety Issues Affecting Function (Mobility)  ability to follow commands;problem solving;judgment  -LB      Impairments Affecting Function (Mobility)  balance;cognition;visual/perceptual;motor planning  -LB      Recorded by [LB] Steffany Flynn PT      Row Name 02/06/19 1156             Bathing Assessment/Treatment    Bathing [place] Level  bathing skills;lower body;upper body;verbal cues;nonverbal cues (demo/gesture);minimum assist (75% patient effort)  -CC      Assistive Device (Bathing)  grab bar/tub rail;hand held shower spray hose;shower chair  -CC      Bathing Position  supported sitting;supported standing  -CC      Bathing Setup Assistance  adjust water temperature;obtain supplies  -CC      Recorded by [CC] Genevieve Talavera OTR      Row Name 02/06/19 1156             Upper Body Dressing Assessment/Treatment    Upper Body Dressing Task  upper body dressing skills;doff;don;pull over garment;supervision;verbal cues  -CC      Upper Body Dressing Position  supported sitting  -CC      Set-up Assistance (Upper Body Dressing)  obtain clothing  -CC      Recorded by [CC] Genevieve Talavera OTR      Row Name 02/06/19 1156             Lower Body Dressing Assessment/Treatment    Lower Body Dressing [place] Level  don;socks;verbal cues;nonverbal cues (demo/gesture);minimum assist (75% patient effort)  -CC      Lower Body Dressing Position  supported sitting;supported standing  -CC      Lower Body Dressing Setup Assistance  obtain clothing  -CC      Recorded by [CC] Genevieve Talavera OTR      Row Name 02/06/19 1156             Grooming Assessment/Treatment    Grooming [place] Level  grooming skills;deodorant application;wash face, hands;set up;supervision  -CC      Grooming Position  supported sitting  -CC      Grooming Setup Assistance  adjust water temperature/flow;obtain supplies  -CC      Recorded by [CC]  Genevieve Talavera OTR      Row Name 02/06/19 1500 02/06/19 1300 02/06/19 1156       Pain Scale: Numbers Pre/Post-Treatment    Pain Scale: Numbers, Pretreatment  0/10 - no pain  -CC  0/10 - no pain  -KB  0/10 - no pain  -CC    Pain Scale: Numbers, Post-Treatment  0/10 - no pain  -CC  0/10 - no pain  -KB  0/10 - no pain  -CC    Recorded by [CC] Genevieve Talavera OTR [KB] Bruton, Katherine L [CC] Genevieve Talavera OTR    Row Name 02/06/19 1100 02/06/19 0845          Pain Scale: Numbers Pre/Post-Treatment    Pain Scale: Numbers, Pretreatment  0/10 - no pain  -KB  0/10 - no pain  -LB     Pain Scale: Numbers, Post-Treatment  0/10 - no pain  -KB  0/10 - no pain  -LB     Recorded by [KB] Bruton, Katherine L [LB] Steffany Flynn, PT     Row Name 02/06/19 0845             Static Standing Balance    Level of [place] (Supported Standing, Static Balance)  contact guard assist  -LB      Time Able to Maintain Position (Supported Standing, Static Balance)  3 to 4 minutes  -LB      Comment (Supported Standing, Static Balance)  stood and performed a bean bag toss no LOB noted and pt was able to stay on task  -LB      Comment (Unsupported Standing, Static Balance)  The pt did become sick after activity and vomited a large amount of emesis.  Wife present and RN made aware of situtation.  -LB      Recorded by [LB] Steffany Flynn, PT      Row Name 02/06/19 1500             Upper Extremity Seated Therapeutic Exercise    Performed, Seated Upper Extremity (Therapeutic Exercise)  shoulder flexion/extension;scapular protraction/retraction;elbow flexion/extension  -CC      Device, Seated Upper Extremity (Therapeutic Exercise)  free weights, barbell;free weights, cuff  -CC      Exercise Type, Seated Upper Extremity (Therapeutic Exercise)  resistive exercise  -CC      Expected Outcomes, Seated Upper Extremity (Therapeutic Exercise)  improve functional tolerance, self-care activity  -CC      Sets/Reps Detail, Seated Upper Extremity (Therapeutic  Exercise)  10x3; 2# dowel and 2# hand wt   -CC      Recorded by [CC] Genevieve Talavera OTR      Row Name 02/06/19 0845             Lower Extremity Seated Therapeutic Exercise    Performed, Seated Lower Extremity (Therapeutic Exercise)  LAQ (long arc quad), knee extension;hip flexion/extension  -LB      Exercise Type, Seated Lower Extremity (Therapeutic Exercise)  AAROM (active assistive range of motion)  -LB      Sets/Reps Detail, Seated Lower Extremity (Therapeutic Exercise)  1/10  -LB      Recorded by [LB] Steffany Flynn, PT      Row Name 02/06/19 1500 02/06/19 1156 02/06/19 0845       Positioning and Restraints    Pre-Treatment Position  sitting in chair/recliner  -CC  sitting in chair/recliner  -CC  sitting in chair/recliner  -LB    Post Treatment Position  wheelchair  -CC  wheelchair  -CC  wheelchair  -LB    In Bed  with family/caregiver;with SLP  -CC  --  --    In Wheelchair  --  sitting;with family/caregiver  -CC  with family/caregiver  -LB    Recorded by [CC] Genevieve Talavera OTR [CC] Genevieve Talavera OTR [LB] Steffany Flynn, PT      User Key  (r) = Recorded By, (t) = Taken By, (c) = Cosigned By    Initials Name Effective Dates    CC Genevieve Talavera OTR 06/08/18 -     Steffany Domingo, PT 04/03/18 -     KB Bruton, Katherine L 03/07/18 -         Wound 01/02/19 1710 Bilateral lateral head incision (Active)   Dressing Appearance open to air 2/5/2019  9:18 PM     Physical Therapy Education     Title: PT OT SLP Therapies (In Progress)     Topic: Physical Therapy (In Progress)     Point: Mobility training (In Progress)     Learning Progress Summary           Patient Acceptance, E,TB, NR by ERNESTO at 2/6/2019 11:43 AM    Acceptance, E, NR by AILEEN at 2/5/2019  2:22 PM    Acceptance, E, DU,NR by AILEEN at 2/5/2019 10:41 AM    Acceptance, E,TB,D, VU,DU by ERNESTO at 2/4/2019  3:05 PM    Comment:  Denice able to assist pt on steps, transfers and gait with RWx    Acceptance, E,TB, VU,DU by ERNESTO at 1/31/2019 10:00 AM    Comment:  Wife,  Denice able to assist pt with car transfer and ambulation for 80ft with Rwx.  okay to go to [facility] today    Acceptance, E,TB, NR by LB at 1/29/2019  9:16 AM    Acceptance, E,TB, NR by LB at 1/28/2019  3:06 PM    Acceptance, E,TB, VU,NR by IRENE at 1/26/2019 12:21 PM    Acceptance, E, DU,NR by JK at 1/25/2019 11:14 AM    Acceptance, E,TB, VU,NR by LB at 1/24/2019  1:47 PM    Acceptance, E,D, DU,NR by JK at 1/22/2019  3:18 PM    Acceptance, E,TB, NR by LB at 1/21/2019 12:15 PM    Acceptance, E,TB, NR by LB at 1/19/2019  9:33 AM    Acceptance, E, NR by LB1 at 1/18/2019  3:31 PM    Acceptance, E,TB, NR by LB at 1/17/2019  9:22 AM    Acceptance, E, NR by LB1 at 1/16/2019  1:21 PM    Acceptance, E,TB, NR by LB at 1/14/2019  9:21 AM    Acceptance, E,TB, NR by JS at 1/12/2019 12:08 PM    Acceptance, E,TB, NR by LB at 1/11/2019  9:33 AM    Acceptance, E,TB, VU,NR by LB at 1/9/2019  9:37 AM    Acceptance, E,TB, NR by LB at 1/8/2019  9:14 AM    Acceptance, E,TB, NR by LB at 1/7/2019  9:36 AM    Acceptance, E,TB, VU,NR by IRENE at 1/5/2019  2:34 PM    Acceptance, E,TB, NR by LB at 1/4/2019 10:22 AM    Acceptance, E,TB, NR by LB at 1/3/2019 10:39 AM   Significant Other Acceptance, E,TB,D, VU,DU by LB at 2/4/2019  3:05 PM    Comment:  Denice able to assist pt on steps, transfers and gait with RWx    Acceptance, E,TB, VU,DU by LB at 1/31/2019 10:00 AM    Comment:  Wife, Denice able to assist pt with car transfer and ambulation for 80ft with Rwx.  okay to go to [facility] appt today                   Point: Home exercise program (In Progress)     Learning Progress Summary           Patient Acceptance, E,TB, NR by ERNESTO at 1/22/2019  3:25 PM    Acceptance, E,TB, NR by GERSON at 1/12/2019 12:08 PM                   Point: Precautions (In Progress)     Learning Progress Summary           Patient Acceptance, E,TB, NR by GERSON at 1/12/2019 12:08 PM    Acceptance, E,TB, NR by ERNESTO at 1/8/2019  3:14 PM                               User Key     Initials Effective  Dates Name Provider Type Discipline    IRENE 06/08/18 -  Elvira Salter, PT Physical Therapist PT    JS 04/06/17 -  Vickie Pradhan, PT Physical Therapist PT    LB 04/03/18 -  Steffany Flynn, PT Physical Therapist PT    LB1 03/07/18 -  Jessica Oneal, PTA Physical Therapy Assistant PT    JK 04/03/18 -  Emily Jarrett, PT Physical Therapist PT                  PT Recommendation and Plan  Planned Therapy Interventions (PT Eval): bed mobility training, balance training, strengthening, stair training, transfer training, neuromuscular re-education, home exercise program  Frequency of Treatment (PT Eval): 60 minutes per session     Daily Summary of Progress (PT)  Recommendations: Physical Therapy: The patient did get sick at end of session.  RN informed   Outcome Summary: Due to the patient's cognitive issues and continued balance issues, the patient is not safe to independently ambulate with any type of assistive device from canes to walkers.  The pt requires at least Min Assist for safe transfers and gait.  The patient needs a lightweight wheelchair for safe household mobility which the patient can propel with bilateral lower extremities with CGA.                 Time Calculation:     PT Charges     Row Name 02/06/19 1537 02/06/19 1144          Time Calculation    Start Time  1330  -LB  0830  -LB     Stop Time  1400  -LB  0900  -LB     Time Calculation (min)  30 min  -LB  30 min  -LB     PT Received On  02/06/19  -LB  02/06/19  -LB     PT - Next Appointment  02/07/19  -LB  --     PT Goal Re-Cert Due Date  02/08/19  -LB  --       User Key  (r) = Recorded By, (t) = Taken By, (c) = Cosigned By    Initials Name Provider Type    LB Steffany Flynn, PT Physical Therapist            Therapy Charges for Today     Code Description Service Date Service Provider Modifiers Qty    [id number] HC PT THER PROC EA 15 MIN 2/6/2019 Steffany Flynn PT GP 4                   Steffany Flynn PT  2/6/2019

## 2019-02-06 NOTE — PROGRESS NOTES
Inpatient Rehabilitation Plan of Care Note    Plan of Care  Care Plan Reviewed - No updates at this time.    Safety    Performed Intervention(s)  Items in reach, Falls precautions/ protocol  Safety rounds & Bed/chair alarms, items within reach      Sphincter Control    Performed Intervention(s)  Monitor intake, output, & bowel movements  Use incontinence products/ equipment, & perineal care  Encourage appropriate diet & fluid intake    Signed by: Solange Cruz RN

## 2019-02-06 NOTE — PROGRESS NOTES
Patient to be started on bolus tube feedings today. Discussed d/c with Dr. Win. Will not plan for d/c tomorrow as planned. Will need to see how patient tolerates bolus feedings. Discussed with team and wife. Wife concerned since patient has been vomiting. [facility] following. Discussed situation with [facility] liaison and will let her know plan for d/c once determined. Will also make referral to [facility] for tube feeding product after we see how and if he is able to tolerate. Wife has put bed on first floor of home but was again asking about patient going upstairs since full bath is there and asking about how to do a good sponge bath with patient. PT discussed with wife, again, the recommendation to stay on the first floor of home for now and have home health work with patient on steps and determine when this would be safe to do at home with wife's assistance. Discussed with OT need for wife to go through doing sponge bath with patient tomorrow. Wife will plan to go through this teaching with OT in a.m.   Team conference in a.m. To discuss patient's status and d/c plan. Will update wife and home health and assist with plans.

## 2019-02-07 PROCEDURE — G0515 COGNITIVE SKILLS DEVELOPMENT: HCPCS

## 2019-02-07 PROCEDURE — 97110 THERAPEUTIC EXERCISES: CPT | Performed by: PHYSICAL THERAPIST

## 2019-02-07 PROCEDURE — 97110 THERAPEUTIC EXERCISES: CPT

## 2019-02-07 PROCEDURE — 97535 SELF CARE MNGMENT TRAINING: CPT

## 2019-02-07 RX ADMIN — MECLIZINE HCL 12.5 MG: 12.5 TABLET ORAL at 06:05

## 2019-02-07 RX ADMIN — MULTIPLE VITAMINS W/ MINERALS TAB 1 TABLET: TAB at 09:45

## 2019-02-07 RX ADMIN — LANSOPRAZOLE 30 MG: KIT at 06:01

## 2019-02-07 RX ADMIN — MECLIZINE HCL 12.5 MG: 12.5 TABLET ORAL at 14:31

## 2019-02-07 NOTE — PROGRESS NOTES
CEREBELLAR STROKE. PERSISTENT NAUSEA AND EMESIS. UNABLE TO MAINTAIN NUTRITION BY MOUTH. WEIGHT LOSS SINCE November. UNABLE TO TOLERATE BOLUS TUBE FEEDS. WILL NEED TO BE ON CONTINUOUS TUBE FEEDS VIA PUMP AT HOME FOR NUTRITION.  HE WILL NEED TUBE FEEDS FOR AT LEAST 90 DAYS.

## 2019-02-07 NOTE — THERAPY DISCHARGE NOTE
Inpatient Rehabilitation - Occupational Therapy Discharge Summary  [facility]     Patient Name: Feliciano Light  : 1955  MRN: [id number]    Today's Date: 2019                 Admit Date: 2019        OT Recommendation and Plan    Visit Dx:    ICD-10-CM ICD-9-CM   1. Protein-calorie malnutrition, unspecified severity (CMS/HCC) E46 263.9   2. Impaired cognition R41.89 294.9         Time Calculation- OT     Row Name 19 1330 19 1245 19 0900       Time Calculation- OT    OT Start Time  1330  -CC  1245  -CC  0900  -CC    OT Stop Time  1345  -CC  1300  -CC  1000  -CC    OT Time Calculation (min)  15 min  -CC  15 min  -CC  60 min  -CC      User Key  (r) = Recorded By, (t) = Taken By, (c) = Cosigned By    Initials Name Provider Type    Genevieve Farooq OTR Occupational Therapist            OT IRF GOALS     Row Name 19 1500 19 1100 19 1200       Transfer Goal 1 (OT-IRF)    Activity/Assistive Device (Transfer Goal 1, OT-IRF)  toilet  -CC  --  toilet  -CC    [place] Level (Transfer Goal 1, OT-IRF)  contact guard assist  -CC  --  contact guard assist  -CC    Time Frame (Transfer Goal 1, OT-IRF)  short term goal (STG)  -CC  --  short term goal (STG)  -CC    Progress/Outcomes (Transfer Goal 1, OT-IRF)  goal met  -CC  --  goal ongoing  -CC       Transfer Goal 2 (OT-IRF)    Activity/Assistive Device (Transfer Goal 2, OT-IRF)  toilet  -CC  --  toilet  -CC    [place] Level (Transfer Goal 2, OT-IRF)  standby assist  -CC  --  standby assist  -CC    Time Frame (Transfer Goal 2, OT-IRF)  long term goal (LTG)  -CC  --  long term goal (LTG)  -CC    Progress/Outcomes (Transfer Goal 2, OT-IRF)  goal not met  -CC  --  goal ongoing  -CC       Transfer Goal 3 (OT-IRF)    Activity/Assistive Device (Transfer Goal 3, OT-IRF)  shower chair  -CC  --  shower chair  -CC    [place] Level (Transfer Goal 3, OT-IRF)  contact guard assist  -CC  --  contact guard assist  -CC    Time  Frame (Transfer Goal 3, OT-IRF)  short term goal (STG)  -CC  --  short term goal (STG)  -CC    Progress/Outcomes (Transfer Goal 3, OT-IRF)  goal not met  -CC  --  goal ongoing  -CC       Transfer Goal 4 (OT-IRF)    Activity/Assistive Device (Transfer Goal 4, OT-IRF)  shower chair  -CC  --  shower chair  -CC    [employer] Level (Transfer Goal 4, OT-IRF)  standby assist  -CC  --  standby assist  -CC    Time Frame (Transfer Goal 4, OT-IRF)  long term goal (LTG)  -CC  --  long term goal (LTG)  -CC    Progress/Outcomes (Transfer Goal 4, OT-IRF)  goal not met  -CC  --  goal no longer appropriate  -CC       Bathing Goal 1 (OT-IRF)    Activity/Device (Bathing Goal 1, OT-IRF)  bathing skills, all  -CC  --  bathing skills, all  -CC    [employer] Level (Bathing Goal 1, OT-IRF)  minimum assist (75% or more patient effort);contact guard assist  -CC  --  minimum assist (75% or more patient effort);contact guard assist  -CC    Time Frame (Bathing Goal 1, OT-IRF)  short term goal (STG)  -CC  --  short term goal (STG)  -CC    Progress/Outcomes (Bathing Goal 1, OT-IRF)  goal met  -CC  --  goal met  -CC       Bathing Goal 2 (OT-IRF)    Activity/Device (Bathing Goal 2, OT-IRF)  bathing skills, all  -CC  bathing skills, all  -CC  bathing skills, all  -CC    [employer] Level (Bathing Goal 2, OT-IRF)  contact guard assist;standby assist  -CC  contact guard assist;standby assist  -CC  contact guard assist;standby assist  -CC    Time Frame (Bathing Goal 2, OT-IRF)  long term goal (LTG)  -CC  long term goal (LTG)  -CC  long term goal (LTG)  -CC    Progress/Outcomes (Bathing Goal 2, OT-IRF)  goal met  -CC  goal ongoing  -CC  goal ongoing  -CC       UB Dressing Goal 1 (OT-IRF)    Activity/Device (UB Dressing Goal 1, OT-IRF)  --  upper body dressing  -CC  upper body dressing  -CC    [employer] (UB Dress Goal 1, OT-IRF)  --  standby assist  -CC  standby assist  -CC    Time Frame (UB Dressing Goal 1, OT-IRF)  --  long term goal (LTG)  -CC   long term goal (LTG)  -CC    Progress/Outcomes (UB Dressing Goal 1, OT-IRF)  --  goal met  -CC  goal ongoing  -CC       UB Dressing Goal 2 (OT-IRF)    Activity/Device (UB Dressing Goal 2, OT-IRF)  --  upper body dressing  -CC  upper body dressing  -CC    [place] (UB Dress Goal 2, OT-IRF)  --  supervision required  -CC  supervision required  -CC    Time Frame (UB Dressing Goal 2, OT-IRF)  --  long term goal (LTG)  -CC  long term goal (LTG)  -CC    Progress/Outcomes (UB Dressing Goal 2, OT-IRF)  --  goal met  -CC  goal ongoing  -CC       LB Dressing Goal 1 (OT-IRF)    Activity/Device (LB Dressing Goal 1, OT-IRF)  --  lower body dressing  -CC  lower body dressing  -CC    [place] (LB Dressing Goal 1, OT-IRF)  --  moderate assist (50-74% patient effort)  -CC  moderate assist (50-74% patient effort)  -CC    Time Frame (LB Dressing Goal 1, OT-IRF)  --  long term goal (LTG)  -CC  long term goal (LTG)  -CC    Progress/Outcomes (LB Dressing Goal 1, OT-IRF)  --  goal met  -CC  goal met  -CC       LB Dressing Goal 2 (OT-IRF)    Activity/Device (LB Dressing Goal 2, OT-IRF)  --  lower body dressing  -CC  lower body dressing  -CC    [place] (LB Dressing Goal 2, OT-IRF)  --  minimum assist (75% or more patient effort);contact guard assist  -CC  minimum assist (75% or more patient effort);contact guard assist  -CC    Time Frame (LB Dressing Goal 2, OT-IRF)  --  long term goal (LTG)  -CC  long term goal (LTG)  -CC    Progress/Outcomes (LB Dressing Goal 2, OT-IRF)  --  goal met  -CC  goal revised this date  -CC       Toileting Goal 2 (OT-IRF)    Activity/Device (Toileting Goal 2, OT-IRF)  toileting skills, all  -CC  toileting skills, all  -CC  toileting skills, all  -CC    [place] Level (Toileting Goal 2, OT-IRF)  contact guard assist  -CC  contact guard assist  -CC  contact guard assist  -CC    Time Frame (Toileting Goal 2, OT-IRF)  long term goal (LTG)  -CC  long term goal (LTG)  -CC  long term goal (LTG)  -CC     Progress/Outcomes (Toileting Goal 2, OT-IRF)  goal partially met  -CC  goal ongoing  -CC  goal revised this date  -CC       Strength Goal 1 (OT-IRF)    Strength Goal 1 (OT-IRF)  Pt will increase B UE strength to approx. 4/5 to assist w/ ADLs and transfers.  -CC  Pt will increase B UE strength to approx. 4/5 to assist w/ ADLs and transfers.  -CC  Pt will increase B UE strength to approx. 4/5 to assist w/ ADLs and transfers.  -CC    Time Frame (Strength Goal 1, OT-IRF)  long term goal (LTG)  -CC  long term goal (LTG)  -CC  long term goal (LTG)  -CC    Progress/Outcomes (Strength Goal 1, OT-IRF)  goal ongoing  -CC  goal ongoing  -CC  goal ongoing  -CC       Direction Following Goal 1 (OT-IRF)    Activity (Direction Following Goal 1, OT-IRF)  --  --  follow one step directions  -CC    [place]/Cues/Accuracy (Direction Following Goal 1, OT-IRF)  --  --  with minimum;verbal cues/redirection  -CC    Time Frame (Direction Following Goal 1, OT-IRF)  --  --  long term goal (LTG)  -CC    Progress/Outcomes (Direction Following Goal 1, OT-IRF)  --  --  goal ongoing  -CC      User Key  (r) = Recorded By, (t) = Taken By, (c) = Cosigned By    Initials Name Provider Type    CC Genevieve Talavera OTR Occupational Therapist              Therapy Suggested Charges     Code   Minutes Charges    None             Therapy Charges for Today     Code Description Service Date Service Provider Modifiers Qty    10863214485 HC OT SELF CARE/MGMT/TRAIN EA 15 MIN 2/6/2019 Genevieve Talavera OTR GO 2    66127735450 HC OT THER PROC EA 15 MIN 2/6/2019 Genevieve Talavera OTR GO 2    81620061324 HC OT SELF CARE/MGMT/TRAIN EA 15 MIN 2/7/2019 Genevieve Talavera OTR GO 2    64306585571 HC OT THER PROC EA 15 MIN 2/7/2019 Genevieve Talavera OTR GO 2          OT Discharge Summary  Discharge Destination: Home with assist, Home with home health      MELISSA Marrero  2/7/2019

## 2019-02-07 NOTE — PROGRESS NOTES
Inpatient Rehabilitation Functional Measures Assessment and Plan of Care    Plan of Care  Updated Problems/Interventions  Field    Functional Measures  ALBERTO Eating:  Eating Score = 5. Patient is supervision/set-up for eating,  requiring: No assistive devices were required.  ALBERTO Grooming: Grooming Score = 5. Patient is supervision/set-up for grooming,  requiring: Verbal cuing, prompting, or instructing. Stand by assistance. No  assistive devices were required.  ALBERTO Bathing:  Patient bathed in shower. Bathing Score = 4.  Patient requires  minimal assistance for bathing, requiring steadying for balance only. Patient  requires the following assistive device(s): Grab bar/arm rest to maintain  balance. Hand held shower. max vc  ALBERTO Upper Body Dressing:  Upper Body Dressing Score = 5. Patient is supervision  for upper body dressing, requiring: Verbal cuing, prompting, or instructing. No  assistive devices were required.  ALBERTO Lower Body Dressing:  Patient requires no physical assistance for gathering  clothes. Patient requires no physical assistance for donning and/or doffing  undergarment threading right leg. Patient requires no physical assistance for  donning and/or doffing undergarment threading left leg. Patient requires  minimal/incidental physical assistance for pulling undergarment over hips and  adjusting fasteners. Patient requires no physical assistance for donning and/or  doffing pants/skirt threading right leg. Patient requires no physical assistance  for donning and/or doffing pants/skirt threading left leg. Patient requires  minimal/incidental physical assistance for pulling pants/skirt over hips and  adjusting fasteners. Patient requires no physical assistance for donning and/or  doffing right sock. Patient requires no physical assistance for donning and/or  doffing left sock. Patient requires no physical assistance for donning and/or  doffing right shoe. Patient requires no physical assistance for donning  and/or  doffing left shoe. Patient performs 95.45 % of lower body dressing tasks. Lower  Body Dressing Score = 4, Minimal Assistance. No assistive devices were required.  max vc  ALBERTO Toileting:  Toileting Score = 4.  Patient requires minimal assistance for  toileting, such as steadying for balance while cleansing or adjusting clothes.  No assistive devices were required.    ALBERTO Bladder Management  Level of Assistance:  Branch  Frequency/Number of Accidents this Shift:  [facility] Bowel Management  Level of Assistance: Branch  Frequency/Number of Accidents this Shift: [facility] Bed/Chair/Wheelchair Transfer:  [facility] Toilet Transfer:  Toilet Transfer Score = 4.  Patient performs 75% or more  of effort and minimal assistance (little/incidental help/steadying) for  transferring to and from the toilet/commode, requiring: Steadying. Contact  guard. Patient requires the following assistive device(s): Walker.  ALBERTO Tub/Shower Transfer:  Shower Transfer Score = 4. Patient performs 75% or  more of effort and minimal assistance (little/incidental help/lifting of one  limb/steadying) for transferring to and from the shower, requiring: Contact  guard. Patient requires the following assistive device(s): Walker. Shower chair.      Previously Documented Mode of Locomotion at Discharge: Field  ALBERTO Expected Mode of Locomotion at Discharge: [facility] Walk/Wheelchair:  Branch  [facility] Stairs:  [facility] Comprehension:  [facility] Expression:  [facility] Social Interaction:  [facility] Problem Solving:  [facility] Memory:  Branch    Therapy Mode Minutes  Occupational Therapy: Individual: 90 minutes.  Physical Therapy: Branch  Speech Language Pathology:  Branch    Signed by: MELISSA Marrero/ERNESTO

## 2019-02-07 NOTE — PROGRESS NOTES
SECTION GG      Self Care Performance Discharge:   Oral Hygiene: [place] provides verbal cues and/or touching/steadying and/or  contact guard assistance as patient completes activity.   Toileting Hygiene: : [place] does less than half the effort. [place] lifts, holds  or supports trunk or limbs but provides less than half the effort.   Shower/Bathe Self: [place] provides verbal cues and/or touching/steadying and/or  contact guard assistance as patient completes activity.   Upper Body Dressing: [place] provides verbal cues and/or touching/steadying  and/or contact guard assistance as patient completes activity.   Lower Body Dressing: [place] does less than half the effort. [place] lifts, holds  or supports trunk or limbs but provides less than half the effort.   Putting On/Taking Off Footwear: [place] provides verbal cues and/or  touching/steadying and/or contact guard assistance as patient completes  activity.    Mobility Toilet Transfer Discharge: [place] provides verbal cues or  touching/steadying assistance as patient completes activity.    Signed by: MELISSA Marrero/ERNESTO

## 2019-02-07 NOTE — THERAPY TREATMENT NOTE
Inpatient Rehabilitation - Physical Therapy Treatment Note  [facility]     Patient Name: Feliciano Light  : 1955  MRN: [id number]    Today's Date: 2019                 Admit Date: 2019      Visit Dx:      ICD-10-CM ICD-9-CM   1. Protein-calorie malnutrition, unspecified severity (CMS/HCC) E46 263.9   2. 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   • SIADH (syndrome of inappropriate ADH production) (CMS/HCC)   • Elevated hemoglobin A1c   • Hyperlipidemia   • Protein-calorie malnutrition (CMS/HCC)       Therapy Treatment    IRF Treatment Summary     Row Name 19 1043             Evaluation/Treatment Time and Intent    Subjective Information  no complaints was nauseated earlier per wife  -JK      Existing Precautions/Restrictions  fall  -JK      Document Type  therapy note (daily note)  -JK      Mode of Treatment  physical therapy  -JK      Patient/Family Observations  pt up in   -JK      Recorded by [JK] Emily Jarrett, PT      Row Name 19 1043             Cognition/Psychosocial- PT/OT    Affect/Mental Status (Cognitive)  confused alert  -JK      Orientation Status (Cognition)  oriented to;person;situation  -JK      Follows Commands (Cognition)  follows one step commands;50-74% accuracy;delayed response/completion;increased processing time needed;initiation impaired;physical/tactile prompts required;repetition of directions required;verbal cues/prompting required  -JK      Personal Safety Interventions  fall prevention program maintained;gait belt  -JK      Cognitive Function (Cognitive)  safety deficit  -JK      Attention Deficit (Cognitive)  distractible in noisy environment;distractible in quiet environment  -JK      Safety Deficit (Cognitive)  moderate deficit  -JK      Recorded by [JK]  Emily Jarrett, DEBORAH      Row Name 02/07/19 1043             Bed Mobility Assessment/Treatment    Supine-Sit [place] (Bed Mobility)  not tested  -JK      Recorded by [JK] Emily Jarrett, PT      Row Name 02/07/19 1043             Sit-Stand Transfer    Sit-Stand [place] (Transfers)  verbal cues;contact guard;minimum assist (75% patient effort)  -JK      Assistive Device (Sit-Stand Transfers)  wheelchair;walker, front-wheeled  -JK      Recorded by [JK] Emily Jarrett PT      Row Name 02/07/19 1043             Stand-Sit Transfer    Stand-Sit [place] (Transfers)  verbal cues;minimum assist (75% patient effort);contact guard  -JK      Assistive Device (Stand-Sit Transfers)  walker, front-wheeled  -JK      Recorded by [JK] Emily Jarrett, PT      Row Name 02/07/19 1043             Gait/Stairs Assessment/Training    [place] Level (Gait)  contact guard;verbal cues  -JK      Assistive Device (Gait)  walker, front-wheeled  -JK      Distance in Feet (Gait)  80' x 3; 175'  -JK      Deviations/Abnormal Patterns (Gait)  gait speed decreased;stride length decreased  -JK      Bilateral Gait Deviations  forward flexed posture;heel strike decreased  -JK      Comment (Gait/Stairs)  pt more alert today and steered wx without assist   -JK      Recorded by [JK] Emily Jarrett, PT      Row Name 02/07/19 1043             Pain Scale: Numbers Pre/Post-Treatment    Pain Scale: Numbers, Pretreatment  0/10 - no pain  -JK      Pain Scale: Numbers, Post-Treatment  0/10 - no pain  -JK      Recorded by [JK] Emily Jarrett, PT      Row Name 02/07/19 1043             Positioning and Restraints    Pre-Treatment Position  sitting in chair/recliner  -JK      Post Treatment Position  wheelchair  -JK      In Wheelchair  sitting;exit alarm on;with family/caregiver;heels elevated  -JK      Recorded by [JK] Emily Jarrett, PT        User Key  (r) = Recorded By, (t) = Taken By, (c) = Cosigned By    Initials Name Effective Dates    JK  Emily Jarrett, PT 04/03/18 -         Wound 01/02/19 1710 Bilateral lateral head incision (Active)   Dressing Appearance open to air 2/6/2019  8:05 PM   Periwound dry;intact 2/6/2019  8:05 PM   Drainage Amount none 2/7/2019  8:25 AM   Dressing Care, Wound open to air 2/7/2019  8:25 AM     Physical Therapy Education     Title: PT OT SLP Therapies (In Progress)     Topic: Physical Therapy (In Progress)     Point: Mobility training (Done)     Learning Progress Summary           Patient Acceptance, E,D, VU,DU,NR by GABRIELAK at 2/7/2019 10:43 AM    Acceptance, E,TB, NR by LB at 2/6/2019 11:43 AM    Acceptance, E, NR by GABRIELAK at 2/5/2019  2:22 PM    Acceptance, E, DU,NR by JK at 2/5/2019 10:41 AM    Acceptance, E,TB,D, VU,DU by LB at 2/4/2019  3:05 PM    Comment:  Denice able to assist pt on steps, transfers and gait with RWx    Acceptance, E,TB, VU,DU by LB at 1/31/2019 10:00 AM    Comment:  Wife, Denice able to assist pt with car transfer and ambulation for 80ft with Rwx.  okay to go to MD appt today    Acceptance, E,TB, NR by ERNESTO at 1/29/2019  9:16 AM    Acceptance, E,TB, NR by LB at 1/28/2019  3:06 PM    Acceptance, E,TB, VU,NR by IRENE at 1/26/2019 12:21 PM    Acceptance, E, DU,NR by GABRIELAK at 1/25/2019 11:14 AM    Acceptance, E,TB, VU,NR by LB at 1/24/2019  1:47 PM    Acceptance, E,D, DU,NR by GABRIELAK at 1/22/2019  3:18 PM    Acceptance, E,TB, NR by LB at 1/21/2019 12:15 PM    Acceptance, E,TB, NR by LB at 1/19/2019  9:33 AM    Acceptance, E, NR by ERNESTO1 at 1/18/2019  3:31 PM    Acceptance, E,TB, NR by LB at 1/17/2019  9:22 AM    Acceptance, E, NR by ERNESTO1 at 1/16/2019  1:21 PM    Acceptance, E,TB, NR by LB at 1/14/2019  9:21 AM    Acceptance, E,TB, NR by JS at 1/12/2019 12:08 PM    Acceptance, E,TB, NR by LB at 1/11/2019  9:33 AM    Acceptance, E,TB, VU,NR by LB at 1/9/2019  9:37 AM    Acceptance, E,TB, NR by LB at 1/8/2019  9:14 AM    Acceptance, E,TB, NR by LB at 1/7/2019  9:36 AM    Acceptance, E,TB, VU,NR by IRENE at 1/5/2019  2:34 PM     Acceptance, E,TB, NR by LB at 1/4/2019 10:22 AM    Acceptance, E,TB, NR by LB at 1/3/2019 10:39 AM   Significant Other Acceptance, E,TB,D, VU,DU by LB at 2/4/2019  3:05 PM    Comment:  Denice able to assist pt on steps, transfers and gait with RWx    Acceptance, E,TB, VU,DU by LB at 1/31/2019 10:00 AM    Comment:  Wife, Denice able to assist pt with car transfer and ambulation for 80ft with Rwx.  okay to go to MD appt today                   Point: Home exercise program (In Progress)     Learning Progress Summary           Patient Acceptance, E,TB, NR by LB at 1/22/2019  3:25 PM    Acceptance, E,TB, NR by JS at 1/12/2019 12:08 PM                   Point: Precautions (In Progress)     Learning Progress Summary           Patient Acceptance, E,TB, NR by JS at 1/12/2019 12:08 PM    Acceptance, E,TB, NR by LB at 1/8/2019  3:14 PM                               User Key     Initials Effective Dates Name Provider Type Discipline    IRENE 06/08/18 -  Elvira Salter, PT Physical Therapist PT    JS 04/06/17 -  Vickie Pradhan, PT Physical Therapist PT    LB 04/03/18 -  Steffany Flynn, PT Physical Therapist PT    LB 03/07/18 -  Jessica Oneal, PTA Physical Therapy Assistant PT    JK 04/03/18 -  Emily Jarrett, PT Physical Therapist PT                  PT Recommendation and Plan                        Time Calculation:     PT Charges     Row Name 02/07/19 1337 02/07/19 1119          Time Calculation    Start Time  1300  -JK  1030  -JK     Stop Time  1330  -JK  1100  -JK     Time Calculation (min)  30 min  -JK  30 min  -JK       User Key  (r) = Recorded By, (t) = Taken By, (c) = Cosigned By    Initials Name Provider Type    Emily Gallegos, PT Physical Therapist            Therapy Charges for Today     Code Description Service Date Service Provider Modifiers Qty    56513944841 HC PT THER PROC EA 15 MIN 2/7/2019 Emily Jarrett, PT GP 4                   Emily Jarrett PT  2/7/2019

## 2019-02-07 NOTE — PROGRESS NOTES
Adult Nutrition  Assessment/PES    Patient Name:  Feliciano Light  YOB: 1955  MRN: [id number]  Admit Date:  1/2/2019    Assessment Date:  2/7/2019    Comments:  Pt to dc to home tomorrow with TF regimen via pump (intolerant of boluses). Recommend the following cyclic TF rx:    Nutren 1.5 @ 75 cc/hr   Run in 5 hour increments, 3 times daily (total of 15 hours)   Total volume infused per 24 hours = 1125 cc  Water flushes: 100 cc x 4 per day    Wife/caregiver can adjust infusion time blocks as needed so long as pt gets 15 hours total at 75 cc/hr or total of 1125 cc of Nutren 1.5 daily. Provision of nutrients detailed below.     Reason for Assessment     Row Name 02/07/19 1127          Reason for Assessment    Reason For Assessment  follow-up protocol;TF         Nutrition/Diet History     Row Name 02/07/19 1127          Nutrition/Diet History    Typical Food/Fluid Intake Pt intolerant of boluses - vomiting. MD wants pt to dc home with TF via pump. Discussed running over 5 hours at 3 different times daily.            Problem/Interventions:    Intervention Goal     Row Name 02/07/19 1128          Intervention Goal    General  Nutrition support treatment     TF/PN  Adjust TF/PN         Nutrition Intervention     Row Name 02/07/19 1129          Nutrition Intervention    RD/Tech Action  Recommend/ordered     Recommended/Ordered  EN         Nutrition Prescription     Row Name 02/07/19 1129          Nutrition Prescription EN    Enteral Route  PEG     Product Nutren 1.5     TF Delivery Method  Cyclic     Cyclic TF Goal Volume (mL)  1125 mL     Cyclic TF Goal Rate (mL/hr)  75 mL/hr     Cyclic TF Starting Rate (mL/hr)  50 mL/hr     Cyclic TF Cycle Over (hrs)   x 15 hours daily (3-5 hour infusion times, or manipulated to get minimum volume of 1125 cc daily)     Water flush (mL)   100 mL     Water Flush Frequency  Other (comment) 4 x/day        EN to Supply    Kcal/Day  1687 Kcal/Day     Kcal/Kg  28.6 Kcal/Kg      Protein (gm/day)  76 gm/day     Meet Estimated Kcal Need (%)  96 %     Meet Estimated Protein Need (%)  100 %     TF Free H2O (mL)  855 mL     Total Free H2O (mL/day)  1255 mL/day         Education/Evaluation     Row Name 02/07/19 1132          Monitor/Evaluation    Monitor  Per protocol           Electronically signed by:  Elayne Tim RD  02/07/19 11:33 AM

## 2019-02-07 NOTE — PROGRESS NOTES
Inpatient Rehabilitation Functional Measures Assessment    Functional Measures  ALBERTO Eating:  Branch  [facility] Grooming: Branch  [facility] Bathing:  Branch  [facility] Upper Body Dressing:  Branch  [facility] Lower Body Dressing:  [facility] Toileting:  [facility] Bladder Management  Level of Assistance:  [place]  Frequency/Number of Accidents this Shift:  [facility] Bowel Management  Level of Assistance: [place]  Frequency/Number of Accidents this Shift: Branch    [facility] Bed/Chair/Wheelchair Transfer:  Activity was not observed.  ALBERTO Toilet Transfer:  [facility] Tub/Shower Transfer:  [place]    Previously Documented Mode of Locomotion at Discharge: Field  ALBERTO Expected Mode of Locomotion at Discharge: [facility] Walk/Wheelchair:  WHEELCHAIR OBSERVATION   Activity was not observed.    WALK OBSERVATION   Walk Distance Scale = 3.  Distance walked is greater than 150 feet. Walk Score  = 4.  Patient performs 75% or more of effort and requires minimal assistance.  Incidental help/contact guard/steadying was provided. Patient walked a distance  of  175 feet. Patient requires the following assistive device(s): Rolling  walker.  ALBERTO Stairs:  Activity was not observed.    ALBERTO Comprehension:  [facility] Expression:  [facility] Social Interaction:  [facility] Problem Solving:  [facility] Memory:  [place]    Therapy Mode Minutes  Occupational Therapy: Branch  Physical Therapy: Individual: 60 minutes.  Speech Language Pathology:  Branch    Signed by: Emily Jarrett PT

## 2019-02-07 NOTE — PROGRESS NOTES
Inpatient Rehabilitation Plan of Care Note    Plan of Care  Care Plan Reviewed - No updates at this time.    Psychosocial    Performed Intervention(s)  Verbalizes needs & concerns &  prn      Safety    Performed Intervention(s)  Items in reach, Falls precautions/ protocol  Safety rounds & Bed/chair alarms, items within reach      Sphincter Control    Performed Intervention(s)  Monitor intake, output, & bowel movements  Use incontinence products/ equipment, & perineal care  Encourage appropriate diet & fluid intake      Nutrition    Performed Intervention(s)  Monitor oral intake with each meal.  Administer tube feed per order  monitor tolerance of TF    Signed by: Kayleen Draper, RN

## 2019-02-07 NOTE — PROGRESS NOTES
Inpatient Rehabilitation Plan of Care Note    Plan of Care  Care Plan Reviewed - No updates at this time.    Psychosocial    Performed Intervention(s)  Verbalizes needs & concerns &  prn      Safety    Performed Intervention(s)  Items in reach, Falls precautions/ protocol  Safety rounds & Bed/chair alarms, items within reach      Sphincter Control    Performed Intervention(s)  Monitor intake, output, & bowel movements  Use incontinence products/ equipment, & perineal care  Encourage appropriate diet & fluid intake      Nutrition    Performed Intervention(s)  Monitor oral intake with each meal.  Administer tube feed per order  monitor tolerance of TF    Signed by: Alonso Garcia, RN

## 2019-02-07 NOTE — PROGRESS NOTES
"   LOS: 36 days   Patient Care Team:  Jesus Bautista MD as PCP - General (Family Medicine)     Chief Complaint:   1. 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  2. 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  3. Status post right  shunt removal secondary to infection- completed course of antibiotics on 01/02/2019.  4. Status post external ventricular drain and removal.  5. Left upper extremity brachial vein DVT-on Eliquis.  6. Hyponatremia.   7. Neural stimulation-       Subjective          Subjective  Continues on continuous tube feeds.  Did take Antivert once this morning  Therapeutic day pass today  Still confused. Cognition without any dramatic improvement coming off meds, except not having any recent hallucinations..  He does seem a little better off of the antiepileptic medication.  We discussed starting back Ritalin at some point as does not appear related to when he had hallucinations and he did show initial improvement on Ritalin          History taken from: patient    Objective     Vital Signs  Temp:  [97.6 °F (36.4 °C)-98 °F (36.7 °C)] 98 °F (36.7 °C)  Heart Rate:  [] 106  Resp:  [18] 18  BP: (116-121)/(68-73) 116/68    Objective:  Vital signs: (most recent): Blood pressure 116/68, pulse 106, temperature 98 °F (36.7 °C), temperature source Oral, resp. rate 18, height 162.6 cm (64\"), weight 58.8 kg (129 lb 10.1 oz), SpO2 96 %.            GENERAL: The patient is a 63-year-old male who is awake, alert  , in no acute distress.  HEENT:   Sclerae anicteric. Conjunctivae pink. Oropharynx moist.     LUNGS: Clear to auscultation bilaterally without wheezes, rales or rhonchi. No accessory muscle use.  HEART: RRR  ABDOMEN: Normoactive bowel sounds. Soft, PEG tube under [place].  EXTREMITIES: Without edema or cyanosis.   NEUROLOGIC: Slow processing   . Converses.    Impaired recall. Takes resistance " bilaterally. FTN =  He gives confused responses.             Results Review:     I reviewed the patient's new clinical results.  Results from last 7 days   Lab Units 02/04/19  0530 02/03/19  0500 02/02/19  1440   SODIUM mmol/L 140 140 140   POTASSIUM mmol/L 4.0 3.8 3.9   CHLORIDE mmol/L 105 105 103   CO2 mmol/L 22.4 25.1 24.6   BUN mg/dL 17 14 11   CREATININE mg/dL 0.55* 0.60* 0.57*   CALCIUM mg/dL 9.1 9.0 9.1   BILIRUBIN mg/dL  --   --  0.8   ALK PHOS U/L  --   --  62   ALT (SGPT) U/L  --   --  36   AST (SGOT) U/L  --   --  19   GLUCOSE mg/dL 139* 107* 107*     Results from last 7 days   Lab Units 02/04/19  0530 02/03/19  0500 02/02/19  0646   WBC 10*3/mm3 6.07 7.01 14.43*   HEMOGLOBIN g/dL 12.9* 12.6* 13.4*   HEMATOCRIT % 41.0 38.2* 41.5   PLATELETS 10*3/mm3 195 194 193       MRI brain - Jan 16, 2019  Portions summarized -   The lateral 3rd and superior 4th ventricle remain mildly enlarged  compatible with mild hydrocephalus unchanged since 10/09/2019  , the   ventricles are clearly slightly larger than they were on prior outside  head CTs on 12/25/2018 and 12/27/2018 after the removal of the  ventriculostomy catheters.  3.8 x 2.9 cm area of FLAIR hyperintensity and mixed diffusion  hyperintensity extending from the inferior medial cerebellar white  matter into the cerebellar vermis that is compatible with subacute  infarct of brain along the margins of the [employer] used to embolize the  vermian AVM  The 2nd  ventriculostomy catheter extended from the superior right coronal sutures through the  anterior superior lateral right frontal lobe parenchyma to the  anterosuperior body of the right lateral ventricle, and this has some  diffusion hyperintensity in the tract but also there is some FLAIR and  diffusion hyperintensity circumscribing the catheter tract within the  right anterosuperior lateral frontal white matter measuring up to 12 x 8  x 9 mm surrounding FLAIR and diffusion hyperintensity could be edema  related  to the placement and removal or could be some focal cerebritis.    CT head - Jan 24 , 2019  -There is mild prominence of the temporal horns similar to the prior  examination. Beam hardening artifact from the involved material limits  evaluation of the posterior fossa somewhat, but there is no convincing  evidence of hemorrhage involving the posterior fossa. There is resolving  intraventricular blood appreciated. The occipital horns are smaller than  the prior examinations. The remaining portions of the lateral ventricles  as well as the 3rd and 4th ventricle appear unchanged. There is no  evidence of transependymal migration of fluid. There is no evidence of  intra-axial hemorrhage.    Medication Review: done  Scheduled Meds:    influenza vaccine 0.5 mL Intramuscular Once   lansoprazole 30 mg Oral QAM   multivitamin with minerals 1 tablet Oral Daily   polyethylene glycol 17 g Oral Daily     Continuous Infusions:     PRN Meds:.•  acetaminophen  •  bisacodyl  •  magnesium hydroxide  •  meclizine  •  ondansetron  •  ondansetron ODT  •  promethazine  •  sodium chloride      Assessment/Plan       Cerebellar hemorrhage (CMS/HCC)    Intraventricular hemorrhage (CMS/HCC)    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    SIADH (syndrome of inappropriate ADH production) (CMS/HCC)    Elevated hemoglobin A1c    Hyperlipidemia      Assessment & Plan  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    Status post hydrocephalus. S/p removal infected  shunt. Features of hydrocephalus worsening impaired memory, balance , bladder incontinence reviewed with patient and wife in event he develops any of those symptoms in future.   Jan 7 - recheck CT head with patient's lethargy / persistent nausea ( also had at outside hospital) to assess for any worsening of hydrocephalus with shunt removed.  Addendum-There is stable to equivocal slight  interval increase in size in the lateral and third ventricles when compared to prior head CT 11 days ago on 12/27/2018 compatible with mild hydrocephalus. There is some low density and volume loss along the margins of the hyperdense liquid  embolic agents of the cerebellar vermi, compatible with areas of infarcted cerebellar vermis measuring up to 3.5 x 3 x 2.8 cm, unchanged.No residual acute intracranial hemorrhage is seen.  Jan 9 - Nursing called - patient got up on his own, bed alarm went off, in room near nursing station.  Fell and struck head on bedside dresser and bed that wife was in.  Nursing reports no change seen from recent baseline neuro. Got stat CT head ( no chnages from two days ago, no new bleed) and repeat CT  in 24 hours as on Eliquis, neuro checks q 2 hours, sitter ordered. Discontinued Eliquis which he is on for LUE brachial DVT and re-assess regarding anticoagulation after CT tomorrow.   He does not describe any new complaint. Baseline nausea, headache. He did better yesterday after first dose of Ritalin and ate more at dinner. Ate 50% breakfast today per sitter.   Selvin 10 - neuro stable. Given head trauma yesterday on Eliquis, will repeat CT head this afternoon to rule out any bleed before resuming Eliquis for DVT. Add:  F/U CT this afternoon - no acute hemorrhage.  Will resume Eliquis for LUE DVT  Selvin 15 - Patient with variable performance, more alert at times per nursing earlier today,  but then other times more fatigued with therapies.   On amantadine and ritalin.  Na level stable yesterday. To recheck in AM.  WBC normal yesterday. No fever.  On Eliquis for LUE DVT on venous duplex Dec 4 and still present on venous duplex Dec 26.  Concern at any point would be for potential for SDH over time.   At this point, as he is 6 weeks out from LUE DVT on Dec 4, will hold Eliquis for now, recheck BUE venous duplex tomorrow, and then decide on ongoing anti-coagulation,  and if any persistent decline  with performance will check CT head. Will not check CT head this evening as not any dramatic change on physical exam .    Jan 16 - Patient with confused comments, said an odd name randomly, mis-identified where wife worked. SLP also notes some confused comments. Wife reports he complains of headache, but  denied having headache presently to examiner. Wife reports he complained of ear pain but may have been related to tape for feeding tube. His nausea is better and eating better. He reports nausea is low . Does not complain of any new weakness on exam.   Na 129 - not a dramatic change but will d/c water flushes with tube feeds. Will get MRI brain with and without contrast stat- assess for any acute changes from prior infection or any acute bleed. MRI states can get timely once screening sheet completed. Recheck WBC.  Will d/c scopalamine patch - done. (add:  Discussed with neurosurgery and neuroradiology - patient below the threshold in terms of number of CT scan for any concern for cumulative effect - can get repeat CT scans as need). (add:  MRI done without contrast as not able to get IV site).    Add:  MRI portions summarized above. No acute bleed. Subacute infarct - 3.8 x 2.9 cm area of FLAIR hyperintensity and mixed diffusion hyperintensity extending from the inferior medial cerebellar white matter into the cerebellar vermis that is compatible with subacute infarct of brain along the margins of the [place] used to embolize the  vermian AVM . Mild hydrocephalus - Neurosurgery wanted to hold of placing  shunt in near term due to infection with  shunt. Along tract of previous ventriculostomy changes of either edema or focal cerebritis (will monitor as no fever or leukocytosis).   Jan 17 - The patient's imaging was reviewed with [facility] neuro radiology and [place] neurosurgery service.  Neurosurgery evaluating ventricle size as well as the area along the track of the previous frontal ventriculostomy of either edema  or focal cerebritis.  On the previous CAT scan that appeared to represent possibly an area of CSF backflow that improved with subsequent repeat ventriculostomy placement posteriorly.  Jan 19 - Patient with temp 100.2 earlier today. Later was 99.0 / 98.4.   He continues with baseline nausea. Ate only 1/5 of breakfast. He describes some posterior headache.  Wife reports he will do confused action, talk about people who don't live here.   He continues more alert. Does not describe any focal weakness. Dysphonia better.   Will recheck CBC with diff, procalcitonin, ESR, CRP, CMP and consult ID to see.  Addendum-infectious disease does not feel there was any active infectious process.  Continues to monitor  Jan 24 - Ct shows ventricles stable to slightly smaller size  Jan 31 - f/u with [facility] -  [facility] note reviewed.  Per EDIL, can come off AED and see how does in terms of seizures.  He received first dose of Vimpat liquid yesterday but was not continued on MAR as q 12 hours. Got second dose today and back on MAR as q 12 hours. Last dose Keppra yesterday.  Will continue Vimpat through procedure tomorrow and can then look at taper off.   Feb 5 - tapers off Vimpat tonight          Status post meningitis.Completed antibiotics at outside hospital.  Jan 4 - recheck CBC in AM.  Jan 5 - WBC 5K  Jan 7 - WBC 5.9. No fever. Will check procalcitonin/ESR, CRP with lethargy to assess for any recurrent infection.  Addendum-unremarkable.  CRP 0.12, sedimentation rate 14, pro-calcitonin 0.05, WBC 5.93  Jan 19 - temp 100.2. Recheck labs and ask ID to see. Addendum-infectious disease does not feel there was any active infectious process.  Continues to monitor  Jan 21 - afberile. WBC 7.69      Nausea - Jan 14 . Has been on Zofran. Persistent nausea s/p posterior fossa changes. Previously held off on scopolamine patch given potential cognitive side effects, but with persistent nausea that is affecting nutrition, will add.    Jan 16 - Scopolamine helped nausea but had to d/c scopolamine due to cognitive side effect.   Jan 18 - Tolerates tube feeds. Still nauseated. Discussed trial of Phenergan after therapies tomorrow due to sedative side effect.    January 20- he took Phenergan twice yesterday, patient reports nausea better, wife notes that had emesis after dinner.  He is on Zofran scheduled before meal time  Jan 21 - Has not take phenergan since Jan 19. Emesis again with breakfast.Discussed observe off amantadine today to see if any effect on confused comments.  Discussed tomorrow will change from Zofran scheduled to antivert 12.5 mg scheduled tid before meals.  Also add Remeron tomorrow PM to see if helps with appetite. Other option would include Topamax 25 mg HS for headache/nausea.   Jan 24 - Antivert helps nausea but ? If cognitive side effect. Will adjust other meds first.  Jan 25 - Get gastroenterology input on any options for nausea.  Jan 28 - UGI with SBFT today. Possibly gastric emptying study depending on results.   Jan 29 - UGI with SBFT overall unremarkable yesterday. To go for gastric emptying study tomorrow at 7:30 AM  Jan 30 - GES results - normal. Denies nausea on Anitvert.   Feb 1 - if tolerates tube feeds after PEG placed, may change Antivert to 12.5 mg tid prn on Vish Feb 3 to see if any cognitive side effect.   Feb 4 - change antivert to 12.5 mg tid prn to see if cognitive side effect.       Hyponatremia. SIADH.   Selvin 3 - stable at 130.   Jan 5 - Serum sodium has gone up from 130 to 131.  Serum osmolarity is low at 272 but urine osmolarity is inappropriately concentrated at 847/759.  Thyroid function test is normal.  Results are consistent with SIADH.   Placed on a 1500 mL per day fluid restriction ( but he does not approach that on his own).  Jan 6 - . Urine osmolality 277. BUN 9, Cr 0.67.   Jan 8 - Endocrinology increase dietary salt and continue fluid restriction.  Jan 9 - salt tabs added  Jan 14 - NA  131  Jan 18 - Na 133. On salt tabs. No water flushes with tube feeds.   Jan 22 - salt tabs decreased to bid  Jan 23 - Na 136. Salt tabs d/'d as difficulty taking by mouth  Jan 28 - started on normal saline yesterday with decrease appetite and NPO after midnight for meds. Na 139 today.   Jan 29 - IVF d/c'd  Feb 1 - PEG not until afternoon. Add IVF x 2 liters D5 NS with 20 KCL at 100 cc per hour.       Adrenal - Evaluation against adrenal insufficiency.  Endocrinology discontinuing decadron and will re-evaluate further.   Jan 7 - worsened lethargy in therapies today, ? If could be related to being off steroids?  Jan 8- repeat ACTH test  January 11-adrenal insufficiency    Left upper extremity DVT-on Eliquis.  DVT prophylaxis-SCD and Eliquis.  Jan 9 - Eliquis on hold for at least 24 hours with recent fall with trauma to end. Re-assess after CT on Selvin 10.   Selvin 15 - Concern at any point would be for potential for SDH over time any time he shows a fluctuation. At this point, as he is 6 weeks out from LUE DVT on Dec 4, will hold Eliquis for now, recheck BUE venous duplex tomorrow, and then decide on ongoing anticoagulation.   Jan 16 - left brachial vein DVT resolved. BUE superficial venous thrombosis. No bleed on MRI. Resume Eliquis.   Jan 26 - Will d/c Eliquis after  Friday Jan 25 PM dose in preparation for any possible endoscopic evaluation/ procedure first of the week. . BUE venous duplex Jan 16  showed resolution of LUE brachial vein DVT. Had acute and chronic RUE SVT and chronic LUE SVT.   Feb 3 - BUE venous duplex BUE SVT. Patient did not extend LUE for evaluation of left axillary / basilic vein. Left brachial vein no DVT- will not resume Eliquis as DVT 2 months ago and line associated.      Encephalopathy / Neuro- stimulation-admitted on amantadine.  Jan 8 - depression may be a component. SSRI may affect appetite or sodium level. Possibly add Ritalin, could affect appetite but if more alert could possibly eat more.   Will add Ritalin 5 mg every 8 AM and 12 noon.  Selvin 10 - more alert and interactive  Jan 20 - he has some confused comments.  Neurosurgery evaluated imaging.  Seen by ID and not felt to have any acute infectious process.  Discussed with patient and wife,  may possibly have some cognitive side effects from amantadine, decreased to 100 mg today and then discontinue starting with tomorrow and see if there is any improvement.  Doubt confusion would be related to Ritalin.  He has had it before starting of Phenergan. Na level okay 133.  Other consideration would be Keppra side effect contributing.  Jan 22 - no apparent change yet off amantadine.   Jan 24 - continues with increased confusion/ behavioral changes.   Patient has been more confused, more difficulty with tasks from cognitive standpoint.  Making odd, confused comments. Will perseverate on some tasks.  Also increased irritability at times, shaking fists.  He has had some hallucinations as well  He denies any headache. Denies any nausea but does not remember emesis from this morning.  His wife notes that nausea is noticeably better since antivert and nursing reports same, but we discussed could be having some confusion from antivert. Other possibilities could be Ritalin although showed initial improvement with that vs hydrocephalus. Discussed discontinue Remeron and Ritalin and recheck CT head. Will also check UA. Continue antivert for now as does help nausea.   Add: CBC unremarkable. UA pending. CT head stable with - There is resolvingintraventricular blood appreciated. The occipital horns are smaller than the prior examinations. The remaining portions of the lateral ventricles as well as the 3rd and 4th ventricle appear unchanged. There is no evidence of transependymal migration of fluid.  Jan 25 - still confusion , aggressive behavior at night. He had confusion last weekend before antivert but could have symptoms of underlying changes in brain made more  prominent by medication side effect. Discussed see how does off other meds for next couple days and if confusion/ hallucinations continue, then change Keppra to Vimpat. UA unremarkable. BUN/CR, NA okay.   Jan 28 - had planned change to Vimpat but patient unable to swallow pill that large and liquid form not available here.  Jan 30 - gets more confused / irritable later in the days. Still with hallucinations at times per wife. Patient not able to describe. Vimpat liquid to be available this afternoon - will transition from Keppra to see if helps cognition/hallucinaiton/ mood. Last dose Keppra tonight.   Feb 4- taper off Vimpat after tomorrow night   Feb 5 - He gives confused responses, has lap belt for wheelchair and confused with what this is for, asking about his license repeatedly. Patient was irritable with wife earlier today.  Has not taken any prn Antivert past two days, comes off Vimpat after dose tonight.  Feb 6 - still confused rambling comments. See how does further off meds.  Feb 7 - Still confused. Cognition without any dramatic improvement coming off meds, except not having any recent hallucinations..  He does seem a little better off of the antiepileptic medication.  We discussed starting back Ritalin at some point as does not appear related to when he had hallucinations and he did show initial improvement on Ritalin      Seizure prophylaxis-Initially on Keppra.  Jan 30 - Vimpat liquid to be available this afternoon - will transition from Keppra to see if helps cognition/hallucinaiton/ mood.   Feb 1 - per Karel EDIL can come off seizure prophylaxis - will taper off Vimpat in couple days after recovery period after PEG placement   Feb 4- drowsy - may be related to Vimpat, did not take this AM dose, taper off with 50 mg tonight and tomorrow night and then stop.     Nutrition - Jan 5 -  poor intake. Staff encouraging intake. ( He was placed on 1500 cc fluid restriction but does not appraoch that on his  own. Calorie count in progress. May possibly add Remeron. Would avoid Megace given its thrombogenic potential and patient's history of LUE DVT.  Jan 7 - will change back to scheduled Zofran prior to meals. Assess ventricles with CT, labs to assess for infection.    Jan 8 - RUQ ultrasound ordered - see report.  Consider Remeron but sedative side effect could be an issue. Nausea probably related to posterior fossa CNS   Jan 9 - PO intake remains poor.  Will discuss with patient and wife Cortrak feeding tube.   Add: patient and spouse is agreeable. He ate about 50% at supper. Nursing feels he is showing some improvement in intake. Discussed with patient and wife that with next meal that he does not show good intake will place Cortrak. They are in agreement.   Selvin 10 - eating 50% breakfast and lunch so hold on Cortrak presently.   Jan 14 -  Persistent nausea. Had emesis yesterday and again today.  Intake decreased again over weekend.  Neuro without change. No headache, dizziness, abd pain. Previous follow up CTs without significant change.  Discussed with patient and wife - agreeable with Cortrak tube placement to help with nutrition given weight loss.  On Zofran scheduled before meals. Concern with adding Phenergan is sedative side effect. Will try scopalamine patch.   Selvin 15 - nausea better per patient report. Tolerates tube feeds.   Jan 18 - continue tube feeds through weekend and then re-assess.   Jan 21 - Intake by mouth still limited.  Reviewed with dietician, patient, wife and will continue Cortrak tube feeds.  Jan 22- adding remeron for appetite tonight.   Jan 23 - patient pulled Cortrak early AM - discussed see how eats with tube out  Jan 24 -  Discussed replacing Cortrak tube in AM for nutritional support as less likely to pull out during the day.   Jan 25 - replace Cortrak- addendum: Patient not able to tolerate replacement of Cortrak tube. His intake has remained poor. Discussed option of Reglan to see if  helps appetite but concern would be for cognitive side effects. Discussed with patient / wife looking at G-tube placement. Will ask Gastroenterology to see for assessment for options on nausea and PEG tube placement.. His Eliquis can be held at any time for the procedure.   Jan 29 - gastric emptying swallow study tomorrow to determine if place PEG or PEJ tube  Jan 30 - await GES results. Patient has appointment with his Neurosurgery at outside facility tomorrow at 1PM which would affect timing of tube placement.   Feb 1 - PEG placement today.   Feb 4 - titrate up on PEG tube feeds.   Feb 5 - tolerates tube feeds at 55 cc/hour  Feb 6 - tried bolus tube feeds and had emesis- not clear if related to bolus or baseline nausea. . Will change back to continuous          Impaired cognition, mobility and self-care. Now admitted for comprehensive inpatient rehabilitation program with physical therapy 1 hour, occupational therapy 1 hour, speech therapy 1 hour, 5 days a week. Areas to be addressed include cognition/executive function, swallowing, functional mobility, gross and fine motor control, ADL training, transfers, balance, gait. Rehabilitation nursing for carryover and monitoring of his neurologic status, bowel, bladder, skin. Ongoing physician followup. Weekly team conferences. Goal for him to achieve a level of supervision with his mobility and self-care and improvement in his cognition. Rehabilitation prognosis fair. Medical prognosis fair. Estimated length stay is indeterminate at this time.     Selvin 3 - initiated acute inpt rehab  TEAM CONF - JAN 4 - BED MIN. TRANSFERS MIN. GAIT 80 FEET MIN ASSIST RW. MAX - DEPENDENT WITH ADLS, CUES TO KEEP EYES OPEN AND PARTICIPATE. DIFFICULTY WITH VISUAL TASKS.  MODERATE SEVERE  COGNITIVE DEFICITS. BNE PENDING. SWALLOW - TOLERATES SMALL PILLS WITH WATER. AMANTADINE FOR NEUROSTIM. CONTINENT BOWEL AND BLADDER. 30 LBS WEIGHT LOSS DURING HOSPITAL STAY.NAUSEA - HAS BEEN TAKING ZOFRAN.  WAS SCHEDULED AT [facility] AS WELL AS PRN. WILL ADD SCHEDULED DOSE Q AM HERE AND CONTINUE PRN.  NUTRITION - NOT EATING OR DRINKING - 25% WITH CUES.  . SLEPT FROM 6 PM TO 7 AM. HYPONATREMIA - SIADH - STABLE.  ADRENAL INSUFFICIENCY EVALUATION.    ELOS- 3 WEEKS    TEAM [facility] - JAN 10 - Executive Functions severely impaired-impaired attention, thought organization, visuospatial skills  Memory moderately impaired- impaired immediate and delayed  verbal recall, slightly better with visual recall  Bed/Chair/Wheelchair Min  Bed Mobility  Min  Walk 80 ft Rwx Min  Toilet Transfers  Min A  Bathing Mod A  Dressing Lower MAX  Dressing Upper Min  Toileting Dep  Continent mostly, but some incontinence  Sitter for safety  ELOS - two weeks    TEAM [facility] - JAN 17 -TRANSFERS MIN-MOD ASSIST.  GAIT 80 FEET MIN-MOD 2.  LBD MOD-MAX.  UBD MIN ASSIST.  BATH MOD ASSIST. MAX CUES FOR TASKS WITH SLP.   MORE DIFFICULTY WITH TASKS. POOR INITIATION. NOT MAKING PROGRESS.  BLADDER INCONTINENT. ON CORTRAK TUBE FEEDS.   WILL ASK [facility] TO REVIEW RECENT IMAGING.   ELOS - TWO WEEKS    TEAM [facility] - JAN 24 - NAUSEA AND EMESIS AFTER MED THIS AM.  RENAL DISCONTINUED SALT TABS.  FATIGUE . VARIABLE PARTICIPATION. BED MIN ASSIST. GAIT 80 FEET MIN ASSIST. ADLS MIN ASSIST FOR BATHING AND DRESSING. COGNITIVE TASKS - FLUCTUATING SKILLS DAY TO DAY. SEVERELY IMPAIRED MEMORY. ON RITALIN FOR NEUROSTIMULATION. ADDED REMERON LOW DOSE FOR APPETITE.  PULLED OUT CORTRAK - ATE 25-50% YESTERDAY. ON ANTIVERT PRIOR TO MEALS FOR NAUSEA.  WILL DISCUSS WITH PATIENT AND WIFE REGARDING POSSIBLE G-TUBE.  CONTINENT BLADDER.   ELOS - ONE WEEK -MAY NEED TO LOOK AT SUBACUTE.      TEAM CONF - JAN 31 - F/U EDIL TODAY. DRY HEAVE AT BREAKFAST BUT NO EMESIS. ATE 25%. PEG TUBE POSSIBLY TOMORROW. AUDITORY PROCESSING DEFICITS. DOES NOT AVOID OBJECTS OR GO BACK TO CHAIR, NEED TO GUID PATIENT. BED MIN. TRANSFERS MIN. 80 FEET RW MIN-MOD ASSIST. TOILET TRANSFERS MIN ASSIST. SHOWER  TRANSFERS MIN ASSIST. VARIABLE PARTICIPATION WITH ALDS WITH FATIGUE. TOOK OFF RITALIN LAST WEEK DUE TO HALLUCINATIONS BUT DOES NOT APPEAR RELATED AS PERSIST, MAY ADD BACK NEXT WEEK. SEE HOW DOES WITH NUTRITIONAL SUPPORT  ELOS - PEG TUBE PROBABLY TOMORROW, HOME POSSIBLY ON NEXT Thursday. PLAN HOME HEALTH     TEAM CONF - FEB 7 - BED MIN ASSIST. TRANSFERS MIN ASSIST. GAIT  FEET MIN ASSIST RW. 4 STAIRS MIN ASSIST. TOILET TRASNFERS MIN ASSIST. SHOWER TRANSFERS MIN. COGNITION - CONTINUES WITH CONFUSION/CONFABUJLATION. WOULD CONFUSE OBJECTS FOR A PHONE. NO IMPROVEMENT IN COGNITION OFF MEDS. WHEN PATIENT CONFUSED ON TOPIC, CANNOT GET HIM TO REASON AROUND IT.  SEVERE DEFICITS WITH ATTENTION, THUGHT ORGANIZATION, VISUAL SPATIAL SKILLS. SKIN INTACT. INCONTINENT BLADDER.  ELOS - TOMORROW WITH HOME HEALTH. NURSING PT, OT, SLP, NURSING. WOULD HAVE PATIENT REMAIN ON FIRST LEVEL AT HOME.          Nilson Win MD  02/07/19  5:37 PM    Time:

## 2019-02-07 NOTE — PROGRESS NOTES
Case Management  Inpatient Rehabilitation Team Conference    Conference Date/Time: 2/7/2019 8:42:41 AM    Team Conference Attendees:  Mitali Riggs MSSW Lori Baehl, PT  Genevieve Talavera, OT  Elayne Tim RD, LD  Quinton Lopez, LISET Bautista, SLP  Kanwal Garcia, RN    Demographics            Age: 63Y            Gender: Male    Admission Date: 1/2/2019 4:41:00 PM  Rehabilitation Diagnosis:  cerebellar hemorrhage  Past Medical History: recent Lasik; GERD, pancreatitis      Plan of Care  Anticipated Discharge Date/Estimated Length of Stay: ELOS: DC 2/7  Anticipated Discharge Destination: Community discharge with assistance  Discharge Plan : Pt lives with his wife in a 2 story home with 5 steps to enter.  Bed and full bath on the 2nd level. Pt's wife will be his primary caregiver at  d/c. Family conference was held on Wednesday, 1/30/2019.  D/C plan is home with wife. [facility] to provide home NSG, PT, OT, ST.  Medical Necessity Expected Level Rationale: CGA  Intensity and Duration: an average of 3 hours/5 days per week  Medical Supervision and 24 Hour Rehab Nursing: x  Physical Therapy: x  PT Intensity/Duration: 60 minutes/day, 5 days/week  Occupational Therapy: x  OT Intensity/Duration: 60 minutes/day, 5 days/week  Speech and Language Therapy: x  SLP Intensity/Duration: 60 minutes/day, 5 days/week  Social Work: x  Therapeutic Recreation: x  Psychology: x  Updated (if changes indicated)    Anticipated Discharge Date/Estimated Length of Stay:   ELOS: DC 2/8    Based on the patient's medical and functional status, their prognosis and  expected level of functional improvement is: CGA/MIN      Interdisciplinary Problem/Goals/Status    All Rehab Problems:  Cognition    [ST] Executive Functions(Active)  Current Status(02/06/2019): severely impaired-  maximal cues to complete basic  attention, thought organization, visuospatial skills; refusal at times to  participate in tx  Weekly  Goal(02/13/2019): attend to structured therapy tasks with mod cues  Discharge Goal: improved executive functions and attention    [ST] Memory(Active)  Current Status(02/06/2019): moderately impaired- impaired immediate and delayed  verbal recall, slightly better with visual recall; fluctuating skills day to day  Weekly Goal(02/13/2019): recall details of the day with cues  Discharge Goal: improved memory with assist as needed        Mobility    [PT] Bed/Chair/Wheelchair(Active)  Current Status(02/06/2019): Min  Weekly Goal(02/11/2019): min/CGA  Discharge Goal: CGA/Min    [PT] Bed Mobility(Active)  Current Status(02/06/2019): Min  Weekly Goal(02/11/2019): min/CGA  Discharge Goal: CGA/Min    [PT] Walk(Active)  Current Status(02/06/2019):  ft Rwx, min  Weekly Goal(02/11/2019): PT only  Discharge Goal: 100 ft Rwx Min/CGA    [OT] Toilet Transfers(Active)  Current Status(02/06/2019): Min  Weekly Goal(02/14/2019): CGA  Discharge Goal: CGA    [OT] Tub/Shower Transfers(Active)  Current Status(02/06/2019): Min  Weekly Goal(02/13/2019): CGA  Discharge Goal: CGA    [PT] Stairs(Active)  Current Status(02/06/2019): 4 HRs Min  Weekly Goal(03/11/2019): PT only  Discharge Goal: 4 HRS Min        Nutrition    [RN] Nutrition Management(Active)  Current Status(02/04/2019): Patient removed Cortrak. 2/1/19 Pt received peg  tube.  Continuous tube feed at 55cc/hr.  Weekly Goal(02/11/2019): Patient's nausea will be decreased and oral intake  increased. Tolerte tube feed.  Discharge Goal: Patient's nausea will be decreased and oral intake increased.  Wife able to perform TF if needed.        Psychosocial    [RN] Coping/Adjustment(Active)  Current Status(02/04/2019): Flat affect, quiet. supportive wife.  Weekly Goal(02/11/2019): Allow opportunity to express concerns regarding current  status.  Discharge Goal: Adequate coping regarding life changes with ongoing support.        Safety    [RN] Potential for Injury(Active)  Current  Status(02/04/2019): No unsafe behaviors; pt fell 1/9/19. Assist of 1  with transfers. Wife assists pt.  Weekly Goal(02/11/2019): Instruct family regarding safety precautions & need for  close supervision.  Discharge Goal: Patient/ family will be aware of risk of fall & safety in the  home setting        Self Care    [OT] Bathing(Active)  Current Status(02/06/2019): Min w max vc  Weekly Goal(02/14/2019): CGA w vc  Discharge Goal: CGA w vc    [OT] Dressing (Lower)(Active)  Current Status(02/06/2019): Min w max vc  Weekly Goal(02/13/2019): CGa  Discharge Goal: CGA    [OT] Dressing (Upper)(Active)  Current Status(02/06/2019): Min/SBA w max vc  Weekly Goal(02/14/2019): SBA  Discharge Goal: SBA    [OT] Grooming(Active)  Current Status(02/06/2019): SBa w max vc  Weekly Goal(02/06/2019): SBA w vc  Discharge Goal: SBA w vc    [OT] Toileting(Active)  Current Status(02/06/2019): Min  Weekly Goal(02/14/2019): Min/CGa  Discharge Goal: CGA/Min        Sphincter Control    [RN] Bladder & Bowel Management(Active)  Current Status(02/04/2019): Cont/incontinent at times  Weekly Goal(02/11/2019): continent 100%. BM q1-3 days  Discharge Goal: Continence 100%        Comments: 1/4: very fatigued; needs constant verbal cues to participate with  ADL; poor PO intake, team to encourage fluid intake;    1/10: difficulty following directions with some activities; says odd things at  times; endurance improved though still fatigued; poor initiation; now with  sitter due to unsafe behavior/fall;    1/17: not doing as well, goals dialed back some; very confused; poor initiation;  hyperactive gag reflex, now with cortrak and tube feeds;    1/24: agitated at times; participation varies; pulled out cortrak, eating 25 -  50%, some emesis this AM; needs PEG?;    1/31: some hallucinations noted so MD adjusted seizure meds; PEG Friday?;    2/7: PEG placed as planned though still vomiting, plan home with TF pump with  infusions over longer periods of times;  still agitated at times, still to have  day pass today?; staff note that patient no longer appears to be having  hallucinations;    Signed by: Quinton Lopez RN    Physician CoSigned By: Nilson Win 02/07/2019 09:26:50

## 2019-02-07 NOTE — PROGRESS NOTES
Inpatient Rehabilitation Plan of Care Note    Plan of Care  Care Plan Reviewed - No updates at this time.    Psychosocial    Performed Intervention(s)  Verbalizes needs & concerns &  prn      Safety    Performed Intervention(s)  Items in reach, Falls precautions/ protocol  Safety rounds & Bed/chair alarms, items within reach      Sphincter Control    Performed Intervention(s)  Monitor intake, output, & bowel movements  Use incontinence products/ equipment, & perineal care  Encourage appropriate diet & fluid intake      Nutrition    Performed Intervention(s)  Monitor oral intake with each meal.  Administer tube feed per order  monitor tolerance of TF    Signed by: Kanwal Garcia, RN

## 2019-02-07 NOTE — PLAN OF CARE
Problem: Patient Care Overview  Goal: Plan of Care Review  Outcome: Ongoing (interventions implemented as appropriate)   02/07/19 0347   Patient Care Overview   IRF Plan of Care Review progress ongoing, continue   Progress, Functional Goals demonstrating adequate progress   Coping/Psychosocial   Plan of Care Reviewed With patient;spouse   OTHER   Outcome Summary Pt. is calm and cooperative. Denied any pain. No complained of any nausea at night. Nutren 1.5 55ml/hr TF and water flushing rate is 100ml/6hrs. Tolerated well. Spouse at bedside. Home Pass scheduled in the afternoon at 3PM. New bag and tubing applied at 0105. No unsafe behavior to note at this time. Pt. is doing well at night. Will continue to monitor.     Goal: Coping Plan  Outcome: Ongoing (interventions implemented as appropriate)   02/07/19 0347   Coping Plan   Demonstration of Effective Coping Strategies demonstrating adequate progress       Problem: Skin Injury Risk (Adult)  Goal: Skin Health and Integrity  Outcome: Ongoing (interventions implemented as appropriate)   02/07/19 0347   Skin Injury Risk (Adult)   Skin Health and Integrity making progress toward outcome       Problem: Fall Risk (Adult)  Goal: Absence of Fall  Outcome: Ongoing (interventions implemented as appropriate)   02/07/19 0347   Fall Risk (Adult)   Absence of Fall making progress toward outcome       Problem: Nausea/Vomiting (Adult)  Goal: Symptom Relief  Outcome: Outcome(s) achieved Date Met: 02/07/19 02/07/19 0347   Nausea/Vomiting (Adult)   Symptom Relief making progress toward outcome       Problem: Stroke (Hemorrhagic) (Adult)  Goal: Signs and Symptoms of Listed Potential Problems Will be Absent, Minimized or Managed (Stroke)  Outcome: Ongoing (interventions implemented as appropriate)   02/07/19 0347   Goal/Outcome Evaluation   Problems Assessed (Stroke (Hemorrhagic)) all   Problems Present (Stroke (Hemorrhagic)) eating/swallowing impairment;bladder/bowel  dysfunction;communication impairment;motor/sensory impairment;cognitive impairment

## 2019-02-07 NOTE — PROGRESS NOTES
Inpatient Rehabilitation Functional Measures Assessment and Plan of Care    Plan of Care  Updated Problems/Interventions  Field    Functional Measures  ALBERTO Eating:  [facility] Grooming: [facility] Bathing:  [facility] Upper Body Dressing:  [facility] Lower Body Dressing:  [facility] Toileting:  [facility] Bladder Management  Level of Assistance:  [place]  Frequency/Number of Accidents this Shift:  [facility] Bowel Management  Level of Assistance: [place]  Frequency/Number of Accidents this Shift: [facility] Bed/Chair/Wheelchair Transfer:  [facility] Toilet Transfer:  [facility] Tub/Shower Transfer:  [place]    Previously Documented Mode of Locomotion at Discharge: Field  ALBERTO Expected Mode of Locomotion at Discharge: [facility] Walk/Wheelchair:  [facility] Stairs:  [facility] Comprehension:  [facility] Expression:  [facility] Social Interaction:  [facility] Problem Solving:  [facility] Memory:  [place]    Therapy Mode Minutes  Occupational Therapy: Individual: 90 minutes.  Physical Therapy: [place]  Speech Language Pathology:  [place]    Signed by: MELISSA Marrero/ERNESTO

## 2019-02-07 NOTE — PROGRESS NOTES
Inpatient Rehabilitation Functional Measures Assessment    Functional Measures  ALBERTO Eating:  [facility] Grooming: [facility] Bathing:  [facility] Upper Body Dressing:  [facility] Lower Body Dressing:  [facility] Toileting:  [facility] Bladder Management  Level of Assistance:  [place]  Frequency/Number of Accidents this Shift:  [facility] Bowel Management  Level of Assistance: [place]  Frequency/Number of Accidents this Shift: [facility] Bed/Chair/Wheelchair Transfer:  [facility] Toilet Transfer:  [facility] Tub/Shower Transfer:  [place]    Previously Documented Mode of Locomotion at Discharge: Field  ALBERTO Expected Mode of Locomotion at Discharge: [facility] Walk/Wheelchair:  [facility] Stairs:  [facility] Comprehension:  Auditory comprehension is the usual mode. Comprehension  Score = 6, Modified [place].  Patient comprehends complex/abstract  information in their primary language with only mild difficulty.  ALBERTO Expression:  Vocal expression is the usual mode. Expression Score = 6,  Modified Independent.  Patient expresses complex/abstract information in their  primary language with only mild difficulty with tasks.  AL[place]ERT[place] Social Interaction:  Social Interaction Score = 5, Supervision.  Patient may  require encouragement to initiate participation. Patient requires directing only  under stressful or unfamiliar conditions, but less than 10% of the time, for the  following behavior(s):  [place] Problem Solving:  Patient does not make appropriate decisions in order to  solve complex problems without assistance from a helper. Problem Solving Score =  3, Moderate Direction. Patient makes appropriate decisions in order to solve  routine problems 50-74% of the time. Patient requires moderate/some direction  for the following behavior(s):  [place]ERT[place] Memory:  Memory Score = 3, Moderate Prompting. Patient recognizes and  remembers 50-74% of the time. Patient requires moderate/some prompting  for  memory for the  following:    Therapy Mode Minutes  Occupational Therapy: [place]  Physical Therapy: [place]  Speech Language Pathology:  Individual: 60 minutes.    Signed by: Heaven Bautista SLP

## 2019-02-07 NOTE — PROGRESS NOTES
Inpatient Rehabilitation Functional Measures Assessment    Functional Measures  ALBERTO Eating:  [facility] Grooming: [facility] Bathing:  [facility] Upper Body Dressing:  [facility] Lower Body Dressing:  [facility] Toileting:  [facility] Bladder Management  Level of Assistance:  [place]  Frequency/Number of Accidents this Shift:  [facility] Bowel Management  Level of Assistance: [place]  Frequency/Number of Accidents this Shift: [facility] Bed/Chair/Wheelchair Transfer:  [facility] Toilet Transfer:  [facility] Tub/Shower Transfer:  [place]    Previously Documented Mode of Locomotion at Discharge: Field  ALBERTO Expected Mode of Locomotion at Discharge: [facility] Walk/Wheelchair:  [facility] Stairs:  [facility] Comprehension:  Auditory comprehension is the usual mode. Patient does not  comprehend complex/abstract information in their primary language without  assistance from a helper. Comprehension Score = 5, Supervision. Patient  comprehends basic daily needs or ideas greater than 90% of the time. Patient  requires stand by/rare prompting. No assistive devices were required.  ALBERTO Expression:  Vocal expression is the usual mode. Expression Score = 6,  Modified Independent.  Patient expresses complex/abstract information in their  primary language, requiring:  [facility] Social Interaction:  Social Interaction Score = 6, Modified Independent.  Patient is modified independent for social interaction, requiring:  [place]ERT[place] Problem Solving:  Patient does not make appropriate decisions in order to  solve complex problems without assistance from a helper. Problem Solving Score =  5, Supervision.  Patient makes appropriate decisions in order to solve routine  problems with directing only under stressful or unfamiliar conditions, but no  more than 10% of the time, for the following behavior(s):  AL[place]ERT[place] Memory:  Memory Score = 5, Supervision.  Patient recognizes and remembers  with prompting only under stressful or unfamiliar  conditions, but no more than  10% of the time, for the following behavior(s):    Therapy Mode Minutes  Occupational Therapy: [place]  Physical Therapy: [place]  Speech Language Pathology:  [place]    Signed by: Kanwal Garcia RN

## 2019-02-07 NOTE — PROGRESS NOTES
Inpatient Rehabilitation Functional Measures Assessment    Functional Measures  ALBERTO Eating:  [facility] Grooming: [facility] Bathing:  [facility] Upper Body Dressing:  [facility] Lower Body Dressing:  [facility] Toileting:  [facility] Bladder Management  Level of Assistance:  [place]  Frequency/Number of Accidents this Shift:  [facility] Bowel Management  Level of Assistance: [place]  Frequency/Number of Accidents this Shift: [facility] Bed/Chair/Wheelchair Transfer:  [facility] Toilet Transfer:  [facility] Tub/Shower Transfer:  [place]    Previously Documented Mode of Locomotion at Discharge: Field  ALBERTO Expected Mode of Locomotion at Discharge: [facility] Walk/Wheelchair:  [facility] Stairs:  [facility] Comprehension:  Both ( auditory and visual) modes of comprehension are used  equally. Comprehension Score = 6, Modified [place].  Patient comprehends  complex/abstract information in their primary language with only mild  difficulty. Spouse at bedside and helped more  ALBERTO Expression:  Both ( vocal and non-vocal) modes of expression are used  equally. Expression Score = 6,  Modified Independent. Patient expresses  complex/abstract information in their primary language with only mild  difficulty. Spouse at bedside and helped more  [place]ERT[place] Social Interaction:  Social Interaction Score = 3, Moderate Direction.  Patient interacts appropriately 50-74% of the time.  Patient requires  moderate/some direction for the following behavior(s): Non-interactive. Spouse  at bedside and helped more .  AL[place]ERT[place] Problem Solving:  Patient does not make appropriate decisions in order to  solve complex problems without assistance from a helper. Problem Solving Score =  3, Moderate Direction. Patient makes appropriate decisions in order to solve  routine problems 50-74% of the time. Patient requires moderate/some direction  for the following behavior(s): Poor judgment. Spouse at bedside and helped more  .  ALBERTO Memory:   Memory Score = 3, Moderate Prompting. Patient recognizes and  remembers 50-74% of the time. Patient requires moderate/some prompting  for  memory for the following: Needs assistance for use of environmental cues.  Inability to follow multi-step commands. Spouse at bedside and helped more .    Therapy Mode Minutes  Occupational Therapy: [place]  Physical Therapy: [place]  Speech Language Pathology:  [place]    Signed by: Alonso Garcia RN

## 2019-02-07 NOTE — DISCHARGE PLACEMENT REQUEST
"Feliciano Light (63 y.o. Male)     Date of Birth Social Security Number Address Home Phone MRN    [id number]  [id number] [id number] [phone] [id number]    Religious Marital Status          Unknown        Admission Date Admission Type Admitting Provider Attending Provider Department, Room/Bed    1/2/19 Elective Nilson Win MD Gormley, John Michael, MD [facility], 4407/1    Discharge Date Discharge Disposition Discharge Destination                       Attending Provider:  Nilson Win MD    Allergies:  Amoxicillin, Latex, Other, Penicillins    Isolation:  None   Infection:  None   Code Status:  Not on file    Ht:  162.6 cm (64\")   Wt:  58.8 kg (129 lb 10.1 oz)    Admission Cmt:  None   Principal Problem:  Cerebellar hemorrhage (CMS/HCC) [I61.4]                 Active Insurance as of 1/2/2019     Primary Coverage     Payor Plan Insurance Group Employer/Plan Group    MEDICARE MEDICARE A & B      Payor Plan Address Payor Plan Phone Number Payor Plan Fax Number Effective Dates    [address] [phone]  5/1/2018 - None Entered    [facility] [id number]       Subscriber Name Subscriber Birth Date Member ID       FELICIANO LIGHT 1955 [id number]           Secondary Coverage     Payor Plan Insurance Group Employer/Plan Group    [facility] SUPP [id number]     Payor Plan Address Payor Plan Phone Number Payor Plan Fax Number Effective Dates    [address]   5/1/2018 - None Entered    [facility] [id number]       Subscriber Name Subscriber Birth Date Member ID       FELICIANO LIGHT 1955 [id number]                 Emergency Contacts      (Rel.) Home Phone Work Phone Mobile Phone    RAMIRO LIGHT (Spouse) [phone] -- --              "

## 2019-02-07 NOTE — PLAN OF CARE
Problem: Patient Care Overview  Goal: Plan of Care Review  Outcome: Ongoing (interventions implemented as appropriate)   02/07/19 1649   Patient Care Overview   IRF Plan of Care Review progress ongoing, continue   Progress, Functional Goals demonstrating adequate progress   Coping/Psychosocial   Plan of Care Reviewed With patient;spouse   OTHER   Outcome Summary Patient cooperative, tolerate therapy and went on home pass for about 2 hours. Patients wife stated, he had 2 episodes of nuasea and vomiting while on leave. No pain and no unsafe behavior. He is scheduled to be discharged tomorrow.       Problem: Nausea/Vomiting (Adult)  Goal: Symptom Relief  Outcome: Ongoing (interventions implemented as appropriate)   02/07/19 1649   Nausea/Vomiting (Adult)   Symptom Relief making progress toward outcome       Problem: Stroke (Hemorrhagic) (Adult)  Goal: Signs and Symptoms of Listed Potential Problems Will be Absent, Minimized or Managed (Stroke)  Outcome: Ongoing (interventions implemented as appropriate)   02/07/19 1649   Goal/Outcome Evaluation   Problems Assessed (Stroke (Hemorrhagic)) all   Problems Present (Stroke (Hemorrhagic)) bladder/bowel dysfunction;cognitive impairment;communication impairment;eating/swallowing impairment;motor/sensory impairment       Problem: Nutrition, Enteral (Adult)  Goal: Signs and Symptoms of Listed Potential Problems Will be Absent, Minimized or Managed (Nutrition, Enteral)  Outcome: Ongoing (interventions implemented as appropriate)   02/07/19 1649   Goal/Outcome Evaluation   Problems Assessed (Enteral Nutrition) all   Problems Present (Enteral Nutrition) gastrointestinal complications  (patient had no nausea or vomiting)

## 2019-02-07 NOTE — PROGRESS NOTES
"   LOS: 35 days   Patient Care Team:  Jesus Bautista MD as PCP - General (Family Medicine)     Chief Complaint:   1. 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  2. 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  3. Status post right  shunt removal secondary to infection- completed course of antibiotics on 01/02/2019.  4. Status post external ventricular drain and removal.  5. Left upper extremity brachial vein DVT-on Eliquis.  6. Hyponatremia.   7. Neural stimulation-       Subjective          Subjective  Patient seen earlier today on rounds.  Emesis twice today. Tried bolus tube feeds and changed back to continuous.  Therapeutic day pass tomorrow.   Still confused. Cognition not any better coming off meds.          History taken from: patient    Objective     Vital Signs  Temp:  [97.7 °F (36.5 °C)-98.3 °F (36.8 °C)] 98.3 °F (36.8 °C)  Heart Rate:  [] 102  Resp:  [16-18] 16  BP: (110-122)/(78-83) 110/78    Objective:  Vital signs: (most recent): Blood pressure 110/78, pulse 102, temperature 98.3 °F (36.8 °C), temperature source Oral, resp. rate 16, height 162.6 cm (64\"), weight 58.8 kg (129 lb 10.1 oz), SpO2 99 %.            GENERAL: The patient is a 63-year-old male who is awake, alert  , in no acute distress.  HEENT:   Sclerae anicteric. Conjunctivae pink. Oropharynx moist.     LUNGS: Clear to auscultation bilaterally without wheezes, rales or rhonchi. No accessory muscle use.  HEART: RRR  ABDOMEN: Normoactive bowel sounds. Soft, PEG tube under [place].  EXTREMITIES: Without edema or cyanosis.   NEUROLOGIC: Slow processing   . Converses.    Impaired recall. Takes resistance bilaterally. FTN =  He gives confused responses.             Results Review:     I reviewed the patient's new clinical results.  Results from last 7 days   Lab Units 02/04/19  0530 02/03/19  0500 02/02/19  1440   SODIUM mmol/L 140 " 140 140   POTASSIUM mmol/L 4.0 3.8 3.9   CHLORIDE mmol/L 105 105 103   CO2 mmol/L 22.4 25.1 24.6   BUN mg/dL 17 14 11   CREATININE mg/dL 0.55* 0.60* 0.57*   CALCIUM mg/dL 9.1 9.0 9.1   BILIRUBIN mg/dL  --   --  0.8   ALK PHOS U/L  --   --  62   ALT (SGPT) U/L  --   --  36   AST (SGOT) U/L  --   --  19   GLUCOSE mg/dL 139* 107* 107*     Results from last 7 days   Lab Units 02/04/19  0530 02/03/19  0500 02/02/19  0646   WBC 10*3/mm3 6.07 7.01 14.43*   HEMOGLOBIN g/dL 12.9* 12.6* 13.4*   HEMATOCRIT % 41.0 38.2* 41.5   PLATELETS 10*3/mm3 195 194 193       MRI brain - Jan 16, 2019  Portions summarized -   The lateral 3rd and superior 4th ventricle remain mildly enlarged  compatible with mild hydrocephalus unchanged since 10/09/2019  , the   ventricles are clearly slightly larger than they were on prior outside  head CTs on 12/25/2018 and 12/27/2018 after the removal of the  ventriculostomy catheters.  3.8 x 2.9 cm area of FLAIR hyperintensity and mixed diffusion  hyperintensity extending from the inferior medial cerebellar white  matter into the cerebellar vermis that is compatible with subacute  infarct of brain along the margins of the [place] used to embolize the  vermian AVM  The 2nd  ventriculostomy catheter extended from the superior right coronal sutures through the  anterior superior lateral right frontal lobe parenchyma to the  anterosuperior body of the right lateral ventricle, and this has some  diffusion hyperintensity in the tract but also there is some FLAIR and  diffusion hyperintensity circumscribing the catheter tract within the  right anterosuperior lateral frontal white matter measuring up to 12 x 8  x 9 mm surrounding FLAIR and diffusion hyperintensity could be edema  related to the placement and removal or could be some focal cerebritis.    CT head - Jan 24 , 2019  -There is mild prominence of the temporal horns similar to the prior  examination. Beam hardening artifact from the involved material  limits  evaluation of the posterior fossa somewhat, but there is no convincing  evidence of hemorrhage involving the posterior fossa. There is resolving  intraventricular blood appreciated. The occipital horns are smaller than  the prior examinations. The remaining portions of the lateral ventricles  as well as the 3rd and 4th ventricle appear unchanged. There is no  evidence of transependymal migration of fluid. There is no evidence of  intra-axial hemorrhage.    Medication Review: done  Scheduled Meds:    influenza vaccine 0.5 mL Intramuscular Once   lansoprazole 30 mg Oral QAM   multivitamin with minerals 1 tablet Oral Daily   polyethylene glycol 17 g Oral Daily   sodium chloride 3 mL Intravenous Q12H     Continuous Infusions:     PRN Meds:.•  acetaminophen  •  bisacodyl  •  magnesium hydroxide  •  meclizine  •  ondansetron  •  ondansetron ODT  •  promethazine  •  sodium chloride      Assessment/Plan       Cerebellar hemorrhage (CMS/HCC)    Intraventricular hemorrhage (CMS/HCC)    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    SIADH (syndrome of inappropriate ADH production) (CMS/HCC)    Elevated hemoglobin A1c    Hyperlipidemia      Assessment & Plan  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    Status post hydrocephalus. S/p removal infected  shunt. Features of hydrocephalus worsening impaired memory, balance , bladder incontinence reviewed with patient and wife in event he develops any of those symptoms in future.   Jan 7 - recheck CT head with patient's lethargy / persistent nausea ( also had at outside hospital) to assess for any worsening of hydrocephalus with shunt removed.  Addendum-There is stable to equivocal slight interval increase in size in the lateral and third ventricles when compared to prior head CT 11 days ago on 12/27/2018 compatible with mild hydrocephalus. There is some low density and  volume loss along the margins of the hyperdense liquid  embolic agents of the cerebellar vermi, compatible with areas of infarcted cerebellar vermis measuring up to 3.5 x 3 x 2.8 cm, unchanged.No residual acute intracranial hemorrhage is seen.  Jan 9 - Nursing called - patient got up on his own, bed alarm went off, in room near nursing station.  Fell and struck head on bedside dresser and bed that wife was in.  Nursing reports no change seen from recent baseline neuro. Got stat CT head ( no chnages from two days ago, no new bleed) and repeat CT  in 24 hours as on Eliquis, neuro checks q 2 hours, sitter ordered. Discontinued Eliquis which he is on for LUE brachial DVT and re-assess regarding anticoagulation after CT tomorrow.   He does not describe any new complaint. Baseline nausea, headache. He did better yesterday after first dose of Ritalin and ate more at dinner. Ate 50% breakfast today per sitter.   Selvin 10 - neuro stable. Given head trauma yesterday on Eliquis, will repeat CT head this afternoon to rule out any bleed before resuming Eliquis for DVT. Add:  F/U CT this afternoon - no acute hemorrhage.  Will resume Eliquis for LUE DVT  Selvin 15 - Patient with variable performance, more alert at times per nursing earlier today,  but then other times more fatigued with therapies.   On amantadine and ritalin.  Na level stable yesterday. To recheck in AM.  WBC normal yesterday. No fever.  On Eliquis for LUE DVT on venous duplex Dec 4 and still present on venous duplex Dec 26.  Concern at any point would be for potential for SDH over time.   At this point, as he is 6 weeks out from LUE DVT on Dec 4, will hold Eliquis for now, recheck BUE venous duplex tomorrow, and then decide on ongoing anti-coagulation,  and if any persistent decline with performance will check CT head. Will not check CT head this evening as not any dramatic change on physical exam .    Jan 16 - Patient with confused comments, said an odd name  randomly, mis-identified where wife worked. SLP also notes some confused comments. Wife reports he complains of headache, but  denied having headache presently to examiner. Wife reports he complained of ear pain but may have been related to tape for feeding tube. His nausea is better and eating better. He reports nausea is low . Does not complain of any new weakness on exam.   Na 129 - not a dramatic change but will d/c water flushes with tube feeds. Will get MRI brain with and without contrast stat- assess for any acute changes from prior infection or any acute bleed. MRI states can get timely once screening sheet completed. Recheck WBC.  Will d/c scopalamine patch - done. (add:  Discussed with neurosurgery and neuroradiology - patient below the threshold in terms of number of CT scan for any concern for cumulative effect - can get repeat CT scans as need). (add:  MRI done without contrast as not able to get IV site).    Add:  MRI portions summarized above. No acute bleed. Subacute infarct - 3.8 x 2.9 cm area of FLAIR hyperintensity and mixed diffusion hyperintensity extending from the inferior medial cerebellar white matter into the cerebellar vermis that is compatible with subacute infarct of brain along the margins of the [place] used to embolize the  vermian AVM . Mild hydrocephalus - Neurosurgery wanted to hold of placing  shunt in near term due to infection with  shunt. Along tract of previous ventriculostomy changes of either edema or focal cerebritis (will monitor as no fever or leukocytosis).   Jan 17 - The patient's imaging was reviewed with [facility] neuro radiology and [facility] neurosurgery service.  Neurosurgery evaluating ventricle size as well as the area along the track of the previous frontal ventriculostomy of either edema or focal cerebritis.  On the previous CAT scan that appeared to represent possibly an area of CSF backflow that improved with subsequent repeat ventriculostomy placement  posteriorly.  Jan 19 - Patient with temp 100.2 earlier today. Later was 99.0 / 98.4.   He continues with baseline nausea. Ate only 1/5 of breakfast. He describes some posterior headache.  Wife reports he will do confused action, talk about people who don't live here.   He continues more alert. Does not describe any focal weakness. Dysphonia better.   Will recheck CBC with diff, procalcitonin, ESR, CRP, CMP and consult ID to see.  Addendum-infectious disease does not feel there was any active infectious process.  Continues to monitor  Jan 24 - Ct shows ventricles stable to slightly smaller size  Jan 31 - f/u with [facility] -  [facility] note reviewed.  Per EDIL, can come off AED and see how does in terms of seizures.  He received first dose of Vimpat liquid yesterday but was not continued on MAR as q 12 hours. Got second dose today and back on MAR as q 12 hours. Last dose Keppra yesterday.  Will continue Vimpat through procedure tomorrow and can then look at taper off.   Feb 5 - tapers off Vimpat tonight          Status post meningitis.Completed antibiotics at outside hospital.  Jan 4 - recheck CBC in AM.  Jan 5 - WBC 5K  Jan 7 - WBC 5.9. No fever. Will check procalcitonin/ESR, CRP with lethargy to assess for any recurrent infection.  Addendum-unremarkable.  CRP 0.12, sedimentation rate 14, pro-calcitonin 0.05, WBC 5.93  Jan 19 - temp 100.2. Recheck labs and ask ID to see. Addendum-infectious disease does not feel there was any active infectious process.  Continues to monitor  Jan 21 - afberile. WBC 7.69      Nausea - Jan 14 . Has been on Zofran. Persistent nausea s/p posterior fossa changes. Previously held off on scopolamine patch given potential cognitive side effects, but with persistent nausea that is affecting nutrition, will add.   Jan 16 - Scopolamine helped nausea but had to d/c scopolamine due to cognitive side effect.   Jan 18 - Tolerates tube feeds. Still nauseated. Discussed trial of Phenergan  after therapies tomorrow due to sedative side effect.    January 20- he took Phenergan twice yesterday, patient reports nausea better, wife notes that had emesis after dinner.  He is on Zofran scheduled before meal time  Jan 21 - Has not take phenergan since Jan 19. Emesis again with breakfast.Discussed observe off amantadine today to see if any effect on confused comments.  Discussed tomorrow will change from Zofran scheduled to antivert 12.5 mg scheduled tid before meals.  Also add Remeron tomorrow PM to see if helps with appetite. Other option would include Topamax 25 mg HS for headache/nausea.   Jan 24 - Antivert helps nausea but ? If cognitive side effect. Will adjust other meds first.  Jan 25 - Get gastroenterology input on any options for nausea.  Jan 28 - UGI with SBFT today. Possibly gastric emptying study depending on results.   Jan 29 - UGI with SBFT overall unremarkable yesterday. To go for gastric emptying study tomorrow at 7:30 AM  Jan 30 - GES results - normal. Denies nausea on Anitvert.   Feb 1 - if tolerates tube feeds after PEG placed, may change Antivert to 12.5 mg tid prn on Vish Feb 3 to see if any cognitive side effect.   Feb 4 - change antivert to 12.5 mg tid prn to see if cognitive side effect.       Hyponatremia. SIADH.   Selvin 3 - stable at 130.   Jan 5 - Serum sodium has gone up from 130 to 131.  Serum osmolarity is low at 272 but urine osmolarity is inappropriately concentrated at 847/759.  Thyroid function test is normal.  Results are consistent with SIADH.   Placed on a 1500 mL per day fluid restriction ( but he does not approach that on his own).  Jan 6 - . Urine osmolality 277. BUN 9, Cr 0.67.   Jan 8 - Endocrinology increase dietary salt and continue fluid restriction.  Jan 9 - salt tabs added  Jan 14 -   Jan 18 - Na 133. On salt tabs. No water flushes with tube feeds.   Jan 22 - salt tabs decreased to bid  Jan 23 - Na 136. Salt tabs d/'d as difficulty taking by  mouth  Jan 28 - started on normal saline yesterday with decrease appetite and NPO after midnight for meds. Na 139 today.   Jan 29 - IVF d/c'd  Feb 1 - PEG not until afternoon. Add IVF x 2 liters D5 NS with 20 KCL at 100 cc per hour.       Adrenal - Evaluation against adrenal insufficiency.  Endocrinology discontinuing decadron and will re-evaluate further.   Jan 7 - worsened lethargy in therapies today, ? If could be related to being off steroids?  Jan 8- repeat ACTH test  January 11-adrenal insufficiency    Left upper extremity DVT-on Eliquis.  DVT prophylaxis-SCD and Eliquis.  Jan 9 - Eliquis on hold for at least 24 hours with recent fall with trauma to end. Re-assess after CT on Selvin 10.   Selvin 15 - Concern at any point would be for potential for SDH over time any time he shows a fluctuation. At this point, as he is 6 weeks out from LUE DVT on Dec 4, will hold Eliquis for now, recheck BUE venous duplex tomorrow, and then decide on ongoing anticoagulation.   Jan 16 - left brachial vein DVT resolved. BUE superficial venous thrombosis. No bleed on MRI. Resume Eliquis.   Jan 26 - Will d/c Eliquis after  Friday Jan 25 PM dose in preparation for any possible endoscopic evaluation/ procedure first of the week. . BUE venous duplex Jan 16  showed resolution of LUE brachial vein DVT. Had acute and chronic RUE SVT and chronic LUE SVT.   Feb 3 - BUE venous duplex BUE SVT. Patient did not extend LUE for evaluation of left axillary / basilic vein. Left brachial vein no DVT- will not resume Eliquis as DVT 2 months ago and line associated.      Encephalopathy / Neuro- stimulation-admitted on amantadine.  Jan 8 - depression may be a component. SSRI may affect appetite or sodium level. Possibly add Ritalin, could affect appetite but if more alert could possibly eat more.  Will add Ritalin 5 mg every 8 AM and 12 noon.  Selvin 10 - more alert and interactive  Jan 20 - he has some confused comments.  Neurosurgery evaluated imaging.  Seen  by ID and not felt to have any acute infectious process.  Discussed with patient and wife,  may possibly have some cognitive side effects from amantadine, decreased to 100 mg today and then discontinue starting with tomorrow and see if there is any improvement.  Doubt confusion would be related to Ritalin.  He has had it before starting of Phenergan. Na level okay 133.  Other consideration would be Keppra side effect contributing.  Jan 22 - no apparent change yet off amantadine.   Jan 24 - continues with increased confusion/ behavioral changes.   Patient has been more confused, more difficulty with tasks from cognitive standpoint.  Making odd, confused comments. Will perseverate on some tasks.  Also increased irritability at times, shaking fists.  He has had some hallucinations as well  He denies any headache. Denies any nausea but does not remember emesis from this morning.  His wife notes that nausea is noticeably better since antivert and nursing reports same, but we discussed could be having some confusion from antivert. Other possibilities could be Ritalin although showed initial improvement with that vs hydrocephalus. Discussed discontinue Remeron and Ritalin and recheck CT head. Will also check UA. Continue antivert for now as does help nausea.   Add: CBC unremarkable. UA pending. CT head stable with - There is resolvingintraventricular blood appreciated. The occipital horns are smaller than the prior examinations. The remaining portions of the lateral ventricles as well as the 3rd and 4th ventricle appear unchanged. There is no evidence of transependymal migration of fluid.  Jan 25 - still confusion , aggressive behavior at night. He had confusion last weekend before antivert but could have symptoms of underlying changes in brain made more prominent by medication side effect. Discussed see how does off other meds for next couple days and if confusion/ hallucinations continue, then change Keppra to Vimpat. UA  unremarkable. BUN/CR, NA okay.   Jan 28 - had planned change to Vimpat but patient unable to swallow pill that large and liquid form not available here.  Jan 30 - gets more confused / irritable later in the days. Still with hallucinations at times per wife. Patient not able to describe. Vimpat liquid to be available this afternoon - will transition from Keppra to see if helps cognition/hallucinaiton/ mood. Last dose Keppra tonight.   Feb 4- taper off Vimpat after tomorrow night   Feb 5 - He gives confused responses, has lap belt for wheelchair and confused with what this is for, asking about his license repeatedly. Patient was irritable with wife earlier today.  Has not taken any prn Antivert past two days, comes off Vimpat after dose tonight.  Feb 6 - still confused rambling comments. See how does further off meds.      Seizure prophylaxis-Initially on Keppra.  Jan 30 - Vimpat liquid to be available this afternoon - will transition from Keppra to see if helps cognition/hallucinaiton/ mood.   Feb 1 - per [facility] can come off seizure prophylaxis - will taper off Vimpat in couple days after recovery period after PEG placement   Feb 4- drowsy - may be related to Vimpat, did not take this AM dose, taper off with 50 mg tonight and tomorrow night and then stop.     Nutrition - Jan 5 -  poor intake. Staff encouraging intake. ( He was placed on 1500 cc fluid restriction but does not appraoch that on his own. Calorie count in progress. May possibly add Remeron. Would avoid Megace given its thrombogenic potential and patient's history of LUE DVT.  Jan 7 - will change back to scheduled Zofran prior to meals. Assess ventricles with CT, labs to assess for infection.    Jan 8 - RUQ ultrasound ordered - see report.  Consider Remeron but sedative side effect could be an issue. Nausea probably related to posterior fossa CNS   Jan 9 - PO intake remains poor.  Will discuss with patient and wife Cortrak feeding tube.   Add: patient  and spouse is agreeable. He ate about 50% at supper. Nursing feels he is showing some improvement in intake. Discussed with patient and wife that with next meal that he does not show good intake will place Cortrak. They are in agreement.   Selvin 10 - eating 50% breakfast and lunch so hold on Cortrak presently.   Jan 14 -  Persistent nausea. Had emesis yesterday and again today.  Intake decreased again over weekend.  Neuro without change. No headache, dizziness, abd pain. Previous follow up CTs without significant change.  Discussed with patient and wife - agreeable with Cortrak tube placement to help with nutrition given weight loss.  On Zofran scheduled before meals. Concern with adding Phenergan is sedative side effect. Will try scopalamine patch.   Selvin 15 - nausea better per patient report. Tolerates tube feeds.   Jan 18 - continue tube feeds through weekend and then re-assess.   Jan 21 - Intake by mouth still limited.  Reviewed with dietician, patient, wife and will continue Cortrak tube feeds.  Jan 22- adding remeron for appetite tonight.   Jan 23 - patient pulled Cortrak early AM - discussed see how eats with tube out  Jan 24 -  Discussed replacing Cortrak tube in AM for nutritional support as less likely to pull out during the day.   Jan 25 - replace Cortrak- addendum: Patient not able to tolerate replacement of Cortrak tube. His intake has remained poor. Discussed option of Reglan to see if helps appetite but concern would be for cognitive side effects. Discussed with patient / wife looking at G-tube placement. Will ask Gastroenterology to see for assessment for options on nausea and PEG tube placement.. His Eliquis can be held at any time for the procedure.   Jan 29 - gastric emptying swallow study tomorrow to determine if place PEG or PEJ tube  Jan 30 - await GES results. Patient has appointment with his Neurosurgery at outside facility tomorrow at 1PM which would affect timing of tube placement.   Feb 1 -  PEG placement today.   Feb 4 - titrate up on PEG tube feeds.   Feb 5 - tolerates tube feeds at 55 cc/hour  Feb 6 - tried bolus tube feeds and had emesis- not clear if related to bolus or baseline nausea. . Will change back to continuous          Impaired cognition, mobility and self-care. Now admitted for comprehensive inpatient rehabilitation program with physical therapy 1 hour, occupational therapy 1 hour, speech therapy 1 hour, 5 days a week. Areas to be addressed include cognition/executive function, swallowing, functional mobility, gross and fine motor control, ADL training, transfers, balance, gait. Rehabilitation nursing for carryover and monitoring of his neurologic status, bowel, bladder, skin. Ongoing physician followup. Weekly team conferences. Goal for him to achieve a level of supervision with his mobility and self-care and improvement in his cognition. Rehabilitation prognosis fair. Medical prognosis fair. Estimated length stay is indeterminate at this time.     Jan 3 - initiated acute inpt rehab  TEAM CONF - JAN 4 - BED MIN. TRANSFERS MIN. GAIT 80 FEET MIN ASSIST RW. MAX - DEPENDENT WITH ADLS, CUES TO KEEP EYES OPEN AND PARTICIPATE. DIFFICULTY WITH VISUAL TASKS.  MODERATE SEVERE  COGNITIVE DEFICITS. BNE PENDING. SWALLOW - TOLERATES SMALL PILLS WITH WATER. AMANTADINE FOR NEUROSTIM. CONTINENT BOWEL AND BLADDER. 30 LBS WEIGHT LOSS DURING HOSPITAL STAY.NAUSEA - HAS BEEN TAKING ZOFRAN. WAS SCHEDULED AT [facility] AS WELL AS PRN. WILL ADD SCHEDULED DOSE Q AM HERE AND CONTINUE PRN.  NUTRITION - NOT EATING OR DRINKING - 25% WITH CUES.  . SLEPT FROM 6 PM TO 7 AM. HYPONATREMIA - SIADH - STABLE.  ADRENAL INSUFFICIENCY EVALUATION.    ELOS- 3 WEEKS    TEAM CONF - STACI 10 - Executive Functions severely impaired-impaired attention, thought organization, visuospatial skills  Memory moderately impaired- impaired immediate and delayed  verbal recall, slightly better with visual recall  Bed/Chair/Wheelchair  Min  Bed Mobility  Min  Walk 80 ft Rwx Min  Toilet Transfers  Min A  Bathing Mod A  Dressing Lower MAX  Dressing Upper Min  Toileting Dep  Continent mostly, but some incontinence  Sitter for safety  ELOS - two weeks    TEAM CONF - JAN 17 -TRANSFERS MIN-MOD ASSIST.  GAIT 80 FEET MIN-MOD 2.  LBD MOD-MAX.  UBD MIN ASSIST.  BATH MOD ASSIST. MAX CUES FOR TASKS WITH SLP.   MORE DIFFICULTY WITH TASKS. POOR INITIATION. NOT MAKING PROGRESS.  BLADDER INCONTINENT. ON CORTRAK TUBE FEEDS.   WILL ASK [facility] TO REVIEW RECENT IMAGING.   ELOS - TWO WEEKS    TEAM CONF - JAN 24 - NAUSEA AND EMESIS AFTER MED THIS AM.  RENAL DISCONTINUED SALT TABS.  FATIGUE . VARIABLE PARTICIPATION. BED MIN ASSIST. GAIT 80 FEET MIN ASSIST. ADLS MIN ASSIST FOR BATHING AND DRESSING. COGNITIVE TASKS - FLUCTUATING SKILLS DAY TO DAY. SEVERELY IMPAIRED MEMORY. ON RITALIN FOR NEUROSTIMULATION. ADDED REMERON LOW DOSE FOR APPETITE.  PULLED OUT CORTRAK - ATE 25-50% YESTERDAY. ON ANTIVERT PRIOR TO MEALS FOR NAUSEA.  WILL DISCUSS WITH PATIENT AND WIFE REGARDING POSSIBLE G-TUBE.  CONTINENT BLADDER.   ELOS - ONE WEEK -MAY NEED TO LOOK AT SUBACUTE.      TEAM CONF - JAN 31 - F/U EDIL TODAY. DRY HEAVE AT BREAKFAST BUT NO EMESIS. ATE 25%. PEG TUBE POSSIBLY TOMORROW. AUDITORY PROCESSING DEFICITS. DOES NOT AVOID OBJECTS OR GO BACK TO CHAIR, NEED TO GUID PATIENT. BED MIN. TRANSFERS MIN. 80 FEET RW MIN-MOD ASSIST. TOILET TRANSFERS MIN ASSIST. SHOWER TRANSFERS MIN ASSIST. VARIABLE PARTICIPATION WITH ALDS WITH FATIGUE. TOOK OFF RITALIN LAST WEEK DUE TO HALLUCINATIONS BUT DOES NOT APPEAR RELATED AS PERSIST, MAY ADD BACK NEXT WEEK. SEE HOW DOES WITH NUTRITIONAL SUPPORT  ELOS - PEG TUBE PROBABLY TOMORROW, HOME POSSIBLY ON NEXT Thursday. PLAN HOME HEALTH           Nilson Win MD  02/06/19  7:38 PM    Time:

## 2019-02-07 NOTE — PROGRESS NOTES
Discussed team conference report, current status, and d/c date for tomorrow, 2/8 with wife. Discussed tube feedings at home will be continuous vs bolus and discussed teaching for her to learn. Referral made to [facility] for tube feeding product and pump. Also informed [facility] of d/c date and plan for tube feeding as continuous. Wife wants to take patient home on very short pass this afternoon. She stated she will have people at home to help her but only wants to stay long enough for him to see home and his dogs. Wife completed family teaching with OT this morning regarding bathing, dressing, and cleaning around G-tube. Discussed respite care for her as caregiver and how to deal with patient if gets agitated. She realizes she needs to try not to get agitated with patient as this only escalates him. She stated she has been blessed with lots of people to help her and has a neighbor who is an OT for [facility]. She has friends bringing meals and her mother is there with her and will stay as long as needed, although she cannot help physically. Will plan for d/c tomorrow, 2/8.
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
## 2019-02-07 NOTE — PROGRESS NOTES
TEAM CONF - FEB 7 - BED MIN ASSIST. TRANSFERS MIN ASSIST. GAIT  FEET MIN ASSIST RW. 4 STAIRS MIN ASSIST. TOILET TRASNFERS MIN ASSIST. SHOWER TRANSFERS MIN. COGNITION - CONTINUES WITH CONFUSION/CONFABUJLATION. WOULD CONFUSE OBJECTS FOR A PHONE. NO IMPROVEMENT IN COGNITION OFF MEDS. WHEN PATIENT CONFUSED ON TOPIC, CANNOT GET HIM TO REASON AROUND IT.  SEVERE DEFICITS WITH ATTENTION, THUGHT ORGANIZATION, VISUAL SPATIAL SKILLS. SKIN INTACT. INCONTINENT BLADDER.  ELOS - TOMORROW WITH HOME HEALTH. NURSING PT, OT, SLP, NURSING. WOULD HAVE PATIENT REMAIN ON FIRST LEVEL AT HOME.

## 2019-02-08 VITALS
DIASTOLIC BLOOD PRESSURE: 66 MMHG | HEIGHT: 64 IN | RESPIRATION RATE: 18 BRPM | WEIGHT: 129.63 LBS | BODY MASS INDEX: 22.13 KG/M2 | HEART RATE: 102 BPM | OXYGEN SATURATION: 98 % | TEMPERATURE: 98.4 F | SYSTOLIC BLOOD PRESSURE: 103 MMHG

## 2019-02-08 RX ORDER — LANSOPRAZOLE
30 KIT EVERY MORNING
Qty: 300 ML | Refills: 1 | Status: SHIPPED | OUTPATIENT
Start: 2019-02-09 | End: 2019-09-26

## 2019-02-08 RX ORDER — METHYLPHENIDATE HYDROCHLORIDE 5 MG/1
5 TABLET ORAL 2 TIMES DAILY
Status: DISCONTINUED | OUTPATIENT
Start: 2019-02-13 | End: 2019-02-08 | Stop reason: HOSPADM

## 2019-02-08 RX ORDER — ACETAMINOPHEN 160 MG/5ML
650 SOLUTION ORAL EVERY 6 HOURS PRN
Start: 2019-02-08 | End: 2019-09-26

## 2019-02-08 RX ORDER — ONDANSETRON 4 MG/1
4 TABLET, ORALLY DISINTEGRATING ORAL EVERY 4 HOURS PRN
Qty: 20 TABLET | Refills: 1 | Status: SHIPPED | OUTPATIENT
Start: 2019-02-08 | End: 2019-09-26

## 2019-02-08 RX ORDER — MECLIZINE HCL 12.5 MG/1
12.5 TABLET ORAL 3 TIMES DAILY PRN
Qty: 45 TABLET | Refills: 1 | Status: SHIPPED | OUTPATIENT
Start: 2019-02-08 | End: 2019-09-26

## 2019-02-08 RX ORDER — POLYETHYLENE GLYCOL 3350 17 G/17G
17 POWDER, FOR SOLUTION ORAL DAILY PRN
Status: DISCONTINUED | OUTPATIENT
Start: 2019-02-08 | End: 2019-02-08 | Stop reason: HOSPADM

## 2019-02-08 RX ORDER — METHYLPHENIDATE HYDROCHLORIDE 5 MG/1
TABLET ORAL
Qty: 60 TABLET | Refills: 0 | Status: SHIPPED | OUTPATIENT
Start: 2019-02-08 | End: 2019-09-26

## 2019-02-08 RX ORDER — MULTIPLE VITAMINS W/ MINERALS TAB 9MG-400MCG
1 TAB ORAL DAILY
Start: 2019-02-09 | End: 2019-09-26

## 2019-02-08 RX ORDER — POLYETHYLENE GLYCOL 3350 17 G/17G
17 POWDER, FOR SOLUTION ORAL DAILY PRN
Start: 2019-02-08 | End: 2019-09-26

## 2019-02-08 RX ADMIN — MECLIZINE HCL 12.5 MG: 12.5 TABLET ORAL at 11:47

## 2019-02-08 RX ADMIN — LANSOPRAZOLE 30 MG: KIT at 08:37

## 2019-02-08 RX ADMIN — MULTIPLE VITAMINS W/ MINERALS TAB 1 TABLET: TAB at 08:37

## 2019-02-08 RX ADMIN — MECLIZINE HCL 12.5 MG: 12.5 TABLET ORAL at 08:37

## 2019-02-08 NOTE — PLAN OF CARE
Problem: Patient Care Overview  Goal: Plan of Care Review  Outcome: Outcome(s) achieved Date Met: 02/08/19 02/08/19 1110   Patient Care Overview   IRF Plan of Care Review discharge pending   Progress, Functional Goals preparing for discharge   Coping/Psychosocial   Plan of Care Reviewed With patient;spouse   OTHER   Outcome Summary Patient preparing for discharge, paperwork and prescriptions given, and follow up appointments and medications will be discussed. Home health to follow tube feeding schedule and will come out to educate patients wife- wife spoke with Crystal with [facility] regarding tube feeding and insurance coverage.       Problem: Skin Injury Risk (Adult)  Goal: Skin Health and Integrity  Outcome: Outcome(s) achieved Date Met: 02/08/19 02/08/19 1110   Skin Injury Risk (Adult)   Skin Health and Integrity achieves outcome       Problem: Fall Risk (Adult)  Goal: Absence of Fall  Outcome: Outcome(s) achieved Date Met: 02/08/19 02/08/19 1110   Fall Risk (Adult)   Absence of Fall achieves outcome       Problem: Nausea/Vomiting (Adult)  Goal: Symptom Relief  Outcome: Outcome(s) achieved Date Met: 02/08/19 02/08/19 1110   Nausea/Vomiting (Adult)   Symptom Relief achieves outcome       Problem: Stroke (Hemorrhagic) (Adult)  Goal: Signs and Symptoms of Listed Potential Problems Will be Absent, Minimized or Managed (Stroke)  Outcome: Outcome(s) achieved Date Met: 02/08/19 02/08/19 1110   Goal/Outcome Evaluation   Problems Assessed (Stroke (Hemorrhagic)) all   Problems Present (Stroke (Hemorrhagic)) cognitive impairment;eating/swallowing impairment;motor/sensory impairment       Problem: Nutrition, Enteral (Adult)  Goal: Signs and Symptoms of Listed Potential Problems Will be Absent, Minimized or Managed (Nutrition, Enteral)  Outcome: Outcome(s) achieved Date Met: 02/08/19

## 2019-02-08 NOTE — DISCHARGE SUMMARY
SHANON TAYLOR   - 1955    ADMIT - 2019  DISCHARGE - 2019    Chief Complaint:   1. 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  2. 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  3. Status post right  shunt removal secondary to infection- completed course of antibiotics on 2019.  4. Status post external ventricular drain and removal.  5. Left upper extremity brachial vein DVT--resolved  6. Impaired cogntion, mobility, self care  7.  Nutrition - persistent nausea - PEG tube placed   8.  Neural stimulation- re-trail Ritalin 5 mg bid starting   9. S/p SIADH - appears resolved - recheck BMP  before discharge      HISTORY: The patient is a 63-year-old male, previously independent, presented to [facility] on 2018 with a cerebellar hemorrhage from ruptured PICA aneurysm as well as intraventricular hemorrhage. He had a lengthy hospital course. He underwent angiogram with embolization of aneurysms. Repeat CT of the head showed worsening hydrocephalus. He had a  shunt placed but it had to be removed on 2018 due to meningitis. Neurosurgery was considering a  shunt replacement when his CSF clears further, but no plans at this time. He had a ventriculostomy placed on 2018 and removed last week. He is completing the steroid taper on 2019. Hypertension was initially treated with Cardene and then oral medications, but now controlled off his medications. Meningitis-CSF growing klebsiella-completed course of IV Rocephin yesterday. He had respiratory failure, extubated 12/10/2018. He has a left upper extremity brachial vein DVT. He was cleared to start on Eliquis. He is to complete a 3-month course. He is to follow up with primary care to manage as an outpatient. He has had mild hyponatremia-asymptomatic. 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 Sodium was 129  today. He is on Florinef and IV fluids and has been on Decadron taper suspecting adrenal insufficiency. Cosyntropin simulation completed with very poor response. Nephrology has given him 3% saline periodically. He will continue on Florinef. Nephrology will follow in the rehab unit. May have referral to Endocrinology possibly. The patient had acute metabolic encephalopathy that is improving. He is on amantadine that has been titrated up to twice a day. There is a question of seizures. No noted seizures on the EEG. He continues on Keppra for now twice a day. He has had persistent nausea and has been on scheduled Zofran as needed. He was switched on discharge to Zofran as needed.      Functionally, the patient has had impairment in his cognition. Slow processing. Decreased alertness. He has been a 2-person assist with his transfers. He has ambulated 20 feet moderate assist of 2. On a regular consistency diet. His toileting is moderate assist. He presently denies headache, vision changes, swelling changes, cognitive changes, focal weakness. His wife notes, however, that he does have cognitive changes and that he has had some weakness noted on the left side. Given his functional impairments and comorbidities, he is now admitted for acute inpatient rehabilitation.     PAST MEDICAL HISTORY:  1. Gastroesophageal reflux disease.  2. Pancreatitis 2 years ago.  3. Recent LASIK eye surgery.     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     Status post hydrocephalus. S/p removal infected  shunt. Features of hydrocephalus worsening impaired memory, balance , bladder incontinence reviewed with patient and wife in event he develops any of those symptoms in future.   Jan 7 - recheck CT head with patient's lethargy / persistent nausea ( also had at outside hospital) to assess for any worsening of hydrocephalus with shunt removed.   Addendum-There is stable to equivocal slight interval increase in size in the lateral and third ventricles when compared to prior head CT 11 days ago on 12/27/2018 compatible with mild hydrocephalus. There is some low density and volume loss along the margins of the hyperdense liquid  embolic agents of the cerebellar vermi, compatible with areas of infarcted cerebellar vermis measuring up to 3.5 x 3 x 2.8 cm, unchanged.No residual acute intracranial hemorrhage is seen.  Jan 9 - Nursing called - patient got up on his own, bed alarm went off, in room near nursing station.  Fell and struck head on bedside dresser and bed that wife was in.  Nursing reports no change seen from recent baseline neuro. Got stat CT head ( no chnages from two days ago, no new bleed) and repeat CT  in 24 hours as on Eliquis, neuro checks q 2 hours, sitter ordered. Discontinued Eliquis which he is on for LUE brachial DVT and re-assess regarding anticoagulation after CT tomorrow.   He does not describe any new complaint. Baseline nausea, headache. He did better yesterday after first dose of Ritalin and ate more at dinner. Ate 50% breakfast today per sitter.   Selvin 10 - neuro stable. Given head trauma yesterday on Eliquis, will repeat CT head this afternoon to rule out any bleed before resuming Eliquis for DVT. Add:  F/U CT this afternoon - no acute hemorrhage.  Will resume Eliquis for LUE DVT  Selvin 15 - Patient with variable performance, more alert at times per nursing earlier today,  but then other times more fatigued with therapies.   On amantadine and ritalin.  Na level stable yesterday. To recheck in AM.  WBC normal yesterday. No fever.  On Eliquis for LUE DVT on venous duplex Dec 4 and still present on venous duplex Dec 26.  Concern at any point would be for potential for SDH over time.   At this point, as he is 6 weeks out from LUE DVT on Dec 4, will hold Eliquis for now, recheck BUE venous duplex tomorrow, and then decide on ongoing  anti-coagulation,  and if any persistent decline with performance will check CT head. Will not check CT head this evening as not any dramatic change on physical exam .    Jan 16 - Patient with confused comments, said an odd name randomly, mis-identified where wife worked. SLP also notes some confused comments. Wife reports he complains of headache, but  denied having headache presently to examiner. Wife reports he complained of ear pain but may have been related to tape for feeding tube. His nausea is better and eating better. He reports nausea is low . Does not complain of any new weakness on exam.   Na 129 - not a dramatic change but will d/c water flushes with tube feeds. Will get MRI brain with and without contrast stat- assess for any acute changes from prior infection or any acute bleed. MRI states can get timely once screening sheet completed. Recheck WBC.  Will d/c scopalamine patch - done. (add:  Discussed with neurosurgery and neuroradiology - patient below the threshold in terms of number of CT scan for any concern for cumulative effect - can get repeat CT scans as need). (add:  MRI done without contrast as not able to get IV site).    Add:  MRI portions summarized above. No acute bleed. Subacute infarct - 3.8 x 2.9 cm area of FLAIR hyperintensity and mixed diffusion hyperintensity extending from the inferior medial cerebellar white matter into the cerebellar vermis that is compatible with subacute infarct of brain along the margins of the William used to embolize the  vermian AVM . Mild hydrocephalus - Neurosurgery wanted to hold of placing  shunt in near term due to infection with  shunt. Along tract of previous ventriculostomy changes of either edema or focal cerebritis (will monitor as no fever or leukocytosis).   Jan 17 - The patient's imaging was reviewed with [facility] neuro radiology and [facility] service.  Neurosurgery evaluating ventricle size as well as the area along the track of the  previous frontal ventriculostomy of either edema or focal cerebritis.  On the previous CAT scan that appeared to represent possibly an area of CSF backflow that improved with subsequent repeat ventriculostomy placement posteriorly.  Jan 19 - Patient with temp 100.2 earlier today. Later was 99.0 / 98.4.   He continues with baseline nausea. Ate only 1/5 of breakfast. He describes some posterior headache.  Wife reports he will do confused action, talk about people who don't live here.   He continues more alert. Does not describe any focal weakness. Dysphonia better.   Will recheck CBC with diff, procalcitonin, ESR, CRP, CMP and consult ID to see.  Addendum-infectious disease does not feel there was any active infectious process.  Continues to monitor  Jan 24 - Ct shows ventricles stable to slightly smaller size  Jan 31 - f/u with [facility] -  [facility] note reviewed.  Per [facility], can come off AED and see how does in terms of seizures.  He received first dose of Vimpat liquid yesterday but was not continued on MAR as q 12 hours. Got second dose today and back on MAR as q 12 hours. Last dose Keppra yesterday.  Will continue Vimpat through procedure tomorrow and can then look at taper off.   Feb 5 - tapers off Vimpat tonight           Status post meningitis.Completed antibiotics at outside hospital.  Jan 4 - recheck CBC in AM.  Jan 5 - WBC 5K  Jan 7 - WBC 5.9. No fever. Will check procalcitonin/ESR, CRP with lethargy to assess for any recurrent infection.  Addendum-unremarkable.  CRP 0.12, sedimentation rate 14, pro-calcitonin 0.05, WBC 5.93  Jan 19 - temp 100.2. Recheck labs and ask ID to see. Addendum-infectious disease does not feel there was any active infectious process.  Continues to monitor  Jan 21 - afberile. WBC 7.69        Nausea - Jan 14 . Has been on Zofran. Persistent nausea s/p posterior fossa changes. Previously held off on scopolamine patch given potential cognitive side effects, but with  persistent nausea that is affecting nutrition, will add.   Jan 16 - Scopolamine helped nausea but had to d/c scopolamine due to cognitive side effect.   Jan 18 - Tolerates tube feeds. Still nauseated. Discussed trial of Phenergan after therapies tomorrow due to sedative side effect.    January 20- he took Phenergan twice yesterday, patient reports nausea better, wife notes that had emesis after dinner.  He is on Zofran scheduled before meal time  Jan 21 - Has not take phenergan since Jan 19. Emesis again with breakfast.Discussed observe off amantadine today to see if any effect on confused comments.  Discussed tomorrow will change from Zofran scheduled to antivert 12.5 mg scheduled tid before meals.  Also add Remeron tomorrow PM to see if helps with appetite. Other option would include Topamax 25 mg HS for headache/nausea.   Jan 24 - Antivert helps nausea but ? If cognitive side effect. Will adjust other meds first.  Jan 25 - Get gastroenterology input on any options for nausea.  Jan 28 - UGI with SBFT today. Possibly gastric emptying study depending on results.   Jan 29 - UGI with SBFT overall unremarkable yesterday. To go for gastric emptying study tomorrow at 7:30 AM  Jan 30 - GES results - normal. Denies nausea on Anitvert.   Feb 1 - if tolerates tube feeds after PEG placed, may change Antivert to 12.5 mg tid prn on Vish Feb 3 to see if any cognitive side effect.   Feb 4 - change antivert to 12.5 mg tid prn to see if cognitive side effect.   Feb 8 - Rx for Atnivert prn - takes three times in past two days and for Zofran 4 mg prn (thouugh not take recently ) for home in case needed        Hyponatremia. SIADH.   Selvin 3 - stable at 130.   Jan 5 - Serum sodium has gone up from 130 to 131.  Serum osmolarity is low at 272 but urine osmolarity is inappropriately concentrated at 847/759.  Thyroid function test is normal.  Results are consistent with SIADH.   Placed on a 1500 mL per day fluid restriction ( but he does  not approach that on his own).  Jan 6 - . Urine osmolality 277. BUN 9, Cr 0.67.   Jan 8 - Endocrinology increase dietary salt and continue fluid restriction.  Jan 9 - salt tabs added  Jan 14 -   Jan 18 - Na 133. On salt tabs. No water flushes with tube feeds.   Jan 22 - salt tabs decreased to bid  Jan 23 - Na 136. Salt tabs d/'d as difficulty taking by mouth  Jan 28 - started on normal saline yesterday with decrease appetite and NPO after midnight for meds. Na 139 today.   Jan 29 - IVF d/c'd  Feb 1 - PEG not until afternoon. Add IVF x 2 liters D5 NS with 20 KCL at 100 cc per hour.   Feb 8 - Na level remains improved. SIADH appears resolved. Recheck BMP today before discharge.         Adrenal - Evaluation against adrenal insufficiency.  Endocrinology discontinuing decadron and will re-evaluate further.   Jan 7 - worsened lethargy in therapies today, ? If could be related to being off steroids?  Jan 8- repeat ACTH test  January 11-adrenal insufficiency     Left upper extremity DVT-on Eliquis.  DVT prophylaxis-SCD and Eliquis.  Jan 9 - Eliquis on hold for at least 24 hours with recent fall with trauma to end. Re-assess after CT on Selvin 10.   Selvin 15 - Concern at any point would be for potential for SDH over time any time he shows a fluctuation. At this point, as he is 6 weeks out from LUE DVT on Dec 4, will hold Eliquis for now, recheck BUE venous duplex tomorrow, and then decide on ongoing anticoagulation.   Jan 16 - left brachial vein DVT resolved. BUE superficial venous thrombosis. No bleed on MRI. Resume Eliquis.   Jan 26 - Will d/c Eliquis after  Friday Jan 25 PM dose in preparation for any possible endoscopic evaluation/ procedure first of the week. . BUE venous duplex Jan 16  showed resolution of LUE brachial vein DVT. Had acute and chronic RUE SVT and chronic LUE SVT.   Feb 3 - BUE venous duplex BUE SVT. Patient did not extend LUE for evaluation of left axillary / basilic vein. Left brachial vein no  DVT- will not resume Eliquis as DVT 2 months ago and line associated.        Encephalopathy / Neuro- stimulation-admitted on amantadine.  Jan 8 - depression may be a component. SSRI may affect appetite or sodium level. Possibly add Ritalin, could affect appetite but if more alert could possibly eat more.  Will add Ritalin 5 mg every 8 AM and 12 noon.  Selvin 10 - more alert and interactive  Jan 20 - he has some confused comments.  Neurosurgery evaluated imaging.  Seen by ID and not felt to have any acute infectious process.  Discussed with patient and wife,  may possibly have some cognitive side effects from amantadine, decreased to 100 mg today and then discontinue starting with tomorrow and see if there is any improvement.  Doubt confusion would be related to Ritalin.  He has had it before starting of Phenergan. Na level okay 133.  Other consideration would be Keppra side effect contributing.  Jan 22 - no apparent change yet off amantadine.   Jan 24 - continues with increased confusion/ behavioral changes.   Patient has been more confused, more difficulty with tasks from cognitive standpoint.  Making odd, confused comments. Will perseverate on some tasks.  Also increased irritability at times, shaking fists.  He has had some hallucinations as well  He denies any headache. Denies any nausea but does not remember emesis from this morning.  His wife notes that nausea is noticeably better since antivert and nursing reports same, but we discussed could be having some confusion from antivert. Other possibilities could be Ritalin although showed initial improvement with that vs hydrocephalus. Discussed discontinue Remeron and Ritalin and recheck CT head. Will also check UA. Continue antivert for now as does help nausea.   Add: CBC unremarkable. UA pending. CT head stable with - There is resolvingintraventricular blood appreciated. The occipital horns are smaller than the prior examinations. The remaining portions of the  lateral ventricles as well as the 3rd and 4th ventricle appear unchanged. There is no evidence of transependymal migration of fluid.  Jan 25 - still confusion , aggressive behavior at night. He had confusion last weekend before antivert but could have symptoms of underlying changes in brain made more prominent by medication side effect. Discussed see how does off other meds for next couple days and if confusion/ hallucinations continue, then change Keppra to Vimpat. UA unremarkable. BUN/CR, NA okay.   Jan 28 - had planned change to Vimpat but patient unable to swallow pill that large and liquid form not available here.  Jan 30 - gets more confused / irritable later in the days. Still with hallucinations at times per wife. Patient not able to describe. Vimpat liquid to be available this afternoon - will transition from Keppra to see if helps cognition/hallucinaiton/ mood. Last dose Keppra tonight.   Feb 4- taper off Vimpat after tomorrow night   Feb 5 - He gives confused responses, has lap belt for wheelchair and confused with what this is for, asking about his license repeatedly. Patient was irritable with wife earlier today.  Has not taken any prn Antivert past two days, comes off Vimpat after dose tonight.  Feb 6 - still confused rambling comments. See how does further off meds.  Feb 7 - Still confused. Cognition without any dramatic improvement coming off meds, except not having any recent hallucinations..  He does seem a little better off of the antiepileptic medication.  We discussed starting back Ritalin at some point as does not appear related to when he had hallucinations and he did show initial improvement on Ritalin  Feb - re-trial  Ritalin 5 mg q 8AM and 12 Noon startind Wed Feb 13        Seizure prophylaxis-Initially on Keppra.  Jan 30 - Vimpat liquid to be available this afternoon - will transition from Keppra to see if helps cognition/hallucinaiton/ mood.   Feb 1 - per [facility]O can come off seizure  prophylaxis - will taper off Vimpat in couple days after recovery period after PEG placement   Feb 4- drowsy - may be related to Vimpat, did not take this AM dose, taper off with 50 mg tonight and tomorrow night and then stop.      Nutrition - Jan 5 -  poor intake. Staff encouraging intake. ( He was placed on 1500 cc fluid restriction but does not appraoch that on his own. Calorie count in progress. May possibly add Remeron. Would avoid Megace given its thrombogenic potential and patient's history of LUE DVT.  Jan 7 - will change back to scheduled Zofran prior to meals. Assess ventricles with CT, labs to assess for infection.    Jan 8 - RUQ ultrasound ordered - see report.  Consider Remeron but sedative side effect could be an issue. Nausea probably related to posterior fossa CNS   Jan 9 - PO intake remains poor.  Will discuss with patient and wife Cortrak feeding tube.   Add: patient and spouse is agreeable. He ate about 50% at supper. Nursing feels he is showing some improvement in intake. Discussed with patient and wife that with next meal that he does not show good intake will place Cortrak. They are in agreement.   Selvin 10 - eating 50% breakfast and lunch so hold on Cortrak presently.   Jan 14 -  Persistent nausea. Had emesis yesterday and again today.  Intake decreased again over weekend.  Neuro without change. No headache, dizziness, abd pain. Previous follow up CTs without significant change.  Discussed with patient and wife - agreeable with Cortrak tube placement to help with nutrition given weight loss.  On Zofran scheduled before meals. Concern with adding Phenergan is sedative side effect. Will try scopalamine patch.   Selvin 15 - nausea better per patient report. Tolerates tube feeds.   Jan 18 - continue tube feeds through weekend and then re-assess.   Jan 21 - Intake by mouth still limited.  Reviewed with dietician, patient, wife and will continue Cortrak tube feeds.  Jan 22- adding remeron for appetite  tonight.   Jan 23 - patient pulled Cortrak early AM - discussed see how eats with tube out  Jan 24 -  Discussed replacing Cortrak tube in AM for nutritional support as less likely to pull out during the day.   Jan 25 - replace Cortrak- addendum: Patient not able to tolerate replacement of Cortrak tube. His intake has remained poor. Discussed option of Reglan to see if helps appetite but concern would be for cognitive side effects. Discussed with patient / wife looking at G-tube placement. Will ask Gastroenterology to see for assessment for options on nausea and PEG tube placement.. His Eliquis can be held at any time for the procedure.   Jan 29 - gastric emptying swallow study tomorrow to determine if place PEG or PEJ tube  Jan 30 - await GES results. Patient has appointment with his Neurosurgery at outside facility tomorrow at 1PM which would affect timing of tube placement.   Feb 1 - PEG placement today.   Feb 4 - titrate up on PEG tube feeds.   Feb 5 - tolerates tube feeds at 55 cc/hour  Feb 6 - tried bolus tube feeds and had emesis- not clear if related to bolus or baseline nausea. . Will change back to continuous  Feb 8 - adjust tube feeds to three 5 hour increments for home             Impaired cognition, mobility and self-care. Now admitted for comprehensive inpatient rehabilitation program with physical therapy 1 hour, occupational therapy 1 hour, speech therapy 1 hour, 5 days a week. Areas to be addressed include cognition/executive function, swallowing, functional mobility, gross and fine motor control, ADL training, transfers, balance, gait. Rehabilitation nursing for carryover and monitoring of his neurologic status, bowel, bladder, skin. Ongoing physician followup. Weekly team conferences. Goal for him to achieve a level of supervision with his mobility and self-care and improvement in his cognition. Rehabilitation prognosis fair. Medical prognosis fair. Estimated length stay is indeterminate at this  time.     Jan 3 - initiated acute inpt rehab  TEAM CONF - JAN 4 - BED MIN. TRANSFERS MIN. GAIT 80 FEET MIN ASSIST RW. MAX - DEPENDENT WITH ADLS, CUES TO KEEP EYES OPEN AND PARTICIPATE. DIFFICULTY WITH VISUAL TASKS.  MODERATE SEVERE  COGNITIVE DEFICITS. BNE PENDING. SWALLOW - TOLERATES SMALL PILLS WITH WATER. AMANTADINE FOR NEUROSTIM. CONTINENT BOWEL AND BLADDER. 30 LBS WEIGHT LOSS DURING HOSPITAL STAY.NAUSEA - HAS BEEN TAKING ZOFRAN. WAS SCHEDULED AT [facility] AS WELL AS PRN. WILL ADD SCHEDULED DOSE Q AM HERE AND CONTINUE PRN.  NUTRITION - NOT EATING OR DRINKING - 25% WITH CUES.  . SLEPT FROM 6 PM TO 7 AM. HYPONATREMIA - SIADH - STABLE.  ADRENAL INSUFFICIENCY EVALUATION.    OS- 3 WEEKS     TEAM CONF - JAN 10 - Executive Functions severely impaired-impaired attention, thought organization, visuospatial skills  Memory moderately impaired- impaired immediate and delayed  verbal recall, slightly better with visual recall  Bed/Chair/Wheelchair Min  Bed Mobility  Min  Walk 80 ft Rwx Min  Toilet Transfers  Min A  Bathing Mod A  Dressing Lower MAX  Dressing Upper Min  Toileting Dep  Continent mostly, but some incontinence  Sitter for safety  ELOS - two weeks     TEAM CONF - JAN 17 -TRANSFERS MIN-MOD ASSIST.  GAIT 80 FEET MIN-MOD 2.  LBD MOD-MAX.  UBD MIN ASSIST.  BATH MOD ASSIST. MAX CUES FOR TASKS WITH SLP.   MORE DIFFICULTY WITH TASKS. POOR INITIATION. NOT MAKING PROGRESS.  BLADDER INCONTINENT. ON CORTRAK TUBE FEEDS.   WILL ASK [facility] TO REVIEW RECENT IMAGING.   ELOS - TWO WEEKS     TEAM CONF - JAN 24 - NAUSEA AND EMESIS AFTER MED THIS AM.  RENAL DISCONTINUED SALT TABS.  FATIGUE . VARIABLE PARTICIPATION. BED MIN ASSIST. GAIT 80 FEET MIN ASSIST. ADLS MIN ASSIST FOR BATHING AND DRESSING. COGNITIVE TASKS - FLUCTUATING SKILLS DAY TO DAY. SEVERELY IMPAIRED MEMORY. ON RITALIN FOR NEUROSTIMULATION. ADDED REMERON LOW DOSE FOR APPETITE.  PULLED OUT CORTRAK - ATE 25-50% YESTERDAY. ON ANTIVERT PRIOR TO MEALS  FOR NAUSEA.  WILL DISCUSS WITH PATIENT AND WIFE REGARDING POSSIBLE G-TUBE.  CONTINENT BLADDER.   ELOS - ONE WEEK -MAY NEED TO LOOK AT SUBACUTE.       TEAM CONF - JAN 31 - F/U [facility] TODAY. DRY HEAVE AT BREAKFAST BUT NO EMESIS. ATE 25%. PEG TUBE POSSIBLY TOMORROW. AUDITORY PROCESSING DEFICITS. DOES NOT AVOID OBJECTS OR GO BACK TO CHAIR, NEED TO GUID PATIENT. BED MIN. TRANSFERS MIN. 80 FEET RW MIN-MOD ASSIST. TOILET TRANSFERS MIN ASSIST. SHOWER TRANSFERS MIN ASSIST. VARIABLE PARTICIPATION WITH ALDS WITH FATIGUE. TOOK OFF RITALIN LAST WEEK DUE TO HALLUCINATIONS BUT DOES NOT APPEAR RELATED AS PERSIST, MAY ADD BACK NEXT WEEK. SEE HOW DOES WITH NUTRITIONAL SUPPORT  ELOS - PEG TUBE PROBABLY TOMORROW, HOME POSSIBLY ON NEXT Thursday. PLAN HOME HEALTH      TEAM CONF - FEB 7 - BED MIN ASSIST. TRANSFERS MIN ASSIST. GAIT  FEET MIN ASSIST RW. 4 STAIRS MIN ASSIST. TOILET TRASNFERS MIN ASSIST. SHOWER TRANSFERS MIN. COGNITION - CONTINUES WITH CONFUSION/CONFABUJLATION. WOULD CONFUSE OBJECTS FOR A PHONE. NO IMPROVEMENT IN COGNITION OFF MEDS. WHEN PATIENT CONFUSED ON TOPIC, CANNOT GET HIM TO REASON AROUND IT.  SEVERE DEFICITS WITH ATTENTION, THUGHT ORGANIZATION, VISUAL SPATIAL SKILLS. SKIN INTACT. INCONTINENT BLADDER.  ELOS - TOMORROW WITH HOME HEALTH. NURSING PT, OT, SLP, NURSING. WOULD HAVE PATIENT REMAIN ON FIRST LEVEL AT HOME.                GENERAL: The patient is a 63-year-old male who is awake, alert  , in no acute distress.  HEENT:   Sclerae anicteric. Conjunctivae pink. Oropharynx moist.     LUNGS: Clear to auscultation bilaterally without wheezes, rales or rhonchi. No accessory muscle use.  HEART: RRR  ABDOMEN: Normoactive bowel sounds. Soft, PEG tube under Binder.  EXTREMITIES: Without edema or cyanosis.   NEUROLOGIC: Slow processing   . Converses.    Impaired recall. Takes resistance bilaterally. FTN =  He gives confused responses.     Results from last 7 days   Lab Units 02/04/19  0530 02/03/19  0500 02/02/19  1440    SODIUM mmol/L 140 140 140   POTASSIUM mmol/L 4.0 3.8 3.9   CHLORIDE mmol/L 105 105 103   CO2 mmol/L 22.4 25.1 24.6   BUN mg/dL 17 14 11   CREATININE mg/dL 0.55* 0.60* 0.57*   CALCIUM mg/dL 9.1 9.0 9.1   BILIRUBIN mg/dL  --   --  0.8   ALK PHOS U/L  --   --  62   ALT (SGPT) U/L  --   --  36   AST (SGOT) U/L  --   --  19   GLUCOSE mg/dL 139* 107* 107*             Results from last 7 days   Lab Units 02/04/19  0530 02/03/19  0500 02/02/19  0646   WBC 10*3/mm3 6.07 7.01 14.43*   HEMOGLOBIN g/dL 12.9* 12.6* 13.4*   HEMATOCRIT % 41.0 38.2* 41.5   PLATELETS 10*3/mm3 195 194 193     MRI brain - Jan 16, 2019  Portions summarized -   The lateral 3rd and superior 4th ventricle remain mildly enlarged  compatible with mild hydrocephalus unchanged since 10/09/2019  , the   ventricles are clearly slightly larger than they were on prior outside  head CTs on 12/25/2018 and 12/27/2018 after the removal of the  ventriculostomy catheters.  3.8 x 2.9 cm area of FLAIR hyperintensity and mixed diffusion  hyperintensity extending from the inferior medial cerebellar white  matter into the cerebellar vermis that is compatible with subacute  infarct of brain along the margins of the William used to embolize the  vermian AVM  The 2nd  ventriculostomy catheter extended from the superior right coronal sutures through the  anterior superior lateral right frontal lobe parenchyma to the  anterosuperior body of the right lateral ventricle, and this has some  diffusion hyperintensity in the tract but also there is some FLAIR and  diffusion hyperintensity circumscribing the catheter tract within the  right anterosuperior lateral frontal white matter measuring up to 12 x 8  x 9 mm surrounding FLAIR and diffusion hyperintensity could be edema  related to the placement and removal or could be some focal cerebritis.     CT head - Jan 24 , 2019  -There is mild prominence of the temporal horns similar to the prior  examination. Beam hardening artifact from  the involved material limits  evaluation of the posterior fossa somewhat, but there is no convincing  evidence of hemorrhage involving the posterior fossa. There is resolving  intraventricular blood appreciated. The occipital horns are smaller than  the prior examinations. The remaining portions of the lateral ventricles  as well as the 3rd and 4th ventricle appear unchanged. There is no  evidence of transependymal migration of fluid. There is no evidence of  intra-axial hemorrhage.  Name Relationship Specialty Phone Fax Address Order              Jesus Bautista MD  PCP - General Family Medicine [phone] [address] [facility] [address]     Next Steps: Follow up      Instructions: F/U after rehab. Will need to complete 3 month course of eliquis.      Nilson Win MD   Physical Medicine and Rehabilitation [phone] [address] [place] [address] [address]  [facility] [address]     Next Steps: Follow up in 1 month(s)      Greer Pickering APRN   Nurse Practitioner [phone] [address] [facility] [address]  [facility] [address]     Next Steps: Follow up on 2/21/2019      Instructions: [address] [facility] [address]     Phone: [phone]   Fax: [phone]       [facility]-Dr Montano           Next Steps: Follow up in 3 month(s)      Instructions: with CT head with contrast      [facility]   Home Health Services, Infusion Clinic, Infusion Therapy, DME Services [phone] [address] [facility] [address]  [facility] [address]     Next Steps: Follow up      Instructions: You will receive tube feeding product, pump and supplies from this company.       [facility]   Home Health Services [phone] [address] [facility] [address]  [facility] [address]     Next Steps: Follow up      Instructions: You will be receiving home PT, OT, ST, NSG. They will contact you to schedule in home  visits.            Discharge Medications      New Medications      Instructions Start Date   acetaminophen 160 MG/5ML solution  Commonly known as:  TYLENOL   650 mg, Per G Tube, Every 6 Hours PRN, (650 mg dose)- over the counter      lansoprazole 3 MG/ML suspension oral suspension   30 mg, Per G Tube, Every Morning, (patient to take per G-tube as not able to take by mouth)      magnesium hydroxide 2400 MG/10ML suspension suspension  Commonly known as:  MILK OF MAGNESIA   10 mL, Oral, Daily PRN      meclizine 12.5 MG tablet  Commonly known as:  ANTIVERT   12.5 mg, Oral, 3 Times Daily PRN      methylphenidate 5 MG tablet  Commonly known as:  RITALIN   Starting Wed Feb 13, 2019- 5 mg po bid at 8AM and 12NOON      multivitamin with minerals tablet tablet   1 tablet, Oral, Daily      ondansetron ODT 4 MG disintegrating tablet  Commonly known as:  ZOFRAN-ODT   4 mg, Oral, Every 4 Hours PRN      polyethylene glycol packet  Commonly known as:  MIRALAX   17 g, Oral, Daily PRN             Home health PT, OT, SLP, Nursing    TUBE FEEDS AT HOME -  Nutren 1.5 @ 75 cc/hr   Run in 5 hour increments, 3 times daily (total of 15 hours)   Total volume infused per 24 hours = 1125 cc  Water flushes: 100 cc x 4 per day     Wife/caregiver can adjust infusion time blocks as needed so long as pt gets 15 hours total at 75 cc/hr or total of 1125 cc of Nutren 1.5 daily.    Start Ritalin 5 mg bid at 8AM and 12 NOON on Wed. Feb 13, 2019    If insurance requires prior authorization for Prevacid liquid 30 mg daily, have pharmacy call office and will change to Pepcid liquid 20 mg bid      >30 on discharge

## 2019-02-08 NOTE — NURSING NOTE
Discussed with wife about administering flu injection, she decided that she would speak to her PCP regarding this issue and it could be administered at a later date.

## 2019-02-08 NOTE — THERAPY DISCHARGE NOTE
Inpatient Rehabilitation - IRF Physical Therapy Discharge Summary  [facility]       Patient Name: Feliciano Light  : 1955  MRN: [id number]    Today's Date: 2019                 Admit Date: 2019      PT Recommendation and Plan    Visit Dx:    ICD-10-CM ICD-9-CM   1. Protein-calorie malnutrition, unspecified severity (CMS/HCC) E46 263.9   2. Impaired cognition R41.89 294.9                 PT IRF GOALS     Row Name 19 1000             Bed Mobility Goal 1 (PT-IRF)    Activity/Assistive Device (Bed Mobility Goal 1, PT-IRF)  rolling to left;rolling to right;sit to supine;supine to sit  -LB      [place]ander Level (Bed Mobility Goal 1, PT-IRF)  contact guard assist  -LB      Progress/Outcomes (Bed Mobility Goal 1, PT-IRF)  goal not met;progress slower than expected  -LB         Transfer Goal 1 (PT-IRF)    Activity/Assistive Device (Transfer Goal 1, PT-IRF)  sit-to-stand/stand-to-sit;bed-to-chair/chair-to-bed  -LB      [place] Level (Transfer Goal 1, PT-IRF)  contact guard assist;minimum assist (75% or more patient effort)  -LB      Progress/Outcomes (Transfer Goal 1, PT-IRF)  goal met  -LB         Gait/Walking Locomotion Goal 1 (PT-IRF)    Activity/Assistive Device (Gait/Walking Locomotion Goal 1, PT-IRF)  walker, rolling  -LB      Gait/Walking Locomotion Distance Goal 1 (PT-IRF)  100  -LB      [place]ander Level (Gait/Walking Locomotion Goal 1, PT-IRF)  contact guard assist;minimum assist (75% or more patient effort)  -LB      Progress/Outcomes (Gait/Walking Locomotion Goal 1, PT-IRF)  goal met  -LB         Stairs Goal 1 (PT-IRF)    Activity/Assistive Device (Stairs Goal 1, PT-IRF)  ascending stairs;descending stairs  -LB      Number of Stairs (Stairs Goal 1, PT-IRF)  4  -LB      [place] Level (Stairs Goal 1, PT-IRF)  minimum assist (75% or more patient effort)  -LB      Progress/Outcomes (Stairs Goal 1, PT-IRF)  goal met  -LB        User Key  (r) = Recorded By, (t) = Taken By, (c) =  Cosigned By    Initials Name Provider Type    Steffany Domingo, PT Physical Therapist          Therapy Suggested Charges     Code   Minutes Charges    None                 PT Discharge Summary  Reason for Discharge: Discharge from facility  Outcomes Achieved: Refer to plan of care for updates on goals achieved, Patient able to partially acheive established goals  Discharge Destination: Home with assist, Home with home health      Steffany Flynn, PT   2/8/2019

## 2019-02-08 NOTE — PROGRESS NOTES
SECTION GG    Eating Performance: Branch    Eating Discharge Goals: Carlock sets up or cleans up; patient completes activity.  Carlock assists only prior to or following the activity.    Signed by: Kawnal Garcia RN

## 2019-02-08 NOTE — PROGRESS NOTES
Inpatient Rehabilitation Functional Measures Assessment    Functional Measures  ALBERTO Eating:  [facility] Grooming: [facility] Bathing:  [facility] Upper Body Dressing:  [facility] Lower Body Dressing:  [facility] Toileting:  [facility] Bladder Management  Level of Assistance:  [place]  Frequency/Number of Accidents this Shift:  [facility] Bowel Management  Level of Assistance: [place]  Frequency/Number of Accidents this Shift: [facility] Bed/Chair/Wheelchair Transfer:  [facility] Toilet Transfer:  [facility] Tub/Shower Transfer:  [place]    Previously Documented Mode of Locomotion at Discharge: Field  ALBERTO Expected Mode of Locomotion at Discharge: [facility] Walk/Wheelchair:  [facility] Stairs:  [facility] Comprehension:  Auditory comprehension is the usual mode. Comprehension  Score = 6, Modified [place].  Patient comprehends complex/abstract  information in their primary language, requiring:  ALBERTO Expression:  Vocal expression is the usual mode. Expression Score = 6,  Modified Independent.  Patient expresses complex/abstract information in their  primary language, requiring:  ALBERTO Social Interaction:  Social Interaction Score = 6, Modified Independent.  Patient is modified independent for social interaction, requiring:  ALBERTO Problem Solving:  Problem Solving Score = 6, Modified [place].  Patient  makes appropriate decisions in order to solve complex problems, but requires  extra time.  ALBERTO Memory:  Memory Score = 6, Modified [place].  Patient is modified  independent for memory, requiring:    Therapy Mode Minutes  Occupational Therapy: Branch  Physical Therapy: Branch  Speech Language Pathology:  Branch    Signed by: Kanwal Garcia RN

## 2019-02-08 NOTE — DISCHARGE INSTRUCTIONS
Home health PT, OT, SLP, Nursing    TUBE FEEDS AT HOME -  Nutren 1.5 @ 75 cc/hr   Run in 5 hour increments, 3 times daily (total of 15 hours)   Total volume infused per 24 hours = 1125 cc  Water flushes: 100 cc x 4 per day     Wife/caregiver can adjust infusion time blocks as needed so long as pt gets 15 hours total at 75 cc/hr or total of 1125 cc of Nutren 1.5 daily.    Start Ritalin 5 mg bid at 8AM and 12 NOON on Wed. Feb 13, 2019    If insurance requires prior authorization for Prevacid liquid 30 mg daily, have pharmacy call office and will change to Pepcid liquid 20 mg bid

## 2019-02-08 NOTE — PROGRESS NOTES
Met with patient and wife to discuss any concerns regarding d/c. Patient stated he was ready to go home. Wife did take patient home for a short pass yesterday late afternoon. She stated patient did well but got a little nauseated when got up the steps and had dry heaves. She stated he was excited to see their dogs. Wife felt the visit went well and she is ready to take patient home today. Discussed [facility] to provide tube feeding product and supplies and will deliver to home. [facility]  to provide home PT, OT, ST, NSG.

## 2019-03-04 ENCOUNTER — APPOINTMENT (OUTPATIENT)
Dept: CT IMAGING | Facility: HOSPITAL | Age: 64
End: 2019-03-04

## 2019-03-04 ENCOUNTER — HOSPITAL ENCOUNTER (EMERGENCY)
Facility: HOSPITAL | Age: 64
Discharge: HOME | End: 2019-03-04
Attending: EMERGENCY MEDICINE | Admitting: EMERGENCY MEDICINE

## 2019-03-04 VITALS
DIASTOLIC BLOOD PRESSURE: 90 MMHG | SYSTOLIC BLOOD PRESSURE: 137 MMHG | TEMPERATURE: 98.1 F | HEART RATE: 85 BPM | RESPIRATION RATE: 16 BRPM | OXYGEN SATURATION: 98 %

## 2019-03-04 DIAGNOSIS — L03.311 ABDOMINAL WALL CELLULITIS: Primary | ICD-10-CM

## 2019-03-04 LAB
ANION GAP SERPL CALCULATED.3IONS-SCNC: 11 MMOL/L
BASOPHILS # BLD AUTO: 0.07 10*3/MM3 (ref 0–0.2)
BASOPHILS NFR BLD AUTO: 0.7 % (ref 0–1.5)
BUN BLD-MCNC: 8 MG/DL (ref 8–23)
BUN/CREAT SERPL: 10.5 (ref 7–25)
CALCIUM SPEC-SCNC: 9.4 MG/DL (ref 8.6–10.5)
CHLORIDE SERPL-SCNC: 96 MMOL/L (ref 98–107)
CO2 SERPL-SCNC: 26 MMOL/L (ref 22–29)
CREAT BLD-MCNC: 0.76 MG/DL (ref 0.76–1.27)
DEPRECATED RDW RBC AUTO: 50 FL (ref 37–54)
EOSINOPHIL # BLD AUTO: 0.09 10*3/MM3 (ref 0–0.4)
EOSINOPHIL NFR BLD AUTO: 0.9 % (ref 0.3–6.2)
ERYTHROCYTE [DISTWIDTH] IN BLOOD BY AUTOMATED COUNT: 13.6 % (ref 12.3–15.4)
GFR SERPL CREATININE-BSD FRML MDRD: 103 ML/MIN/1.73
GLUCOSE BLD-MCNC: 89 MG/DL (ref 65–99)
HCT VFR BLD AUTO: 43.4 % (ref 37.5–51)
HGB BLD-MCNC: 14 G/DL (ref 13–17.7)
IMM GRANULOCYTES # BLD AUTO: 0.07 10*3/MM3 (ref 0–0.05)
IMM GRANULOCYTES NFR BLD AUTO: 0.7 % (ref 0–0.5)
LYMPHOCYTES # BLD AUTO: 1.13 10*3/MM3 (ref 0.7–3.1)
LYMPHOCYTES NFR BLD AUTO: 10.7 % (ref 19.6–45.3)
MCH RBC QN AUTO: 32 PG (ref 26.6–33)
MCHC RBC AUTO-ENTMCNC: 32.3 G/DL (ref 31.5–35.7)
MCV RBC AUTO: 99.3 FL (ref 79–97)
MONOCYTES # BLD AUTO: 0.88 10*3/MM3 (ref 0.1–0.9)
MONOCYTES NFR BLD AUTO: 8.3 % (ref 5–12)
NEUTROPHILS # BLD AUTO: 8.31 10*3/MM3 (ref 1.4–7)
NEUTROPHILS NFR BLD AUTO: 78.7 % (ref 42.7–76)
NRBC BLD AUTO-RTO: 0 /100 WBC (ref 0–0)
PLATELET # BLD AUTO: 293 10*3/MM3 (ref 140–450)
PMV BLD AUTO: 10.1 FL (ref 6–12)
POTASSIUM BLD-SCNC: 4.2 MMOL/L (ref 3.5–5.2)
RBC # BLD AUTO: 4.37 10*6/MM3 (ref 4.14–5.8)
SODIUM BLD-SCNC: 133 MMOL/L (ref 136–145)
WBC NRBC COR # BLD: 10.55 10*3/MM3 (ref 3.4–10.8)

## 2019-03-04 PROCEDURE — 99283 EMERGENCY DEPT VISIT LOW MDM: CPT

## 2019-03-04 PROCEDURE — 80048 BASIC METABOLIC PNL TOTAL CA: CPT | Performed by: EMERGENCY MEDICINE

## 2019-03-04 PROCEDURE — 74177 CT ABD & PELVIS W/CONTRAST: CPT

## 2019-03-04 PROCEDURE — 25010000002 IOPAMIDOL 61 % SOLUTION: Performed by: EMERGENCY MEDICINE

## 2019-03-04 PROCEDURE — 96360 HYDRATION IV INFUSION INIT: CPT

## 2019-03-04 PROCEDURE — 85025 COMPLETE CBC W/AUTO DIFF WBC: CPT | Performed by: EMERGENCY MEDICINE

## 2019-03-04 RX ORDER — CLINDAMYCIN HYDROCHLORIDE 150 MG/1
450 CAPSULE ORAL 4 TIMES DAILY
Qty: 60 CAPSULE | Refills: 0 | Status: SHIPPED | OUTPATIENT
Start: 2019-03-04 | End: 2019-03-09

## 2019-03-04 RX ORDER — SODIUM CHLORIDE 0.9 % (FLUSH) 0.9 %
10 SYRINGE (ML) INJECTION AS NEEDED
Status: DISCONTINUED | OUTPATIENT
Start: 2019-03-04 | End: 2019-03-04 | Stop reason: HOSPADM

## 2019-03-04 RX ADMIN — SODIUM CHLORIDE, POTASSIUM CHLORIDE, SODIUM LACTATE AND CALCIUM CHLORIDE 1000 ML: 600; 310; 30; 20 INJECTION, SOLUTION INTRAVENOUS at 17:53

## 2019-03-04 RX ADMIN — IOPAMIDOL 85 ML: 612 INJECTION, SOLUTION INTRAVENOUS at 17:34

## 2019-03-04 NOTE — ED NOTES
Charge notified that pt is difficult stick. IV therapy paged.      Manuela Gillis RN  03/04/19 9502

## 2019-03-04 NOTE — ED PROVIDER NOTES
EMERGENCY DEPARTMENT ENCOUNTER    Room Number:  44/44  Date seen:  3/4/2019  Time seen: 4:41 PM  PCP: Carlos Samuels MD  Historian: Pt's wife      HPI:  Chief Complaint: G tube issue  A complete HPI/ROS/PMH/PSH/SH/FH are unobtainable due to: Nothing  Context: Feliciano Light is a 63 y.o. male who presents to the ED c/o G tube issue. The skin around the pt's G tube is becoming red. The pt has a G tube due to vomiting which the pt had after his stroke. The pt's wife states that they have been flushing the G tube but have not been feeding through it. The pt confirms fever.     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 01/02/2019   • SIADH (syndrome of inappropriate ADH production) (CMS/[facility]) 01/03/2019   • Elevated hemoglobin A1c 01/03/2019   • Hyperlipidemia 01/03/2019   • Protein-calorie malnutrition (CMS/HCC) 01/31/2019     Resolved Ambulatory Problems     Diagnosis Date Noted   • No Resolved Ambulatory Problems     Past Medical History:   Diagnosis Date   • Cerebellar hemorrhage (CMS/HCC) 1/2/2019   • Intraventricular hemorrhage (CMS/[facility]) 1/2/2019   • Pancreatitis 12/01/2018   • 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 01/02/2019         PAST SURGICAL HISTORY  Past Surgical History:   Procedure Laterality Date   • ENDOSCOPY W/ PEG TUBE PLACEMENT N/A 2/1/2019    Procedure: ESOPHAGOGASTRODUODENOSCOPY WITH PERCUTANEOUS ENDOSCOPIC GASTROSTOMY TUBE INSERTION;  Surgeon: Nila  Caterina STRONG MD;  Location: [facility] ENDOSCOPY;  Service: Gastroenterology         FAMILY HISTORY  Family History   Problem Relation Age of Onset   • Cancer Mother    • Hypertension Father          SOCIAL HISTORY  Social History     Socioeconomic History   • Marital status:      Spouse name: Not on file   • Number of children: Not on file   • Years of education: Not on file   • Highest education level: Not on file   Social Needs   • Financial resource strain: Not on file   • Food insecurity - worry: Not on file   • Food insecurity - inability: Not on file   • Transportation needs - medical: Not on file   • Transportation needs - non-medical: Not on file   Occupational History   • Not on file   Tobacco Use   • Smoking status: Never Smoker   Substance and Sexual Activity   • Alcohol use: Yes     Alcohol/week: 1.2 oz     Types: 2 Shots of liquor per week   • Drug use: No   • Sexual activity: Defer   Other Topics Concern   • Not on file   Social History Narrative   • Not on file         ALLERGIES  Amoxicillin; Latex; Other; and Penicillins        REVIEW OF SYSTEMS  Review of Systems   Constitutional: Positive for fever.   HENT: Negative for sore throat.    Respiratory: Negative for shortness of breath.    Cardiovascular: Negative for chest pain.   Gastrointestinal: Negative for abdominal pain.        G tube issue   Endocrine: Negative for polyuria.   Genitourinary: Negative for dysuria.   Musculoskeletal: Negative for neck pain.   Skin: Negative for rash.   Neurological: Negative for headaches.   All other systems reviewed and are negative.           PHYSICAL EXAM  ED Triage Vitals   Temp Heart Rate Resp BP SpO2   03/04/19 1520 03/04/19 1520 03/04/19 1520 03/04/19 1609 03/04/19 1520   98.1 °F (36.7 °C) 106 18 119/81 99 %      Temp src Heart Rate Source Patient Position BP Location FiO2 (%)   03/04/19 1520 03/04/19 1520 03/04/19 1609 03/04/19 1609 --   Tympanic Monitor Lying Left arm          GENERAL: not  distressed  HENT: nares patent  EYES: no scleral icterus  CV: regular rhythm, regular rate  RESPIRATORY: normal effort  ABDOMEN: soft, poorly demarcated erythema surrounding epigastric G tube with overlying warmth, mild epigastric tenderness  MUSCULOSKELETAL: no deformity  NEURO: alert, ESPINOSA, FC  SKIN: warm, dry    Vital signs and nursing notes reviewed.          LAB RESULTS  Recent Results (from the past 24 hour(s))   Basic Metabolic Panel    Collection Time: 03/04/19  5:50 PM   Result Value Ref Range    Glucose 89 65 - 99 mg/dL    BUN 8 8 - 23 mg/dL    Creatinine 0.76 0.76 - 1.27 mg/dL    Sodium 133 (L) 136 - 145 mmol/L    Potassium 4.2 3.5 - 5.2 mmol/L    Chloride 96 (L) 98 - 107 mmol/L    CO2 26.0 22.0 - 29.0 mmol/L    Calcium 9.4 8.6 - 10.5 mg/dL    eGFR Non African Amer 103 >60 mL/min/1.73    BUN/Creatinine Ratio 10.5 7.0 - 25.0    Anion Gap 11.0 mmol/L   CBC Auto Differential    Collection Time: 03/04/19  5:50 PM   Result Value Ref Range    WBC 10.55 3.40 - 10.80 10*3/mm3    RBC 4.37 4.14 - 5.80 10*6/mm3    Hemoglobin 14.0 13.0 - 17.7 g/dL    Hematocrit 43.4 37.5 - 51.0 %    MCV 99.3 (H) 79.0 - 97.0 fL    MCH 32.0 26.6 - 33.0 pg    MCHC 32.3 31.5 - 35.7 g/dL    RDW 13.6 12.3 - 15.4 %    RDW-SD 50.0 37.0 - 54.0 fl    MPV 10.1 6.0 - 12.0 fL    Platelets 293 140 - 450 10*3/mm3    Neutrophil % 78.7 (H) 42.7 - 76.0 %    Lymphocyte % 10.7 (L) 19.6 - 45.3 %    Monocyte % 8.3 5.0 - 12.0 %    Eosinophil % 0.9 0.3 - 6.2 %    Basophil % 0.7 0.0 - 1.5 %    Immature Grans % 0.7 (H) 0.0 - 0.5 %    Neutrophils, Absolute 8.31 (H) 1.40 - 7.00 10*3/mm3    Lymphocytes, Absolute 1.13 0.70 - 3.10 10*3/mm3    Monocytes, Absolute 0.88 0.10 - 0.90 10*3/mm3    Eosinophils, Absolute 0.09 0.00 - 0.40 10*3/mm3    Basophils, Absolute 0.07 0.00 - 0.20 10*3/mm3    Immature Grans, Absolute 0.07 (H) 0.00 - 0.05 10*3/mm3    nRBC 0.0 0.0 - 0.0 /100 WBC       Ordered the above labs and reviewed the results.        RADIOLOGY  CT Abdomen Pelvis  With Contrast      The CT scan was performed as an emergency procedure through the abdomen  and pelvis with intravenous contrast and compared to previous outside CT  scan of the abdomen and pelvis dated 2018 and demonstrates the  followin. A percutaneous gastrostomy tube enters the mid stomach and no  complicating features are seen in this region. There is no evidence of  free air or abscess. There is some minimal induration of the  subcutaneous soft tissues without evidence of abscess superficially.  2. The lung bases are clear. The liver, spleen, pancreas, both adrenal  glands, and both kidneys appear normal. The gallbladder shows no  gallstones or wall thickening.   3. There is no aortic aneurysm or retroperitoneal lymphadenopathy. The  large and small bowel loops are normal in caliber. In the pelvis there  appears to be some localized wall thickening of the mid sigmoid colon.  There is no adjacent inflammatory change to suggest acute diverticulitis  but clinical correlation to this area is recommended.     Radiation dose reduction techniques were utilized, including automated  exposure control and exposure modulation based on body size.          Ordered the above noted radiological studies. Reviewed by me in PACS.  Spoke with Dr. Rodarte (radiologist) regarding CT abd pelvis scan results.          PROCEDURES  Procedures        MEDICATIONS GIVEN IN ER  Medications   sodium chloride 0.9 % flush 10 mL (not administered)   lactated ringers bolus 1,000 mL (0 mL Intravenous Stopped 3/4/19 1914)   iopamidol (ISOVUE-300) 61 % injection 100 mL (85 mL Intravenous Given by Other 3/4/19 173)           PROGRESS AND CONSULTS  ED Course as of Mar 04 1928   Mon Mar 04, 2019   1901 Patient has cellulitis of the middle wall without evidence of abscess.  Given his allergies, I am prescribing him clindamycin.  He will open up his capsules and administer them through the G-tube with his wife's assistance.  [TD]      ED  Course User Index  [TD] Silviano Wilson II, MD      1646 Ordered CT abd pelvis, BMP, and CBC for further evaluation.     1838 Rechecked the pt who is resting comfortably. Discussed the pt's lab and imaging results. Discussed the plan to discharge the pt with antibiotics for the cellulitis. The pt and wife agree with the plan and all questions were addressed.       MEDICAL DECISION MAKING      MDM  Number of Diagnoses or Management Options  Abdominal wall cellulitis:      Amount and/or Complexity of Data Reviewed  Clinical lab tests: ordered and reviewed (Sodium: 133)  Tests in the radiology section of CPT®: ordered and reviewed (CT abd pelvis: Nothing acute)  Discussion of test results with the performing providers: yes (Dr. Rodarte)  Decide to obtain previous medical records or to obtain history from someone other than the patient: yes  Review and summarize past medical records: yes  Independent visualization of images, tracings, or specimens: yes (No abscess)    Patient Progress  Patient progress: stable             DIAGNOSIS  Final diagnoses:   Abdominal wall cellulitis         DISPOSITION  Disposition: Discharged.    Patient discharged in stable condition.    Reviewed implications of results, diagnosis, meds, responsibility to follow up, warning signs and symptoms of possible worsening, potential complications and reasons to return to ER, including new or worsening symptoms.    Patient/Family voiced understanding of above instructions.    Discussed plan for discharge, as there is no emergent indication for admission.  Pt/family is agreeable and understands need for follow up and repeat testing.  Pt is aware that discharge does not mean that nothing is wrong but it indicates no emergency is present and they must continue care with follow-up as given below or physician of their choice.     FOLLOW-UP  Carlos Samuels MD  [address] [id number] [phone]    Schedule an appointment as  soon as possible for a visit   As needed         Medication List      New Prescriptions    clindamycin 150 MG capsule  Commonly known as:  CLEOCIN  Take 3 capsules by mouth 4 (Four) Times a Day for 5 days.            Latest Documented Vital Signs:  As of 7:28 PM  BP- 137/90 HR- 85 Temp- 98.1 °F (36.7 °C) (Tympanic) O2 sat- 98%        --  Documentation assistance provided by haroldo Recio for Dr. Steve MD.  Information recorded by the scribe was done at my direction and has been verified and validated by me.     Patito Recio  03/04/19 1850       Silviano Wilson II, MD  03/04/19 1927       Silviano Wilson II, MD  03/04/19 1928

## 2019-03-04 NOTE — ED NOTES
"Pt was sent to ER by PCP for \"possible infection\". Pt reports redness and pain around Gtube site.      Manuela Gillis RN  03/04/19 1999    "

## 2019-03-26 ENCOUNTER — OFFICE VISIT (OUTPATIENT)
Dept: GASTROENTEROLOGY | Facility: CLINIC | Age: 64
End: 2019-03-26

## 2019-03-26 VITALS
HEIGHT: 65 IN | TEMPERATURE: 97.4 F | SYSTOLIC BLOOD PRESSURE: 114 MMHG | WEIGHT: 126 LBS | DIASTOLIC BLOOD PRESSURE: 82 MMHG | BODY MASS INDEX: 20.99 KG/M2

## 2019-03-26 DIAGNOSIS — Z43.1 PEG (PERCUTANEOUS ENDOSCOPIC GASTROSTOMY) ADJUSTMENT/REPLACEMENT/REMOVAL (HCC): Primary | ICD-10-CM

## 2019-03-26 PROCEDURE — 99213 OFFICE O/P EST LOW 20 MIN: CPT | Performed by: INTERNAL MEDICINE

## 2019-03-26 NOTE — PROGRESS NOTES
Chief Complaint   Patient presents with   • Hospital follow up        Feliciano Light is a  63 y.o. male here for a follow up visit for G-tube removal    HPI this 63-year-old white male patient of Carlos Berrios MD presents since he was seen in February during his rehabilitation stay.  He had undergone G-tube placement on February 1 to address poor nutrition.  He and his wife recounts that he has not required use of the G-tube for the past 4 weeks.  He now request to be removed.  His weight has improved from a low of 121 pounds to a current level of 126 pounds attributed to dietary intake.  He had issues with nausea and vomiting that were believed to be associated with vertigo and that has improved as well.  All are in agreement that the tube can be removed at this time.  He is on no anticoagulant therapy.    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 20 tablet 1   • acetaminophen (TYLENOL) 160 MG/5ML solution 20.3 mL by Per G Tube route Every 6 (Six) Hours As Needed for Mild Pain . (650 mg dose)- over the counter     • lansoprazole (FIRST) 3 MG/ML suspension oral suspension 10 mL by Per G Tube route Every Morning. (patient to take per G-tube as not able to take by mouth) 300 mL 1   • magnesium hydroxide (MILK OF MAGNESIA) 2400 MG/10ML suspension suspension Take 10 mL by mouth Daily As Needed (constipation). 10 mL    • meclizine (ANTIVERT) 12.5 MG tablet Take 1 tablet by mouth 3 (Three) Times a Day As Needed for dizziness or Nausea. 45 tablet 1   • methylphenidate (RITALIN) 5 MG tablet Starting Wed  "Feb 13, 2019- 5 mg po bid at 8AM and 12NOON 60 tablet 0   • Multiple Vitamins-Minerals (MULTIVITAMIN WITH MINERALS) tablet tablet Take 1 tablet by mouth Daily.     • polyethylene glycol (MIRALAX) packet Take 17 g by mouth Daily As Needed (constipation).       No current facility-administered medications for this visit.        PRN Meds:.    Allergies   Allergen Reactions   • Amoxicillin Shortness Of Breath, Hives and Swelling   • Latex Hives and Anaphylaxis   • Other Shortness Of Breath     Wife states allergic to all \"cillins\"   • Penicillins Shortness Of Breath       Social History     Socioeconomic History   • Marital status:      Spouse name: Not on file   • Number of children: Not on file   • Years of education: Not on file   • Highest education level: Not on file   Tobacco Use   • Smoking status: Never Smoker   • Smokeless tobacco: Never Used   Substance and Sexual Activity   • Alcohol use: No     Frequency: Never   • Drug use: No   • Sexual activity: Defer       Family History   Problem Relation Age of Onset   • Cancer Mother    • Hypertension Father        Review of Systems   Constitutional: Negative for activity change, appetite change, fatigue and unexpected weight change.   HENT: Negative for congestion, facial swelling, sore throat, trouble swallowing and voice change.    Eyes: Negative for photophobia and visual disturbance.   Respiratory: Negative for cough and choking.    Cardiovascular: Negative for chest pain.   Gastrointestinal: Negative for abdominal distention, abdominal pain, anal bleeding, blood in stool, constipation, diarrhea, nausea, rectal pain and vomiting.   Endocrine: Negative for polyphagia.   Musculoskeletal: Negative for arthralgias, gait problem and joint swelling.   Skin: Negative for color change, pallor and rash.   Allergic/Immunologic: Negative for food allergies.   Neurological: Negative for speech difficulty and headaches.   Hematological: Does not bruise/bleed easily. "   Psychiatric/Behavioral: Negative for agitation, confusion and sleep disturbance.       Vitals:    03/26/19 0902   BP: 114/82   Temp: 97.4 °F (36.3 °C)       Physical Exam   Constitutional: He is oriented to person, place, and time. He appears well-developed and well-nourished.   HENT:   Head: Normocephalic.   Mouth/Throat: Oropharynx is clear and moist.   Eyes: Conjunctivae and EOM are normal.   Neck: Normal range of motion.   Cardiovascular: Normal rate and regular rhythm.   Pulmonary/Chest: Breath sounds normal.   Abdominal: Soft. Bowel sounds are normal.   G-tube situated in the epigastrium with clean site, easily rotated.   Musculoskeletal: Normal range of motion.   Neurological: He is alert and oriented to person, place, and time.   Skin: Skin is warm and dry.   Psychiatric: He has a normal mood and affect. His behavior is normal.       ASSESSMENT   #1 G-tube removal: Patient placed in a supine position, viscous lidocaine applied to the site and the tube was removed with gentle traction and minimal heme.  Occlusive dressing applied.      PLAN  Patient may remove dressing tomorrow and resume regular activity  Call as needed for any further GI related complaints      ICD-10-CM ICD-9-CM   1. PEG (percutaneous endoscopic gastrostomy) adjustment/replacement/removal (CMS/[facility]) Z43.1 V55.1

## 2019-09-25 PROBLEM — G91.9 HYDROCEPHALUS: Status: ACTIVE | Noted: 2018-12-18

## 2019-09-25 PROBLEM — I82.629 ACUTE DEEP VEIN THROMBOSIS (DVT) OF UPPER EXTREMITY: Status: ACTIVE | Noted: 2018-12-18

## 2019-09-25 PROBLEM — E27.1 ADRENAL INSUFFICIENCY (ADDISON'S DISEASE): Status: ACTIVE | Noted: 2018-12-29

## 2019-09-25 PROBLEM — R35.0 BENIGN PROSTATIC HYPERPLASIA WITH URINARY FREQUENCY: Status: ACTIVE | Noted: 2019-09-25

## 2019-09-25 PROBLEM — N40.1 BENIGN PROSTATIC HYPERPLASIA WITH URINARY FREQUENCY: Status: ACTIVE | Noted: 2019-09-25

## 2019-09-25 RX ORDER — FLUDROCORTISONE ACETATE 0.1 MG/1
0.1 TABLET ORAL DAILY
COMMUNITY
Start: 2019-01-03 | End: 2019-09-26

## 2019-09-25 RX ORDER — LEVETIRACETAM 100 MG/ML
500 SOLUTION ORAL
COMMUNITY
Start: 2019-01-02 | End: 2019-09-26

## 2019-09-26 ENCOUNTER — OFFICE VISIT (OUTPATIENT)
Dept: FAMILY MEDICINE CLINIC | Facility: CLINIC | Age: 64
End: 2019-09-26

## 2019-09-26 VITALS
DIASTOLIC BLOOD PRESSURE: 70 MMHG | HEIGHT: 65 IN | SYSTOLIC BLOOD PRESSURE: 109 MMHG | HEART RATE: 85 BPM | BODY MASS INDEX: 22.26 KG/M2 | WEIGHT: 133.6 LBS | OXYGEN SATURATION: 98 % | TEMPERATURE: 97.4 F

## 2019-09-26 DIAGNOSIS — I61.5 INTRAVENTRICULAR HEMORRHAGE (HCC): ICD-10-CM

## 2019-09-26 DIAGNOSIS — Z28.39 IMMUNIZATION DEFICIENCY: ICD-10-CM

## 2019-09-26 DIAGNOSIS — R73.09 ELEVATED HEMOGLOBIN A1C: ICD-10-CM

## 2019-09-26 DIAGNOSIS — K21.9 GERD WITHOUT ESOPHAGITIS: ICD-10-CM

## 2019-09-26 DIAGNOSIS — E22.2 SIADH (SYNDROME OF INAPPROPRIATE ADH PRODUCTION) (HCC): Primary | ICD-10-CM

## 2019-09-26 DIAGNOSIS — E78.2 MIXED HYPERLIPIDEMIA: ICD-10-CM

## 2019-09-26 PROCEDURE — 90732 PPSV23 VACC 2 YRS+ SUBQ/IM: CPT | Performed by: FAMILY MEDICINE

## 2019-09-26 PROCEDURE — 99214 OFFICE O/P EST MOD 30 MIN: CPT | Performed by: FAMILY MEDICINE

## 2019-09-26 PROCEDURE — G0009 ADMIN PNEUMOCOCCAL VACCINE: HCPCS | Performed by: FAMILY MEDICINE

## 2019-09-26 PROCEDURE — G0008 ADMIN INFLUENZA VIRUS VAC: HCPCS | Performed by: FAMILY MEDICINE

## 2019-09-26 PROCEDURE — 90686 IIV4 VACC NO PRSV 0.5 ML IM: CPT | Performed by: FAMILY MEDICINE

## 2019-09-26 RX ORDER — LANSOPRAZOLE
30 KIT EVERY MORNING
Qty: 300 ML | Refills: 5 | Status: SHIPPED | OUTPATIENT
Start: 2019-09-26 | End: 2019-09-27

## 2019-09-26 NOTE — PROGRESS NOTES
"Chief Complaint   Patient presents with   • Hyperlipidemia       Subjective   Feliciano Light is a 64 y.o. male.     History of Present Illness   C/o trouble with lot of reflux.   I have trouble every morning.  Unable to take pills.    Still with lot of nausea at times sal with bending over.    F/U SIADH due to IVH/head injury.  Not taking any fludrocortisone now.  Na 136 2 months ago.  F/U hyperglycemia.  Doing well.  No triad of diabetes.      The following portions of the patient's history were reviewed and updated as appropriate: allergies, current medications, past family history, past medical history, past social history, past surgical history and problem list.    Review of Systems   Constitutional: Negative for fatigue.   Eyes: Negative for blurred vision.   Respiratory: Negative for cough, chest tightness and shortness of breath.    Cardiovascular: Negative for chest pain, palpitations and leg swelling.   Neurological: Negative for dizziness, light-headedness and headache.       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   • SIADH (syndrome of inappropriate ADH production) (CMS/HCC)   • Elevated hemoglobin A1c   • Hyperlipidemia   • Protein-calorie malnutrition (CMS/HCC)   • Acute deep vein thrombosis (DVT) of upper extremity (CMS/HCC)   • Adrenal insufficiency (Ozzie's disease) (CMS/HCC)   • Hydrocephalus   • Benign prostatic hyperplasia with urinary frequency   • History of gastroesophageal reflux (GERD)   • GERD without esophagitis       Allergies   Allergen Reactions   • Amoxicillin Shortness Of Breath, Hives and Swelling   • Latex Hives and Anaphylaxis   • Other Shortness Of Breath     Wife states allergic to all \"cillins\"   • Penicillins Shortness Of Breath       No current outpatient medications on file.    Past Medical History:   Diagnosis Date "   • Acute pancreatitis    • Arm skin lesion, left    • BPH (benign prostatic hyperplasia)    • Cerebellar hemorrhage (CMS/HCC) 1/2/2019   • Chronic nausea    • Elevated lipase    • Encounter for preventive health examination    • Enlarged prostate without lower urinary tract symptoms (luts)    • History of gastroesophageal reflux (GERD)    • Hyperlipidemia    • Intraventricular hemorrhage (CMS/HCC) 1/2/2019   • Intraventricular hemorrhage (CMS/HCC)    • Pancreatitis 12/01/2018   • 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 No specimens collected.    • TONSILLECTOMY         Family History   Problem Relation Age of Onset   • Arthritis Mother    • Breast cancer Mother    • Hypertension Father    • Stroke Father    • COPD Father    • Stroke Other    • Breast cancer Other    • Hypertension Other    • COPD Other    • Arthritis Other        Social History     Tobacco Use   • Smoking status: Never Smoker   • Smokeless tobacco: Never Used   Substance Use Topics   • Alcohol use: Defer     Frequency: Never     Comment: drank alcohol in the past            Objective     Vitals:    09/26/19 1302   BP: 109/70   Pulse: 85   Temp: 97.4 °F (36.3 °C)   SpO2: 98%     Body mass index is 22.23 kg/m².    Physical Exam   Constitutional: He appears well-developed and well-nourished.   HENT:   Head: Normocephalic and atraumatic.   Mouth/Throat: Oropharynx is clear and moist.   Eyes: Pupils are equal, round, and reactive to light. No scleral icterus.   Neck: No thyromegaly present.   Cardiovascular: Normal rate and regular rhythm. Exam reveals no gallop and  no friction rub.   No murmur heard.  Pulmonary/Chest: Effort normal. No respiratory distress. He has no wheezes. He has no rales. He exhibits no tenderness.   Abdominal: Soft. Bowel sounds are normal. He exhibits no distension. There is no tenderness.   Musculoskeletal: Normal range of motion. He exhibits no edema or deformity.   Lymphadenopathy:     He has no cervical adenopathy.   Neurological: No cranial nerve deficit. He exhibits normal muscle tone.   Skin: Skin is warm and dry. No rash noted. He is not diaphoretic.   Vitals reviewed.      Lab Results   Component Value Date    GLUCOSE 89 03/04/2019    BUN 20 07/08/2019    CREATININE 1.0 07/08/2019    EGFRIFNONA 103 03/04/2019    BCR 19.6 07/08/2019    K 4.2 07/08/2019    CO2 25 07/08/2019    CALCIUM 9.4 07/08/2019    ALBUMIN 3.9 07/05/2019    LABIL2 1.3 07/05/2019    AST 26 07/05/2019    ALT 34 07/05/2019       WBC   Date Value Ref Range Status   07/08/2019 5.62 4.5 - 11.0 10*3/uL Final     RBC   Date Value Ref Range Status   07/08/2019 4.49 (L) 4.5 - 5.9 10*6/uL Final   12/29/2018 3.84 (L) 4.5 - 5.9 10*6/uL Final     Hemoglobin   Date Value Ref Range Status   07/08/2019 14.0 13.5 - 17.5 g/dL Final     Hematocrit   Date Value Ref Range Status   07/08/2019 42.5 41.0 - 53.0 % Final     MCV   Date Value Ref Range Status   07/08/2019 94.7 80.0 - 100.0 fL Final     MCH   Date Value Ref Range Status   07/08/2019 31.2 26.0 - 34.0 pg Final     MCHC   Date Value Ref Range Status   07/08/2019 32.9 31.0 - 37.0 g/dL Final     RDW   Date Value Ref Range Status   07/08/2019 13.9 12.0 - 16.8 % Final     RDW-SD   Date Value Ref Range Status   03/04/2019 50.0 37.0 - 54.0 fl Final     MPV   Date Value Ref Range Status   07/08/2019 10.9 (H) 6.7 - 10.8 fL Final     Platelets   Date Value Ref Range Status   07/08/2019 162 140 - 440 10*3/uL Final     Neutrophil Rel %   Date Value Ref Range Status   07/08/2019 61.4 45 - 80 % Final     Lymphocyte Rel %   Date Value Ref Range Status    07/08/2019 23.3 15 - 50 % Final     Monocyte Rel %   Date Value Ref Range Status   07/08/2019 10.5 0 - 15 % Final     Eosinophil %   Date Value Ref Range Status   07/08/2019 4.1 0 - 7 % Final   12/29/2018 1.0 0 - 7 % Final     Basophil Rel %   Date Value Ref Range Status   07/08/2019 0.5 0 - 2 % Final     Immature Grans %   Date Value Ref Range Status   07/08/2019 0.2 (H) 0 % Final     Neutrophils Absolute   Date Value Ref Range Status   07/08/2019 3.45 2.0 - 8.8 10*3/uL Final     Lymphocytes Absolute   Date Value Ref Range Status   07/08/2019 1.31 0.7 - 5.5 10*3/uL Final     Monocytes Absolute   Date Value Ref Range Status   07/08/2019 0.59 0.0 - 1.7 10*3/uL Final     Eosinophils Absolute   Date Value Ref Range Status   07/08/2019 0.23 0.0 - 0.8 10*3/uL Final     Basophils Absolute   Date Value Ref Range Status   07/08/2019 0.03 0.0 - 0.2 10*3/uL Final     Immature Grans, Absolute   Date Value Ref Range Status   07/08/2019 0.01 <1 10*3/uL Final     nRBC   Date Value Ref Range Status   07/08/2019 0 0 /100(WBC) Final   03/04/2019 0.0 0.0 - 0.0 /100 WBC Final       No results found for: HGBA1C    No results found for: [id number]    TSH   Date Value Ref Range Status   01/03/2019 2.000 0.270 - 4.200 mIU/mL Final       Lab Results   Component Value Date    CHOL 213 (H) 01/04/2019     Lab Results   Component Value Date    TRIG 64 01/04/2019     Lab Results   Component Value Date    HDL 54 01/04/2019     Lab Results   Component Value Date     (H) 01/04/2019     Lab Results   Component Value Date    VLDL 12.8 01/04/2019     Lab Results   Component Value Date    LDLHDL 2.71 01/04/2019         Procedures    Assessment/Plan   Problems Addressed this Visit        Cardiovascular and Mediastinum    Hyperlipidemia       Digestive    GERD without esophagitis       Endocrine    SIADH (syndrome of inappropriate ADH production) (CMS/HCC) - Primary       Nervous and Auditory    Intraventricular hemorrhage (CMS/HCC)        Hematopoietic and Hemostatic    Elevated hemoglobin A1c          No orders of the defined types were placed in this encounter.    KLEBER uncontrolled.  Start PPI.    No current outpatient medications on file.     No current facility-administered medications for this visit.        Feliciano Light had no medications administered during this visit.    No Follow-up on file.    There are no Patient Instructions on file for this visit.

## 2019-09-27 ENCOUNTER — TELEPHONE (OUTPATIENT)
Dept: FAMILY MEDICINE CLINIC | Facility: CLINIC | Age: 64
End: 2019-09-27

## 2019-09-27 DIAGNOSIS — K21.9 GERD WITHOUT ESOPHAGITIS: Primary | ICD-10-CM

## 2019-09-27 RX ORDER — LANSOPRAZOLE 30 MG/1
30 CAPSULE, DELAYED RELEASE ORAL DAILY
Qty: 30 CAPSULE | Refills: 11 | Status: SHIPPED | OUTPATIENT
Start: 2019-09-27 | End: 2020-01-22 | Stop reason: SDUPTHER

## 2019-09-27 NOTE — TELEPHONE ENCOUNTER
PT WIFE CALLED AND SAID INSURANCE WILL NOT PAY FOR THE LIQUID MEDICATION WANTS TO KNOW IF PILL FORM WILL BE COVERED

## 2020-01-22 ENCOUNTER — OFFICE VISIT (OUTPATIENT)
Dept: FAMILY MEDICINE CLINIC | Facility: CLINIC | Age: 65
End: 2020-01-22

## 2020-01-22 VITALS
BODY MASS INDEX: 22.41 KG/M2 | WEIGHT: 134.5 LBS | SYSTOLIC BLOOD PRESSURE: 116 MMHG | HEIGHT: 65 IN | DIASTOLIC BLOOD PRESSURE: 74 MMHG | TEMPERATURE: 97.9 F | OXYGEN SATURATION: 97 % | HEART RATE: 83 BPM

## 2020-01-22 DIAGNOSIS — I61.4 CEREBELLAR HEMORRHAGE (HCC): ICD-10-CM

## 2020-01-22 DIAGNOSIS — E78.2 MIXED HYPERLIPIDEMIA: ICD-10-CM

## 2020-01-22 DIAGNOSIS — R73.09 ELEVATED HEMOGLOBIN A1C: ICD-10-CM

## 2020-01-22 DIAGNOSIS — K21.9 GERD WITHOUT ESOPHAGITIS: ICD-10-CM

## 2020-01-22 DIAGNOSIS — R35.0 BENIGN PROSTATIC HYPERPLASIA WITH URINARY FREQUENCY: ICD-10-CM

## 2020-01-22 DIAGNOSIS — N40.1 BENIGN PROSTATIC HYPERPLASIA WITH URINARY FREQUENCY: ICD-10-CM

## 2020-01-22 DIAGNOSIS — E22.2 SIADH (SYNDROME OF INAPPROPRIATE ADH PRODUCTION) (HCC): ICD-10-CM

## 2020-01-22 DIAGNOSIS — Z00.00 MEDICARE ANNUAL WELLNESS VISIT, SUBSEQUENT: Primary | ICD-10-CM

## 2020-01-22 PROCEDURE — G0439 PPPS, SUBSEQ VISIT: HCPCS | Performed by: FAMILY MEDICINE

## 2020-01-22 PROCEDURE — 99214 OFFICE O/P EST MOD 30 MIN: CPT | Performed by: FAMILY MEDICINE

## 2020-01-22 RX ORDER — LANSOPRAZOLE 30 MG/1
30 CAPSULE, DELAYED RELEASE ORAL DAILY
Qty: 90 CAPSULE | Refills: 3 | Status: SHIPPED | OUTPATIENT
Start: 2020-01-22 | End: 2020-02-12

## 2020-01-22 NOTE — PROGRESS NOTES
The ABCs of the Annual Wellness Visit  Subsequent Medicare Wellness Visit    Chief Complaint   Patient presents with   • Hyperlipidemia   • stroke follow up       Subjective   History of Present Illness:  Feliciano Light is a 64 y.o. male who presents for a Subsequent Medicare Wellness Visit.  C/o persistent nausea. Occurs every am.  Worse with bending over or movement in AM.  Hx of cerebellar hemorrhage.   Not doing much with activity now.  Some dizziness now.  Question on whether he can drive yet.     FU hyperlipidmeia. No meds.   Fu KLEBER.  Doing well with med.  No Se.      HEALTH RISK ASSESSMENT    Recent Hospitalizations:  Recently treated at the following:  Other: [employer]    Current Medical Providers:  Patient Care Team:  Carlos Samuels MD as PCP - General (Family Medicine)    Smoking Status:  Social History     Tobacco Use   Smoking Status Never Smoker   Smokeless Tobacco Never Used       Alcohol Consumption:  Social History     Substance and Sexual Activity   Alcohol Use Defer   • Frequency: Never    Comment: drank alcohol in the past       Depression Screen:   PHQ-2/PHQ-9 Depression Screening 9/26/2019   Little interest or pleasure in doing things 0   Feeling down, depressed, or hopeless 0   Total Score 0       Fall Risk Screen:  STEADI Fall Risk Assessment has not been completed.    Health Habits and Functional and Cognitive Screening:  Functional & Cognitive Status 1/22/2020   Do you have difficulty preparing food and eating? No   Do you have difficulty bathing yourself, getting dressed or grooming yourself? No   Do you have difficulty using the toilet? No   Do you have difficulty moving around from place to place? No   Do you have trouble with steps or getting out of a bed or a chair? No   Current Diet Well Balanced Diet   Dental Exam Up to date   Eye Exam Up to date   Exercise (times per week) 0 times per week   Current Exercise Activities Include None   Do you need help using the phone?   No   Are you deaf or do you have serious difficulty hearing?  No   Do you need help with transportation? Yes   Do you need help shopping? Yes   Do you need help preparing meals?  Yes   Do you need help with housework?  No   Do you need help with laundry? Yes   Do you need help taking your medications? Yes   Do you need help managing money? Yes   Do you ever drive or ride in a car without wearing a seat belt? No   Have you felt unusual stress, anger or loneliness in the last month? No   Who do you live with? Spouse   If you need help, do you have trouble finding someone available to you? No   Have you been bothered in the last four weeks by sexual problems? No   Do you have difficulty concentrating, remembering or making decisions? No         Does the patient have evidence of cognitive impairment? Yes    Asprin use counseling:Does not need ASA (and currently is not on it)    Age-appropriate Screening Schedule:  Refer to the list below for future screening recommendations based on patient's age, sex and/or medical conditions. Orders for these recommended tests are listed in the plan section. The patient has been provided with a written plan.    Health Maintenance   Topic Date Due   • ZOSTER VACCINE (1 of 2) 05/13/2003   • COLONOSCOPY  03/26/2019   • LIPID PANEL  01/04/2020   • TDAP/TD VACCINES (2 - Td) 02/06/2023   • INFLUENZA VACCINE  Completed          The following portions of the patient's history were reviewed and updated as appropriate: allergies, current medications, past family history, past medical history, past social history, past surgical history and problem list.    Outpatient Medications Prior to Visit   Medication Sig Dispense Refill   • lansoprazole (PREVACID) 30 MG capsule Take 1 capsule by mouth Daily. 30 capsule 11     No facility-administered medications prior to visit.        Patient Active Problem List   Diagnosis   • Cerebellar hemorrhage (CMS/HCC)   • Intraventricular hemorrhage (CMS/HCC)   •  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   • SIADH (syndrome of inappropriate ADH production) (CMS/[facility])   • Elevated hemoglobin A1c   • Hyperlipidemia   • Protein-calorie malnutrition (CMS/HCC)   • Acute deep vein thrombosis (DVT) of upper extremity (CMS/[facility])   • Adrenal insufficiency (Ozzie's disease) (CMS/[facility])   • Hydrocephalus (CMS/[facility])   • Benign prostatic hyperplasia with urinary frequency   • History of gastroesophageal reflux (GERD)   • GERD without esophagitis   • Immunization deficiency   • Medicare annual wellness visit, subsequent       Advanced Care Planning:  Patient does not have an advance directive - information provided to the patient today    Review of Systems   Constitutional: Negative for activity change, appetite change and fatigue.   HENT: Negative for hearing loss and postnasal drip.    Eyes: Negative for discharge and itching.   Respiratory: Negative for cough and shortness of breath.    Cardiovascular: Negative for chest pain and leg swelling.   Gastrointestinal: Negative for abdominal distention and abdominal pain.   Endocrine: Negative for cold intolerance and heat intolerance.   Genitourinary: Negative for difficulty urinating and flank pain.   Musculoskeletal: Negative for arthralgias and myalgias.   Skin: Negative for color change.   Neurological: Negative for dizziness and facial asymmetry.   Hematological: Negative for adenopathy.   Psychiatric/Behavioral: Negative for agitation and confusion.       Compared to one year ago, the patient feels his physical health is the same.  Compared to one year ago, the patient feels his mental health is the same.    Reviewed chart for potential of high risk medication in the elderly: yes  Reviewed chart for potential of harmful drug interactions in the elderly:yes    Objective         Vitals:    01/22/20 1258   BP: 116/74   Pulse: 83   Temp: 97.9 °F (36.6 °C)   SpO2: 97%   Weight: 61 kg (134 lb 8 oz)   Height:  "165.1 cm (65\")       Body mass index is 22.38 kg/m².  Discussed the patient's BMI with him. The BMI is in the acceptable range.    Physical Exam   Constitutional: He appears well-developed and well-nourished.   HENT:   Head: Normocephalic and atraumatic.   Right Ear: External ear normal.   Left Ear: External ear normal.   Nose: Nose normal.   Mouth/Throat: Oropharynx is clear and moist.   Eyes: Pupils are equal, round, and reactive to light. Conjunctivae and EOM are normal. Right eye exhibits no discharge. Left eye exhibits no discharge. No scleral icterus.   Neck: Normal range of motion. Neck supple. No JVD present. No tracheal deviation present. No thyromegaly present.   Cardiovascular: Normal rate, regular rhythm, normal heart sounds and intact distal pulses. Exam reveals no gallop and no friction rub.   No murmur heard.  Pulmonary/Chest: Effort normal and breath sounds normal. No respiratory distress. He has no wheezes. He has no rales. He exhibits no tenderness.   Abdominal: Soft. Bowel sounds are normal. He exhibits no distension and no mass. There is no tenderness. There is no rebound and no guarding. No hernia.   Musculoskeletal: Normal range of motion. He exhibits no edema or tenderness.   Lymphadenopathy:     He has no cervical adenopathy.   Neurological: He displays normal reflexes. No cranial nerve deficit or sensory deficit. He exhibits normal muscle tone. Coordination normal.   Skin: Skin is warm and dry.   Psychiatric: He has a normal mood and affect. His behavior is normal. Judgment and thought content normal.   Needs redirection at times.     Nursing note and vitals reviewed.            Assessment/Plan   Medicare Risks and Personalized Health Plan  CMS Preventative Services Quick Reference  Inactivity/Sedentary    The above risks/problems have been discussed with the patient.  Pertinent information has been shared with the patient in the After Visit Summary.  Follow up plans and orders are seen " below in the Assessment/Plan Section.    Diagnoses and all orders for this visit:    1. Medicare annual wellness visit, subsequent (Primary)    2. Cerebellar hemorrhage (CMS/[facility])  -     Ambulatory Referral to Physical Medicine Rehab    3. GERD without esophagitis  -     lansoprazole (PREVACID) 30 MG capsule; Take 1 capsule by mouth Daily.  Dispense: 90 capsule; Refill: 3    4. Mixed hyperlipidemia  -     Comprehensive Metabolic Panel  -     Lipid Panel With / Chol / HDL Ratio    5. Elevated hemoglobin A1c  -     Hemoglobin A1c    6. SIADH (syndrome of inappropriate ADH production) (CMS/[facility])    7. Benign prostatic hyperplasia with urinary frequency  -     PSA DIAGNOSTIC    Start PT 2 times a week.     Follow Up:  Return in about 4 months (around 5/22/2020).     An After Visit Summary and PPPS were given to the patient.       Preventive counseling.   Start regular exercise.  No driving until reevaluated for driving to avoid injuries.  IUTD.

## 2020-01-23 DIAGNOSIS — E22.2 SIADH (SYNDROME OF INAPPROPRIATE ADH PRODUCTION) (HCC): Primary | ICD-10-CM

## 2020-01-23 DIAGNOSIS — E78.2 MIXED HYPERLIPIDEMIA: Primary | ICD-10-CM

## 2020-01-23 LAB
ALBUMIN SERPL-MCNC: NORMAL G/DL
ALP SERPL-CCNC: NORMAL U/L
ALT SERPL-CCNC: NORMAL U/L
AST SERPL-CCNC: NORMAL U/L
BILIRUB SERPL-MCNC: NORMAL MG/DL
BUN SERPL-MCNC: NORMAL MG/DL
CALCIUM SERPL-MCNC: NORMAL MG/DL
CHLORIDE SERPL-SCNC: NORMAL MMOL/L
CHOLEST SERPL-MCNC: 285 MG/DL (ref 0–200)
CHOLEST/HDLC SERPL: 5.59 {RATIO}
CO2 SERPL-SCNC: NORMAL MMOL/L
CREAT SERPL-MCNC: NORMAL MG/DL
GLUCOSE SERPL-MCNC: NORMAL MG/DL
HBA1C MFR BLD: 5.6 % (ref 4.8–5.6)
HDLC SERPL-MCNC: 51 MG/DL (ref 40–60)
LDLC SERPL CALC-MCNC: 209 MG/DL (ref 0–100)
POTASSIUM SERPL-SCNC: NORMAL MMOL/L
PROT SERPL-MCNC: NORMAL G/DL
PSA SERPL-MCNC: 2.37 NG/ML (ref 0–4)
SODIUM SERPL-SCNC: NORMAL MMOL/L
SPECIMEN STATUS: NORMAL
TRIGL SERPL-MCNC: 123 MG/DL (ref 0–150)
VLDLC SERPL CALC-MCNC: 24.6 MG/DL

## 2020-01-23 RX ORDER — ROSUVASTATIN CALCIUM 10 MG/1
10 TABLET, COATED ORAL DAILY
Qty: 90 TABLET | Refills: 3 | Status: SHIPPED | OUTPATIENT
Start: 2020-01-23 | End: 2020-02-12 | Stop reason: SDUPTHER

## 2020-01-23 NOTE — PROGRESS NOTES
Tell his wife his cholesterol is really high.  We need to start rosuvastatin 10 mg a day 90 with 3 refills to avoid any trouble with strokes in the future.  See me in 3 months to recheck labs.

## 2020-01-24 ENCOUNTER — RESULTS ENCOUNTER (OUTPATIENT)
Dept: FAMILY MEDICINE CLINIC | Facility: CLINIC | Age: 65
End: 2020-01-24

## 2020-01-24 DIAGNOSIS — E22.2 SIADH (SYNDROME OF INAPPROPRIATE ADH PRODUCTION) (HCC): ICD-10-CM

## 2020-01-28 LAB
ALBUMIN SERPL-MCNC: 4.6 G/DL (ref 3.5–5.2)
ALBUMIN/GLOB SERPL: 2.4 G/DL
ALP SERPL-CCNC: 61 U/L (ref 39–117)
ALT SERPL-CCNC: 26 U/L (ref 1–41)
AST SERPL-CCNC: 17 U/L (ref 1–40)
BILIRUB SERPL-MCNC: 0.5 MG/DL (ref 0.2–1.2)
BUN SERPL-MCNC: 19 MG/DL (ref 8–23)
BUN/CREAT SERPL: 15 (ref 7–25)
CALCIUM SERPL-MCNC: 9.6 MG/DL (ref 8.6–10.5)
CHLORIDE SERPL-SCNC: 103 MMOL/L (ref 98–107)
CO2 SERPL-SCNC: 24.6 MMOL/L (ref 22–29)
CREAT SERPL-MCNC: 1.27 MG/DL (ref 0.76–1.27)
GLOBULIN SER CALC-MCNC: 1.9 GM/DL
GLUCOSE SERPL-MCNC: 129 MG/DL (ref 65–99)
HBA1C MFR BLD: 5.7 % (ref 4.8–5.6)
POTASSIUM SERPL-SCNC: 4.6 MMOL/L (ref 3.5–5.2)
PROT SERPL-MCNC: 6.5 G/DL (ref 6–8.5)
SODIUM SERPL-SCNC: 144 MMOL/L (ref 136–145)

## 2020-02-12 ENCOUNTER — OFFICE VISIT (OUTPATIENT)
Dept: FAMILY MEDICINE CLINIC | Facility: CLINIC | Age: 65
End: 2020-02-12

## 2020-02-12 VITALS
WEIGHT: 130.8 LBS | HEART RATE: 79 BPM | TEMPERATURE: 97.7 F | BODY MASS INDEX: 21.79 KG/M2 | HEIGHT: 65 IN | OXYGEN SATURATION: 97 % | DIASTOLIC BLOOD PRESSURE: 79 MMHG | SYSTOLIC BLOOD PRESSURE: 123 MMHG

## 2020-02-12 DIAGNOSIS — K21.9 GERD WITHOUT ESOPHAGITIS: Primary | ICD-10-CM

## 2020-02-12 DIAGNOSIS — E78.2 MIXED HYPERLIPIDEMIA: ICD-10-CM

## 2020-02-12 PROCEDURE — 99213 OFFICE O/P EST LOW 20 MIN: CPT | Performed by: FAMILY MEDICINE

## 2020-02-12 RX ORDER — MECLIZINE HCL 12.5 MG/1
12.5 TABLET ORAL 3 TIMES DAILY PRN
Qty: 45 TABLET | Refills: 1
Start: 2020-02-12 | End: 2020-02-12 | Stop reason: SDUPTHER

## 2020-02-12 RX ORDER — ROSUVASTATIN CALCIUM 10 MG/1
10 TABLET, COATED ORAL DAILY
Qty: 90 TABLET | Refills: 3
Start: 2020-02-12 | End: 2020-11-24 | Stop reason: SDUPTHER

## 2020-02-12 RX ORDER — MECLIZINE HCL 12.5 MG/1
12.5 TABLET ORAL 3 TIMES DAILY PRN
Qty: 45 TABLET | Refills: 3 | Status: SHIPPED | OUTPATIENT
Start: 2020-02-12 | End: 2020-11-24

## 2020-02-12 NOTE — PROGRESS NOTES
Chief Complaint   Patient presents with   • Nausea   • rash on back     Pt states that he thinks this was caused by the lansoprazole. He has d/c that medication        Subjective   Feliciano Light is a 64 y.o. male.     History of Present Illness   F/U KLEBER . Had itching with lansoprazole.  On pepcid once a day.    F/U hyperlipidmeia.  No myalgias.  Doing wellwith the rosuvastatin.      The following portions of the patient's history were reviewed and updated as appropriate: allergies, current medications, past family history, past medical history, past social history, past surgical history and problem list.    Review of Systems   Constitutional: Negative for appetite change and fatigue.   HENT: Negative for nosebleeds and sore throat.    Eyes: Negative for blurred vision and visual disturbance.   Respiratory: Negative for shortness of breath and wheezing.    Cardiovascular: Negative for chest pain and leg swelling.   Gastrointestinal: Negative for abdominal distention and abdominal pain.   Endocrine: Negative for cold intolerance and polyuria.   Genitourinary: Negative for dysuria and hematuria.   Musculoskeletal: Negative for arthralgias and myalgias.   Skin: Negative for color change and rash.   Neurological: Negative for weakness and confusion.   Psychiatric/Behavioral: Negative for agitation and depressed mood.       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   • SIADH (syndrome of inappropriate ADH production) (CMS/HCC)   • Elevated hemoglobin A1c   • Hyperlipidemia   • Protein-calorie malnutrition (CMS/HCC)   • Acute deep vein thrombosis (DVT) of upper extremity (CMS/HCC)   • Adrenal insufficiency (Boston's disease) (CMS/HCC)   • Hydrocephalus (CMS/HCC)   • Benign prostatic hyperplasia with urinary frequency   • History of gastroesophageal reflux (GERD)  "  • GERD without esophagitis   • Immunization deficiency   • Medicare annual wellness visit, subsequent       Allergies   Allergen Reactions   • Amoxicillin Shortness Of Breath, Hives and Swelling   • Latex Hives and Anaphylaxis   • Other Shortness Of Breath     Wife states allergic to all \"cillins\"   • Penicillins Shortness Of Breath         Current Outpatient Medications:   •  rosuvastatin (CRESTOR) 10 MG tablet, Take 1 tablet by mouth Daily., Disp: 90 tablet, Rfl: 3  •  meclizine (ANTIVERT) 12.5 MG tablet, Take 1 tablet by mouth 3 (Three) Times a Day As Needed for Dizziness or Nausea., Disp: 45 tablet, Rfl: 1    Past Medical History:   Diagnosis Date   • Acute pancreatitis    • Arm skin lesion, left    • BPH (benign prostatic hyperplasia)    • Cerebellar hemorrhage (CMS/HCC) 1/2/2019   • Chronic nausea    • Elevated lipase    • Encounter for preventive health examination    • Enlarged prostate without lower urinary tract symptoms (luts)    • History of gastroesophageal reflux (GERD)    • Hyperlipidemia    • Intraventricular hemorrhage (CMS/HCC) 1/2/2019   • Intraventricular hemorrhage (CMS/HCC)    • Pancreatitis 12/01/2018   • 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 No specimens collected.    • TONSILLECTOMY         Family History   Problem Relation Age of Onset   • Arthritis Mother    • Breast cancer Mother    • Hypertension Father    • Stroke Father    • COPD Father    • Stroke Other    • Breast cancer Other    • Hypertension Other    • COPD Other    • Arthritis Other        Social History     Tobacco Use   • Smoking status: " Never Smoker   • Smokeless tobacco: Never Used   Substance Use Topics   • Alcohol use: Defer     Frequency: Never     Comment: drank alcohol in the past            Objective     Vitals:    02/12/20 1549   BP: 123/79   Pulse: 79   Temp: 97.7 °F (36.5 °C)   SpO2: 97%     Body mass index is 21.77 kg/m².    Physical Exam   Constitutional: He appears well-developed and well-nourished.   HENT:   Head: Normocephalic and atraumatic.   Mouth/Throat: Oropharynx is clear and moist.   Eyes: Pupils are equal, round, and reactive to light. No scleral icterus.   Neck: No thyromegaly present.   Cardiovascular: Normal rate and regular rhythm. Exam reveals no gallop and no friction rub.   No murmur heard.  Pulmonary/Chest: Effort normal. No respiratory distress. He has no wheezes. He has no rales. He exhibits no tenderness.   Abdominal: Soft. Bowel sounds are normal. He exhibits no distension. There is no tenderness.   Musculoskeletal: Normal range of motion. He exhibits no edema or deformity.   Lymphadenopathy:     He has no cervical adenopathy.   Neurological: No cranial nerve deficit. He exhibits normal muscle tone.   Skin: Skin is warm and dry. No rash noted. He is not diaphoretic.   Vitals reviewed.      Lab Results   Component Value Date    GLUCOSE 89 03/04/2019    BUN 19 01/27/2020    CREATININE 1.27 01/27/2020    EGFRIFNONA 57 (L) 01/27/2020    EGFRIFAFRI 69 01/27/2020    BCR 15.0 01/27/2020    K 4.6 01/27/2020    CO2 24.6 01/27/2020    CALCIUM 9.6 01/27/2020    PROTENTOTREF 6.5 01/27/2020    ALBUMIN 4.60 01/27/2020    LABIL2 2.4 01/27/2020    AST 17 01/27/2020    ALT 26 01/27/2020       WBC   Date Value Ref Range Status   07/08/2019 5.62 4.5 - 11.0 10*3/uL Final     RBC   Date Value Ref Range Status   07/08/2019 4.49 (L) 4.5 - 5.9 10*6/uL Final     Hemoglobin   Date Value Ref Range Status   07/08/2019 14.0 13.5 - 17.5 g/dL Final     Hematocrit   Date Value Ref Range Status   07/08/2019 42.5 41.0 - 53.0 % Final     MCV   Date  Value Ref Range Status   07/08/2019 94.7 80.0 - 100.0 fL Final     MCH   Date Value Ref Range Status   07/08/2019 31.2 26.0 - 34.0 pg Final     MCHC   Date Value Ref Range Status   07/08/2019 32.9 31.0 - 37.0 g/dL Final     RDW   Date Value Ref Range Status   07/08/2019 13.9 12.0 - 16.8 % Final     RDW-SD   Date Value Ref Range Status   03/04/2019 50.0 37.0 - 54.0 fl Final     MPV   Date Value Ref Range Status   07/08/2019 10.9 (H) 6.7 - 10.8 fL Final     Platelets   Date Value Ref Range Status   07/08/2019 162 140 - 440 10*3/uL Final     Neutrophil Rel %   Date Value Ref Range Status   07/08/2019 61.4 45 - 80 % Final     Lymphocyte Rel %   Date Value Ref Range Status   07/08/2019 23.3 15 - 50 % Final     Monocyte Rel %   Date Value Ref Range Status   07/08/2019 10.5 0 - 15 % Final     Eosinophil %   Date Value Ref Range Status   07/08/2019 4.1 0 - 7 % Final     Basophil Rel %   Date Value Ref Range Status   07/08/2019 0.5 0 - 2 % Final     Immature Grans %   Date Value Ref Range Status   07/08/2019 0.2 (H) 0 % Final     Neutrophils Absolute   Date Value Ref Range Status   07/08/2019 3.45 2.0 - 8.8 10*3/uL Final     Lymphocytes Absolute   Date Value Ref Range Status   07/08/2019 1.31 0.7 - 5.5 10*3/uL Final     Monocytes Absolute   Date Value Ref Range Status   07/08/2019 0.59 0.0 - 1.7 10*3/uL Final     Eosinophils Absolute   Date Value Ref Range Status   07/08/2019 0.23 0.0 - 0.8 10*3/uL Final     Basophils Absolute   Date Value Ref Range Status   07/08/2019 0.03 0.0 - 0.2 10*3/uL Final     Immature Grans, Absolute   Date Value Ref Range Status   07/08/2019 0.01 <1 10*3/uL Final     nRBC   Date Value Ref Range Status   07/08/2019 0 0 /100(WBC) Final   03/04/2019 0.0 0.0 - 0.0 /100 WBC Final       Lab Results   Component Value Date    HGBA1C 5.70 (H) 01/27/2020       No results found for: [id number]    TSH   Date Value Ref Range Status   01/03/2019 2.000 0.270 - 4.200 mIU/mL Final   12/27/2018 1.810 0.470 - 4.680  u(iU)/mL Final     Comment:      Higher dose biotin supplements may interfere with this test.  Interpret results within the context of patient's clinical picture.       Lab Results   Component Value Date    CHOL 213 (H) 01/04/2019     Lab Results   Component Value Date    TRIG 123 01/22/2020     Lab Results   Component Value Date    HDL 51 01/22/2020     Lab Results   Component Value Date     (H) 01/22/2020     Lab Results   Component Value Date    VLDL 24.6 01/22/2020     Lab Results   Component Value Date    LDLHDL 2.71 01/04/2019         Procedures    Assessment/Plan   Problems Addressed this Visit        Cardiovascular and Mediastinum    Hyperlipidemia    Relevant Medications    rosuvastatin (CRESTOR) 10 MG tablet       Digestive    GERD without esophagitis - Primary      Use pepcid prn.      No orders of the defined types were placed in this encounter.      Current Outpatient Medications   Medication Sig Dispense Refill   • rosuvastatin (CRESTOR) 10 MG tablet Take 1 tablet by mouth Daily. 90 tablet 3   • meclizine (ANTIVERT) 12.5 MG tablet Take 1 tablet by mouth 3 (Three) Times a Day As Needed for Dizziness or Nausea. 45 tablet 1     No current facility-administered medications for this visit.        Feliciano Light had no medications administered during this visit.    No follow-ups on file.    There are no Patient Instructions on file for this visit.

## 2020-05-21 ENCOUNTER — TELEMEDICINE (OUTPATIENT)
Dept: FAMILY MEDICINE CLINIC | Facility: CLINIC | Age: 65
End: 2020-05-21

## 2020-05-21 ENCOUNTER — TELEPHONE (OUTPATIENT)
Dept: FAMILY MEDICINE CLINIC | Facility: CLINIC | Age: 65
End: 2020-05-21

## 2020-05-21 DIAGNOSIS — K59.04 CHRONIC IDIOPATHIC CONSTIPATION: Primary | ICD-10-CM

## 2020-05-21 DIAGNOSIS — K56.49: ICD-10-CM

## 2020-05-21 PROCEDURE — 99442 PR PHYS/QHP TELEPHONE EVALUATION 11-20 MIN: CPT | Performed by: FAMILY MEDICINE

## 2020-05-21 RX ORDER — AMOXICILLIN 250 MG
2 CAPSULE ORAL DAILY
Qty: 180 TABLET | Refills: 1 | Status: SHIPPED | OUTPATIENT
Start: 2020-05-21 | End: 2020-11-24

## 2020-05-21 NOTE — PROGRESS NOTES
C/o constipation    Subjective   Feliciano Light is a 65 y.o. male.     History of Present Illness   Telephone visit due to covid.  Consent obtained by wife.  11 minute visit.    C/o 2 week hisotry of constipation/impaction.  Had one large BM that resulted in bleeding and pain in rectum.   Had to have prunes, miralax, suppository, stool softeners to have that.  Feels like he is getting back in same situation.  Pt leads sedentary lifestyle.      The following portions of the patient's history were reviewed and updated as appropriate: allergies, current medications, past family history, past medical history, past social history, past surgical history and problem list.    Review of Systems   Constitutional: Negative for appetite change and fatigue.   HENT: Negative for nosebleeds and sore throat.    Eyes: Negative for blurred vision and visual disturbance.   Respiratory: Negative for shortness of breath and wheezing.    Cardiovascular: Negative for chest pain and leg swelling.   Gastrointestinal: Positive for anal bleeding, blood in stool, constipation and rectal pain. Negative for abdominal distention and abdominal pain.   Endocrine: Negative for cold intolerance and polyuria.   Genitourinary: Negative for dysuria and hematuria.   Musculoskeletal: Negative for arthralgias and myalgias.   Skin: Negative for color change and rash.   Neurological: Negative for weakness and confusion.   Psychiatric/Behavioral: Negative for agitation and depressed mood.       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   • SIADH (syndrome of inappropriate ADH production) (CMS/HCC)   • Elevated hemoglobin A1c   • Hyperlipidemia   • Protein-calorie malnutrition (CMS/HCC)   • Acute deep vein thrombosis (DVT) of upper extremity (CMS/HCC)   • Adrenal insufficiency (Gooding's disease)  "(CMS/HCC)   • Hydrocephalus (CMS/HCC)   • Benign prostatic hyperplasia with urinary frequency   • History of gastroesophageal reflux (GERD)   • GERD without esophagitis   • Immunization deficiency   • Medicare annual wellness visit, subsequent   • Chronic idiopathic constipation   • Impaction of intestine (CMS/HCC)       Allergies   Allergen Reactions   • Amoxicillin Shortness Of Breath, Hives and Swelling   • Latex Hives and Anaphylaxis   • Other Shortness Of Breath     Wife states allergic to all \"cillins\"   • Penicillins Shortness Of Breath         Current Outpatient Medications:   •  meclizine (ANTIVERT) 12.5 MG tablet, Take 1 tablet by mouth 3 (Three) Times a Day As Needed for Dizziness or Nausea., Disp: 45 tablet, Rfl: 3  •  rosuvastatin (CRESTOR) 10 MG tablet, Take 1 tablet by mouth Daily., Disp: 90 tablet, Rfl: 3    Past Medical History:   Diagnosis Date   • Acute pancreatitis    • Arm skin lesion, left    • BPH (benign prostatic hyperplasia)    • Cerebellar hemorrhage (CMS/HCC) 1/2/2019   • Chronic nausea    • Elevated lipase    • Encounter for preventive health examination    • Enlarged prostate without lower urinary tract symptoms (luts)    • History of gastroesophageal reflux (GERD)    • Hyperlipidemia    • Intraventricular hemorrhage (CMS/HCC) 1/2/2019   • Intraventricular hemorrhage (CMS/HCC)    • Pancreatitis 12/01/2018   • 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 No specimens collected.    • TONSILLECTOMY         Family History   Problem Relation Age of Onset   • Arthritis Mother    • Breast cancer Mother    • " Hypertension Father    • Stroke Father    • COPD Father    • Stroke Other    • Breast cancer Other    • Hypertension Other    • COPD Other    • Arthritis Other        Social History     Tobacco Use   • Smoking status: Never Smoker   • Smokeless tobacco: Never Used   Substance Use Topics   • Alcohol use: Defer     Frequency: Never     Comment: drank alcohol in the past            Objective     There were no vitals filed for this visit.  There is no height or weight on file to calculate BMI.    Physical Exam   Neurological: He is alert.   Psychiatric: He has a normal mood and affect.       Lab Results   Component Value Date    GLUCOSE 89 03/04/2019    BUN 19 01/27/2020    CREATININE 1.27 01/27/2020    EGFRIFNONA 57 (L) 01/27/2020    EGFRIFAFRI 69 01/27/2020    BCR 15.0 01/27/2020    K 4.6 01/27/2020    CO2 24.6 01/27/2020    CALCIUM 9.6 01/27/2020    PROTENTOTREF 6.5 01/27/2020    ALBUMIN 4.60 01/27/2020    LABIL2 2.4 01/27/2020    AST 17 01/27/2020    ALT 26 01/27/2020       WBC   Date Value Ref Range Status   07/08/2019 5.62 4.5 - 11.0 10*3/uL Final     RBC   Date Value Ref Range Status   07/08/2019 4.49 (L) 4.5 - 5.9 10*6/uL Final     Hemoglobin   Date Value Ref Range Status   07/08/2019 14.0 13.5 - 17.5 g/dL Final     Hematocrit   Date Value Ref Range Status   07/08/2019 42.5 41.0 - 53.0 % Final     MCV   Date Value Ref Range Status   07/08/2019 94.7 80.0 - 100.0 fL Final     MCH   Date Value Ref Range Status   07/08/2019 31.2 26.0 - 34.0 pg Final     MCHC   Date Value Ref Range Status   07/08/2019 32.9 31.0 - 37.0 g/dL Final     RDW   Date Value Ref Range Status   07/08/2019 13.9 12.0 - 16.8 % Final     RDW-SD   Date Value Ref Range Status   03/04/2019 50.0 37.0 - 54.0 fl Final     MPV   Date Value Ref Range Status   07/08/2019 10.9 (H) 6.7 - 10.8 fL Final     Platelets   Date Value Ref Range Status   07/08/2019 162 140 - 440 10*3/uL Final     Neutrophil Rel %   Date Value Ref Range Status   07/08/2019 61.4 45 -  80 % Final     Lymphocyte Rel %   Date Value Ref Range Status   07/08/2019 23.3 15 - 50 % Final     Monocyte Rel %   Date Value Ref Range Status   07/08/2019 10.5 0 - 15 % Final     Eosinophil %   Date Value Ref Range Status   07/08/2019 4.1 0 - 7 % Final     Basophil Rel %   Date Value Ref Range Status   07/08/2019 0.5 0 - 2 % Final     Immature Grans %   Date Value Ref Range Status   07/08/2019 0.2 (H) 0 % Final     Neutrophils Absolute   Date Value Ref Range Status   07/08/2019 3.45 2.0 - 8.8 10*3/uL Final     Lymphocytes Absolute   Date Value Ref Range Status   07/08/2019 1.31 0.7 - 5.5 10*3/uL Final     Monocytes Absolute   Date Value Ref Range Status   07/08/2019 0.59 0.0 - 1.7 10*3/uL Final     Eosinophils Absolute   Date Value Ref Range Status   07/08/2019 0.23 0.0 - 0.8 10*3/uL Final     Basophils Absolute   Date Value Ref Range Status   07/08/2019 0.03 0.0 - 0.2 10*3/uL Final     Immature Grans, Absolute   Date Value Ref Range Status   07/08/2019 0.01 <1 10*3/uL Final     nRBC   Date Value Ref Range Status   07/08/2019 0 0 /100(WBC) Final   03/04/2019 0.0 0.0 - 0.0 /100 WBC Final       Lab Results   Component Value Date    HGBA1C 5.70 (H) 01/27/2020       No results found for: [id number]    TSH   Date Value Ref Range Status   01/03/2019 2.000 0.270 - 4.200 mIU/mL Final   12/27/2018 1.810 0.470 - 4.680 u(iU)/mL Final     Comment:      Higher dose biotin supplements may interfere with this test.  Interpret results within the context of patient's clinical picture.       Lab Results   Component Value Date    CHOL 213 (H) 01/04/2019     Lab Results   Component Value Date    TRIG 123 01/22/2020     Lab Results   Component Value Date    HDL 51 01/22/2020     Lab Results   Component Value Date     (H) 01/22/2020     Lab Results   Component Value Date    VLDL 24.6 01/22/2020     Lab Results   Component Value Date    LDLHDL 2.71 01/04/2019         Procedures    Assessment/Plan   Problems Addressed this Visit         Digestive    Chronic idiopathic constipation - Primary    Impaction of intestine (CMS/HCC)      Use fleets enema now.    Place on senna s regimen.      No orders of the defined types were placed in this encounter.      Current Outpatient Medications   Medication Sig Dispense Refill   • meclizine (ANTIVERT) 12.5 MG tablet Take 1 tablet by mouth 3 (Three) Times a Day As Needed for Dizziness or Nausea. 45 tablet 3   • rosuvastatin (CRESTOR) 10 MG tablet Take 1 tablet by mouth Daily. 90 tablet 3     No current facility-administered medications for this visit.        Feliciano Light had no medications administered during this visit.    No follow-ups on file.    There are no Patient Instructions on file for this visit.

## 2020-06-10 ENCOUNTER — OFFICE VISIT (OUTPATIENT)
Dept: FAMILY MEDICINE CLINIC | Facility: CLINIC | Age: 65
End: 2020-06-10

## 2020-06-10 VITALS
WEIGHT: 132.4 LBS | SYSTOLIC BLOOD PRESSURE: 90 MMHG | TEMPERATURE: 97.5 F | HEIGHT: 65 IN | BODY MASS INDEX: 22.06 KG/M2 | DIASTOLIC BLOOD PRESSURE: 61 MMHG | HEART RATE: 79 BPM | OXYGEN SATURATION: 96 %

## 2020-06-10 DIAGNOSIS — R73.9 HYPERGLYCEMIA: ICD-10-CM

## 2020-06-10 DIAGNOSIS — E78.2 MIXED HYPERLIPIDEMIA: Primary | ICD-10-CM

## 2020-06-10 DIAGNOSIS — R53.82 CHRONIC FATIGUE: ICD-10-CM

## 2020-06-10 DIAGNOSIS — F32.1 CURRENT MODERATE EPISODE OF MAJOR DEPRESSIVE DISORDER, UNSPECIFIED WHETHER RECURRENT (HCC): ICD-10-CM

## 2020-06-10 DIAGNOSIS — I61.5 INTRAVENTRICULAR HEMORRHAGE (HCC): ICD-10-CM

## 2020-06-10 PROBLEM — F32.A DEPRESSION: Status: ACTIVE | Noted: 2020-06-10

## 2020-06-10 PROCEDURE — 99214 OFFICE O/P EST MOD 30 MIN: CPT | Performed by: FAMILY MEDICINE

## 2020-06-10 RX ORDER — ONDANSETRON 4 MG/1
TABLET, ORALLY DISINTEGRATING ORAL
COMMUNITY
Start: 2020-03-04 | End: 2020-06-10 | Stop reason: SDUPTHER

## 2020-06-10 RX ORDER — ONDANSETRON 4 MG/1
4 TABLET, ORALLY DISINTEGRATING ORAL EVERY 8 HOURS PRN
Qty: 40 TABLET | Refills: 5 | Status: SHIPPED | OUTPATIENT
Start: 2020-06-10 | End: 2020-11-24

## 2020-06-10 RX ORDER — DULOXETIN HYDROCHLORIDE 30 MG/1
30 CAPSULE, DELAYED RELEASE ORAL DAILY
Qty: 90 CAPSULE | Refills: 3 | Status: SHIPPED | OUTPATIENT
Start: 2020-06-10 | End: 2020-11-24

## 2020-06-10 NOTE — PROGRESS NOTES
"Chief Complaint   Patient presents with   • Hyperlipidemia       Subjective   Feliciano Light is a 65 y.o. male.     History of Present Illness   C/o trouble with lack of motivation.  Per wife, sits around all day.  Lot of fatigue.  Going on for 4 months or more.  No crying.  Just \"will not do anything for self\".  Makes self-deprecating statements often.  Trouble with short term memory. 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    F/U hyperlipidmeia.  No myalgias.  On crestor daily.     The following portions of the patient's history were reviewed and updated as appropriate: allergies, current medications, past family history, past medical history, past social history, past surgical history and problem list.    Review of Systems   Constitutional: Positive for fatigue. Negative for appetite change.   HENT: Negative for nosebleeds and sore throat.    Eyes: Negative for blurred vision and visual disturbance.   Respiratory: Negative for shortness of breath and wheezing.    Cardiovascular: Negative for chest pain and leg swelling.   Gastrointestinal: Negative for abdominal distention and abdominal pain.   Endocrine: Negative for cold intolerance and polyuria.   Genitourinary: Negative for dysuria and hematuria.   Musculoskeletal: Negative for arthralgias and myalgias.   Skin: Negative for color change and rash.   Neurological: Negative for weakness and confusion.   Psychiatric/Behavioral: Positive for depressed mood. Negative for agitation.       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   • SIADH (syndrome of inappropriate ADH production) (CMS/HCC)   • Elevated hemoglobin A1c   • Hyperlipidemia   • Protein-calorie malnutrition (CMS/HCC)   • Acute deep vein thrombosis (DVT) of upper extremity (CMS/HCC)   • Adrenal insufficiency ([place]'s disease) (CMS/HCC)   • " "Hydrocephalus (CMS/HCC)   • Benign prostatic hyperplasia with urinary frequency   • History of gastroesophageal reflux (GERD)   • GERD without esophagitis   • Immunization deficiency   • Medicare annual wellness visit, subsequent   • Chronic idiopathic constipation   • Impaction of intestine (CMS/HCC)   • Depression   • Hyperglycemia   • Chronic fatigue       Allergies   Allergen Reactions   • Amoxicillin Shortness Of Breath, Hives and Swelling   • Latex Hives and Anaphylaxis   • Other Shortness Of Breath     Wife states allergic to all \"cillins\"   • Penicillins Shortness Of Breath         Current Outpatient Medications:   •  meclizine (ANTIVERT) 12.5 MG tablet, Take 1 tablet by mouth 3 (Three) Times a Day As Needed for Dizziness or Nausea., Disp: 45 tablet, Rfl: 3  •  rosuvastatin (CRESTOR) 10 MG tablet, Take 1 tablet by mouth Daily., Disp: 90 tablet, Rfl: 3  •  sennosides-docusate (senna-docusate sodium) 8.6-50 MG per tablet, Take 2 tablets by mouth Daily., Disp: 180 tablet, Rfl: 1  •  DULoxetine (CYMBALTA) 30 MG capsule, Take 1 capsule by mouth Daily., Disp: 90 capsule, Rfl: 3  •  ondansetron ODT (ZOFRAN-ODT) 4 MG disintegrating tablet, Place 1 tablet on the tongue Every 8 (Eight) Hours As Needed for Nausea or Vomiting. 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 No specimens collected.    • TONSILLECTOMY         Family History   Problem Relation Age of Onset   • Arthritis Mother    • Breast cancer Mother    • Hypertension Father    • Stroke Father    • COPD Father    • Stroke Other    • Breast cancer Other    • Hypertension Other    • COPD Other    • Arthritis Other        Social History     Tobacco Use   • Smoking status: Never Smoker   • Smokeless tobacco: Never Used   Substance Use Topics   • Alcohol use: Defer     Frequency: Never     Comment: drank alcohol in the past            Objective     Vitals:    06/10/20 1343   BP: 90/61   Pulse: 79   Temp: 97.5 °F (36.4 °C)   SpO2: 96%     Body mass index is 22.03 kg/m².    Physical Exam   Constitutional: He appears well-developed and well-nourished.   HENT:   Head: Normocephalic and atraumatic.   Mouth/Throat: Oropharynx is clear and moist.   Eyes: Pupils are equal, round, and reactive to light. No scleral icterus.   Neck: No thyromegaly present.   Cardiovascular: Normal rate and regular rhythm. Exam reveals no gallop and no friction rub.   No murmur heard.  Pulmonary/Chest: Effort normal. No respiratory distress. He has no wheezes. He has no rales. He exhibits no tenderness.   Abdominal: Soft. Bowel sounds are normal. He exhibits no distension. There is no tenderness.   Musculoskeletal: Normal range of motion. He exhibits no edema or deformity.   Lymphadenopathy:     He has no cervical adenopathy.   Neurological: No cranial nerve deficit. He exhibits normal muscle tone.   Skin: Skin is warm and dry. No rash noted. He is not diaphoretic.   Vitals reviewed.      Lab Results   Component Value Date    GLUCOSE 89 03/04/2019    BUN 19 01/27/2020     CREATININE 1.27 01/27/2020    EGFRIFNONA 57 (L) 01/27/2020    EGFRIFAFRI 69 01/27/2020    BCR 15.0 01/27/2020    K 4.6 01/27/2020    CO2 24.6 01/27/2020    CALCIUM 9.6 01/27/2020    PROTENTOTREF 6.5 01/27/2020    ALBUMIN 4.60 01/27/2020    LABIL2 2.4 01/27/2020    AST 17 01/27/2020    ALT 26 01/27/2020       WBC   Date Value Ref Range Status   07/08/2019 5.62 4.5 - 11.0 10*3/uL Final     RBC   Date Value Ref Range Status   07/08/2019 4.49 (L) 4.5 - 5.9 10*6/uL Final     Hemoglobin   Date Value Ref Range Status   07/08/2019 14.0 13.5 - 17.5 g/dL Final     Hematocrit   Date Value Ref Range Status   07/08/2019 42.5 41.0 - 53.0 % Final     MCV   Date Value Ref Range Status   07/08/2019 94.7 80.0 - 100.0 fL Final     MCH   Date Value Ref Range Status   07/08/2019 31.2 26.0 - 34.0 pg Final     MCHC   Date Value Ref Range Status   07/08/2019 32.9 31.0 - 37.0 g/dL Final     RDW   Date Value Ref Range Status   07/08/2019 13.9 12.0 - 16.8 % Final     RDW-SD   Date Value Ref Range Status   03/04/2019 50.0 37.0 - 54.0 fl Final     MPV   Date Value Ref Range Status   07/08/2019 10.9 (H) 6.7 - 10.8 fL Final     Platelets   Date Value Ref Range Status   07/08/2019 162 140 - 440 10*3/uL Final     Neutrophil Rel %   Date Value Ref Range Status   07/08/2019 61.4 45 - 80 % Final     Lymphocyte Rel %   Date Value Ref Range Status   07/08/2019 23.3 15 - 50 % Final     Monocyte Rel %   Date Value Ref Range Status   07/08/2019 10.5 0 - 15 % Final     Eosinophil %   Date Value Ref Range Status   07/08/2019 4.1 0 - 7 % Final     Basophil Rel %   Date Value Ref Range Status   07/08/2019 0.5 0 - 2 % Final     Immature Grans %   Date Value Ref Range Status   07/08/2019 0.2 (H) 0 % Final     Neutrophils Absolute   Date Value Ref Range Status   07/08/2019 3.45 2.0 - 8.8 10*3/uL Final     Lymphocytes Absolute   Date Value Ref Range Status   07/08/2019 1.31 0.7 - 5.5 10*3/uL Final     Monocytes Absolute   Date Value Ref Range Status    07/08/2019 0.59 0.0 - 1.7 10*3/uL Final     Eosinophils Absolute   Date Value Ref Range Status   07/08/2019 0.23 0.0 - 0.8 10*3/uL Final     Basophils Absolute   Date Value Ref Range Status   07/08/2019 0.03 0.0 - 0.2 10*3/uL Final     Immature Grans, Absolute   Date Value Ref Range Status   07/08/2019 0.01 <1 10*3/uL Final     nRBC   Date Value Ref Range Status   07/08/2019 0 0 /100(WBC) Final   03/04/2019 0.0 0.0 - 0.0 /100 WBC Final       Lab Results   Component Value Date    HGBA1C 5.70 (H) 01/27/2020       No results found for: [id number]    TSH   Date Value Ref Range Status   01/03/2019 2.000 0.270 - 4.200 mIU/mL Final   12/27/2018 1.810 0.470 - 4.680 u(iU)/mL Final     Comment:      Higher dose biotin supplements may interfere with this test.  Interpret results within the context of patient's clinical picture.       Lab Results   Component Value Date    CHOL 213 (H) 01/04/2019     Lab Results   Component Value Date    TRIG 123 01/22/2020     Lab Results   Component Value Date    HDL 51 01/22/2020     Lab Results   Component Value Date     (H) 01/22/2020     Lab Results   Component Value Date    VLDL 24.6 01/22/2020     Lab Results   Component Value Date    LDLHDL 2.71 01/04/2019         Procedures    Assessment/Plan   Problems Addressed this Visit        Cardiovascular and Mediastinum    Hyperlipidemia - Primary    Relevant Orders    Comprehensive Metabolic Panel    Lipid Panel With / Chol / HDL Ratio       Nervous and Auditory    Intraventricular hemorrhage (CMS/HCC)       Other    Depression    Relevant Medications    DULoxetine (CYMBALTA) 30 MG capsule    Hyperglycemia    Relevant Orders    Hemoglobin A1c    Chronic fatigue    Relevant Orders    CBC & Differential    Vitamin B12    TSH Rfx On Abnormal To Free T4        Start duloxetine.    Orders Placed This Encounter   Procedures   • Comprehensive Metabolic Panel   • Lipid Panel With / Chol / HDL Ratio   • Hemoglobin A1c   • Vitamin B12   • TSH  Rfx On Abnormal To Free T4   • CBC & Differential     Order Specific Question:   Manual Differential     Answer:   No       Current Outpatient Medications   Medication Sig Dispense Refill   • meclizine (ANTIVERT) 12.5 MG tablet Take 1 tablet by mouth 3 (Three) Times a Day As Needed for Dizziness or Nausea. 45 tablet 3   • rosuvastatin (CRESTOR) 10 MG tablet Take 1 tablet by mouth Daily. 90 tablet 3   • sennosides-docusate (senna-docusate sodium) 8.6-50 MG per tablet Take 2 tablets by mouth Daily. 180 tablet 1   • DULoxetine (CYMBALTA) 30 MG capsule Take 1 capsule by mouth Daily. 90 capsule 3   • ondansetron ODT (ZOFRAN-ODT) 4 MG disintegrating tablet Place 1 tablet on the tongue Every 8 (Eight) Hours As Needed for Nausea or Vomiting. Prn nausea 40 tablet 5     No current facility-administered medications for this visit.        Feliciano Light had no medications administered during this visit.    No follow-ups on file.    There are no Patient Instructions on file for this visit.

## 2020-06-11 LAB
ALBUMIN SERPL-MCNC: 4.6 G/DL (ref 3.5–5.2)
ALBUMIN/GLOB SERPL: 2.1 G/DL
ALP SERPL-CCNC: 49 U/L (ref 39–117)
ALT SERPL-CCNC: 48 U/L (ref 1–41)
AST SERPL-CCNC: 27 U/L (ref 1–40)
BASOPHILS # BLD AUTO: 0.03 10*3/MM3 (ref 0–0.2)
BASOPHILS NFR BLD AUTO: 0.6 % (ref 0–1.5)
BILIRUB SERPL-MCNC: 0.5 MG/DL (ref 0.2–1.2)
BUN SERPL-MCNC: 17 MG/DL (ref 8–23)
BUN/CREAT SERPL: 13.6 (ref 7–25)
CALCIUM SERPL-MCNC: 9.8 MG/DL (ref 8.6–10.5)
CHLORIDE SERPL-SCNC: 103 MMOL/L (ref 98–107)
CHOLEST SERPL-MCNC: 149 MG/DL (ref 0–200)
CHOLEST/HDLC SERPL: 2.81 {RATIO}
CO2 SERPL-SCNC: 28.7 MMOL/L (ref 22–29)
CREAT SERPL-MCNC: 1.25 MG/DL (ref 0.76–1.27)
EOSINOPHIL # BLD AUTO: 0.05 10*3/MM3 (ref 0–0.4)
EOSINOPHIL NFR BLD AUTO: 1 % (ref 0.3–6.2)
ERYTHROCYTE [DISTWIDTH] IN BLOOD BY AUTOMATED COUNT: 13.1 % (ref 12.3–15.4)
GLOBULIN SER CALC-MCNC: 2.2 GM/DL
GLUCOSE SERPL-MCNC: 163 MG/DL (ref 65–99)
HBA1C MFR BLD: 5.6 % (ref 4.8–5.6)
HCT VFR BLD AUTO: 44 % (ref 37.5–51)
HDLC SERPL-MCNC: 53 MG/DL (ref 40–60)
HGB BLD-MCNC: 14.7 G/DL (ref 13–17.7)
IMM GRANULOCYTES # BLD AUTO: 0.01 10*3/MM3 (ref 0–0.05)
IMM GRANULOCYTES NFR BLD AUTO: 0.2 % (ref 0–0.5)
LDLC SERPL CALC-MCNC: 85 MG/DL (ref 0–100)
LYMPHOCYTES # BLD AUTO: 0.88 10*3/MM3 (ref 0.7–3.1)
LYMPHOCYTES NFR BLD AUTO: 18 % (ref 19.6–45.3)
MCH RBC QN AUTO: 32.2 PG (ref 26.6–33)
MCHC RBC AUTO-ENTMCNC: 33.4 G/DL (ref 31.5–35.7)
MCV RBC AUTO: 96.3 FL (ref 79–97)
MONOCYTES # BLD AUTO: 0.48 10*3/MM3 (ref 0.1–0.9)
MONOCYTES NFR BLD AUTO: 9.8 % (ref 5–12)
NEUTROPHILS # BLD AUTO: 3.44 10*3/MM3 (ref 1.7–7)
NEUTROPHILS NFR BLD AUTO: 70.4 % (ref 42.7–76)
NRBC BLD AUTO-RTO: 0.2 /100 WBC (ref 0–0.2)
PLATELET # BLD AUTO: 149 10*3/MM3 (ref 140–450)
POTASSIUM SERPL-SCNC: 5 MMOL/L (ref 3.5–5.2)
PROT SERPL-MCNC: 6.8 G/DL (ref 6–8.5)
RBC # BLD AUTO: 4.57 10*6/MM3 (ref 4.14–5.8)
SODIUM SERPL-SCNC: 142 MMOL/L (ref 136–145)
TRIGL SERPL-MCNC: 53 MG/DL (ref 0–150)
TSH SERPL DL<=0.005 MIU/L-ACNC: 1.95 UIU/ML (ref 0.27–4.2)
VIT B12 SERPL-MCNC: 1361 PG/ML (ref 211–946)
VLDLC SERPL CALC-MCNC: 10.6 MG/DL
WBC # BLD AUTO: 4.89 10*3/MM3 (ref 3.4–10.8)

## 2020-06-11 NOTE — PROGRESS NOTES
Tell his wife his labs look great.  LDL went from 209 down to 85 and is doing well with the crestor.  Sugar average looks great.  Stay on the same plan.  Thanks.

## 2020-06-18 ENCOUNTER — TELEPHONE (OUTPATIENT)
Dept: FAMILY MEDICINE CLINIC | Facility: CLINIC | Age: 65
End: 2020-06-18

## 2020-06-18 NOTE — TELEPHONE ENCOUNTER
Faizan from [facility] called and states she has faxed over a plan of care 3 times that needs to be filled out.  She wants to know if you received it?  Her phone number is  [phone].

## 2020-06-18 NOTE — TELEPHONE ENCOUNTER
Spoke with Faizan and let her know that I did fax this form back on 06/01/2020. She did not receive it. I gave her my email to resend the form and told her I would fax this back

## 2020-10-29 ENCOUNTER — TELEPHONE (OUTPATIENT)
Dept: FAMILY MEDICINE CLINIC | Facility: CLINIC | Age: 65
End: 2020-10-29

## 2020-11-04 ENCOUNTER — CLINICAL SUPPORT (OUTPATIENT)
Dept: FAMILY MEDICINE CLINIC | Facility: CLINIC | Age: 65
End: 2020-11-04

## 2020-11-04 DIAGNOSIS — Z23 NEED FOR INFLUENZA VACCINATION: ICD-10-CM

## 2020-11-04 PROCEDURE — 90694 VACC AIIV4 NO PRSRV 0.5ML IM: CPT | Performed by: FAMILY MEDICINE

## 2020-11-04 PROCEDURE — G0008 ADMIN INFLUENZA VIRUS VAC: HCPCS | Performed by: FAMILY MEDICINE

## 2020-11-24 ENCOUNTER — OFFICE VISIT (OUTPATIENT)
Dept: FAMILY MEDICINE CLINIC | Facility: CLINIC | Age: 65
End: 2020-11-24

## 2020-11-24 VITALS
WEIGHT: 138.13 LBS | BODY MASS INDEX: 23.01 KG/M2 | SYSTOLIC BLOOD PRESSURE: 95 MMHG | DIASTOLIC BLOOD PRESSURE: 65 MMHG | HEART RATE: 87 BPM | TEMPERATURE: 97.8 F | OXYGEN SATURATION: 95 % | HEIGHT: 65 IN

## 2020-11-24 DIAGNOSIS — K59.04 CHRONIC IDIOPATHIC CONSTIPATION: Primary | ICD-10-CM

## 2020-11-24 DIAGNOSIS — E78.2 MIXED HYPERLIPIDEMIA: ICD-10-CM

## 2020-11-24 DIAGNOSIS — R11.0 CHRONIC NAUSEA: ICD-10-CM

## 2020-11-24 DIAGNOSIS — I61.4 CEREBELLAR HEMORRHAGE (HCC): ICD-10-CM

## 2020-11-24 DIAGNOSIS — I61.5 INTRAVENTRICULAR HEMORRHAGE (HCC): ICD-10-CM

## 2020-11-24 PROCEDURE — 99214 OFFICE O/P EST MOD 30 MIN: CPT | Performed by: FAMILY MEDICINE

## 2020-11-24 RX ORDER — ROSUVASTATIN CALCIUM 10 MG/1
10 TABLET, COATED ORAL DAILY
Qty: 90 TABLET | Refills: 3 | Status: SHIPPED | OUTPATIENT
Start: 2020-11-24 | End: 2021-06-01 | Stop reason: SDUPTHER

## 2020-11-24 RX ORDER — AMOXICILLIN 250 MG
1 CAPSULE ORAL DAILY
Qty: 90 TABLET | Refills: 1
Start: 2020-11-24 | End: 2022-02-09

## 2020-11-24 RX ORDER — ONDANSETRON 4 MG/1
4 TABLET, ORALLY DISINTEGRATING ORAL EVERY 12 HOURS PRN
Qty: 90 TABLET | Refills: 5 | Status: SHIPPED | OUTPATIENT
Start: 2020-11-24 | End: 2021-06-01 | Stop reason: SDUPTHER

## 2020-11-24 NOTE — PROGRESS NOTES
Chief Complaint   Patient presents with   • Follow-up       Subjective   Feliciano Light is a 65 y.o. male.     History of Present Illness   FU hyperlipidemia.  LDL 85 from  5 months ago.  Doing wel lwt meds.   F/U cerebellar and IVH hemorrhage.   About the same per wife.  Patient is walking to get paper now.  On chronic ondansetron due to persistent nausea.    F/U constipation.  Doing well with stool softener.        The following portions of the patient's history were reviewed and updated as appropriate: allergies, current medications, past family history, past medical history, past social history, past surgical history and problem list.    Review of Systems   Constitutional: Negative for appetite change and fatigue.   HENT: Negative for nosebleeds and sore throat.    Eyes: Negative for blurred vision and visual disturbance.   Respiratory: Negative for shortness of breath and wheezing.    Cardiovascular: Negative for chest pain and leg swelling.   Gastrointestinal: Negative for abdominal distention and abdominal pain.   Endocrine: Negative for cold intolerance and polyuria.   Genitourinary: Negative for dysuria and hematuria.   Musculoskeletal: Negative for arthralgias and myalgias.   Skin: Negative for color change and rash.   Neurological: Negative for weakness and confusion.   Psychiatric/Behavioral: Negative for agitation and depressed mood.       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   • SIADH (syndrome of inappropriate ADH production) (CMS/HCC)   • Elevated hemoglobin A1c   • Hyperlipidemia   • Protein-calorie malnutrition (CMS/HCC)   • Acute deep vein thrombosis (DVT) of upper extremity (CMS/HCC)   • Adrenal insufficiency ([place]'s disease) (CMS/HCC)   • Hydrocephalus (CMS/HCC)   • Benign prostatic hyperplasia with urinary frequency   • History  "of gastroesophageal reflux (GERD)   • GERD without esophagitis   • Immunization deficiency   • Medicare annual wellness visit, subsequent   • Chronic idiopathic constipation   • Impaction of intestine (CMS/HCC)   • Depression   • Hyperglycemia   • Chronic fatigue   • Chronic nausea       Allergies   Allergen Reactions   • Amoxicillin Shortness Of Breath, Hives and Swelling   • Latex Hives and Anaphylaxis   • Other Shortness Of Breath     Wife states allergic to all \"cillins\"   • Penicillins Shortness Of Breath         Current Outpatient Medications:   •  ondansetron ODT (ZOFRAN-ODT) 4 MG disintegrating tablet, Place 1 tablet on the tongue Every 12 (Twelve) Hours As Needed for Nausea or Vomiting. Prn nausea, Disp: 90 tablet, Rfl: 5  •  rosuvastatin (CRESTOR) 10 MG tablet, Take 1 tablet by mouth Daily., Disp: 90 tablet, Rfl: 3  •  sennosides-docusate (senna-docusate sodium) 8.6-50 MG per tablet, Take 1 tablet by mouth Daily., Disp: 90 tablet, Rfl: 1    Past Medical History:   Diagnosis Date   • Acute pancreatitis    • Arm skin lesion, left    • BPH (benign prostatic hyperplasia)    • Cerebellar hemorrhage (CMS/HCC) 1/2/2019   • Chronic nausea    • Elevated lipase    • Encounter for preventive health examination    • Enlarged prostate without lower urinary tract symptoms (luts)    • History of gastroesophageal reflux (GERD)    • Hyperlipidemia    • Intraventricular hemorrhage (CMS/HCC) 1/2/2019   • Intraventricular hemorrhage (CMS/HCC)    • Pancreatitis 12/01/2018   • 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 No specimens collected.  "   • TONSILLECTOMY         Family History   Problem Relation Age of Onset   • Arthritis Mother    • Breast cancer Mother    • Hypertension Father    • Stroke Father    • COPD Father    • Stroke Other    • Breast cancer Other    • Hypertension Other    • COPD Other    • Arthritis Other        Social History     Tobacco Use   • Smoking status: Never Smoker   • Smokeless tobacco: Never Used   Substance Use Topics   • Alcohol use: Defer     Frequency: Never     Comment: drank alcohol in the past            Objective     Vitals:    11/24/20 1340   BP: 95/65   Pulse: 87   Temp: 97.8 °F (36.6 °C)   SpO2: 95%     Body mass index is 22.99 kg/m².    Physical Exam  Vitals signs reviewed.   Constitutional:       Appearance: He is well-developed. He is not diaphoretic.   HENT:      Head: Normocephalic and atraumatic.   Eyes:      General: No scleral icterus.     Pupils: Pupils are equal, round, and reactive to light.   Neck:      Thyroid: No thyromegaly.   Cardiovascular:      Rate and Rhythm: Normal rate and regular rhythm.      Heart sounds: No murmur. No friction rub. No gallop.    Pulmonary:      Effort: Pulmonary effort is normal. No respiratory distress.      Breath sounds: No wheezing or rales.   Chest:      Chest wall: No tenderness.   Abdominal:      General: Bowel sounds are normal. There is no distension.      Palpations: Abdomen is soft.      Tenderness: There is no abdominal tenderness.   Musculoskeletal: Normal range of motion.         General: No deformity.   Lymphadenopathy:      Cervical: No cervical adenopathy.   Skin:     General: Skin is warm and dry.      Findings: No rash.   Neurological:      Cranial Nerves: No cranial nerve deficit.      Motor: No abnormal muscle tone.         Lab Results   Component Value Date    GLUCOSE 89 03/04/2019    BUN 17 06/10/2020    CREATININE 1.25 06/10/2020    EGFRIFNONA 58 (L) 06/10/2020    EGFRIFAFRI 70 06/10/2020    BCR 13.6 06/10/2020    K 5.0 06/10/2020    CO2 28.7  06/10/2020    CALCIUM 9.8 06/10/2020    PROTENTOTREF 6.8 06/10/2020    ALBUMIN 4.60 06/10/2020    LABIL2 2.1 06/10/2020    AST 27 06/10/2020    ALT 48 (H) 06/10/2020       WBC   Date Value Ref Range Status   06/10/2020 4.89 3.40 - 10.80 10*3/mm3 Final   07/08/2019 5.62 4.5 - 11.0 10*3/uL Final     RBC   Date Value Ref Range Status   06/10/2020 4.57 4.14 - 5.80 10*6/mm3 Final   07/08/2019 4.49 (L) 4.5 - 5.9 10*6/uL Final     Hemoglobin   Date Value Ref Range Status   06/10/2020 14.7 13.0 - 17.7 g/dL Final   07/08/2019 14.0 13.5 - 17.5 g/dL Final     Hematocrit   Date Value Ref Range Status   06/10/2020 44.0 37.5 - 51.0 % Final   07/08/2019 42.5 41.0 - 53.0 % Final     MCV   Date Value Ref Range Status   06/10/2020 96.3 79.0 - 97.0 fL Final   07/08/2019 94.7 80.0 - 100.0 fL Final     MCH   Date Value Ref Range Status   06/10/2020 32.2 26.6 - 33.0 pg Final   07/08/2019 31.2 26.0 - 34.0 pg Final     MCHC   Date Value Ref Range Status   06/10/2020 33.4 31.5 - 35.7 g/dL Final   07/08/2019 32.9 31.0 - 37.0 g/dL Final     RDW   Date Value Ref Range Status   06/10/2020 13.1 12.3 - 15.4 % Final   07/08/2019 13.9 12.0 - 16.8 % Final     RDW-SD   Date Value Ref Range Status   03/04/2019 50.0 37.0 - 54.0 fl Final     MPV   Date Value Ref Range Status   07/08/2019 10.9 (H) 6.7 - 10.8 fL Final     Platelets   Date Value Ref Range Status   06/10/2020 149 140 - 450 10*3/mm3 Final   07/08/2019 162 140 - 440 10*3/uL Final     Neutrophil Rel %   Date Value Ref Range Status   06/10/2020 70.4 42.7 - 76.0 % Final   07/08/2019 61.4 45 - 80 % Final     Lymphocyte Rel %   Date Value Ref Range Status   06/10/2020 18.0 (L) 19.6 - 45.3 % Final   07/08/2019 23.3 15 - 50 % Final     Monocyte Rel %   Date Value Ref Range Status   06/10/2020 9.8 5.0 - 12.0 % Final   07/08/2019 10.5 0 - 15 % Final     Eosinophil Rel %   Date Value Ref Range Status   06/10/2020 1.0 0.3 - 6.2 % Final     Eosinophil %   Date Value Ref Range Status   07/08/2019 4.1 0 -  7 % Final     Basophil Rel %   Date Value Ref Range Status   06/10/2020 0.6 0.0 - 1.5 % Final   07/08/2019 0.5 0 - 2 % Final     Immature Grans %   Date Value Ref Range Status   07/08/2019 0.2 (H) 0 % Final     Neutrophils Absolute   Date Value Ref Range Status   06/10/2020 3.44 1.70 - 7.00 10*3/mm3 Final   07/08/2019 3.45 2.0 - 8.8 10*3/uL Final     Lymphocytes Absolute   Date Value Ref Range Status   06/10/2020 0.88 0.70 - 3.10 10*3/mm3 Final   07/08/2019 1.31 0.7 - 5.5 10*3/uL Final     Monocytes Absolute   Date Value Ref Range Status   06/10/2020 0.48 0.10 - 0.90 10*3/mm3 Final   07/08/2019 0.59 0.0 - 1.7 10*3/uL Final     Eosinophils Absolute   Date Value Ref Range Status   06/10/2020 0.05 0.00 - 0.40 10*3/mm3 Final   07/08/2019 0.23 0.0 - 0.8 10*3/uL Final     Basophils Absolute   Date Value Ref Range Status   06/10/2020 0.03 0.00 - 0.20 10*3/mm3 Final   07/08/2019 0.03 0.0 - 0.2 10*3/uL Final     Immature Grans, Absolute   Date Value Ref Range Status   07/08/2019 0.01 <1 10*3/uL Final     nRBC   Date Value Ref Range Status   06/10/2020 0.2 0.0 - 0.2 /100 WBC Final   07/08/2019 0 0 /100(WBC) Final   03/04/2019 0.0 0.0 - 0.0 /100 WBC Final       Lab Results   Component Value Date    HGBA1C 5.60 06/10/2020       Lab Results   Component Value Date    [id number] 1,361 (H) 06/10/2020       TSH   Date Value Ref Range Status   06/10/2020 1.950 0.270 - 4.200 uIU/mL Final   01/03/2019 2.000 0.270 - 4.200 mIU/mL Final   12/27/2018 1.810 0.470 - 4.680 u(iU)/mL Final     Comment:      Higher dose biotin supplements may interfere with this test.  Interpret results within the context of patient's clinical picture.       Lab Results   Component Value Date    CHOL 213 (H) 01/04/2019     Lab Results   Component Value Date    TRIG 53 06/10/2020     Lab Results   Component Value Date    HDL 53 06/10/2020     Lab Results   Component Value Date    LDL 85 06/10/2020     Lab Results   Component Value Date    VLDL 10.6 06/10/2020      Lab Results   Component Value Date    LDLHDL 2.71 01/04/2019         Procedures    Assessment/Plan   Problems Addressed this Visit        Cardiovascular and Mediastinum    Hyperlipidemia    Relevant Medications    rosuvastatin (CRESTOR) 10 MG tablet       Digestive    Chronic idiopathic constipation - Primary    Chronic nausea    Relevant Medications    ondansetron ODT (ZOFRAN-ODT) 4 MG disintegrating tablet       Nervous and Auditory    Cerebellar hemorrhage (CMS/HCC)    Intraventricular hemorrhage (CMS/HCC)      Diagnoses       Codes Comments    Chronic idiopathic constipation    -  Primary ICD-10-CM: K59.04  ICD-9-CM: 564.00     Mixed hyperlipidemia     ICD-10-CM: E78.2  ICD-9-CM: 272.2     Intraventricular hemorrhage (CMS/HCC)     ICD-10-CM: I61.5  ICD-9-CM: 431     Cerebellar hemorrhage (CMS/HCC)     ICD-10-CM: I61.4  ICD-9-CM: 431     Chronic nausea     ICD-10-CM: R11.0  ICD-9-CM: 787.02         Continue statin.    Encouraged regular exercise at home.    No orders of the defined types were placed in this encounter.      Current Outpatient Medications   Medication Sig Dispense Refill   • ondansetron ODT (ZOFRAN-ODT) 4 MG disintegrating tablet Place 1 tablet on the tongue Every 12 (Twelve) Hours As Needed for Nausea or Vomiting. Prn nausea 90 tablet 5   • rosuvastatin (CRESTOR) 10 MG tablet Take 1 tablet by mouth Daily. 90 tablet 3   • sennosides-docusate (senna-docusate sodium) 8.6-50 MG per tablet Take 1 tablet by mouth Daily. 90 tablet 1     No current facility-administered medications for this visit.        Feliciano Light had no medications administered during this visit.    No follow-ups on file.    There are no Patient Instructions on file for this visit.

## 2021-03-19 ENCOUNTER — BULK ORDERING (OUTPATIENT)
Dept: CASE MANAGEMENT | Facility: OTHER | Age: 66
End: 2021-03-19

## 2021-03-19 DIAGNOSIS — Z23 IMMUNIZATION DUE: ICD-10-CM

## 2021-06-01 ENCOUNTER — OFFICE VISIT (OUTPATIENT)
Dept: FAMILY MEDICINE CLINIC | Facility: CLINIC | Age: 66
End: 2021-06-01

## 2021-06-01 VITALS
HEIGHT: 65 IN | TEMPERATURE: 97.5 F | WEIGHT: 134 LBS | DIASTOLIC BLOOD PRESSURE: 58 MMHG | BODY MASS INDEX: 22.33 KG/M2 | SYSTOLIC BLOOD PRESSURE: 92 MMHG | OXYGEN SATURATION: 98 % | HEART RATE: 68 BPM

## 2021-06-01 DIAGNOSIS — N40.1 BENIGN PROSTATIC HYPERPLASIA WITH URINARY OBSTRUCTION: ICD-10-CM

## 2021-06-01 DIAGNOSIS — N13.8 BENIGN PROSTATIC HYPERPLASIA WITH URINARY OBSTRUCTION: ICD-10-CM

## 2021-06-01 DIAGNOSIS — Z12.11 ENCOUNTER FOR SCREENING FOR MALIGNANT NEOPLASM OF COLON: ICD-10-CM

## 2021-06-01 DIAGNOSIS — Z00.00 MEDICARE ANNUAL WELLNESS VISIT, SUBSEQUENT: Primary | ICD-10-CM

## 2021-06-01 DIAGNOSIS — R73.9 HYPERGLYCEMIA: ICD-10-CM

## 2021-06-01 DIAGNOSIS — E78.2 MIXED HYPERLIPIDEMIA: ICD-10-CM

## 2021-06-01 DIAGNOSIS — R11.0 CHRONIC NAUSEA: ICD-10-CM

## 2021-06-01 PROCEDURE — 99214 OFFICE O/P EST MOD 30 MIN: CPT | Performed by: FAMILY MEDICINE

## 2021-06-01 PROCEDURE — G0439 PPPS, SUBSEQ VISIT: HCPCS | Performed by: FAMILY MEDICINE

## 2021-06-01 RX ORDER — TAMSULOSIN HYDROCHLORIDE 0.4 MG/1
1 CAPSULE ORAL DAILY
Qty: 30 CAPSULE | Refills: 11 | Status: SHIPPED | OUTPATIENT
Start: 2021-06-01 | End: 2021-06-16 | Stop reason: SDUPTHER

## 2021-06-01 RX ORDER — ONDANSETRON 4 MG/1
4 TABLET, ORALLY DISINTEGRATING ORAL EVERY 12 HOURS PRN
Qty: 180 TABLET | Refills: 5 | Status: SHIPPED | OUTPATIENT
Start: 2021-06-01 | End: 2022-06-21 | Stop reason: SDUPTHER

## 2021-06-01 RX ORDER — ROSUVASTATIN CALCIUM 10 MG/1
10 TABLET, COATED ORAL DAILY
Qty: 90 TABLET | Refills: 3 | Status: SHIPPED | OUTPATIENT
Start: 2021-06-01 | End: 2022-04-04

## 2021-06-01 NOTE — PROGRESS NOTES
The ABCs of the Annual Wellness Visit  Subsequent Medicare Wellness Visit    Chief Complaint   Patient presents with   • Follow-up     6 month       Subjective   History of Present Illness:  Feliciano Light is a 66 y.o. male who presents for a Subsequent Medicare Wellness Visit.  C/o urinary hesitancy.   Going on months. Nocturia 2 times at night.     F/U hyperlipidemia.  No myalgias.    HEALTH RISK ASSESSMENT    Recent Hospitalizations:  No hospitalization(s) within the last year.    Current Medical Providers:  Patient Care Team:  Carlos Samuels MD as PCP - General (Family Medicine)    Smoking Status:  Social History     Tobacco Use   Smoking Status Never Smoker   Smokeless Tobacco Never Used       Alcohol Consumption:  Social History     Substance and Sexual Activity   Alcohol Use Defer    Comment: drank alcohol in the past       Depression Screen:   PHQ-2/PHQ-9 Depression Screening 11/24/2020   Little interest or pleasure in doing things 0   Feeling down, depressed, or hopeless 0   Total Score 0       Fall Risk Screen:  MICHAEL Fall Risk Assessment has not been completed.    Health Habits and Functional and Cognitive Screening:  Functional & Cognitive Status 6/1/2021   Do you have difficulty preparing food and eating? No   Do you have difficulty bathing yourself, getting dressed or grooming yourself? No   Do you have difficulty using the toilet? No   Do you have difficulty moving around from place to place? No   Do you have trouble with steps or getting out of a bed or a chair? No   Current Diet Well Balanced Diet   Dental Exam Up to date   Eye Exam Up to date   Exercise (times per week) 0 times per week   Current Exercises Include No Regular Exercise   Current Exercise Activities Include -   Do you need help using the phone?  No   Are you deaf or do you have serious difficulty hearing?  No   Do you need help with transportation? Yes   Do you need help shopping? Yes   Do you need help preparing meals?  Yes    Do you need help with housework?  Yes   Do you need help with laundry? Yes   Do you need help taking your medications? Yes   Do you need help managing money? Yes   Do you ever drive or ride in a car without wearing a seat belt? No   Have you felt unusual stress, anger or loneliness in the last month? No   Who do you live with? Spouse   If you need help, do you have trouble finding someone available to you? No   Have you been bothered in the last four weeks by sexual problems? No   Do you have difficulty concentrating, remembering or making decisions? No         Does the patient have evidence of cognitive impairment? Yes    Asprin use counseling:Does not need ASA (and currently is not on it)    Age-appropriate Screening Schedule:  Refer to the list below for future screening recommendations based on patient's age, sex and/or medical conditions. Orders for these recommended tests are listed in the plan section. The patient has been provided with a written plan.    Health Maintenance   Topic Date Due   • ZOSTER VACCINE (1 of 2) Never done   • LIPID PANEL  06/10/2021   • INFLUENZA VACCINE  08/01/2021   • TDAP/TD VACCINES (2 - Td or Tdap) 02/06/2023          The following portions of the patient's history were reviewed and updated as appropriate: allergies, current medications, past family history, past medical history, past social history, past surgical history and problem list.    Outpatient Medications Prior to Visit   Medication Sig Dispense Refill   • sennosides-docusate (senna-docusate sodium) 8.6-50 MG per tablet Take 1 tablet by mouth Daily. 90 tablet 1   • ondansetron ODT (ZOFRAN-ODT) 4 MG disintegrating tablet Place 1 tablet on the tongue Every 12 (Twelve) Hours As Needed for Nausea or Vomiting. Prn nausea 90 tablet 5   • rosuvastatin (CRESTOR) 10 MG tablet Take 1 tablet by mouth Daily. 90 tablet 3     No facility-administered medications prior to visit.       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   • SIADH (syndrome of inappropriate ADH production) (CMS/HCC)   • Elevated hemoglobin A1c   • Hyperlipidemia   • Protein-calorie malnutrition (CMS/HCC)   • Acute deep vein thrombosis (DVT) of upper extremity (CMS/HCC)   • Adrenal insufficiency ([place]'s disease) (CMS/HCC)   • Hydrocephalus (CMS/HCC)   • Benign prostatic hyperplasia with urinary frequency   • History of gastroesophageal reflux (GERD)   • GERD without esophagitis   • Immunization deficiency   • Medicare annual wellness visit, subsequent   • Chronic idiopathic constipation   • Impaction of intestine (CMS/HCC)   • Depression   • Hyperglycemia   • Chronic fatigue   • Chronic nausea   • Benign prostatic hyperplasia with urinary obstruction       Advanced Care Planning:  ACP discussion was held with the patient during this visit. Patient has an advance directive (not in EMR), copy requested.    Review of Systems   Constitutional: Negative for activity change, appetite change and fatigue.   HENT: Negative for hearing loss and postnasal drip.    Eyes: Negative for discharge and itching.   Respiratory: Negative for cough and shortness of breath.    Cardiovascular: Negative for chest pain and leg swelling.   Gastrointestinal: Negative for abdominal distention and abdominal pain.   Endocrine: Negative for cold intolerance and heat intolerance.   Genitourinary: Negative for difficulty urinating and flank pain.   Musculoskeletal: Negative for arthralgias and myalgias.   Skin: Negative for color change.   Neurological: Negative for dizziness and facial asymmetry.   Hematological: Negative for adenopathy.   Psychiatric/Behavioral: Negative for agitation and confusion.       Compared to one year ago, the patient feels his physical health is the same.  Compared to one year ago, the patient feels his mental  "health is the same.    Reviewed chart for potential of high risk medication in the elderly: yes  Reviewed chart for potential of harmful drug interactions in the elderly:yes    Objective         Vitals:    06/01/21 1410   BP: 92/58   Pulse: 68   Temp: 97.5 °F (36.4 °C)   TempSrc: Infrared   SpO2: 98%   Weight: 60.8 kg (134 lb)   Height: 165.1 cm (65\")       Body mass index is 22.3 kg/m².  Discussed the patient's BMI with him. The BMI is in the acceptable range.    Physical Exam  Vitals reviewed.   Constitutional:       Appearance: He is well-developed. He is not diaphoretic.   HENT:      Head: Normocephalic and atraumatic.   Eyes:      General: No scleral icterus.     Pupils: Pupils are equal, round, and reactive to light.   Neck:      Thyroid: No thyromegaly.   Cardiovascular:      Rate and Rhythm: Normal rate and regular rhythm.      Heart sounds: No murmur heard.   No friction rub. No gallop.    Pulmonary:      Effort: Pulmonary effort is normal. No respiratory distress.      Breath sounds: No wheezing or rales.   Chest:      Chest wall: No tenderness.   Abdominal:      General: Bowel sounds are normal. There is no distension.      Palpations: Abdomen is soft.      Tenderness: There is no abdominal tenderness.   Musculoskeletal:         General: No deformity. Normal range of motion.   Lymphadenopathy:      Cervical: No cervical adenopathy.   Skin:     General: Skin is warm and dry.      Findings: No rash.   Neurological:      Cranial Nerves: No cranial nerve deficit.      Motor: No abnormal muscle tone.               Assessment/Plan   Medicare Risks and Personalized Health Plan  CMS Preventative Services Quick Reference  Inactivity/Sedentary    The above risks/problems have been discussed with the patient.  Pertinent information has been shared with the patient in the After Visit Summary.  Follow up plans and orders are seen below in the Assessment/Plan Section.    Diagnoses and all orders for this visit:    1. " Medicare annual wellness visit, subsequent (Primary)    2. Mixed hyperlipidemia  -     rosuvastatin (CRESTOR) 10 MG tablet; Take 1 tablet by mouth Daily.  Dispense: 90 tablet; Refill: 3  -     Cancel: Lipid Panel With / Chol / HDL Ratio  -     Lipid Panel With / Chol / HDL Ratio  -     Comprehensive Metabolic Panel    3. Chronic nausea  -     ondansetron ODT (ZOFRAN-ODT) 4 MG disintegrating tablet; Place 1 tablet on the tongue Every 12 (Twelve) Hours As Needed for Nausea or Vomiting. Prn nausea  Dispense: 180 tablet; Refill: 5    4. Hyperglycemia  -     Cancel: Comprehensive Metabolic Panel  -     Cancel: Hemoglobin A1c  -     Comprehensive Metabolic Panel  -     Hemoglobin A1c    5. Benign prostatic hyperplasia with urinary obstruction  -     PSA DIAGNOSTIC    6. Encounter for screening for malignant neoplasm of colon    Other orders  -     tamsulosin (FLOMAX) 0.4 MG capsule 24 hr capsule; Take 1 capsule by mouth Daily.  Dispense: 30 capsule; Refill: 11    BPH uncontrolled.  Start flomax.    Chronic nausea due to intracranial hemorrhage.  RF ondansetron.    Hyperlipidmeia.   Continue statin.    Follow Up:  Return in about 6 months (around 12/1/2021).     An After Visit Summary and PPPS were given to the patient.     Preventive Counseling:  Encouraged to get active.   Covid utd.  Pneumovax utd.  Check PSA.  Deferred colorectal screening due to comorbidities.      13071 time was 12 minutes.    Medicare wellness was 10 minutes.

## 2021-06-02 LAB
ALBUMIN SERPL-MCNC: 4.7 G/DL (ref 3.5–5.2)
ALBUMIN/GLOB SERPL: 2.6 G/DL
ALP SERPL-CCNC: 48 U/L (ref 39–117)
ALT SERPL-CCNC: 77 U/L (ref 1–41)
AST SERPL-CCNC: 44 U/L (ref 1–40)
BILIRUB SERPL-MCNC: 0.6 MG/DL (ref 0–1.2)
BUN SERPL-MCNC: 17 MG/DL (ref 8–23)
BUN/CREAT SERPL: 15.5 (ref 7–25)
CALCIUM SERPL-MCNC: 9.7 MG/DL (ref 8.6–10.5)
CHLORIDE SERPL-SCNC: 105 MMOL/L (ref 98–107)
CHOLEST SERPL-MCNC: 126 MG/DL (ref 0–200)
CHOLEST/HDLC SERPL: 2.52 {RATIO}
CO2 SERPL-SCNC: 20.1 MMOL/L (ref 22–29)
CREAT SERPL-MCNC: 1.1 MG/DL (ref 0.76–1.27)
GLOBULIN SER CALC-MCNC: 1.8 GM/DL
GLUCOSE SERPL-MCNC: 141 MG/DL (ref 65–99)
HBA1C MFR BLD: 5.5 % (ref 4.8–5.6)
HDLC SERPL-MCNC: 50 MG/DL (ref 40–60)
LDLC SERPL CALC-MCNC: 63 MG/DL (ref 0–100)
POTASSIUM SERPL-SCNC: 4.4 MMOL/L (ref 3.5–5.2)
PROT SERPL-MCNC: 6.5 G/DL (ref 6–8.5)
PSA SERPL-MCNC: 2.22 NG/ML (ref 0–4)
SODIUM SERPL-SCNC: 140 MMOL/L (ref 136–145)
TRIGL SERPL-MCNC: 57 MG/DL (ref 0–150)
VLDLC SERPL CALC-MCNC: 13 MG/DL (ref 5–40)

## 2021-06-16 RX ORDER — TAMSULOSIN HYDROCHLORIDE 0.4 MG/1
1 CAPSULE ORAL DAILY
Qty: 90 CAPSULE | Refills: 3 | Status: SHIPPED | OUTPATIENT
Start: 2021-06-16 | End: 2022-02-09 | Stop reason: SDUPTHER

## 2021-06-16 NOTE — TELEPHONE ENCOUNTER
Please send 90 day supply instead of 30 day supply to [facility]. Already changed on medication refill request.

## 2021-06-16 NOTE — TELEPHONE ENCOUNTER
Caller: RAMIRO TAYLOR    Relationship: Emergency Contact    Best call back number: [phone]    Medication needed:   Requested Prescriptions     Pending Prescriptions Disp Refills   • tamsulosin (FLOMAX) 0.4 MG capsule 24 hr capsule 30 capsule 11     Sig: Take 1 capsule by mouth Daily.       When do you need the refill by: 6/16/21    What additional details did the patient provide when requesting the medication: CAN HE GET 90 DAY SUPPLY INSTEAD OF 30 MEDICATION IS WORKING FOR HIM     Does the patient have less than a 3 day supply:  [] Yes  [x] No    What is the patient's preferred pharmacy: [address] [phone] [facility] [phone]

## 2021-12-01 ENCOUNTER — OFFICE VISIT (OUTPATIENT)
Dept: FAMILY MEDICINE CLINIC | Facility: CLINIC | Age: 66
End: 2021-12-01

## 2021-12-01 VITALS
DIASTOLIC BLOOD PRESSURE: 58 MMHG | HEIGHT: 65 IN | OXYGEN SATURATION: 98 % | WEIGHT: 138 LBS | TEMPERATURE: 97.8 F | BODY MASS INDEX: 22.99 KG/M2 | SYSTOLIC BLOOD PRESSURE: 98 MMHG | HEART RATE: 70 BPM

## 2021-12-01 DIAGNOSIS — R41.3 MEMORY IMPAIRMENT: ICD-10-CM

## 2021-12-01 DIAGNOSIS — N13.8 BENIGN PROSTATIC HYPERPLASIA WITH URINARY OBSTRUCTION: ICD-10-CM

## 2021-12-01 DIAGNOSIS — N40.1 BENIGN PROSTATIC HYPERPLASIA WITH URINARY OBSTRUCTION: ICD-10-CM

## 2021-12-01 DIAGNOSIS — E78.2 MIXED HYPERLIPIDEMIA: Primary | ICD-10-CM

## 2021-12-01 PROCEDURE — 99214 OFFICE O/P EST MOD 30 MIN: CPT | Performed by: FAMILY MEDICINE

## 2021-12-01 NOTE — PROGRESS NOTES
Chief Complaint   Patient presents with   • Stroke       Subjective   Feliciano Light is a 66 y.o. male.     History of Present Illness   F/U CVA with IVH/cerebellar hemorrhage.   Not moving much per wife.  C/o trouble with memory.    F/U  Hyperlipidmeia.  No myalgias.   LDL 63 6/1/21.  F/U BPH.  Doing wel [facility] meds.    The following portions of the patient's history were reviewed and updated as appropriate: allergies, current medications, past family history, past medical history, past social history, past surgical history and problem list.    Review of Systems   Constitutional: Negative for appetite change and fatigue.   HENT: Negative for nosebleeds and sore throat.    Eyes: Negative for blurred vision and visual disturbance.   Respiratory: Negative for shortness of breath and wheezing.    Cardiovascular: Negative for chest pain and leg swelling.   Gastrointestinal: Negative for abdominal distention and abdominal pain.   Endocrine: Negative for cold intolerance and polyuria.   Genitourinary: Negative for dysuria and hematuria.   Musculoskeletal: Negative for arthralgias and myalgias.   Skin: Negative for color change and rash.   Neurological: Negative for weakness and confusion.   Psychiatric/Behavioral: Negative for agitation and depressed mood.       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   • SIADH (syndrome of inappropriate ADH production) ([facility])   • Elevated hemoglobin A1c   • Hyperlipidemia   • Protein-calorie malnutrition (HCC)   • Acute deep vein thrombosis (DVT) of upper extremity ([facility])   • Adrenal insufficiency ([place]'s disease) ([facility])   • Hydrocephalus ([facility])   • Benign prostatic hyperplasia with urinary frequency   • History of gastroesophageal reflux (GERD)   • GERD without esophagitis   • Immunization deficiency   • Medicare annual wellness visit,  "subsequent   • Chronic idiopathic constipation   • Impaction of intestine (HCC)   • Depression   • Hyperglycemia   • Chronic fatigue   • Chronic nausea   • Benign prostatic hyperplasia with urinary obstruction   • Memory impairment       Allergies   Allergen Reactions   • Amoxicillin Shortness Of Breath, Hives and Swelling   • Latex Hives and Anaphylaxis   • Other Shortness Of Breath     Wife states allergic to all \"cillins\"   • Penicillins Shortness Of Breath         Current Outpatient Medications:   •  ondansetron ODT (ZOFRAN-ODT) 4 MG disintegrating tablet, Place 1 tablet on the tongue Every 12 (Twelve) Hours As Needed for Nausea or Vomiting. Prn nausea, Disp: 180 tablet, Rfl: 5  •  rosuvastatin (CRESTOR) 10 MG tablet, Take 1 tablet by mouth Daily., Disp: 90 tablet, Rfl: 3  •  sennosides-docusate (senna-docusate sodium) 8.6-50 MG per tablet, Take 1 tablet by mouth Daily., Disp: 90 tablet, Rfl: 1  •  tamsulosin (FLOMAX) 0.4 MG capsule 24 hr capsule, Take 1 capsule by mouth Daily., Disp: 90 capsule, Rfl: 3    Past Medical History:   Diagnosis Date   • Acute pancreatitis    • Arm skin lesion, left    • BPH (benign prostatic hyperplasia)    • Cerebellar hemorrhage (HCC) 1/2/2019   • Chronic nausea    • Elevated lipase    • Encounter for preventive health examination    • Enlarged prostate without lower urinary tract symptoms (luts)    • History of gastroesophageal reflux (GERD)    • Hyperlipidemia    • Intraventricular hemorrhage (HCC) 1/2/2019   • Intraventricular hemorrhage (HCC)    • Pancreatitis 12/01/2018   • 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 01/02/2019   • SIADH (syndrome of inappropriate ADH production) ([facility])    • Skin lesion of cheek        Past Surgical History:   Procedure Laterality Date   • CEREBRAL ANEURYSM REPAIR Right     embolization of aneurysm R PICA   • ENDOSCOPY W/ PEG TUBE PLACEMENT N/A 2/1/2019    Normal, an externally removable PEG placement was " successfully completed. No specimens collected.    • TONSILLECTOMY         Family History   Problem Relation Age of Onset   • Arthritis Mother    • Breast cancer Mother    • Hypertension Father    • Stroke Father    • COPD Father    • Stroke Other    • Breast cancer Other    • Hypertension Other    • COPD Other    • Arthritis Other        Social History     Tobacco Use   • Smoking status: Never Smoker   • Smokeless tobacco: Never Used   Substance Use Topics   • Alcohol use: Defer     Comment: drank alcohol in the past            Objective     Vitals:    12/01/21 1411   BP: 98/58   Pulse: 70   Temp: 97.8 °F (36.6 °C)   SpO2: 98%     Body mass index is 22.96 kg/m².    Physical Exam  Vitals reviewed.   Constitutional:       Appearance: He is well-developed. He is not diaphoretic.   HENT:      Head: Normocephalic and atraumatic.   Eyes:      General: No scleral icterus.     Pupils: Pupils are equal, round, and reactive to light.   Neck:      Thyroid: No thyromegaly.   Cardiovascular:      Rate and Rhythm: Normal rate and regular rhythm.      Heart sounds: No murmur heard.  No friction rub. No gallop.    Pulmonary:      Effort: Pulmonary effort is normal. No respiratory distress.      Breath sounds: No wheezing or rales.   Chest:      Chest wall: No tenderness.   Abdominal:      General: Bowel sounds are normal. There is no distension.      Palpations: Abdomen is soft.      Tenderness: There is no abdominal tenderness.   Musculoskeletal:         General: No deformity. Normal range of motion.   Lymphadenopathy:      Cervical: No cervical adenopathy.   Skin:     General: Skin is warm and dry.      Findings: No rash.   Neurological:      Cranial Nerves: No cranial nerve deficit.      Motor: No abnormal muscle tone.         Lab Results   Component Value Date    GLUCOSE 141 (H) 06/01/2021    BUN 17 06/01/2021    CREATININE 1.10 06/01/2021    EGFRIFNONA 67 06/01/2021    EGFRIFAFRI 81 06/01/2021    BCR 15.5 06/01/2021    K 4.4  06/01/2021    CO2 20.1 (L) 06/01/2021    CALCIUM 9.7 06/01/2021    PROTENTOTREF 6.5 06/01/2021    ALBUMIN 4.70 06/01/2021    LABIL2 2.6 06/01/2021    AST 44 (H) 06/01/2021    ALT 77 (H) 06/01/2021       WBC   Date Value Ref Range Status   06/10/2020 4.89 3.40 - 10.80 10*3/mm3 Final   07/08/2019 5.62 4.5 - 11.0 10*3/uL Final     RBC   Date Value Ref Range Status   06/10/2020 4.57 4.14 - 5.80 10*6/mm3 Final   07/08/2019 4.49 (L) 4.5 - 5.9 10*6/uL Final     Hemoglobin   Date Value Ref Range Status   06/10/2020 14.7 13.0 - 17.7 g/dL Final   07/08/2019 14.0 13.5 - 17.5 g/dL Final     Hematocrit   Date Value Ref Range Status   06/10/2020 44.0 37.5 - 51.0 % Final   07/08/2019 42.5 41.0 - 53.0 % Final     MCV   Date Value Ref Range Status   06/10/2020 96.3 79.0 - 97.0 fL Final   07/08/2019 94.7 80.0 - 100.0 fL Final     MCH   Date Value Ref Range Status   06/10/2020 32.2 26.6 - 33.0 pg Final   07/08/2019 31.2 26.0 - 34.0 pg Final     MCHC   Date Value Ref Range Status   06/10/2020 33.4 31.5 - 35.7 g/dL Final   07/08/2019 32.9 31.0 - 37.0 g/dL Final     RDW   Date Value Ref Range Status   06/10/2020 13.1 12.3 - 15.4 % Final   07/08/2019 13.9 12.0 - 16.8 % Final     RDW-SD   Date Value Ref Range Status   03/04/2019 50.0 37.0 - 54.0 fl Final     MPV   Date Value Ref Range Status   07/08/2019 10.9 (H) 6.7 - 10.8 fL Final     Platelets   Date Value Ref Range Status   06/10/2020 149 140 - 450 10*3/mm3 Final   07/08/2019 162 140 - 440 10*3/uL Final     Neutrophil Rel %   Date Value Ref Range Status   06/10/2020 70.4 42.7 - 76.0 % Final   07/08/2019 61.4 45 - 80 % Final     Lymphocyte Rel %   Date Value Ref Range Status   06/10/2020 18.0 (L) 19.6 - 45.3 % Final   07/08/2019 23.3 15 - 50 % Final     Monocyte Rel %   Date Value Ref Range Status   06/10/2020 9.8 5.0 - 12.0 % Final   07/08/2019 10.5 0 - 15 % Final     Eosinophil Rel %   Date Value Ref Range Status   06/10/2020 1.0 0.3 - 6.2 % Final     Eosinophil %   Date Value Ref  Range Status   07/08/2019 4.1 0 - 7 % Final     Basophil Rel %   Date Value Ref Range Status   06/10/2020 0.6 0.0 - 1.5 % Final   07/08/2019 0.5 0 - 2 % Final     Immature Grans %   Date Value Ref Range Status   07/08/2019 0.2 (H) 0 % Final     Neutrophils Absolute   Date Value Ref Range Status   06/10/2020 3.44 1.70 - 7.00 10*3/mm3 Final   07/08/2019 3.45 2.0 - 8.8 10*3/uL Final     Lymphocytes Absolute   Date Value Ref Range Status   06/10/2020 0.88 0.70 - 3.10 10*3/mm3 Final   07/08/2019 1.31 0.7 - 5.5 10*3/uL Final     Monocytes Absolute   Date Value Ref Range Status   06/10/2020 0.48 0.10 - 0.90 10*3/mm3 Final   07/08/2019 0.59 0.0 - 1.7 10*3/uL Final     Eosinophils Absolute   Date Value Ref Range Status   06/10/2020 0.05 0.00 - 0.40 10*3/mm3 Final   07/08/2019 0.23 0.0 - 0.8 10*3/uL Final     Basophils Absolute   Date Value Ref Range Status   06/10/2020 0.03 0.00 - 0.20 10*3/mm3 Final   07/08/2019 0.03 0.0 - 0.2 10*3/uL Final     Immature Grans, Absolute   Date Value Ref Range Status   07/08/2019 0.01 <1 10*3/uL Final     nRBC   Date Value Ref Range Status   06/10/2020 0.2 0.0 - 0.2 /100 WBC Final   07/08/2019 0 0 /100(WBC) Final   03/04/2019 0.0 0.0 - 0.0 /100 WBC Final       Lab Results   Component Value Date    HGBA1C 5.50 06/01/2021       Lab Results   Component Value Date    XZLFPNXQ60 1,361 (H) 06/10/2020       TSH   Date Value Ref Range Status   06/10/2020 1.950 0.270 - 4.200 uIU/mL Final   01/03/2019 2.000 0.270 - 4.200 mIU/mL Final   12/27/2018 1.810 0.470 - 4.680 u(iU)/mL Final     Comment:      Higher dose biotin supplements may interfere with this test.  Interpret results within the context of patient's clinical picture.       Lab Results   Component Value Date    CHOL 213 (H) 01/04/2019     Lab Results   Component Value Date    TRIG 57 06/01/2021     Lab Results   Component Value Date    HDL 50 06/01/2021     Lab Results   Component Value Date    LDL 63 06/01/2021     Lab Results   Component  Value Date    VLDL 13 06/01/2021     Lab Results   Component Value Date    LDLHDL 2.71 01/04/2019         Procedures    Assessment/Plan   Problems Addressed this Visit     Benign prostatic hyperplasia with urinary obstruction    Hyperlipidemia - Primary    Relevant Orders    Comprehensive Metabolic Panel    Memory impairment    Relevant Orders    CBC & Differential    Comprehensive Metabolic Panel    TSH Rfx On Abnormal To Free T4    Vitamin B12      Diagnoses       Codes Comments    Mixed hyperlipidemia    -  Primary ICD-10-CM: E78.2  ICD-9-CM: 272.2     Benign prostatic hyperplasia with urinary obstruction     ICD-10-CM: N40.1, N13.8  ICD-9-CM: 600.01, 599.69     Memory impairment     ICD-10-CM: R41.3  ICD-9-CM: 780.93       Hyperlipidmeia.  LDL at goal. Continue statin.    Memory impairment. Uncontrolled.    Check CBC, CMP, B12, TSH.    BPH.  Stable.   Continue flomax.     Flu/Covid updated.  Encouraged patient to not drive.    Orders Placed This Encounter   Procedures   • Comprehensive Metabolic Panel     Order Specific Question:   Release to patient     Answer:   Immediate   • TSH Rfx On Abnormal To Free T4     Order Specific Question:   Release to patient     Answer:   Immediate   • Vitamin B12     Order Specific Question:   Release to patient     Answer:   Immediate   • CBC & Differential     Order Specific Question:   Manual Differential     Answer:   No       Current Outpatient Medications   Medication Sig Dispense Refill   • ondansetron ODT (ZOFRAN-ODT) 4 MG disintegrating tablet Place 1 tablet on the tongue Every 12 (Twelve) Hours As Needed for Nausea or Vomiting. Prn nausea 180 tablet 5   • rosuvastatin (CRESTOR) 10 MG tablet Take 1 tablet by mouth Daily. 90 tablet 3   • sennosides-docusate (senna-docusate sodium) 8.6-50 MG per tablet Take 1 tablet by mouth Daily. 90 tablet 1   • tamsulosin (FLOMAX) 0.4 MG capsule 24 hr capsule Take 1 capsule by mouth Daily. 90 capsule 3     No current  facility-administered medications for this visit.       Feliciano Light had no medications administered during this visit.    No follow-ups on file.    There are no Patient Instructions on file for this visit.

## 2021-12-02 LAB
ALBUMIN SERPL-MCNC: 4.4 G/DL (ref 3.8–4.8)
ALBUMIN/GLOB SERPL: 2.1 {RATIO} (ref 1.2–2.2)
ALP SERPL-CCNC: 55 IU/L (ref 44–121)
ALT SERPL-CCNC: 79 IU/L (ref 0–44)
AST SERPL-CCNC: 46 IU/L (ref 0–40)
BASOPHILS # BLD AUTO: 0 X10E3/UL (ref 0–0.2)
BASOPHILS NFR BLD AUTO: 1 %
BILIRUB SERPL-MCNC: 0.5 MG/DL (ref 0–1.2)
BUN SERPL-MCNC: 19 MG/DL (ref 8–27)
BUN/CREAT SERPL: 16 (ref 10–24)
CALCIUM SERPL-MCNC: 9.5 MG/DL (ref 8.6–10.2)
CHLORIDE SERPL-SCNC: 105 MMOL/L (ref 96–106)
CO2 SERPL-SCNC: 23 MMOL/L (ref 20–29)
CREAT SERPL-MCNC: 1.17 MG/DL (ref 0.76–1.27)
EOSINOPHIL # BLD AUTO: 0.2 X10E3/UL (ref 0–0.4)
EOSINOPHIL NFR BLD AUTO: 3 %
ERYTHROCYTE [DISTWIDTH] IN BLOOD BY AUTOMATED COUNT: 12.4 % (ref 11.6–15.4)
GLOBULIN SER CALC-MCNC: 2.1 G/DL (ref 1.5–4.5)
GLUCOSE SERPL-MCNC: 107 MG/DL (ref 65–99)
HCT VFR BLD AUTO: 45.2 % (ref 37.5–51)
HGB BLD-MCNC: 15.3 G/DL (ref 13–17.7)
IMM GRANULOCYTES # BLD AUTO: 0 X10E3/UL (ref 0–0.1)
IMM GRANULOCYTES NFR BLD AUTO: 0 %
LYMPHOCYTES # BLD AUTO: 1.3 X10E3/UL (ref 0.7–3.1)
LYMPHOCYTES NFR BLD AUTO: 27 %
MCH RBC QN AUTO: 32.6 PG (ref 26.6–33)
MCHC RBC AUTO-ENTMCNC: 33.8 G/DL (ref 31.5–35.7)
MCV RBC AUTO: 96 FL (ref 79–97)
MONOCYTES # BLD AUTO: 0.5 X10E3/UL (ref 0.1–0.9)
MONOCYTES NFR BLD AUTO: 10 %
NEUTROPHILS # BLD AUTO: 2.9 X10E3/UL (ref 1.4–7)
NEUTROPHILS NFR BLD AUTO: 59 %
PLATELET # BLD AUTO: 165 X10E3/UL (ref 150–450)
POTASSIUM SERPL-SCNC: 4.8 MMOL/L (ref 3.5–5.2)
PROT SERPL-MCNC: 6.5 G/DL (ref 6–8.5)
RBC # BLD AUTO: 4.7 X10E6/UL (ref 4.14–5.8)
SODIUM SERPL-SCNC: 146 MMOL/L (ref 134–144)
TSH SERPL DL<=0.005 MIU/L-ACNC: 2.36 UIU/ML (ref 0.45–4.5)
VIT B12 SERPL-MCNC: 1133 PG/ML (ref 232–1245)
WBC # BLD AUTO: 4.9 X10E3/UL (ref 3.4–10.8)

## 2021-12-29 ENCOUNTER — OFFICE VISIT (OUTPATIENT)
Dept: FAMILY MEDICINE CLINIC | Facility: CLINIC | Age: 66
End: 2021-12-29

## 2021-12-29 VITALS
BODY MASS INDEX: 22.49 KG/M2 | OXYGEN SATURATION: 99 % | TEMPERATURE: 97.3 F | HEIGHT: 65 IN | DIASTOLIC BLOOD PRESSURE: 60 MMHG | WEIGHT: 135 LBS | SYSTOLIC BLOOD PRESSURE: 98 MMHG | HEART RATE: 58 BPM

## 2021-12-29 DIAGNOSIS — I95.1 ORTHOSTATIC HYPOTENSION: Primary | ICD-10-CM

## 2021-12-29 PROCEDURE — 99214 OFFICE O/P EST MOD 30 MIN: CPT | Performed by: FAMILY MEDICINE

## 2021-12-29 RX ORDER — FLUDROCORTISONE ACETATE 0.1 MG/1
0.1 TABLET ORAL DAILY
Qty: 90 TABLET | Refills: 3 | Status: SHIPPED | OUTPATIENT
Start: 2021-12-29 | End: 2022-08-31 | Stop reason: SDUPTHER

## 2021-12-29 NOTE — PROGRESS NOTES
Chief Complaint   Patient presents with   • Fall     Legs gave out and collapsed - last week - no injury       Subjective   Feliciano Light is a 66 y.o. male.     History of Present Illness   C/o trouble with low blood pressure.  Noted it being low at home.  [place] airport where [place] legs got weak and he crumpled ot the floor.  Feels fatigued often.        The following portions of the patient's history were reviewed and updated as appropriate: allergies, current medications, past family history, past medical history, past social history, past surgical history and problem list.    Review of Systems   Constitutional: Negative for appetite change, fatigue, fever and unexpected weight loss.   HENT: Negative for nosebleeds and sore throat.    Eyes: Negative for blurred vision and visual disturbance.   Respiratory: Negative for shortness of breath and wheezing.    Cardiovascular: Negative for chest pain and leg swelling.   Gastrointestinal: Negative for abdominal distention and abdominal pain.   Endocrine: Negative for cold intolerance and polyuria.   Genitourinary: Negative for dysuria, hematuria and urinary incontinence.   Musculoskeletal: Negative for arthralgias, back pain and myalgias.   Skin: Negative for color change and rash.   Neurological: Negative for weakness, numbness and confusion.   Psychiatric/Behavioral: Negative for agitation and depressed mood.       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   • SIADH (syndrome of inappropriate ADH production) ([facility])   • Elevated hemoglobin A1c   • Hyperlipidemia   • Protein-calorie malnutrition ([facility])   • Acute deep vein thrombosis (DVT) of upper extremity ([facility])   • Adrenal insufficiency (Ozzie's disease) ([facility])   • Hydrocephalus ([facility])   • Benign prostatic hyperplasia with urinary frequency   • History of  "gastroesophageal reflux (GERD)   • GERD without esophagitis   • Immunization deficiency   • Medicare annual wellness visit, subsequent   • Chronic idiopathic constipation   • Impaction of intestine (HCC)   • Depression   • Hyperglycemia   • Chronic fatigue   • Chronic nausea   • Benign prostatic hyperplasia with urinary obstruction   • Memory impairment   • Orthostatic hypotension       Allergies   Allergen Reactions   • Amoxicillin Shortness Of Breath, Hives and Swelling   • Latex Hives and Anaphylaxis   • Other Shortness Of Breath     Wife states allergic to all \"cillins\"   • Penicillins Shortness Of Breath         Current Outpatient Medications:   •  ondansetron ODT (ZOFRAN-ODT) 4 MG disintegrating tablet, Place 1 tablet on the tongue Every 12 (Twelve) Hours As Needed for Nausea or Vomiting. Prn nausea, Disp: 180 tablet, Rfl: 5  •  rosuvastatin (CRESTOR) 10 MG tablet, Take 1 tablet by mouth Daily., Disp: 90 tablet, Rfl: 3  •  tamsulosin (FLOMAX) 0.4 MG capsule 24 hr capsule, Take 1 capsule by mouth Daily., Disp: 90 capsule, Rfl: 3  •  fludrocortisone 0.1 MG tablet, Take 1 tablet by mouth Daily., Disp: 90 tablet, Rfl: 3  •  sennosides-docusate (senna-docusate sodium) 8.6-50 MG per tablet, Take 1 tablet by mouth Daily., Disp: 90 tablet, Rfl: 1    Past Medical History:   Diagnosis Date   • Acute pancreatitis    • Arm skin lesion, left    • BPH (benign prostatic hyperplasia)    • Cerebellar hemorrhage (HCC) 1/2/2019   • Chronic nausea    • Elevated lipase    • Encounter for preventive health examination    • Enlarged prostate without lower urinary tract symptoms (luts)    • History of gastroesophageal reflux (GERD)    • Hyperlipidemia    • Intraventricular hemorrhage (HCC) 1/2/2019   • Intraventricular hemorrhage (HCC)    • Pancreatitis 12/01/2018   • 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 01/02/2019   • SIADH (syndrome of inappropriate ADH production) ([facility])    • Skin lesion of " cheek        Past Surgical History:   Procedure Laterality Date   • CEREBRAL ANEURYSM REPAIR Right     embolization of aneurysm R PICA   • ENDOSCOPY W/ PEG TUBE PLACEMENT N/A 2/1/2019    Normal, an externally removable PEG placement was successfully completed. No specimens collected.    • TONSILLECTOMY         Family History   Problem Relation Age of Onset   • Arthritis Mother    • Breast cancer Mother    • Hypertension Father    • Stroke Father    • COPD Father    • Stroke Other    • Breast cancer Other    • Hypertension Other    • COPD Other    • Arthritis Other        Social History     Tobacco Use   • Smoking status: Never Smoker   • Smokeless tobacco: Never Used   Substance Use Topics   • Alcohol use: Defer     Comment: drank alcohol in the past            Objective     Vitals:    12/29/21 1324   BP: 98/60   Pulse: 58   Temp: 97.3 °F (36.3 °C)   SpO2: 99%     Body mass index is 22.47 kg/m².    Physical Exam  Vitals reviewed.   Constitutional:       Appearance: He is well-developed. He is not diaphoretic.   HENT:      Head: Normocephalic and atraumatic.   Eyes:      General: No scleral icterus.     Pupils: Pupils are equal, round, and reactive to light.   Neck:      Thyroid: No thyromegaly.   Cardiovascular:      Rate and Rhythm: Normal rate and regular rhythm.      Heart sounds: No murmur heard.  No friction rub. No gallop.    Pulmonary:      Effort: Pulmonary effort is normal. No respiratory distress.      Breath sounds: No wheezing or rales.   Chest:      Chest wall: No tenderness.   Abdominal:      General: Bowel sounds are normal. There is no distension.      Palpations: Abdomen is soft.      Tenderness: There is no abdominal tenderness.   Musculoskeletal:         General: No deformity. Normal range of motion.   Lymphadenopathy:      Cervical: No cervical adenopathy.   Skin:     General: Skin is warm and dry.      Findings: No rash.   Neurological:      Cranial Nerves: No cranial nerve deficit.      Motor:  No abnormal muscle tone.         Lab Results   Component Value Date    GLUCOSE 107 (H) 12/01/2021    BUN 19 12/01/2021    CREATININE 1.17 12/01/2021    EGFRIFNONA 64 12/01/2021    EGFRIFAFRI 74 12/01/2021    BCR 16 12/01/2021    K 4.8 12/01/2021    CO2 23 12/01/2021    CALCIUM 9.5 12/01/2021    PROTENTOTREF 6.5 12/01/2021    ALBUMIN 4.4 12/01/2021    LABIL2 2.1 12/01/2021    AST 46 (H) 12/01/2021    ALT 79 (H) 12/01/2021       WBC   Date Value Ref Range Status   12/01/2021 4.9 3.4 - 10.8 x10E3/uL Final   07/08/2019 5.62 4.5 - 11.0 10*3/uL Final     RBC   Date Value Ref Range Status   12/01/2021 4.70 4.14 - 5.80 x10E6/uL Final   07/08/2019 4.49 (L) 4.5 - 5.9 10*6/uL Final     Hemoglobin   Date Value Ref Range Status   12/01/2021 15.3 13.0 - 17.7 g/dL Final   07/08/2019 14.0 13.5 - 17.5 g/dL Final     Hematocrit   Date Value Ref Range Status   12/01/2021 45.2 37.5 - 51.0 % Final   07/08/2019 42.5 41.0 - 53.0 % Final     MCV   Date Value Ref Range Status   12/01/2021 96 79 - 97 fL Final   07/08/2019 94.7 80.0 - 100.0 fL Final     MCH   Date Value Ref Range Status   12/01/2021 32.6 26.6 - 33.0 pg Final   07/08/2019 31.2 26.0 - 34.0 pg Final     MCHC   Date Value Ref Range Status   12/01/2021 33.8 31.5 - 35.7 g/dL Final   07/08/2019 32.9 31.0 - 37.0 g/dL Final     RDW   Date Value Ref Range Status   12/01/2021 12.4 11.6 - 15.4 % Final   07/08/2019 13.9 12.0 - 16.8 % Final     RDW-SD   Date Value Ref Range Status   03/04/2019 50.0 37.0 - 54.0 fl Final     MPV   Date Value Ref Range Status   07/08/2019 10.9 (H) 6.7 - 10.8 fL Final     Platelets   Date Value Ref Range Status   12/01/2021 165 150 - 450 x10E3/uL Final   07/08/2019 162 140 - 440 10*3/uL Final     Neutrophil Rel %   Date Value Ref Range Status   12/01/2021 59 Not Estab. % Final   07/08/2019 61.4 45 - 80 % Final     Lymphocyte Rel %   Date Value Ref Range Status   12/01/2021 27 Not Estab. % Final   07/08/2019 23.3 15 - 50 % Final     Monocyte Rel %   Date Value  Ref Range Status   12/01/2021 10 Not Estab. % Final   07/08/2019 10.5 0 - 15 % Final     Eosinophil Rel %   Date Value Ref Range Status   12/01/2021 3 Not Estab. % Final     Eosinophil %   Date Value Ref Range Status   07/08/2019 4.1 0 - 7 % Final     Basophil Rel %   Date Value Ref Range Status   12/01/2021 1 Not Estab. % Final   07/08/2019 0.5 0 - 2 % Final     Immature Grans %   Date Value Ref Range Status   07/08/2019 0.2 (H) 0 % Final     Neutrophils Absolute   Date Value Ref Range Status   12/01/2021 2.9 1.4 - 7.0 x10E3/uL Final   07/08/2019 3.45 2.0 - 8.8 10*3/uL Final     Lymphocytes Absolute   Date Value Ref Range Status   12/01/2021 1.3 0.7 - 3.1 x10E3/uL Final   07/08/2019 1.31 0.7 - 5.5 10*3/uL Final     Monocytes Absolute   Date Value Ref Range Status   12/01/2021 0.5 0.1 - 0.9 x10E3/uL Final   07/08/2019 0.59 0.0 - 1.7 10*3/uL Final     Eosinophils Absolute   Date Value Ref Range Status   12/01/2021 0.2 0.0 - 0.4 x10E3/uL Final   07/08/2019 0.23 0.0 - 0.8 10*3/uL Final     Basophils Absolute   Date Value Ref Range Status   12/01/2021 0.0 0.0 - 0.2 x10E3/uL Final   07/08/2019 0.03 0.0 - 0.2 10*3/uL Final     Immature Grans, Absolute   Date Value Ref Range Status   07/08/2019 0.01 <1 10*3/uL Final     nRBC   Date Value Ref Range Status   06/10/2020 0.2 0.0 - 0.2 /100 WBC Final   07/08/2019 0 0 /100(WBC) Final   03/04/2019 0.0 0.0 - 0.0 /100 WBC Final       Lab Results   Component Value Date    HGBA1C 5.50 06/01/2021       Lab Results   Component Value Date    KXYATVEY17 1,133 12/01/2021       TSH   Date Value Ref Range Status   12/01/2021 2.360 0.450 - 4.500 uIU/mL Final   01/03/2019 2.000 0.270 - 4.200 mIU/mL Final   12/27/2018 1.810 0.470 - 4.680 u(iU)/mL Final     Comment:      Higher dose biotin supplements may interfere with this test.  Interpret results within the context of patient's clinical picture.       Lab Results   Component Value Date    CHOL 213 (H) 01/04/2019     Lab Results   Component  Value Date    TRIG 57 06/01/2021     Lab Results   Component Value Date    HDL 50 06/01/2021     Lab Results   Component Value Date    LDL 63 06/01/2021     Lab Results   Component Value Date    VLDL 13 06/01/2021     Lab Results   Component Value Date    LDLHDL 2.71 01/04/2019         Procedures    Assessment/Plan   Problems Addressed this Visit     Orthostatic hypotension - Primary      Diagnoses       Codes Comments    Orthostatic hypotension    -  Primary ICD-10-CM: I95.1  ICD-9-CM: 458.0         Orhtostatic hyptension.  New problem.   Start fludrocortisone 0.1 mg once a day.    No orders of the defined types were placed in this encounter.      Current Outpatient Medications   Medication Sig Dispense Refill   • ondansetron ODT (ZOFRAN-ODT) 4 MG disintegrating tablet Place 1 tablet on the tongue Every 12 (Twelve) Hours As Needed for Nausea or Vomiting. Prn nausea 180 tablet 5   • rosuvastatin (CRESTOR) 10 MG tablet Take 1 tablet by mouth Daily. 90 tablet 3   • tamsulosin (FLOMAX) 0.4 MG capsule 24 hr capsule Take 1 capsule by mouth Daily. 90 capsule 3   • fludrocortisone 0.1 MG tablet Take 1 tablet by mouth Daily. 90 tablet 3   • sennosides-docusate (senna-docusate sodium) 8.6-50 MG per tablet Take 1 tablet by mouth Daily. 90 tablet 1     No current facility-administered medications for this visit.       Feliciano Light had no medications administered during this visit.    Return in about 6 weeks (around 2/9/2022).    There are no Patient Instructions on file for this visit.

## 2022-02-09 ENCOUNTER — OFFICE VISIT (OUTPATIENT)
Dept: FAMILY MEDICINE CLINIC | Facility: CLINIC | Age: 67
End: 2022-02-09

## 2022-02-09 VITALS
OXYGEN SATURATION: 99 % | DIASTOLIC BLOOD PRESSURE: 66 MMHG | TEMPERATURE: 97.3 F | BODY MASS INDEX: 21.99 KG/M2 | SYSTOLIC BLOOD PRESSURE: 110 MMHG | HEIGHT: 65 IN | WEIGHT: 132 LBS | HEART RATE: 75 BPM

## 2022-02-09 DIAGNOSIS — I95.1 ORTHOSTATIC HYPOTENSION: Primary | ICD-10-CM

## 2022-02-09 DIAGNOSIS — E78.2 MIXED HYPERLIPIDEMIA: ICD-10-CM

## 2022-02-09 DIAGNOSIS — R35.0 BENIGN PROSTATIC HYPERPLASIA WITH URINARY FREQUENCY: ICD-10-CM

## 2022-02-09 DIAGNOSIS — N40.1 BENIGN PROSTATIC HYPERPLASIA WITH URINARY FREQUENCY: ICD-10-CM

## 2022-02-09 PROCEDURE — 99214 OFFICE O/P EST MOD 30 MIN: CPT | Performed by: FAMILY MEDICINE

## 2022-02-09 RX ORDER — TAMSULOSIN HYDROCHLORIDE 0.4 MG/1
1 CAPSULE ORAL DAILY
Qty: 90 CAPSULE | Refills: 3 | Status: SHIPPED | OUTPATIENT
Start: 2022-02-09 | End: 2022-08-31 | Stop reason: SDUPTHER

## 2022-02-09 NOTE — PROGRESS NOTES
Chief Complaint   Patient presents with   • Hyperlipidemia       Subjective   Feliciano Light is a 66 y.o. male.     History of Present Illness   F/U orthostatic hypotension.  Doing wel with fludrocortisone 0.1 a day.    F/U hyperlipidemia.  No myalgias. LDL 63 6/1/21.    F/U BPH.   Doing well with meds.     The following portions of the patient's history were reviewed and updated as appropriate: allergies, current medications, past family history, past medical history, past social history, past surgical history and problem list.    Review of Systems   Constitutional: Negative for appetite change and fatigue.   HENT: Negative for nosebleeds and sore throat.    Eyes: Negative for blurred vision and visual disturbance.   Respiratory: Negative for shortness of breath and wheezing.    Cardiovascular: Negative for chest pain and leg swelling.   Gastrointestinal: Negative for abdominal distention and abdominal pain.   Endocrine: Negative for cold intolerance and polyuria.   Genitourinary: Negative for dysuria and hematuria.   Musculoskeletal: Negative for arthralgias and myalgias.   Skin: Negative for color change and rash.   Neurological: Negative for weakness and confusion.   Psychiatric/Behavioral: Negative for agitation and depressed mood.       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   • SIADH (syndrome of inappropriate ADH production) (HCC)   • Elevated hemoglobin A1c   • Hyperlipidemia   • Protein-calorie malnutrition (HCC)   • Acute deep vein thrombosis (DVT) of upper extremity (HCC)   • Adrenal insufficiency (Canton's disease) (HCC)   • Hydrocephalus (HCC)   • Benign prostatic hyperplasia with urinary frequency   • History of gastroesophageal reflux (GERD)   • GERD without esophagitis   • Immunization deficiency   • Medicare annual wellness visit, subsequent   •  "Chronic idiopathic constipation   • Impaction of intestine (HCC)   • Depression   • Hyperglycemia   • Chronic fatigue   • Chronic nausea   • Benign prostatic hyperplasia with urinary obstruction   • Memory impairment   • Orthostatic hypotension       Allergies   Allergen Reactions   • Amoxicillin Shortness Of Breath, Hives and Swelling   • Latex Hives and Anaphylaxis   • Other Shortness Of Breath     Wife states allergic to all \"cillins\"   • Penicillins Shortness Of Breath         Current Outpatient Medications:   •  fludrocortisone 0.1 MG tablet, Take 1 tablet by mouth Daily., Disp: 90 tablet, Rfl: 3  •  ondansetron ODT (ZOFRAN-ODT) 4 MG disintegrating tablet, Place 1 tablet on the tongue Every 12 (Twelve) Hours As Needed for Nausea or Vomiting. Prn nausea, Disp: 180 tablet, Rfl: 5  •  rosuvastatin (CRESTOR) 10 MG tablet, Take 1 tablet by mouth Daily., Disp: 90 tablet, Rfl: 3  •  sennosides-docusate (senna-docusate sodium) 8.6-50 MG per tablet, Take 1 tablet by mouth Daily., Disp: 90 tablet, Rfl: 1  •  tamsulosin (FLOMAX) 0.4 MG capsule 24 hr capsule, Take 1 capsule by mouth Daily., Disp: 90 capsule, Rfl: 3    Past Medical History:   Diagnosis Date   • Acute pancreatitis    • Allergic     Latex and all cillens   • Arm skin lesion, left    • BPH (benign prostatic hyperplasia)    • Cerebellar hemorrhage (HCC) 1/2/2019   • Chronic nausea    • Elevated lipase    • Encounter for preventive health examination    • Enlarged prostate without lower urinary tract symptoms (luts)    • History of gastroesophageal reflux (GERD)    • Hyperlipidemia    • Intraventricular hemorrhage (HCC) 1/2/2019   • Intraventricular hemorrhage (HCC)    • Pancreatitis 12/01/2018   • 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 01/02/2019   • SIADH (syndrome of inappropriate ADH production) ([facility])    • Skin lesion of cheek        Past Surgical History:   Procedure Laterality Date   • CEREBRAL ANEURYSM REPAIR Right  "    embolization of aneurysm R PICA   • ENDOSCOPY W/ PEG TUBE PLACEMENT N/A 2/1/2019    Normal, an externally removable PEG placement was successfully completed. No specimens collected.    • TONSILLECTOMY         Family History   Problem Relation Age of Onset   • Arthritis Mother    • Breast cancer Mother    • Hypertension Father    • Stroke Father    • COPD Father    • Stroke Other    • Breast cancer Other    • Hypertension Other    • COPD Other    • Arthritis Other        Social History     Tobacco Use   • Smoking status: Never Smoker   • Smokeless tobacco: Never Used   Substance Use Topics   • Alcohol use: Defer     Comment: drank alcohol in the past            Objective     Vitals:    02/09/22 1314   BP: 110/66   Pulse: 75   Temp: 97.3 °F (36.3 °C)   SpO2: 99%     Body mass index is 21.97 kg/m².    Physical Exam  Vitals reviewed.   Constitutional:       Appearance: He is well-developed. He is not diaphoretic.   HENT:      Head: Normocephalic and atraumatic.   Eyes:      General: No scleral icterus.     Pupils: Pupils are equal, round, and reactive to light.   Neck:      Thyroid: No thyromegaly.   Cardiovascular:      Rate and Rhythm: Normal rate and regular rhythm.      Heart sounds: No murmur heard.  No friction rub. No gallop.    Pulmonary:      Effort: Pulmonary effort is normal. No respiratory distress.      Breath sounds: No wheezing or rales.   Chest:      Chest wall: No tenderness.   Abdominal:      General: Bowel sounds are normal. There is no distension.      Palpations: Abdomen is soft.      Tenderness: There is no abdominal tenderness.   Musculoskeletal:         General: No deformity. Normal range of motion.   Lymphadenopathy:      Cervical: No cervical adenopathy.   Skin:     General: Skin is warm and dry.      Findings: No rash.   Neurological:      Cranial Nerves: No cranial nerve deficit.      Motor: No abnormal muscle tone.         Lab Results   Component Value Date    GLUCOSE 107 (H) 12/01/2021     BUN 19 12/01/2021    CREATININE 1.17 12/01/2021    EGFRIFNONA 64 12/01/2021    EGFRIFAFRI 74 12/01/2021    BCR 16 12/01/2021    K 4.8 12/01/2021    CO2 23 12/01/2021    CALCIUM 9.5 12/01/2021    PROTENTOTREF 6.5 12/01/2021    ALBUMIN 4.4 12/01/2021    LABIL2 2.1 12/01/2021    AST 46 (H) 12/01/2021    ALT 79 (H) 12/01/2021       WBC   Date Value Ref Range Status   12/01/2021 4.9 3.4 - 10.8 x10E3/uL Final   07/08/2019 5.62 4.5 - 11.0 10*3/uL Final     RBC   Date Value Ref Range Status   12/01/2021 4.70 4.14 - 5.80 x10E6/uL Final   07/08/2019 4.49 (L) 4.5 - 5.9 10*6/uL Final     Hemoglobin   Date Value Ref Range Status   12/01/2021 15.3 13.0 - 17.7 g/dL Final   07/08/2019 14.0 13.5 - 17.5 g/dL Final     Hematocrit   Date Value Ref Range Status   12/01/2021 45.2 37.5 - 51.0 % Final   07/08/2019 42.5 41.0 - 53.0 % Final     MCV   Date Value Ref Range Status   12/01/2021 96 79 - 97 fL Final   07/08/2019 94.7 80.0 - 100.0 fL Final     MCH   Date Value Ref Range Status   12/01/2021 32.6 26.6 - 33.0 pg Final   07/08/2019 31.2 26.0 - 34.0 pg Final     MCHC   Date Value Ref Range Status   12/01/2021 33.8 31.5 - 35.7 g/dL Final   07/08/2019 32.9 31.0 - 37.0 g/dL Final     RDW   Date Value Ref Range Status   12/01/2021 12.4 11.6 - 15.4 % Final   07/08/2019 13.9 12.0 - 16.8 % Final     RDW-SD   Date Value Ref Range Status   03/04/2019 50.0 37.0 - 54.0 fl Final     MPV   Date Value Ref Range Status   07/08/2019 10.9 (H) 6.7 - 10.8 fL Final     Platelets   Date Value Ref Range Status   12/01/2021 165 150 - 450 x10E3/uL Final   07/08/2019 162 140 - 440 10*3/uL Final     Neutrophil Rel %   Date Value Ref Range Status   12/01/2021 59 Not Estab. % Final   07/08/2019 61.4 45 - 80 % Final     Lymphocyte Rel %   Date Value Ref Range Status   12/01/2021 27 Not Estab. % Final   07/08/2019 23.3 15 - 50 % Final     Monocyte Rel %   Date Value Ref Range Status   12/01/2021 10 Not Estab. % Final   07/08/2019 10.5 0 - 15 % Final      Eosinophil Rel %   Date Value Ref Range Status   12/01/2021 3 Not Estab. % Final     Eosinophil %   Date Value Ref Range Status   07/08/2019 4.1 0 - 7 % Final     Basophil Rel %   Date Value Ref Range Status   12/01/2021 1 Not Estab. % Final   07/08/2019 0.5 0 - 2 % Final     Immature Grans %   Date Value Ref Range Status   07/08/2019 0.2 (H) 0 % Final     Neutrophils Absolute   Date Value Ref Range Status   12/01/2021 2.9 1.4 - 7.0 x10E3/uL Final   07/08/2019 3.45 2.0 - 8.8 10*3/uL Final     Lymphocytes Absolute   Date Value Ref Range Status   12/01/2021 1.3 0.7 - 3.1 x10E3/uL Final   07/08/2019 1.31 0.7 - 5.5 10*3/uL Final     Monocytes Absolute   Date Value Ref Range Status   12/01/2021 0.5 0.1 - 0.9 x10E3/uL Final   07/08/2019 0.59 0.0 - 1.7 10*3/uL Final     Eosinophils Absolute   Date Value Ref Range Status   12/01/2021 0.2 0.0 - 0.4 x10E3/uL Final   07/08/2019 0.23 0.0 - 0.8 10*3/uL Final     Basophils Absolute   Date Value Ref Range Status   12/01/2021 0.0 0.0 - 0.2 x10E3/uL Final   07/08/2019 0.03 0.0 - 0.2 10*3/uL Final     Immature Grans, Absolute   Date Value Ref Range Status   07/08/2019 0.01 <1 10*3/uL Final     nRBC   Date Value Ref Range Status   06/10/2020 0.2 0.0 - 0.2 /100 WBC Final   07/08/2019 0 0 /100(WBC) Final   03/04/2019 0.0 0.0 - 0.0 /100 WBC Final       Lab Results   Component Value Date    HGBA1C 5.50 06/01/2021       Lab Results   Component Value Date    UCMRSDCX49 1,133 12/01/2021       TSH   Date Value Ref Range Status   12/01/2021 2.360 0.450 - 4.500 uIU/mL Final   01/03/2019 2.000 0.270 - 4.200 mIU/mL Final   12/27/2018 1.810 0.470 - 4.680 u(iU)/mL Final     Comment:      Higher dose biotin supplements may interfere with this test.  Interpret results within the context of patient's clinical picture.       Lab Results   Component Value Date    CHOL 213 (H) 01/04/2019     Lab Results   Component Value Date    TRIG 57 06/01/2021     Lab Results   Component Value Date    HDL 50  06/01/2021     Lab Results   Component Value Date    LDL 63 06/01/2021     Lab Results   Component Value Date    VLDL 13 06/01/2021     Lab Results   Component Value Date    LDLHDL 2.71 01/04/2019         Procedures    Assessment/Plan   Problems Addressed this Visit     Benign prostatic hyperplasia with urinary frequency    Hyperlipidemia    Relevant Orders    Comprehensive Metabolic Panel    Orthostatic hypotension - Primary    Relevant Orders    Comprehensive Metabolic Panel      Diagnoses       Codes Comments    Orthostatic hypotension    -  Primary ICD-10-CM: I95.1  ICD-9-CM: 458.0     Mixed hyperlipidemia     ICD-10-CM: E78.2  ICD-9-CM: 272.2     Benign prostatic hyperplasia with urinary frequency     ICD-10-CM: N40.1, R35.0  ICD-9-CM: 600.01, 788.41       Hyperlipidmeia.  LDL at goal from 6/1/21.  Check FLP.    Orthostatic hypotension.   Controlled. Continue fludrocortisone.     BPH.  Controlle.d  Continue flomax.   RF today.    Orders Placed This Encounter   Procedures   • Comprehensive Metabolic Panel     Order Specific Question:   Release to patient     Answer:   Immediate       Current Outpatient Medications   Medication Sig Dispense Refill   • fludrocortisone 0.1 MG tablet Take 1 tablet by mouth Daily. 90 tablet 3   • ondansetron ODT (ZOFRAN-ODT) 4 MG disintegrating tablet Place 1 tablet on the tongue Every 12 (Twelve) Hours As Needed for Nausea or Vomiting. Prn nausea 180 tablet 5   • rosuvastatin (CRESTOR) 10 MG tablet Take 1 tablet by mouth Daily. 90 tablet 3   • sennosides-docusate (senna-docusate sodium) 8.6-50 MG per tablet Take 1 tablet by mouth Daily. 90 tablet 1   • tamsulosin (FLOMAX) 0.4 MG capsule 24 hr capsule Take 1 capsule by mouth Daily. 90 capsule 3     No current facility-administered medications for this visit.       Feliciano Light had no medications administered during this visit.    Return in about 4 months (around 6/9/2022).    There are no Patient Instructions on file for this  visit.

## 2022-02-10 LAB
ALBUMIN SERPL-MCNC: 4.5 G/DL (ref 3.8–4.8)
ALBUMIN/GLOB SERPL: 2 {RATIO} (ref 1.2–2.2)
ALP SERPL-CCNC: 56 IU/L (ref 44–121)
ALT SERPL-CCNC: 75 IU/L (ref 0–44)
AST SERPL-CCNC: 48 IU/L (ref 0–40)
BILIRUB SERPL-MCNC: 0.5 MG/DL (ref 0–1.2)
BUN SERPL-MCNC: 16 MG/DL (ref 8–27)
BUN/CREAT SERPL: 15 (ref 10–24)
CALCIUM SERPL-MCNC: 9.5 MG/DL (ref 8.6–10.2)
CHLORIDE SERPL-SCNC: 105 MMOL/L (ref 96–106)
CO2 SERPL-SCNC: 21 MMOL/L (ref 20–29)
CREAT SERPL-MCNC: 1.06 MG/DL (ref 0.76–1.27)
GLOBULIN SER CALC-MCNC: 2.2 G/DL (ref 1.5–4.5)
GLUCOSE SERPL-MCNC: 111 MG/DL (ref 65–99)
POTASSIUM SERPL-SCNC: 4.6 MMOL/L (ref 3.5–5.2)
PROT SERPL-MCNC: 6.7 G/DL (ref 6–8.5)
SODIUM SERPL-SCNC: 145 MMOL/L (ref 134–144)

## 2022-02-22 RX ORDER — TAMSULOSIN HYDROCHLORIDE 0.4 MG/1
CAPSULE ORAL
Qty: 90 CAPSULE | Refills: 0 | OUTPATIENT
Start: 2022-02-22

## 2022-03-18 NOTE — PROGRESS NOTES
----- Message from Efe Sellers, DO sent at 3/18/2022  9:19 AM CDT -----  His white blood count is elevated and his platelet count is slightly low.  I would like the test repeated.  Please order a CBC with diff, esr, crp, zeinab + reflex, TSH + reflex.  Please let him know that I do not know the cause at this point, but we will find out.  Repeating the test might simply show that the results return to normal.     Inpatient Rehabilitation Plan of Care Note    Plan of Care  Care Plan Reviewed - No updates at this time.    Psychosocial    Performed Intervention(s)  Verbalizes needs & concerns &  prn      Safety    Performed Intervention(s)  Items in reach, Falls precautions/ protocol  Safety rounds & Bed/chair alarms      Sphincter Control    Performed Intervention(s)  Monitor intake, output, & bowel movements  Use incontinence products/ equipment, & perineal care  Encourage appropriate diet & fluid intake      Nutrition    Performed Intervention(s)  Monitor oral intake with each meal.  Administer tube feed per order    Signed by: Alisa Adler RN

## 2022-04-01 DIAGNOSIS — E78.2 MIXED HYPERLIPIDEMIA: ICD-10-CM

## 2022-04-04 RX ORDER — ROSUVASTATIN CALCIUM 10 MG/1
TABLET, COATED ORAL
Qty: 90 TABLET | Refills: 0 | Status: SHIPPED | OUTPATIENT
Start: 2022-04-04 | End: 2022-06-13

## 2022-04-04 NOTE — TELEPHONE ENCOUNTER
Rx Refill Note  Requested Prescriptions     Pending Prescriptions Disp Refills   • rosuvastatin (CRESTOR) 10 MG tablet [Pharmacy Med Name: Rosuvastatin Calcium 10 MG Oral Tablet] 90 tablet 0     Sig: Take 1 tablet by mouth once daily      Last office visit with prescribing clinician: 2/9/2022      Next office visit with prescribing clinician: due 6/2022          Last filled 6/1/2021           Nano Munoz MA  04/04/22, 08:07 EDT

## 2022-04-24 NOTE — PROGRESS NOTES
Inpatient Rehabilitation Plan of Care Note    Plan of Care  Care Plan Reviewed - Updates as Follows    Nutrition    [RN] Nutrition Management(Active)  Current Status(01/28/2019): Patient removed Cortrak. 2/1/19 Pt received peg  tube.  Continuous tube feed at 25cc/hr.  Weekly Goal(02/04/2019): Patient's nausea will be decreased and oral intake  increased. Tolerte tube feed.  Discharge Goal: Patient's nausea will be decreased and oral intake increased.    Performed Intervention(s)  Administer tube feed per order      Safety    Performed Intervention(s)  Items in reach, Falls precautions/ protocol  Safety rounds & Bed/chair alarms      Sphincter Control    Performed Intervention(s)  Use incontinence products/ equipment, & perineal care  Encourage appropriate diet & fluid intake    Signed by: Leonardo Martinez RN     · 

## 2022-06-12 DIAGNOSIS — E78.2 MIXED HYPERLIPIDEMIA: ICD-10-CM

## 2022-06-13 RX ORDER — ROSUVASTATIN CALCIUM 10 MG/1
TABLET, COATED ORAL
Qty: 90 TABLET | Refills: 3 | Status: SHIPPED | OUTPATIENT
Start: 2022-06-13 | End: 2022-07-10 | Stop reason: SDUPTHER

## 2022-06-14 ENCOUNTER — TELEPHONE (OUTPATIENT)
Dept: FAMILY MEDICINE CLINIC | Facility: CLINIC | Age: 67
End: 2022-06-14

## 2022-06-14 DIAGNOSIS — Z12.11 COLON CANCER SCREENING: Primary | ICD-10-CM

## 2022-06-14 NOTE — TELEPHONE ENCOUNTER
PATIENTS WIFE IS CALLING IN PATIENT IS WANTING TO KNOW IF HE CAN DO COLOGARD TEST INSTEAD OF COLONOSCOPY.      PLEASE ADVISE    CALLBACK NUMBER IS  [phone]

## 2022-06-21 DIAGNOSIS — R11.0 CHRONIC NAUSEA: ICD-10-CM

## 2022-06-21 RX ORDER — ONDANSETRON 4 MG/1
4 TABLET, ORALLY DISINTEGRATING ORAL EVERY 12 HOURS PRN
Qty: 180 TABLET | Refills: 5 | Status: SHIPPED | OUTPATIENT
Start: 2022-06-21

## 2022-06-21 NOTE — TELEPHONE ENCOUNTER
Rx Refill Note  Requested Prescriptions     Pending Prescriptions Disp Refills   • ondansetron ODT (ZOFRAN-ODT) 4 MG disintegrating tablet 180 tablet 5     Sig: Place 1 tablet on the tongue Every 12 (Twelve) Hours As Needed for Nausea or Vomiting. Prn nausea      Last office visit with prescribing clinician: 2/9/2022      Next office visit with prescribing clinician: 6/27/2022           Last filled 6/1/2021           Nano Munoz MA  06/21/22, 09:42 EDT

## 2022-06-27 ENCOUNTER — PRE-PROCEDURE SCREENING (OUTPATIENT)
Dept: GASTROENTEROLOGY | Facility: CLINIC | Age: 67
End: 2022-06-27

## 2022-06-27 ENCOUNTER — OFFICE VISIT (OUTPATIENT)
Dept: FAMILY MEDICINE CLINIC | Facility: CLINIC | Age: 67
End: 2022-06-27

## 2022-06-27 VITALS
DIASTOLIC BLOOD PRESSURE: 64 MMHG | HEIGHT: 65 IN | HEART RATE: 84 BPM | RESPIRATION RATE: 16 BRPM | OXYGEN SATURATION: 97 % | BODY MASS INDEX: 22.06 KG/M2 | TEMPERATURE: 98.4 F | WEIGHT: 132.4 LBS | SYSTOLIC BLOOD PRESSURE: 104 MMHG

## 2022-06-27 DIAGNOSIS — Z00.00 MEDICARE ANNUAL WELLNESS VISIT, SUBSEQUENT: Primary | ICD-10-CM

## 2022-06-27 DIAGNOSIS — I95.1 ORTHOSTATIC HYPOTENSION: ICD-10-CM

## 2022-06-27 DIAGNOSIS — F32.1 CURRENT MODERATE EPISODE OF MAJOR DEPRESSIVE DISORDER, UNSPECIFIED WHETHER RECURRENT: ICD-10-CM

## 2022-06-27 PROCEDURE — G0439 PPPS, SUBSEQ VISIT: HCPCS | Performed by: FAMILY MEDICINE

## 2022-06-27 PROCEDURE — 99214 OFFICE O/P EST MOD 30 MIN: CPT | Performed by: FAMILY MEDICINE

## 2022-06-27 PROCEDURE — 1170F FXNL STATUS ASSESSED: CPT | Performed by: FAMILY MEDICINE

## 2022-06-27 PROCEDURE — 1159F MED LIST DOCD IN RCRD: CPT | Performed by: FAMILY MEDICINE

## 2022-06-27 RX ORDER — VENLAFAXINE HYDROCHLORIDE 37.5 MG/1
37.5 CAPSULE, EXTENDED RELEASE ORAL DAILY
Qty: 30 CAPSULE | Refills: 5 | Status: SHIPPED | OUTPATIENT
Start: 2022-06-27

## 2022-06-27 NOTE — PROGRESS NOTES
The ABCs of the Annual Wellness Visit  Subsequent Medicare Wellness Visit    Chief Complaint   Patient presents with   • Hyperlipidemia      Subjective    History of Present Illness:  Feliciano Light is a 67 y.o. male who presents for a Subsequent Medicare Wellness Visit.  C/o trouble with lack of motivation . Does not ;not auguste things at all at home.  Going on years.    F/u orthostastic hypotension.  On meds regulalry.    The following portions of the patient's history were reviewed and   updated as appropriate: allergies, current medications, past family history, past medical history, past social history, past surgical history and problem list.    Compared to one year ago, the patient feels his physical   health is the same.    Compared to one year ago, the patient feels his mental   health is the same.    Recent Hospitalizations:  He was not admitted to the hospital during the last year.       Current Medical Providers:  Patient Care Team:  Carlos Samuels MD as PCP - General (Family Medicine)    Outpatient Medications Prior to Visit   Medication Sig Dispense Refill   • fludrocortisone 0.1 MG tablet Take 1 tablet by mouth Daily. 90 tablet 3   • ondansetron ODT (ZOFRAN-ODT) 4 MG disintegrating tablet Place 1 tablet on the tongue Every 12 (Twelve) Hours As Needed for Nausea or Vomiting. Prn nausea 180 tablet 5   • rosuvastatin (CRESTOR) 10 MG tablet Take 1 tablet by mouth once daily 90 tablet 3   • tamsulosin (FLOMAX) 0.4 MG capsule 24 hr capsule Take 1 capsule by mouth Daily. 90 capsule 3     No facility-administered medications prior to visit.       No opioid medication identified on active medication list. I have reviewed chart for other potential  high risk medication/s and harmful drug interactions in the elderly.          Aspirin is not on active medication list.  Aspirin use is not indicated based on review of current medical condition/s. Risk of harm outweighs potential benefits.  .    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   • SIADH (syndrome of inappropriate ADH production) (HCC)   • Elevated hemoglobin A1c   • Hyperlipidemia   • Protein-calorie malnutrition (HCC)   • Acute deep vein thrombosis (DVT) of upper extremity (HCC)   • Adrenal insufficiency ([place] disease) (HCC)   • Hydrocephalus (HCC)   • Benign prostatic hyperplasia with urinary frequency   • History of gastroesophageal reflux (GERD)   • GERD without esophagitis   • Immunization deficiency   • Medicare annual wellness visit, subsequent   • Chronic idiopathic constipation   • Impaction of intestine (HCC)   • Depression   • Hyperglycemia   • Chronic fatigue   • Chronic nausea   • Benign prostatic hyperplasia with urinary obstruction   • Memory impairment   • Orthostatic hypotension     Advance Care Planning  Advance Directive is not on file.  ACP discussion was held with the patient during this visit. Patient has an advance directive (not in EMR), copy requested.          Objective    Vitals:    06/27/22 1302   BP: 104/64   BP Location: Right arm   Patient Position: Sitting   Cuff Size: Adult   Pulse: 84   Resp: 16   Temp: 98.4 °F (36.9 °C)   TempSrc: Temporal   SpO2: 97%   Weight: 60.1 kg (132 lb 6.4 oz)   Height: 165.1 cm (65\")     Estimated body mass index is 22.03 kg/m² as calculated from the following:    Height as of this encounter: 165.1 cm (65\").    Weight as of this encounter: 60.1 kg (132 lb 6.4 oz).    BMI is within normal parameters. No other follow-up for BMI required.      Does the patient have evidence of cognitive impairment? Yes    Physical Exam            HEALTH RISK ASSESSMENT    Smoking Status:  Social History     Tobacco Use   Smoking Status Never Smoker   Smokeless Tobacco Never Used     Alcohol Consumption:  Social History     Substance and Sexual Activity   Alcohol Use " Defer    Comment: drank alcohol in the past     Fall Risk Screen:    MICHAEL Fall Risk Assessment has not been completed.    Depression Screening:  PHQ-2/PHQ-9 Depression Screening 11/24/2020   Retired Total Score 0       Health Habits and Functional and Cognitive Screening:  Functional & Cognitive Status 6/27/2022   Do you have difficulty preparing food and eating? No   Do you have difficulty bathing yourself, getting dressed or grooming yourself? No   Do you have difficulty using the toilet? No   Do you have difficulty moving around from place to place? No   Do you have trouble with steps or getting out of a bed or a chair? No   Current Diet Well Balanced Diet   Dental Exam Up to date   Eye Exam Up to date   Exercise (times per week) 0 times per week   Current Exercises Include No Regular Exercise   Current Exercise Activities Include -   Do you need help using the phone?  No   Are you deaf or do you have serious difficulty hearing?  No   Do you need help with transportation? No   Do you need help shopping? No   Do you need help preparing meals?  No   Do you need help with housework?  Yes   Do you need help with laundry? Yes   Do you need help taking your medications? Yes   Do you need help managing money? Yes   Do you ever drive or ride in a car without wearing a seat belt? -   Have you felt unusual stress, anger or loneliness in the last month? No   Who do you live with? Spouse   If you need help, do you have trouble finding someone available to you? No   Have you been bothered in the last four weeks by sexual problems? No   Do you have difficulty concentrating, remembering or making decisions? No       Age-appropriate Screening Schedule:  Refer to the list below for future screening recommendations based on patient's age, sex and/or medical conditions. Orders for these recommended tests are listed in the plan section. The patient has been provided with a written plan.    Health Maintenance   Topic Date Due   •  ZOSTER VACCINE (1 of 2) Never done   • LIPID PANEL  06/01/2022   • INFLUENZA VACCINE  10/01/2022   • TDAP/TD VACCINES (2 - Td or Tdap) 02/06/2023              Assessment & Plan   CMS Preventative Services Quick Reference  Risk Factors Identified During Encounter  Inactivity/Sedentary  The above risks/problems have been discussed with the patient.  Follow up actions/plans if indicated are seen below in the Assessment/Plan Section.  Pertinent information has been shared with the patient in the After Visit Summary.    Diagnoses and all orders for this visit:    1. Medicare annual wellness visit, subsequent (Primary)    2. Orthostatic hypotension    3. Current moderate episode of major depressive disorder, unspecified whether recurrent (HCC)  -     venlafaxine XR (Effexor XR) 37.5 MG 24 hr capsule; Take 1 capsule by mouth Daily.  Dispense: 30 capsule; Refill: 5    Hypotension, orthostasis. Controlled.  Doing well with meds.  Continue fludrocortisone.    Depression.  Uncontrolled.  Star venlafaxine ER 37.5 once a day.  Start one task a day to help with motivation.       Preventive Counseling:  Encouraged to stay active.  Covid vaccine UTD.  Pneumovax UTD.  Cologuard utd.    Dentist UTD.  Optho UTD.        Follow Up:   Return in about 2 months (around 8/27/2022).     An After Visit Summary and PPPS were made available to the patient.

## 2022-07-10 DIAGNOSIS — E78.2 MIXED HYPERLIPIDEMIA: ICD-10-CM

## 2022-07-10 RX ORDER — ROSUVASTATIN CALCIUM 10 MG/1
10 TABLET, COATED ORAL DAILY
Qty: 90 TABLET | Refills: 3 | Status: SHIPPED | OUTPATIENT
Start: 2022-07-10

## 2022-08-31 ENCOUNTER — OFFICE VISIT (OUTPATIENT)
Dept: FAMILY MEDICINE CLINIC | Facility: CLINIC | Age: 67
End: 2022-08-31

## 2022-08-31 VITALS
OXYGEN SATURATION: 99 % | SYSTOLIC BLOOD PRESSURE: 118 MMHG | BODY MASS INDEX: 21.38 KG/M2 | HEIGHT: 65 IN | WEIGHT: 128.3 LBS | HEART RATE: 60 BPM | TEMPERATURE: 97.3 F | DIASTOLIC BLOOD PRESSURE: 80 MMHG

## 2022-08-31 DIAGNOSIS — I95.1 ORTHOSTATIC HYPOTENSION: ICD-10-CM

## 2022-08-31 DIAGNOSIS — F32.1 CURRENT MODERATE EPISODE OF MAJOR DEPRESSIVE DISORDER, UNSPECIFIED WHETHER RECURRENT: Primary | ICD-10-CM

## 2022-08-31 PROCEDURE — 99214 OFFICE O/P EST MOD 30 MIN: CPT | Performed by: FAMILY MEDICINE

## 2022-08-31 RX ORDER — TAMSULOSIN HYDROCHLORIDE 0.4 MG/1
1 CAPSULE ORAL DAILY
Qty: 90 CAPSULE | Refills: 3 | Status: SHIPPED | OUTPATIENT
Start: 2022-08-31

## 2022-08-31 RX ORDER — FLUDROCORTISONE ACETATE 0.1 MG/1
0.1 TABLET ORAL DAILY
Qty: 90 TABLET | Refills: 3 | Status: SHIPPED | OUTPATIENT
Start: 2022-08-31

## 2022-08-31 NOTE — PROGRESS NOTES
Chief Complaint   Patient presents with   • Hypotension       Subjective   Feliciano Light is a 67 y.o. male.     History of Present Illness   F/U depression.  Doing well with effexor ER 37.5 a day.    F/u Orthostatic hypotension.  Doing well with fludrocortisone daily.    The following portions of the patient's history were reviewed and updated as appropriate: allergies, current medications, past family history, past medical history, past social history, past surgical history and problem list.    Review of Systems   Constitutional: Negative for appetite change and fatigue.   HENT: Negative for nosebleeds and sore throat.    Eyes: Negative for blurred vision and visual disturbance.   Respiratory: Negative for shortness of breath and wheezing.    Cardiovascular: Negative for chest pain and leg swelling.   Gastrointestinal: Negative for abdominal distention and abdominal pain.   Endocrine: Negative for cold intolerance and polyuria.   Genitourinary: Negative for dysuria and hematuria.   Musculoskeletal: Negative for arthralgias and myalgias.   Skin: Negative for color change and rash.   Neurological: Negative for weakness and confusion.   Psychiatric/Behavioral: Negative for agitation and depressed mood.       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   • SIADH (syndrome of inappropriate ADH production) ([facility])   • Elevated hemoglobin A1c   • Hyperlipidemia   • Protein-calorie malnutrition (HCC)   • Acute deep vein thrombosis (DVT) of upper extremity (HCC)   • Adrenal insufficiency ([place]'s disease) (HCC)   • Hydrocephalus (HCC)   • Benign prostatic hyperplasia with urinary frequency   • History of gastroesophageal reflux (GERD)   • GERD without esophagitis   • Immunization deficiency   • Medicare annual wellness visit, subsequent   • Chronic idiopathic constipation   •  "Impaction of intestine ([facility])   • Depression   • Hyperglycemia   • Chronic fatigue   • Chronic nausea   • Benign prostatic hyperplasia with urinary obstruction   • Memory impairment   • Orthostatic hypotension       Allergies   Allergen Reactions   • Amoxicillin Shortness Of Breath, Hives and Swelling   • Latex Hives and Anaphylaxis   • Other Shortness Of Breath     Wife states allergic to all \"cillins\"   • Penicillins Shortness Of Breath         Current Outpatient Medications:   •  fludrocortisone 0.1 MG tablet, Take 1 tablet by mouth Daily., Disp: 90 tablet, Rfl: 3  •  ondansetron ODT (ZOFRAN-ODT) 4 MG disintegrating tablet, Place 1 tablet on the tongue Every 12 (Twelve) Hours As Needed for Nausea or Vomiting. Prn nausea, Disp: 180 tablet, Rfl: 5  •  rosuvastatin (CRESTOR) 10 MG tablet, Take 1 tablet by mouth Daily., Disp: 90 tablet, Rfl: 3  •  tamsulosin (FLOMAX) 0.4 MG capsule 24 hr capsule, Take 1 capsule by mouth Daily., Disp: 90 capsule, Rfl: 3  •  venlafaxine XR (Effexor XR) 37.5 MG 24 hr capsule, Take 1 capsule by mouth Daily., Disp: 30 capsule, Rfl: 5    Past Medical History:   Diagnosis Date   • Acute pancreatitis    • Allergic     Latex and all cillens   • Arm skin lesion, left    • BPH (benign prostatic hyperplasia)    • Cerebellar hemorrhage ([facility]) 1/2/2019   • Chronic nausea    • Elevated lipase    • Encounter for preventive health examination    • Enlarged prostate without lower urinary tract symptoms (luts)    • History of gastroesophageal reflux (GERD)    • Hyperlipidemia    • Intraventricular hemorrhage (HCC) 1/2/2019   • Intraventricular hemorrhage ([facility])    • Pancreatitis 12/01/2018   • 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 No specimens collected.    • TONSILLECTOMY         Family History   Problem Relation Age of Onset   • Arthritis Mother    • Breast cancer Mother    • Hypertension Father    • Stroke Father    • COPD Father    • Stroke Other    • Breast cancer Other    • Hypertension Other    • COPD Other    • Arthritis Other        Social History     Tobacco Use   • Smoking status: Never Smoker   • Smokeless tobacco: Never Used   Substance Use Topics   • Alcohol use: Defer     Comment: drank alcohol in the past            Objective     Vitals:    08/31/22 1341   BP: 118/80   Pulse: 60   Temp: 97.3 °F (36.3 °C)   SpO2: 99%     Body mass index is 21.35 kg/m².    Physical Exam  Vitals reviewed.   Constitutional:       Appearance: He is well-developed. He is not diaphoretic.   HENT:      Head: Normocephalic and atraumatic.   Eyes:      General: No scleral icterus.     Pupils: Pupils are equal, round, and reactive to light.   Neck:      Thyroid: No thyromegaly.   Cardiovascular:      Rate and Rhythm: Normal rate and regular rhythm.      Heart sounds: No murmur heard.    No friction rub. No gallop.   Pulmonary:      Effort: Pulmonary effort is normal. No respiratory distress.      Breath sounds: No wheezing or rales.   Chest:      Chest wall: No tenderness.   Abdominal:      General: Bowel sounds are normal. There is no distension.      Palpations: Abdomen is soft.      Tenderness: There is no abdominal tenderness.   Musculoskeletal:         General: No deformity. Normal range of motion.   Lymphadenopathy:      Cervical: No cervical adenopathy.   Skin:     General: Skin is warm and dry.      Findings: No rash.   Neurological:      Cranial Nerves: No cranial nerve deficit.      Motor: No abnormal muscle tone.         Lab Results   Component Value Date    GLUCOSE 111 (H) 02/09/2022    BUN 16 02/09/2022    CREATININE 1.06  02/09/2022    EGFRIFNONA 72 02/09/2022    EGFRIFAFRI 83 02/09/2022    BCR 15 02/09/2022    K 4.6 02/09/2022    CO2 21 02/09/2022    CALCIUM 9.5 02/09/2022    PROTENTOTREF 6.7 02/09/2022    ALBUMIN 4.5 02/09/2022    LABIL2 2.0 02/09/2022    AST 48 (H) 02/09/2022    ALT 75 (H) 02/09/2022       WBC   Date Value Ref Range Status   12/01/2021 4.9 3.4 - 10.8 x10E3/uL Final   07/08/2019 5.62 4.5 - 11.0 10*3/uL Final     RBC   Date Value Ref Range Status   12/01/2021 4.70 4.14 - 5.80 x10E6/uL Final   07/08/2019 4.49 (L) 4.5 - 5.9 10*6/uL Final     Hemoglobin   Date Value Ref Range Status   12/01/2021 15.3 13.0 - 17.7 g/dL Final   07/08/2019 14.0 13.5 - 17.5 g/dL Final     Hematocrit   Date Value Ref Range Status   12/01/2021 45.2 37.5 - 51.0 % Final   07/08/2019 42.5 41.0 - 53.0 % Final     MCV   Date Value Ref Range Status   12/01/2021 96 79 - 97 fL Final   07/08/2019 94.7 80.0 - 100.0 fL Final     MCH   Date Value Ref Range Status   12/01/2021 32.6 26.6 - 33.0 pg Final   07/08/2019 31.2 26.0 - 34.0 pg Final     MCHC   Date Value Ref Range Status   12/01/2021 33.8 31.5 - 35.7 g/dL Final   07/08/2019 32.9 31.0 - 37.0 g/dL Final     RDW   Date Value Ref Range Status   12/01/2021 12.4 11.6 - 15.4 % Final   07/08/2019 13.9 12.0 - 16.8 % Final     RDW-SD   Date Value Ref Range Status   03/04/2019 50.0 37.0 - 54.0 fl Final     MPV   Date Value Ref Range Status   07/08/2019 10.9 (H) 6.7 - 10.8 fL Final     Platelets   Date Value Ref Range Status   12/01/2021 165 150 - 450 x10E3/uL Final   07/08/2019 162 140 - 440 10*3/uL Final     Neutrophil Rel %   Date Value Ref Range Status   12/01/2021 59 Not Estab. % Final   07/08/2019 61.4 45 - 80 % Final     Lymphocyte Rel %   Date Value Ref Range Status   12/01/2021 27 Not Estab. % Final   07/08/2019 23.3 15 - 50 % Final     Monocyte Rel %   Date Value Ref Range Status   12/01/2021 10 Not Estab. % Final   07/08/2019 10.5 0 - 15 % Final     Eosinophil Rel %   Date Value Ref Range Status    12/01/2021 3 Not Estab. % Final     Eosinophil %   Date Value Ref Range Status   07/08/2019 4.1 0 - 7 % Final     Basophil Rel %   Date Value Ref Range Status   12/01/2021 1 Not Estab. % Final   07/08/2019 0.5 0 - 2 % Final     Immature Grans %   Date Value Ref Range Status   07/08/2019 0.2 (H) 0 % Final     Neutrophils Absolute   Date Value Ref Range Status   12/01/2021 2.9 1.4 - 7.0 x10E3/uL Final   07/08/2019 3.45 2.0 - 8.8 10*3/uL Final     Lymphocytes Absolute   Date Value Ref Range Status   12/01/2021 1.3 0.7 - 3.1 x10E3/uL Final   07/08/2019 1.31 0.7 - 5.5 10*3/uL Final     Monocytes Absolute   Date Value Ref Range Status   12/01/2021 0.5 0.1 - 0.9 x10E3/uL Final   07/08/2019 0.59 0.0 - 1.7 10*3/uL Final     Eosinophils Absolute   Date Value Ref Range Status   12/01/2021 0.2 0.0 - 0.4 x10E3/uL Final   07/08/2019 0.23 0.0 - 0.8 10*3/uL Final     Basophils Absolute   Date Value Ref Range Status   12/01/2021 0.0 0.0 - 0.2 x10E3/uL Final   07/08/2019 0.03 0.0 - 0.2 10*3/uL Final     Immature Grans, Absolute   Date Value Ref Range Status   07/08/2019 0.01 <1 10*3/uL Final     nRBC   Date Value Ref Range Status   06/10/2020 0.2 0.0 - 0.2 /100 WBC Final   07/08/2019 0 0 /100(WBC) Final   03/04/2019 0.0 0.0 - 0.0 /100 WBC Final       Lab Results   Component Value Date    HGBA1C 5.50 06/01/2021       Lab Results   Component Value Date    HNWXVEQT96 1,133 12/01/2021       TSH   Date Value Ref Range Status   12/01/2021 2.360 0.450 - 4.500 uIU/mL Final   01/03/2019 2.000 0.270 - 4.200 mIU/mL Final   12/27/2018 1.810 0.470 - 4.680 u(iU)/mL Final     Comment:      Higher dose biotin supplements may interfere with this test.  Interpret results within the context of patient's clinical picture.       Lab Results   Component Value Date    CHOL 213 (H) 01/04/2019     Lab Results   Component Value Date    TRIG 57 06/01/2021     Lab Results   Component Value Date    HDL 50 06/01/2021     Lab Results   Component Value Date     LDL 63 06/01/2021     Lab Results   Component Value Date    VLDL 13 06/01/2021     Lab Results   Component Value Date    LDLHDL 2.71 01/04/2019         Procedures    Assessment & Plan   Problems Addressed this Visit     Depression - Primary    Orthostatic hypotension    Relevant Orders    Comprehensive Metabolic Panel      Diagnoses       Codes Comments    Current moderate episode of major depressive disorder, unspecified whether recurrent (HCC)    -  Primary ICD-10-CM: F32.1  ICD-9-CM: 296.22     Orthostatic hypotension     ICD-10-CM: I95.1  ICD-9-CM: 458.0       Depression.  Controlled.  Continue effexor XR 37. 5 a day.    Orthostatic hypotension.  Controlled. Continue fludrocortisone 0.1 a day. Check CMP.  RF fludrocortisone.      Orders Placed This Encounter   Procedures   • Comprehensive Metabolic Panel     Order Specific Question:   Release to patient     Answer:   Routine Release       Current Outpatient Medications   Medication Sig Dispense Refill   • fludrocortisone 0.1 MG tablet Take 1 tablet by mouth Daily. 90 tablet 3   • ondansetron ODT (ZOFRAN-ODT) 4 MG disintegrating tablet Place 1 tablet on the tongue Every 12 (Twelve) Hours As Needed for Nausea or Vomiting. Prn nausea 180 tablet 5   • rosuvastatin (CRESTOR) 10 MG tablet Take 1 tablet by mouth Daily. 90 tablet 3   • tamsulosin (FLOMAX) 0.4 MG capsule 24 hr capsule Take 1 capsule by mouth Daily. 90 capsule 3   • venlafaxine XR (Effexor XR) 37.5 MG 24 hr capsule Take 1 capsule by mouth Daily. 30 capsule 5     No current facility-administered medications for this visit.       Feliciano Light had no medications administered during this visit.    No follow-ups on file.    There are no Patient Instructions on file for this visit.       Answers for HPI/ROS submitted by the patient on 8/29/2022  What is the primary reason for your visit?: Other  Please describe your symptoms.: This is a check up appointment  Have you had these symptoms before?: Yes  How  long have you been having these symptoms?: Greater than 2 weeks  Please describe any probable cause for these symptoms. : Stroke

## 2022-09-01 LAB
ALBUMIN SERPL-MCNC: 4.5 G/DL (ref 3.8–4.8)
ALBUMIN/GLOB SERPL: 2.4 {RATIO} (ref 1.2–2.2)
ALP SERPL-CCNC: 49 IU/L (ref 44–121)
ALT SERPL-CCNC: 80 IU/L (ref 0–44)
AST SERPL-CCNC: 53 IU/L (ref 0–40)
BILIRUB SERPL-MCNC: 0.7 MG/DL (ref 0–1.2)
BUN SERPL-MCNC: 14 MG/DL (ref 8–27)
BUN/CREAT SERPL: 11 (ref 10–24)
CALCIUM SERPL-MCNC: 9.4 MG/DL (ref 8.6–10.2)
CHLORIDE SERPL-SCNC: 103 MMOL/L (ref 96–106)
CO2 SERPL-SCNC: 23 MMOL/L (ref 20–29)
CREAT SERPL-MCNC: 1.22 MG/DL (ref 0.76–1.27)
EGFRCR-CYS SERPLBLD CKD-EPI 2021: 64 ML/MIN/1.73
GLOBULIN SER CALC-MCNC: 1.9 G/DL (ref 1.5–4.5)
GLUCOSE SERPL-MCNC: 121 MG/DL (ref 65–99)
POTASSIUM SERPL-SCNC: 4.5 MMOL/L (ref 3.5–5.2)
PROT SERPL-MCNC: 6.4 G/DL (ref 6–8.5)
SODIUM SERPL-SCNC: 140 MMOL/L (ref 134–144)

## 2022-10-31 ENCOUNTER — CLINICAL SUPPORT (OUTPATIENT)
Dept: FAMILY MEDICINE CLINIC | Facility: CLINIC | Age: 67
End: 2022-10-31

## 2022-10-31 PROCEDURE — 0124A PR ADM SARSCOV2 30MCG/0.3ML BST: CPT | Performed by: FAMILY MEDICINE

## 2022-10-31 PROCEDURE — G0008 ADMIN INFLUENZA VIRUS VAC: HCPCS | Performed by: FAMILY MEDICINE

## 2022-10-31 PROCEDURE — 91312 COVID-19 (PFIZER) BIVALENT BOOSTER 12+YRS: CPT | Performed by: FAMILY MEDICINE

## 2022-10-31 PROCEDURE — 90662 IIV NO PRSV INCREASED AG IM: CPT | Performed by: FAMILY MEDICINE

## 2022-12-12 ENCOUNTER — TELEMEDICINE (OUTPATIENT)
Dept: FAMILY MEDICINE CLINIC | Facility: CLINIC | Age: 67
End: 2022-12-12

## 2022-12-12 DIAGNOSIS — U07.1 UPPER RESPIRATORY TRACT INFECTION DUE TO COVID-19 VIRUS: Primary | ICD-10-CM

## 2022-12-12 DIAGNOSIS — J06.9 UPPER RESPIRATORY TRACT INFECTION DUE TO COVID-19 VIRUS: Primary | ICD-10-CM

## 2022-12-12 DIAGNOSIS — R53.83 OTHER FATIGUE: ICD-10-CM

## 2022-12-12 PROCEDURE — 99214 OFFICE O/P EST MOD 30 MIN: CPT | Performed by: FAMILY MEDICINE

## 2022-12-12 NOTE — PROGRESS NOTES
C/o covid.      Subjective   Feliciano Light is a 67 y.o. male.     History of Present Illness   Video visit due to patient having COVID.  Consent obtained.  Patient is currently at home.  Provider is in the office.  7-minute visit.  Patient complains of being positive for COVID yesterday.  He has had symptoms since yesterday.  Was on cruise 2 days previously.  Covid booster from 10/31/22.   The following portions of the patient's history were reviewed and updated as appropriate: allergies, current medications, past family history, past medical history, past social history, past surgical history and problem list.    Review of Systems   Constitutional: Positive for fatigue. Negative for appetite change.   HENT: Positive for postnasal drip and rhinorrhea. Negative for nosebleeds and sore throat.    Eyes: Negative for blurred vision and visual disturbance.   Respiratory: Positive for cough. Negative for shortness of breath and wheezing.    Cardiovascular: Negative for chest pain and leg swelling.   Gastrointestinal: Positive for nausea. Negative for abdominal distention and abdominal pain.   Endocrine: Negative for cold intolerance and polyuria.   Genitourinary: Negative for dysuria and hematuria.   Musculoskeletal: Negative for arthralgias and myalgias.   Skin: Negative for color change and rash.   Neurological: Negative for weakness and confusion.   Psychiatric/Behavioral: Negative for agitation and depressed mood.       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   • SIADH (syndrome of inappropriate ADH production) ([facility])   • Elevated hemoglobin A1c   • Hyperlipidemia   • Protein-calorie malnutrition ([facility])   • Acute deep vein thrombosis (DVT) of upper extremity ([facility])   • Adrenal insufficiency (Ozzie's disease) ([facility])   • Hydrocephalus (HCC)   • Benign prostatic  "hyperplasia with urinary frequency   • History of gastroesophageal reflux (GERD)   • GERD without esophagitis   • Immunization deficiency   • Medicare annual wellness visit, subsequent   • Chronic idiopathic constipation   • Impaction of intestine (HCC)   • Depression   • Hyperglycemia   • Chronic fatigue   • Chronic nausea   • Benign prostatic hyperplasia with urinary obstruction   • Memory impairment   • Orthostatic hypotension   • Upper respiratory tract infection due to COVID-19 virus   • Fatigue       Allergies   Allergen Reactions   • Amoxicillin Shortness Of Breath, Hives and Swelling   • Latex Hives and Anaphylaxis   • Other Shortness Of Breath     Wife states allergic to all \"cillins\"   • Penicillins Shortness Of Breath         Current Outpatient Medications:   •  fludrocortisone 0.1 MG tablet, Take 1 tablet by mouth Daily., Disp: 90 tablet, Rfl: 3  •  Nirmatrelvir&Ritonavir 300/100 (PAXLOVID) 20 x 150 MG & 10 x 100MG tablet therapy pack tablet, Take 3 tablets by mouth 2 (Two) Times a Day for 5 days., Disp: 30 tablet, Rfl: 0  •  ondansetron ODT (ZOFRAN-ODT) 4 MG disintegrating tablet, Place 1 tablet on the tongue Every 12 (Twelve) Hours As Needed for Nausea or Vomiting. Prn nausea, Disp: 180 tablet, Rfl: 5  •  rosuvastatin (CRESTOR) 10 MG tablet, Take 1 tablet by mouth Daily., Disp: 90 tablet, Rfl: 3  •  tamsulosin (FLOMAX) 0.4 MG capsule 24 hr capsule, Take 1 capsule by mouth Daily., Disp: 90 capsule, Rfl: 3  •  venlafaxine XR (Effexor XR) 37.5 MG 24 hr capsule, Take 1 capsule by mouth Daily., Disp: 30 capsule, Rfl: 5    Past Medical History:   Diagnosis Date   • Acute pancreatitis    • Allergic     Latex and all cillens   • Arm skin lesion, left    • BPH (benign prostatic hyperplasia)    • Cerebellar hemorrhage (HCC) 1/2/2019   • Chronic nausea    • Elevated lipase    • Encounter for preventive health examination    • Enlarged prostate without lower urinary tract symptoms (luts)    • History of " gastroesophageal reflux (GERD)    • Hyperlipidemia    • Intraventricular hemorrhage (HCC) 1/2/2019   • Intraventricular hemorrhage (HCC)    • Pancreatitis 12/01/2018   • 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 01/02/2019   • SIADH (syndrome of inappropriate ADH production) (HCC)    • Skin lesion of cheek        Past Surgical History:   Procedure Laterality Date   • CEREBRAL ANEURYSM REPAIR Right     embolization of aneurysm R [facility]   • ENDOSCOPY W/ PEG TUBE PLACEMENT N/A 2/1/2019    Normal, an externally removable PEG placement was successfully completed. No specimens collected.    • TONSILLECTOMY         Family History   Problem Relation Age of Onset   • Arthritis Mother    • Breast cancer Mother    • Hypertension Father    • Stroke Father    • COPD Father    • Stroke Other    • Breast cancer Other    • Hypertension Other    • COPD Other    • Arthritis Other        Social History     Tobacco Use   • Smoking status: Never   • Smokeless tobacco: Never   Substance Use Topics   • Alcohol use: Defer     Comment: drank alcohol in the past            Objective     There were no vitals filed for this visit.  There is no height or weight on file to calculate BMI.    Physical Exam  Constitutional:       Appearance: He is well-developed.   HENT:      Head: Normocephalic and atraumatic.   Pulmonary:      Breath sounds: Normal breath sounds.   Neurological:      Mental Status: He is alert and oriented to person, place, and time.   Psychiatric:         Behavior: Behavior normal.         Thought Content: Thought content normal.         Judgment: Judgment normal.         Lab Results   Component Value Date    GLUCOSE 121 (H) 08/31/2022    BUN 14 08/31/2022    CREATININE 1.22 08/31/2022    EGFRIFNONA 72 02/09/2022    EGFRIFAFRI 83 02/09/2022    BCR 11 08/31/2022    K 4.5 08/31/2022    CO2 23 08/31/2022    CALCIUM 9.4 08/31/2022    PROTENTOTREF 6.4 08/31/2022    ALBUMIN 4.5 08/31/2022    LABIL2  2.4 (H) 08/31/2022    AST 53 (H) 08/31/2022    ALT 80 (H) 08/31/2022       WBC   Date Value Ref Range Status   12/01/2021 4.9 3.4 - 10.8 x10E3/uL Final   07/08/2019 5.62 4.5 - 11.0 10*3/uL Final     RBC   Date Value Ref Range Status   12/01/2021 4.70 4.14 - 5.80 x10E6/uL Final   07/08/2019 4.49 (L) 4.5 - 5.9 10*6/uL Final     Hemoglobin   Date Value Ref Range Status   12/01/2021 15.3 13.0 - 17.7 g/dL Final   07/08/2019 14.0 13.5 - 17.5 g/dL Final     Hematocrit   Date Value Ref Range Status   12/01/2021 45.2 37.5 - 51.0 % Final   07/08/2019 42.5 41.0 - 53.0 % Final     MCV   Date Value Ref Range Status   12/01/2021 96 79 - 97 fL Final   07/08/2019 94.7 80.0 - 100.0 fL Final     MCH   Date Value Ref Range Status   12/01/2021 32.6 26.6 - 33.0 pg Final   07/08/2019 31.2 26.0 - 34.0 pg Final     MCHC   Date Value Ref Range Status   12/01/2021 33.8 31.5 - 35.7 g/dL Final   07/08/2019 32.9 31.0 - 37.0 g/dL Final     RDW   Date Value Ref Range Status   12/01/2021 12.4 11.6 - 15.4 % Final   07/08/2019 13.9 12.0 - 16.8 % Final     RDW-SD   Date Value Ref Range Status   03/04/2019 50.0 37.0 - 54.0 fl Final     MPV   Date Value Ref Range Status   07/08/2019 10.9 (H) 6.7 - 10.8 fL Final     Platelets   Date Value Ref Range Status   12/01/2021 165 150 - 450 x10E3/uL Final   07/08/2019 162 140 - 440 10*3/uL Final     Neutrophil Rel %   Date Value Ref Range Status   12/01/2021 59 Not Estab. % Final   07/08/2019 61.4 45 - 80 % Final     Lymphocyte Rel %   Date Value Ref Range Status   12/01/2021 27 Not Estab. % Final   07/08/2019 23.3 15 - 50 % Final     Monocyte Rel %   Date Value Ref Range Status   12/01/2021 10 Not Estab. % Final   07/08/2019 10.5 0 - 15 % Final     Eosinophil Rel %   Date Value Ref Range Status   12/01/2021 3 Not Estab. % Final     Eosinophil %   Date Value Ref Range Status   07/08/2019 4.1 0 - 7 % Final     Basophil Rel %   Date Value Ref Range Status   12/01/2021 1 Not Estab. % Final   07/08/2019 0.5 0 - 2 %  Final     Immature Grans %   Date Value Ref Range Status   07/08/2019 0.2 (H) 0 % Final     Neutrophils Absolute   Date Value Ref Range Status   12/01/2021 2.9 1.4 - 7.0 x10E3/uL Final   07/08/2019 3.45 2.0 - 8.8 10*3/uL Final     Lymphocytes Absolute   Date Value Ref Range Status   12/01/2021 1.3 0.7 - 3.1 x10E3/uL Final   07/08/2019 1.31 0.7 - 5.5 10*3/uL Final     Monocytes Absolute   Date Value Ref Range Status   12/01/2021 0.5 0.1 - 0.9 x10E3/uL Final   07/08/2019 0.59 0.0 - 1.7 10*3/uL Final     Eosinophils Absolute   Date Value Ref Range Status   12/01/2021 0.2 0.0 - 0.4 x10E3/uL Final   07/08/2019 0.23 0.0 - 0.8 10*3/uL Final     Basophils Absolute   Date Value Ref Range Status   12/01/2021 0.0 0.0 - 0.2 x10E3/uL Final   07/08/2019 0.03 0.0 - 0.2 10*3/uL Final     Immature Grans, Absolute   Date Value Ref Range Status   07/08/2019 0.01 <1 10*3/uL Final     nRBC   Date Value Ref Range Status   06/10/2020 0.2 0.0 - 0.2 /100 WBC Final   07/08/2019 0 0 /100(WBC) Final   03/04/2019 0.0 0.0 - 0.0 /100 WBC Final       Lab Results   Component Value Date    HGBA1C 5.50 06/01/2021       Lab Results   Component Value Date    DZYRWOTV07 1,133 12/01/2021       TSH   Date Value Ref Range Status   12/01/2021 2.360 0.450 - 4.500 uIU/mL Final   01/03/2019 2.000 0.270 - 4.200 mIU/mL Final   12/27/2018 1.810 0.470 - 4.680 u(iU)/mL Final     Comment:      Higher dose biotin supplements may interfere with this test.  Interpret results within the context of patient's clinical picture.       Lab Results   Component Value Date    CHOL 213 (H) 01/04/2019     Lab Results   Component Value Date    TRIG 57 06/01/2021     Lab Results   Component Value Date    HDL 50 06/01/2021     Lab Results   Component Value Date    LDL 63 06/01/2021     Lab Results   Component Value Date    VLDL 13 06/01/2021     Lab Results   Component Value Date    LDLHDL 2.71 01/04/2019         Procedures    Assessment & Plan   Problems Addressed this Visit      Fatigue    Upper respiratory tract infection due to COVID-19 virus - Primary    Relevant Medications    Nirmatrelvir&Ritonavir 300/100 (PAXLOVID) 20 x 150 MG & 10 x 100MG tablet therapy pack tablet   Diagnoses       Codes Comments    Upper respiratory tract infection due to COVID-19 virus    -  Primary ICD-10-CM: U07.1, J06.9  ICD-9-CM: 465.9, 079.89     Other fatigue     ICD-10-CM: R53.83  ICD-9-CM: 780.79       URI due to COVID.  Patient has systemic symptoms.  With patient having comorbid conditions of age greater than 65 and adrenal insufficiency will treat with Paxlovid.  Pt and wife desire treatment after counseling of treatment options.    No orders of the defined types were placed in this encounter.      Current Outpatient Medications   Medication Sig Dispense Refill   • fludrocortisone 0.1 MG tablet Take 1 tablet by mouth Daily. 90 tablet 3   • Nirmatrelvir&Ritonavir 300/100 (PAXLOVID) 20 x 150 MG & 10 x 100MG tablet therapy pack tablet Take 3 tablets by mouth 2 (Two) Times a Day for 5 days. 30 tablet 0   • ondansetron ODT (ZOFRAN-ODT) 4 MG disintegrating tablet Place 1 tablet on the tongue Every 12 (Twelve) Hours As Needed for Nausea or Vomiting. Prn nausea 180 tablet 5   • rosuvastatin (CRESTOR) 10 MG tablet Take 1 tablet by mouth Daily. 90 tablet 3   • tamsulosin (FLOMAX) 0.4 MG capsule 24 hr capsule Take 1 capsule by mouth Daily. 90 capsule 3   • venlafaxine XR (Effexor XR) 37.5 MG 24 hr capsule Take 1 capsule by mouth Daily. 30 capsule 5     No current facility-administered medications for this visit.       Feliciano Light had no medications administered during this visit.    No follow-ups on file.    There are no Patient Instructions on file for this visit.

## 2022-12-27 ENCOUNTER — OFFICE VISIT (OUTPATIENT)
Dept: FAMILY MEDICINE CLINIC | Facility: CLINIC | Age: 67
End: 2022-12-27

## 2022-12-27 VITALS
TEMPERATURE: 97.3 F | RESPIRATION RATE: 16 BRPM | HEART RATE: 99 BPM | HEIGHT: 65 IN | BODY MASS INDEX: 21.56 KG/M2 | WEIGHT: 129.4 LBS | OXYGEN SATURATION: 98 % | SYSTOLIC BLOOD PRESSURE: 128 MMHG | DIASTOLIC BLOOD PRESSURE: 80 MMHG

## 2022-12-27 DIAGNOSIS — I95.1 ORTHOSTATIC HYPOTENSION: ICD-10-CM

## 2022-12-27 DIAGNOSIS — N40.1 BENIGN PROSTATIC HYPERPLASIA WITH URINARY FREQUENCY: ICD-10-CM

## 2022-12-27 DIAGNOSIS — R35.0 BENIGN PROSTATIC HYPERPLASIA WITH URINARY FREQUENCY: ICD-10-CM

## 2022-12-27 DIAGNOSIS — E78.2 MIXED HYPERLIPIDEMIA: Primary | ICD-10-CM

## 2022-12-27 DIAGNOSIS — R73.9 HYPERGLYCEMIA: ICD-10-CM

## 2022-12-27 PROCEDURE — 99214 OFFICE O/P EST MOD 30 MIN: CPT | Performed by: FAMILY MEDICINE

## 2022-12-27 NOTE — PROGRESS NOTES
Chief Complaint   Patient presents with   • Hyperlipidemia       Subjective   Feliciano Light is a 67 y.o. male.     History of Present Illness   F/U hyperlipidmeia.  Doing well with med.  No Se.    F/U orthostatic hypotension.  Doing well with meds.  NO Se.    The following portions of the patient's history were reviewed and updated as appropriate: allergies, current medications, past family history, past medical history, past social history, past surgical history and problem list.    Review of Systems   Constitutional: Negative for appetite change and fatigue.   HENT: Negative for nosebleeds and sore throat.    Eyes: Negative for blurred vision and visual disturbance.   Respiratory: Negative for shortness of breath and wheezing.    Cardiovascular: Negative for chest pain and leg swelling.   Gastrointestinal: Negative for abdominal distention and abdominal pain.   Endocrine: Negative for cold intolerance and polyuria.   Genitourinary: Negative for dysuria and hematuria.   Musculoskeletal: Negative for arthralgias and myalgias.   Skin: Negative for color change and rash.   Neurological: Negative for weakness and confusion.   Psychiatric/Behavioral: Negative for agitation and depressed mood.       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   • SIADH (syndrome of inappropriate ADH production) ([facility])   • Elevated hemoglobin A1c   • Hyperlipidemia   • Protein-calorie malnutrition (HCC)   • Acute deep vein thrombosis (DVT) of upper extremity (HCC)   • Adrenal insufficiency (Nash's disease) (HCC)   • Hydrocephalus (HCC)   • Benign prostatic hyperplasia with urinary frequency   • History of gastroesophageal reflux (GERD)   • GERD without esophagitis   • Immunization deficiency   • Medicare annual wellness visit, subsequent   • Chronic idiopathic constipation   • Impaction of  "intestine ([facility])   • Depression   • Hyperglycemia   • Chronic fatigue   • Chronic nausea   • Benign prostatic hyperplasia with urinary obstruction   • Memory impairment   • Orthostatic hypotension   • Upper respiratory tract infection due to COVID-19 virus   • Fatigue       Allergies   Allergen Reactions   • Amoxicillin Shortness Of Breath, Hives and Swelling   • Latex Hives and Anaphylaxis   • Other Shortness Of Breath     Wife states allergic to all \"cillins\"   • Penicillins Shortness Of Breath         Current Outpatient Medications:   •  fludrocortisone 0.1 MG tablet, Take 1 tablet by mouth Daily., Disp: 90 tablet, Rfl: 3  •  ondansetron ODT (ZOFRAN-ODT) 4 MG disintegrating tablet, Place 1 tablet on the tongue Every 12 (Twelve) Hours As Needed for Nausea or Vomiting. Prn nausea, Disp: 180 tablet, Rfl: 5  •  rosuvastatin (CRESTOR) 10 MG tablet, Take 1 tablet by mouth Daily., Disp: 90 tablet, Rfl: 3  •  tamsulosin (FLOMAX) 0.4 MG capsule 24 hr capsule, Take 1 capsule by mouth Daily., Disp: 90 capsule, Rfl: 3  •  venlafaxine XR (Effexor XR) 37.5 MG 24 hr capsule, Take 1 capsule by mouth Daily., Disp: 30 capsule, Rfl: 5    Past Medical History:   Diagnosis Date   • Acute pancreatitis    • Allergic     Latex and all cillens   • Arm skin lesion, left    • BPH (benign prostatic hyperplasia)    • Cerebellar hemorrhage ([facility]) 1/2/2019   • Chronic nausea    • Elevated lipase    • Encounter for preventive health examination    • Enlarged prostate without lower urinary tract symptoms (luts)    • History of gastroesophageal reflux (GERD)    • Hyperlipidemia    • Intraventricular hemorrhage (HCC) 1/2/2019   • Intraventricular hemorrhage ([facility])    • Pancreatitis 12/01/2018   • 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 01/02/2019   • SIADH (syndrome of inappropriate ADH production) ([facility])    • Skin lesion of cheek        Past Surgical History:   Procedure Laterality Date   • CEREBRAL ANEURYSM " REPAIR Right     embolization of aneurysm R [facility]   • ENDOSCOPY W/ PEG TUBE PLACEMENT N/A 2/1/2019    Normal, an externally removable PEG placement was successfully completed. No specimens collected.    • TONSILLECTOMY         Family History   Problem Relation Age of Onset   • Arthritis Mother    • Breast cancer Mother    • Hypertension Father    • Stroke Father    • COPD Father    • Stroke Other    • Breast cancer Other    • Hypertension Other    • COPD Other    • Arthritis Other        Social History     Tobacco Use   • Smoking status: Never   • Smokeless tobacco: Never   Substance Use Topics   • Alcohol use: Defer     Comment: drank alcohol in the past            Objective     Vitals:    12/27/22 1259   BP: 128/80   Pulse: 99   Resp: 16   Temp: 97.3 °F (36.3 °C)   SpO2: 98%     Body mass index is 21.53 kg/m².    Physical Exam  Vitals reviewed.   Constitutional:       Appearance: He is well-developed. He is not diaphoretic.   HENT:      Head: Normocephalic and atraumatic.   Eyes:      General: No scleral icterus.     Pupils: Pupils are equal, round, and reactive to light.   Neck:      Thyroid: No thyromegaly.   Cardiovascular:      Rate and Rhythm: Normal rate and regular rhythm.      Heart sounds: No murmur heard.    No friction rub. No gallop.   Pulmonary:      Effort: Pulmonary effort is normal. No respiratory distress.      Breath sounds: No wheezing or rales.   Chest:      Chest wall: No tenderness.   Abdominal:      General: Bowel sounds are normal. There is no distension.      Palpations: Abdomen is soft.      Tenderness: There is no abdominal tenderness.   Musculoskeletal:         General: No deformity. Normal range of motion.   Lymphadenopathy:      Cervical: No cervical adenopathy.   Skin:     General: Skin is warm and dry.      Findings: No rash.   Neurological:      Cranial Nerves: No cranial nerve deficit.      Motor: No abnormal muscle tone.         Lab Results   Component Value Date    GLUCOSE 121  (H) 08/31/2022    BUN 14 08/31/2022    CREATININE 1.22 08/31/2022    EGFRIFNONA 72 02/09/2022    EGFRIFAFRI 83 02/09/2022    BCR 11 08/31/2022    K 4.5 08/31/2022    CO2 23 08/31/2022    CALCIUM 9.4 08/31/2022    PROTENTOTREF 6.4 08/31/2022    ALBUMIN 4.5 08/31/2022    LABIL2 2.4 (H) 08/31/2022    AST 53 (H) 08/31/2022    ALT 80 (H) 08/31/2022       WBC   Date Value Ref Range Status   12/01/2021 4.9 3.4 - 10.8 x10E3/uL Final   07/08/2019 5.62 4.5 - 11.0 10*3/uL Final     RBC   Date Value Ref Range Status   12/01/2021 4.70 4.14 - 5.80 x10E6/uL Final   07/08/2019 4.49 (L) 4.5 - 5.9 10*6/uL Final     Hemoglobin   Date Value Ref Range Status   12/01/2021 15.3 13.0 - 17.7 g/dL Final   07/08/2019 14.0 13.5 - 17.5 g/dL Final     Hematocrit   Date Value Ref Range Status   12/01/2021 45.2 37.5 - 51.0 % Final   07/08/2019 42.5 41.0 - 53.0 % Final     MCV   Date Value Ref Range Status   12/01/2021 96 79 - 97 fL Final   07/08/2019 94.7 80.0 - 100.0 fL Final     MCH   Date Value Ref Range Status   12/01/2021 32.6 26.6 - 33.0 pg Final   07/08/2019 31.2 26.0 - 34.0 pg Final     MCHC   Date Value Ref Range Status   12/01/2021 33.8 31.5 - 35.7 g/dL Final   07/08/2019 32.9 31.0 - 37.0 g/dL Final     RDW   Date Value Ref Range Status   12/01/2021 12.4 11.6 - 15.4 % Final   07/08/2019 13.9 12.0 - 16.8 % Final     RDW-SD   Date Value Ref Range Status   03/04/2019 50.0 37.0 - 54.0 fl Final     MPV   Date Value Ref Range Status   07/08/2019 10.9 (H) 6.7 - 10.8 fL Final     Platelets   Date Value Ref Range Status   12/01/2021 165 150 - 450 x10E3/uL Final   07/08/2019 162 140 - 440 10*3/uL Final     Neutrophil Rel %   Date Value Ref Range Status   12/01/2021 59 Not Estab. % Final   07/08/2019 61.4 45 - 80 % Final     Lymphocyte Rel %   Date Value Ref Range Status   12/01/2021 27 Not Estab. % Final   07/08/2019 23.3 15 - 50 % Final     Monocyte Rel %   Date Value Ref Range Status   12/01/2021 10 Not Estab. % Final   07/08/2019 10.5 0 - 15 %  Final     Eosinophil Rel %   Date Value Ref Range Status   12/01/2021 3 Not Estab. % Final     Eosinophil %   Date Value Ref Range Status   07/08/2019 4.1 0 - 7 % Final     Basophil Rel %   Date Value Ref Range Status   12/01/2021 1 Not Estab. % Final   07/08/2019 0.5 0 - 2 % Final     Immature Grans %   Date Value Ref Range Status   07/08/2019 0.2 (H) 0 % Final     Neutrophils Absolute   Date Value Ref Range Status   12/01/2021 2.9 1.4 - 7.0 x10E3/uL Final   07/08/2019 3.45 2.0 - 8.8 10*3/uL Final     Lymphocytes Absolute   Date Value Ref Range Status   12/01/2021 1.3 0.7 - 3.1 x10E3/uL Final   07/08/2019 1.31 0.7 - 5.5 10*3/uL Final     Monocytes Absolute   Date Value Ref Range Status   12/01/2021 0.5 0.1 - 0.9 x10E3/uL Final   07/08/2019 0.59 0.0 - 1.7 10*3/uL Final     Eosinophils Absolute   Date Value Ref Range Status   12/01/2021 0.2 0.0 - 0.4 x10E3/uL Final   07/08/2019 0.23 0.0 - 0.8 10*3/uL Final     Basophils Absolute   Date Value Ref Range Status   12/01/2021 0.0 0.0 - 0.2 x10E3/uL Final   07/08/2019 0.03 0.0 - 0.2 10*3/uL Final     Immature Grans, Absolute   Date Value Ref Range Status   07/08/2019 0.01 <1 10*3/uL Final     nRBC   Date Value Ref Range Status   06/10/2020 0.2 0.0 - 0.2 /100 WBC Final   07/08/2019 0 0 /100(WBC) Final   03/04/2019 0.0 0.0 - 0.0 /100 WBC Final       Lab Results   Component Value Date    HGBA1C 5.50 06/01/2021       Lab Results   Component Value Date    OPIFQZXU24 1,133 12/01/2021       TSH   Date Value Ref Range Status   12/01/2021 2.360 0.450 - 4.500 uIU/mL Final   01/03/2019 2.000 0.270 - 4.200 mIU/mL Final   12/27/2018 1.810 0.470 - 4.680 u(iU)/mL Final     Comment:      Higher dose biotin supplements may interfere with this test.  Interpret results within the context of patient's clinical picture.       Lab Results   Component Value Date    CHOL 213 (H) 01/04/2019     Lab Results   Component Value Date    TRIG 57 06/01/2021     Lab Results   Component Value Date    HDL  50 06/01/2021     Lab Results   Component Value Date    LDL 63 06/01/2021     Lab Results   Component Value Date    VLDL 13 06/01/2021     Lab Results   Component Value Date    LDLHDL 2.71 01/04/2019         Procedures    Assessment & Plan   Problems Addressed this Visit     Benign prostatic hyperplasia with urinary frequency    Hyperglycemia    Hyperlipidemia - Primary    Orthostatic hypotension   Diagnoses       Codes Comments    Mixed hyperlipidemia    -  Primary ICD-10-CM: E78.2  ICD-9-CM: 272.2     Hyperglycemia     ICD-10-CM: R73.9  ICD-9-CM: 790.29     Benign prostatic hyperplasia with urinary frequency     ICD-10-CM: N40.1, R35.0  ICD-9-CM: 600.01, 788.41     Orthostatic hypotension     ICD-10-CM: I95.1  ICD-9-CM: 458.0       Hyperlipidmeia.  Check CMP/FLP.  Continue rosuvastatin.    Hyperglycemia.  Check A1c.    BPH.  Check PSA.     No orders of the defined types were placed in this encounter.      Current Outpatient Medications   Medication Sig Dispense Refill   • fludrocortisone 0.1 MG tablet Take 1 tablet by mouth Daily. 90 tablet 3   • ondansetron ODT (ZOFRAN-ODT) 4 MG disintegrating tablet Place 1 tablet on the tongue Every 12 (Twelve) Hours As Needed for Nausea or Vomiting. Prn nausea 180 tablet 5   • rosuvastatin (CRESTOR) 10 MG tablet Take 1 tablet by mouth Daily. 90 tablet 3   • tamsulosin (FLOMAX) 0.4 MG capsule 24 hr capsule Take 1 capsule by mouth Daily. 90 capsule 3   • venlafaxine XR (Effexor XR) 37.5 MG 24 hr capsule Take 1 capsule by mouth Daily. 30 capsule 5     No current facility-administered medications for this visit.       Feliciano Light had no medications administered during this visit.    Return in about 4 months (around 4/27/2023).    There are no Patient Instructions on file for this visit.

## 2022-12-28 LAB
ALBUMIN SERPL-MCNC: 4.1 G/DL (ref 3.8–4.8)
ALBUMIN/GLOB SERPL: 2 {RATIO} (ref 1.2–2.2)
ALP SERPL-CCNC: 50 IU/L (ref 44–121)
ALT SERPL-CCNC: 55 IU/L (ref 0–44)
AST SERPL-CCNC: 39 IU/L (ref 0–40)
BILIRUB SERPL-MCNC: 0.7 MG/DL (ref 0–1.2)
BUN SERPL-MCNC: 13 MG/DL (ref 8–27)
BUN/CREAT SERPL: 11 (ref 10–24)
CALCIUM SERPL-MCNC: 9 MG/DL (ref 8.6–10.2)
CHLORIDE SERPL-SCNC: 105 MMOL/L (ref 96–106)
CHOLEST SERPL-MCNC: 155 MG/DL (ref 100–199)
CHOLEST/HDLC SERPL: 3.2 RATIO (ref 0–5)
CO2 SERPL-SCNC: 21 MMOL/L (ref 20–29)
CREAT SERPL-MCNC: 1.18 MG/DL (ref 0.76–1.27)
EGFRCR SERPLBLD CKD-EPI 2021: 67 ML/MIN/1.73
GLOBULIN SER CALC-MCNC: 2.1 G/DL (ref 1.5–4.5)
GLUCOSE SERPL-MCNC: 179 MG/DL (ref 70–99)
HBA1C MFR BLD: 5.8 % (ref 4.8–5.6)
HDLC SERPL-MCNC: 49 MG/DL
LDLC SERPL CALC-MCNC: 92 MG/DL (ref 0–99)
POTASSIUM SERPL-SCNC: 4.4 MMOL/L (ref 3.5–5.2)
PROT SERPL-MCNC: 6.2 G/DL (ref 6–8.5)
PSA SERPL-MCNC: 2.1 NG/ML (ref 0–4)
SODIUM SERPL-SCNC: 142 MMOL/L (ref 134–144)
TRIGL SERPL-MCNC: 73 MG/DL (ref 0–149)
VLDLC SERPL CALC-MCNC: 14 MG/DL (ref 5–40)

## 2023-06-09 DIAGNOSIS — E78.2 MIXED HYPERLIPIDEMIA: ICD-10-CM

## 2023-06-09 RX ORDER — ROSUVASTATIN CALCIUM 10 MG/1
TABLET, COATED ORAL
Qty: 90 TABLET | Refills: 0 | Status: SHIPPED | OUTPATIENT
Start: 2023-06-09

## 2023-09-12 DIAGNOSIS — E78.2 MIXED HYPERLIPIDEMIA: ICD-10-CM

## 2023-09-12 RX ORDER — FLUDROCORTISONE ACETATE 0.1 MG/1
TABLET ORAL
Qty: 90 TABLET | Refills: 1 | Status: SHIPPED | OUTPATIENT
Start: 2023-09-12

## 2023-09-12 RX ORDER — ROSUVASTATIN CALCIUM 10 MG/1
TABLET, COATED ORAL
Qty: 90 TABLET | Refills: 3 | Status: SHIPPED | OUTPATIENT
Start: 2023-09-12

## 2023-09-13 RX ORDER — TAMSULOSIN HYDROCHLORIDE 0.4 MG/1
1 CAPSULE ORAL DAILY
Qty: 90 CAPSULE | Refills: 0 | Status: SHIPPED | OUTPATIENT
Start: 2023-09-13

## 2023-12-02 RX ORDER — TAMSULOSIN HYDROCHLORIDE 0.4 MG/1
1 CAPSULE ORAL DAILY
Qty: 90 CAPSULE | Refills: 3 | Status: SHIPPED | OUTPATIENT
Start: 2023-12-02

## 2024-01-04 DIAGNOSIS — R11.0 CHRONIC NAUSEA: ICD-10-CM

## 2024-01-04 RX ORDER — ONDANSETRON 4 MG/1
4 TABLET, ORALLY DISINTEGRATING ORAL EVERY 12 HOURS PRN
Qty: 60 TABLET | Refills: 5 | Status: SHIPPED | OUTPATIENT
Start: 2024-01-04

## 2024-01-04 NOTE — TELEPHONE ENCOUNTER
Caller: RAMIRO TAYLOR    Relationship: Emergency Contact    Best call back number: [phone]    Requested Prescriptions:   Requested Prescriptions     Pending Prescriptions Disp Refills    ondansetron ODT (ZOFRAN-ODT) 4 MG disintegrating tablet 180 tablet 5     Sig: Place 1 tablet on the tongue Every 12 (Twelve) Hours As Needed for Nausea or Vomiting. Prn nausea        Pharmacy where request should be sent: [address] [phone] [facility] [phone] FX     Last office visit with prescribing clinician: 2022   Last telemedicine visit with prescribing clinician: Visit date not found   Next office visit with prescribing clinician: 3/20/2024     Additional details provided by patient: PATIENT IS OUT....PHARMACY STATED THAT SCRIP WAS  AND NEEDED TO BE CONTACTED FIRST FOR A REFILL    Does the patient have less than a 3 day supply:  [x] Yes  [] No    Would you like a call back once the refill request has been completed: [] Yes [x] No    If the office needs to give you a call back, can they leave a voicemail: [] Yes [x] No    Mayra Villarreal Rep   24 09:08 EST

## 2024-01-11 DIAGNOSIS — E78.2 MIXED HYPERLIPIDEMIA: ICD-10-CM

## 2024-01-11 DIAGNOSIS — R11.0 CHRONIC NAUSEA: ICD-10-CM

## 2024-01-11 DIAGNOSIS — F32.1 CURRENT MODERATE EPISODE OF MAJOR DEPRESSIVE DISORDER, UNSPECIFIED WHETHER RECURRENT: ICD-10-CM

## 2024-01-11 RX ORDER — ONDANSETRON 4 MG/1
4 TABLET, ORALLY DISINTEGRATING ORAL EVERY 12 HOURS PRN
Qty: 60 TABLET | Refills: 5 | Status: SHIPPED | OUTPATIENT
Start: 2024-01-11

## 2024-01-11 RX ORDER — ROSUVASTATIN CALCIUM 10 MG/1
10 TABLET, COATED ORAL DAILY
Qty: 90 TABLET | Refills: 3 | Status: SHIPPED | OUTPATIENT
Start: 2024-01-11

## 2024-01-11 RX ORDER — FLUDROCORTISONE ACETATE 0.1 MG/1
0.1 TABLET ORAL DAILY
Qty: 90 TABLET | Refills: 3 | Status: SHIPPED | OUTPATIENT
Start: 2024-01-11

## 2024-01-11 RX ORDER — VENLAFAXINE HYDROCHLORIDE 37.5 MG/1
37.5 CAPSULE, EXTENDED RELEASE ORAL DAILY
Qty: 90 CAPSULE | Refills: 5 | Status: SHIPPED | OUTPATIENT
Start: 2024-01-11

## 2024-01-11 RX ORDER — TAMSULOSIN HYDROCHLORIDE 0.4 MG/1
1 CAPSULE ORAL DAILY
Qty: 90 CAPSULE | Refills: 3 | Status: SHIPPED | OUTPATIENT
Start: 2024-01-11

## 2024-01-11 NOTE — TELEPHONE ENCOUNTER
Caller: RAMIRO TAYLOR    Relationship: Emergency Contact    Best call back number: [phone]    Requested Prescriptions:   Requested Prescriptions     Pending Prescriptions Disp Refills    fludrocortisone 0.1 MG tablet       Sig: Take  by mouth Daily.    rosuvastatin (CRESTOR) 10 MG tablet 90 tablet 3     Sig: Take 1 tablet by mouth Daily.    tamsulosin (FLOMAX) 0.4 MG capsule 24 hr capsule 90 capsule 3     Sig: Take 1 capsule by mouth Daily.    venlafaxine XR (Effexor XR) 37.5 MG 24 hr capsule 30 capsule 5     Sig: Take 1 capsule by mouth Daily.    ondansetron ODT (ZOFRAN-ODT) 4 MG disintegrating tablet 60 tablet 5     Sig: Place 1 tablet on the tongue Every 12 (Twelve) Hours As Needed for Nausea or Vomiting. Prn nausea        Pharmacy where request should be sent: [facility] #[id number] - [facility], [place] - [address] AT [facility] [phone]  - [phone] FX     Last office visit with prescribing clinician: 12/27/2022   Last telemedicine visit with prescribing clinician: Visit date not found   Next office visit with prescribing clinician: 3/20/2024     Additional details provided by patient: PATIENTS SPOUSE STATES THE PATIENT IS NEEDING ALL NEW PRESCRIPTIONS FOR THESE MEDICATIONS.     PATIENT STATES SHE TRIED TO GET THE PHARMACY TO TRANSFER AND THEY WERE NOT ABLE TO DUE TO THE PATIENT NOT HAVING ANY REFILLS LEFT.     Does the patient have less than a 3 day supply:  [x] Yes  [] No    Would you like a call back once the refill request has been completed: [x] Yes [] No    If the office needs to give you a call back, can they leave a voicemail: [x] Yes [] No    Katie Nick, PCT   01/11/24 12:27 EST         DELETE AFTER READING TO PATIENT: “Thank you for sharing this information with me. I will send a message to the clinical team. Please allow 48 hours for the clinical staff to follow up on this request.”

## 2024-03-20 ENCOUNTER — OFFICE VISIT (OUTPATIENT)
Dept: FAMILY MEDICINE CLINIC | Facility: CLINIC | Age: 69
End: 2024-03-20
Payer: MEDICARE

## 2024-03-20 VITALS
WEIGHT: 125.6 LBS | SYSTOLIC BLOOD PRESSURE: 102 MMHG | OXYGEN SATURATION: 98 % | BODY MASS INDEX: 20.93 KG/M2 | TEMPERATURE: 97.1 F | HEIGHT: 65 IN | DIASTOLIC BLOOD PRESSURE: 60 MMHG | RESPIRATION RATE: 18 BRPM | HEART RATE: 78 BPM

## 2024-03-20 DIAGNOSIS — L20.82 FLEXURAL ECZEMA: ICD-10-CM

## 2024-03-20 DIAGNOSIS — E78.2 MIXED HYPERLIPIDEMIA: ICD-10-CM

## 2024-03-20 DIAGNOSIS — Z00.00 MEDICARE ANNUAL WELLNESS VISIT, SUBSEQUENT: Primary | ICD-10-CM

## 2024-03-20 DIAGNOSIS — R35.0 BENIGN PROSTATIC HYPERPLASIA WITH URINARY FREQUENCY: ICD-10-CM

## 2024-03-20 DIAGNOSIS — N40.1 BENIGN PROSTATIC HYPERPLASIA WITH URINARY FREQUENCY: ICD-10-CM

## 2024-03-20 NOTE — PROGRESS NOTES
The ABCs of the Annual Wellness Visit  Subsequent Medicare Wellness Visit    Subjective    Feliciano Light is a 68 y.o. male who presents for a Subsequent Medicare Wellness Visit.    The following portions of the patient's history were reviewed and   updated as appropriate: allergies, current medications, past family history, past medical history, past social history, past surgical history, and problem list.    Compared to one year ago, the patient feels his physical   health is the same.    Compared to one year ago, the patient feels his mental   health is the same.    Recent Hospitalizations:  He was not admitted to the hospital during the last year.       Current Medical Providers:  Patient Care Team:  Carlos Samuels MD as PCP - General (Family Medicine)    Outpatient Medications Prior to Visit   Medication Sig Dispense Refill    fludrocortisone 0.1 MG tablet Take 1 tablet by mouth Daily. 90 tablet 3    ondansetron ODT (ZOFRAN-ODT) 4 MG disintegrating tablet Place 1 tablet on the tongue Every 12 (Twelve) Hours As Needed for Nausea or Vomiting. Prn nausea 60 tablet 5    rosuvastatin (CRESTOR) 10 MG tablet Take 1 tablet by mouth Daily. 90 tablet 3    tamsulosin (FLOMAX) 0.4 MG capsule 24 hr capsule Take 1 capsule by mouth Daily. 90 capsule 3    venlafaxine XR (Effexor XR) 37.5 MG 24 hr capsule Take 1 capsule by mouth Daily. (Patient not taking: Reported on 3/20/2024) 90 capsule 5     No facility-administered medications prior to visit.       No opioid medication identified on active medication list. I have reviewed chart for other potential  high risk medication/s and harmful drug interactions in the elderly.        Aspirin is not on active medication list.  Aspirin use is not indicated based on review of current medical condition/s. Risk of harm outweighs potential benefits.  .    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    SIADH (syndrome of inappropriate ADH production)    Elevated hemoglobin A1c    Hyperlipidemia    Protein-calorie malnutrition    Acute deep vein thrombosis (DVT) of upper extremity    Adrenal insufficiency ([place]'s disease)    Hydrocephalus    Benign prostatic hyperplasia with urinary frequency    History of gastroesophageal reflux (GERD)    GERD without esophagitis    Immunization deficiency    Medicare annual wellness visit, subsequent    Chronic idiopathic constipation    Impaction of intestine    Depression    Hyperglycemia    Chronic fatigue    Chronic nausea    Benign prostatic hyperplasia with urinary obstruction    Memory impairment    Orthostatic hypotension    Upper respiratory tract infection due to COVID-19 virus    Fatigue     Advance Care Planning   Advance Care Planning     Advance Directive is not on file.  ACP discussion was held with the patient during this visit. Patient has an advance directive (not in EMR), copy requested.     Objective    Vitals:    03/20/24 1357   BP: 102/60   BP Location: Left arm   Patient Position: Sitting   Cuff Size: Adult   Pulse: 78   Resp: 18   Temp: 97.1 °F (36.2 °C)   TempSrc: Temporal   SpO2: 98%   Weight: 57 kg (125 lb 9.6 oz)   Height: 165 cm (64.96\")     Estimated body mass index is 20.93 kg/m² as calculated from the following:    Height as of this encounter: 165 cm (64.96\").    Weight as of this encounter: 57 kg (125 lb 9.6 oz).    BMI is within normal parameters. No other follow-up for BMI required.      Does the patient have evidence of cognitive impairment? Yes          HEALTH RISK ASSESSMENT    Smoking Status:  Social History     Tobacco Use   Smoking Status Never   Smokeless Tobacco Never     Alcohol Consumption:  Social History     Substance and Sexual Activity   Alcohol Use Defer    Comment: drank alcohol in the past     Fall Risk Screen:    MICHAEL Fall Risk Assessment has not been " completed.    Depression Screening:       No data to display                Health Habits and Functional and Cognitive Screening:      3/20/2024     2:00 PM   Functional & Cognitive Status   Do you have difficulty preparing food and eating? No   Do you have difficulty bathing yourself, getting dressed or grooming yourself? No   Do you have difficulty using the toilet? No   Do you have difficulty moving around from place to place? No   Do you have trouble with steps or getting out of a bed or a chair? No   Current Diet Well Balanced Diet   Dental Exam Up to date   Eye Exam Not up to date   Exercise (times per week) 0 times per week   Current Exercises Include No Regular Exercise   Do you need help using the phone?  No   Are you deaf or do you have serious difficulty hearing?  Yes   Do you need help to go to places out of walking distance? No   Do you need help shopping? No   Do you need help preparing meals?  No   Do you need help with housework?  Yes   Do you need help with laundry? Yes   Do you need help taking your medications? Yes   Do you need help managing money? Yes   Do you ever drive or ride in a car without wearing a seat belt? No   Have you felt unusual stress, anger or loneliness in the last month? No   Who do you live with? Spouse   If you need help, do you have trouble finding someone available to you? No   Have you been bothered in the last four weeks by sexual problems? No   Do you have difficulty concentrating, remembering or making decisions? No       Age-appropriate Screening Schedule:  Refer to the list below for future screening recommendations based on patient's age, sex and/or medical conditions. Orders for these recommended tests are listed in the plan section. The patient has been provided with a written plan.    Health Maintenance   Topic Date Due    ZOSTER VACCINE (1 of 2) Never done    HEPATITIS C SCREENING  Never done    TDAP/TD VACCINES (2 - Td or Tdap) 02/06/2023    LIPID PANEL   "12/27/2023    ANNUAL WELLNESS VISIT  03/20/2025    COVID-19 Vaccine  Completed    RSV Vaccine - Adults  Completed    INFLUENZA VACCINE  Completed    Pneumococcal Vaccine 65+  Completed    COLORECTAL CANCER SCREENING  Discontinued                  CMS Preventative Services Quick Reference  Risk Factors Identified During Encounter  Inactivity/Sedentary: Patient was advised to exercise at least 150 minutes a week per CDC recommendations.  The above risks/problems have been discussed with the patient.  Pertinent information has been shared with the patient in the After Visit Summary.  An After Visit Summary and PPPS were made available to the patient.    Follow Up:   Next Medicare Wellness visit to be scheduled in 1 year.       Additional E&M Note during same encounter follows:  Patient has multiple medical problems which are significant and separately identifiable that require additional work above and beyond the Medicare Wellness Visit.      Chief Complaint  Hyperlipidemia    Subjective        Hyperlipidemia  Pertinent negatives include no chest pain or shortness of breath.     Feliciano Light is also being seen today for rash on back.  There for a few weeks.      Review of Systems   Constitutional:  Negative for chills, diaphoresis and fatigue.   HENT:  Negative for rhinorrhea.    Respiratory:  Negative for cough and shortness of breath.    Cardiovascular:  Negative for chest pain and leg swelling.   Gastrointestinal:  Negative for abdominal distention and abdominal pain.   Musculoskeletal:  Negative for arthralgias and back pain.   Skin:  Negative for rash.       Objective   Vital Signs:  /60 (BP Location: Left arm, Patient Position: Sitting, Cuff Size: Adult)   Pulse 78   Temp 97.1 °F (36.2 °C) (Temporal)   Resp 18   Ht 165 cm (64.96\")   Wt 57 kg (125 lb 9.6 oz)   SpO2 98%   BMI 20.93 kg/m²     Physical Exam  Vitals reviewed.   Constitutional:       Appearance: He is well-developed. He is not " diaphoretic.   HENT:      Head: Normocephalic and atraumatic.   Eyes:      General: No scleral icterus.     Pupils: Pupils are equal, round, and reactive to light.   Neck:      Thyroid: No thyromegaly.   Cardiovascular:      Rate and Rhythm: Normal rate and regular rhythm.      Heart sounds: No murmur heard.     No friction rub. No gallop.   Pulmonary:      Effort: Pulmonary effort is normal. No respiratory distress.      Breath sounds: No wheezing or rales.   Chest:      Chest wall: No tenderness.   Abdominal:      General: Bowel sounds are normal. There is no distension.      Palpations: Abdomen is soft.      Tenderness: There is no abdominal tenderness.   Musculoskeletal:         General: No deformity. Normal range of motion.   Lymphadenopathy:      Cervical: No cervical adenopathy.   Skin:     General: Skin is warm and dry.      Findings: No rash.   Neurological:      Cranial Nerves: No cranial nerve deficit.      Motor: No abnormal muscle tone.                         Assessment and Plan   Diagnoses and all orders for this visit:    1. Medicare annual wellness visit, subsequent (Primary)    2. Mixed hyperlipidemia      Eczema.  Uncontrolled.  Start cortizone 10 TID.     Hyperlipidmeia . Check CMP, FLP.    Preventive Counseling:  Encouraged to stay active.  Covid vaccine UTD.   Pneumovax UTD.     Dentist UTD.  Optho recommended.  Check PSA.             Follow Up   No follow-ups on file.  Patient was given instructions and counseling regarding his condition or for health maintenance advice. Please see specific information pulled into the AVS if appropriate.

## 2024-03-21 LAB
ALBUMIN SERPL-MCNC: 4.4 G/DL (ref 3.9–4.9)
ALBUMIN/GLOB SERPL: 2 {RATIO} (ref 1.2–2.2)
ALP SERPL-CCNC: 55 IU/L (ref 44–121)
ALT SERPL-CCNC: 81 IU/L (ref 0–44)
AST SERPL-CCNC: 50 IU/L (ref 0–40)
BILIRUB SERPL-MCNC: 0.6 MG/DL (ref 0–1.2)
BUN SERPL-MCNC: 25 MG/DL (ref 8–27)
BUN/CREAT SERPL: 21 (ref 10–24)
CALCIUM SERPL-MCNC: 9.4 MG/DL (ref 8.6–10.2)
CHLORIDE SERPL-SCNC: 104 MMOL/L (ref 96–106)
CHOLEST SERPL-MCNC: 144 MG/DL (ref 100–199)
CHOLEST/HDLC SERPL: 2.6 RATIO (ref 0–5)
CO2 SERPL-SCNC: 23 MMOL/L (ref 20–29)
CREAT SERPL-MCNC: 1.17 MG/DL (ref 0.76–1.27)
EGFRCR SERPLBLD CKD-EPI 2021: 67 ML/MIN/1.73
GLOBULIN SER CALC-MCNC: 2.2 G/DL (ref 1.5–4.5)
GLUCOSE SERPL-MCNC: 111 MG/DL (ref 70–99)
HDLC SERPL-MCNC: 56 MG/DL
LDLC SERPL CALC-MCNC: 76 MG/DL (ref 0–99)
POTASSIUM SERPL-SCNC: 4.4 MMOL/L (ref 3.5–5.2)
PROT SERPL-MCNC: 6.6 G/DL (ref 6–8.5)
PSA SERPL-MCNC: 2.6 NG/ML (ref 0–4)
SODIUM SERPL-SCNC: 142 MMOL/L (ref 134–144)
TRIGL SERPL-MCNC: 54 MG/DL (ref 0–149)
VLDLC SERPL CALC-MCNC: 12 MG/DL (ref 5–40)

## 2025-01-12 RX ORDER — FLUDROCORTISONE ACETATE 0.1 MG/1
TABLET ORAL DAILY
Qty: 90 TABLET | Refills: 3 | Status: SHIPPED | OUTPATIENT
Start: 2025-01-12

## 2025-02-28 DIAGNOSIS — R11.0 CHRONIC NAUSEA: ICD-10-CM

## 2025-03-01 RX ORDER — ONDANSETRON 4 MG/1
TABLET, ORALLY DISINTEGRATING ORAL
Qty: 60 TABLET | Refills: 5 | Status: SHIPPED | OUTPATIENT
Start: 2025-03-01

## 2025-03-19 DIAGNOSIS — E78.2 MIXED HYPERLIPIDEMIA: ICD-10-CM

## 2025-03-19 RX ORDER — ROSUVASTATIN CALCIUM 10 MG/1
10 TABLET, COATED ORAL DAILY
Qty: 90 TABLET | Refills: 3 | Status: SHIPPED | OUTPATIENT
Start: 2025-03-19

## 2025-03-23 RX ORDER — TAMSULOSIN HYDROCHLORIDE 0.4 MG/1
1 CAPSULE ORAL DAILY
Qty: 90 CAPSULE | Refills: 3 | Status: SHIPPED | OUTPATIENT
Start: 2025-03-23

## 2025-04-28 ENCOUNTER — OFFICE VISIT (OUTPATIENT)
Dept: FAMILY MEDICINE CLINIC | Facility: CLINIC | Age: 70
End: 2025-04-28
Payer: MEDICARE

## 2025-04-28 VITALS
DIASTOLIC BLOOD PRESSURE: 78 MMHG | TEMPERATURE: 97.3 F | OXYGEN SATURATION: 98 % | BODY MASS INDEX: 20.09 KG/M2 | RESPIRATION RATE: 17 BRPM | WEIGHT: 120.6 LBS | SYSTOLIC BLOOD PRESSURE: 120 MMHG | HEART RATE: 64 BPM | HEIGHT: 65 IN

## 2025-04-28 DIAGNOSIS — R73.9 HYPERGLYCEMIA: ICD-10-CM

## 2025-04-28 DIAGNOSIS — D69.6 THROMBOCYTOPENIA: ICD-10-CM

## 2025-04-28 DIAGNOSIS — N40.1 BENIGN PROSTATIC HYPERPLASIA WITH URINARY FREQUENCY: ICD-10-CM

## 2025-04-28 DIAGNOSIS — Z00.00 MEDICARE ANNUAL WELLNESS VISIT, SUBSEQUENT: Primary | ICD-10-CM

## 2025-04-28 DIAGNOSIS — E78.2 MIXED HYPERLIPIDEMIA: ICD-10-CM

## 2025-04-28 DIAGNOSIS — R35.0 BENIGN PROSTATIC HYPERPLASIA WITH URINARY FREQUENCY: ICD-10-CM

## 2025-04-28 NOTE — ASSESSMENT & PLAN NOTE
Orders:    Comprehensive Metabolic Panel    Lipid Panel With / Chol / HDL Ratio    TSH Rfx On Abnormal To Free T4

## 2025-04-28 NOTE — PROGRESS NOTES
Subjective   The ABCs of the Annual Wellness Visit  Medicare Wellness Visit      Feliciano Light is a 69 y.o. patient who presents for a Medicare Wellness Visit.    The following portions of the patient's history were reviewed and   updated as appropriate: allergies, current medications, past family history, past medical history, past social history, past surgical history, and problem list.    Compared to one year ago, the patient's physical   health is the same.  Compared to one year ago, the patient's mental   health is the same.    Recent Hospitalizations:  He was not admitted to the hospital during the last year.     Current Medical Providers:  Patient Care Team:  Carlos Samuels MD as PCP - General (Family Medicine)    Outpatient Medications Prior to Visit   Medication Sig Dispense Refill    fludrocortisone 0.1 MG tablet TAKE 1 TABLET BY MOUTH EVERY DAY 90 tablet 3    ondansetron ODT (ZOFRAN-ODT) 4 MG disintegrating tablet PLACE 1 TABLET ON THE TONGUE EVERY 12 (TWELVE) HOURS AS NEEDED FOR NAUSEA OR VOMITING 60 tablet 5    rosuvastatin (CRESTOR) 10 MG tablet TAKE 1 TABLET BY MOUTH EVERY DAY 90 tablet 3    tamsulosin (FLOMAX) 0.4 MG capsule 24 hr capsule TAKE 1 CAPSULE BY MOUTH EVERY DAY 90 capsule 3     No facility-administered medications prior to visit.     No opioid medication identified on active medication list. I have reviewed chart for other potential  high risk medication/s and harmful drug interactions in the elderly.      Aspirin is not on active medication list.  Aspirin use is not indicated based on review of current medical condition/s. Risk of harm outweighs potential benefits.  .    Patient Active Problem List   Diagnosis    Cerebellar hemorrhage    Intraventricular hemorrhage    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    SIADH (syndrome of inappropriate ADH production)    Elevated hemoglobin A1c    Hyperlipidemia    Protein-calorie malnutrition    Acute  "deep vein thrombosis (DVT) of upper extremity    Adrenal insufficiency ([place]'s disease)    Hydrocephalus    Benign prostatic hyperplasia with urinary frequency    History of gastroesophageal reflux (GERD)    GERD without esophagitis    Immunization deficiency    Medicare annual wellness visit, subsequent    Chronic idiopathic constipation    Impaction of intestine    Depression    Hyperglycemia    Chronic fatigue    Chronic nausea    Benign prostatic hyperplasia with urinary obstruction    Memory impairment    Orthostatic hypotension    Upper respiratory tract infection due to COVID-19 virus    Fatigue    Flexural eczema     Advance Care Planning Advance Directive is not on file.  ACP discussion was held with the patient during this visit. Patient has an advance directive (not in EMR), copy requested.            Objective   Vitals:    04/28/25 1004   BP: 120/78   BP Location: Left arm   Patient Position: Sitting   Cuff Size: Adult   Pulse: 64   Resp: 17   Temp: 97.3 °F (36.3 °C)   TempSrc: Temporal   SpO2: 98%   Weight: 54.7 kg (120 lb 9.6 oz)   Height: 165 cm (64.96\")       Estimated body mass index is 20.09 kg/m² as calculated from the following:    Height as of this encounter: 165 cm (64.96\").    Weight as of this encounter: 54.7 kg (120 lb 9.6 oz).    BMI is within normal parameters. No other follow-up for BMI required.           Does the patient have evidence of cognitive impairment? No                                                                                                Health  Risk Assessment    Smoking Status:  Social History     Tobacco Use   Smoking Status Never   Smokeless Tobacco Never     Alcohol Consumption:  Social History     Substance and Sexual Activity   Alcohol Use Defer    Comment: drank alcohol in the past       Fall Risk Screen  STEADI Fall Risk Assessment was completed, and patient is at LOW risk for falls.Assessment completed on:4/28/2025    Depression Screening   Little interest " or pleasure in doing things? Not at all   Feeling down, depressed, or hopeless? Not at all   PHQ-2 Total Score 0      Health Habits and Functional and Cognitive Screenin/26/2025     7:43 AM   Functional & Cognitive Status   Do you have difficulty preparing food and eating? No   Do you have difficulty bathing yourself, getting dressed or grooming yourself? No   Do you have difficulty using the toilet? No   Do you have difficulty moving around from place to place? No   Do you have trouble with steps or getting out of a bed or a chair? No   Current Diet Well Balanced Diet   Dental Exam Up to date   Eye Exam Up to date   Exercise (times per week) 0 times per week   Current Exercises Include No Regular Exercise   Do you need help using the phone?  No   Are you deaf or do you have serious difficulty hearing?  Yes   Do you need help to go to places out of walking distance? Yes   Do you need help shopping? No   Do you need help preparing meals?  Yes   Do you need help with housework?  Yes   Do you need help with laundry? No   Do you need help taking your medications? No   Do you need help managing money? Yes   Do you ever drive or ride in a car without wearing a seat belt? No   Have you felt unusual stress, anger or loneliness in the last month? No   Who do you live with? Spouse   If you need help, do you have trouble finding someone available to you? No   Have you been bothered in the last four weeks by sexual problems? Yes   Do you have difficulty concentrating, remembering or making decisions? Yes           Age-appropriate Screening Schedule:  Refer to the list below for future screening recommendations based on patient's age, sex and/or medical conditions. Orders for these recommended tests are listed in the plan section. The patient has been provided with a written plan.    Health Maintenance List  Health Maintenance   Topic Date Due    ZOSTER VACCINE (1 of 2) Never done    HEPATITIS C SCREENING  Never done     "COVID-19 Vaccine (8 - 2024-25 season) 03/07/2025    ANNUAL WELLNESS VISIT  03/20/2025    LIPID PANEL  03/20/2025    INFLUENZA VACCINE  07/01/2025    Pneumococcal Vaccine 50+  Completed    TDAP/TD VACCINES  Discontinued    COLORECTAL CANCER SCREENING  Discontinued                                                                                                                                                CMS Preventative Services Quick Reference  Risk Factors Identified During Encounter  Inactivity/Sedentary: Patient was advised to exercise at least 150 minutes a week per CDC recommendations.    The above risks/problems have been discussed with the patient.  Pertinent information has been shared with the patient in the After Visit Summary.  An After Visit Summary and PPPS were made available to the patient.    Follow Up:   Next Medicare Wellness visit to be scheduled in 1 year.         Additional E&M Note during same encounter follows:  Patient has additional, significant, and separately identifiable condition(s)/problem(s) that require work above and beyond the Medicare Wellness Visit     Chief Complaint  Medicare Wellness-subsequent    Subjective   HPI  Feliciano is also being seen today for additional medical problem/s.    Review of Systems   Constitutional:  Negative for chills, diaphoresis and fatigue.   HENT:  Negative for rhinorrhea.    Respiratory:  Negative for cough and shortness of breath.    Cardiovascular:  Negative for chest pain and leg swelling.   Gastrointestinal:  Negative for abdominal distention and abdominal pain.   Musculoskeletal:  Negative for arthralgias and back pain.   Skin:  Negative for rash.      F/U thrombocytopenia.  Platelet 119 from 2 years ago.          Objective   Vital Signs:  /78 (BP Location: Left arm, Patient Position: Sitting, Cuff Size: Adult)   Pulse 64   Temp 97.3 °F (36.3 °C) (Temporal)   Resp 17   Ht 165 cm (64.96\")   Wt 54.7 kg (120 lb 9.6 oz)   SpO2 98%   BMI " 20.09 kg/m²   Physical Exam  Vitals reviewed.   Constitutional:       Appearance: He is well-developed. He is not diaphoretic.   HENT:      Head: Normocephalic and atraumatic.   Eyes:      General: No scleral icterus.     Pupils: Pupils are equal, round, and reactive to light.   Neck:      Thyroid: No thyromegaly.   Cardiovascular:      Rate and Rhythm: Normal rate and regular rhythm.      Heart sounds: No murmur heard.     No friction rub. No gallop.   Pulmonary:      Effort: Pulmonary effort is normal. No respiratory distress.      Breath sounds: No wheezing or rales.   Chest:      Chest wall: No tenderness.   Abdominal:      General: Bowel sounds are normal. There is no distension.      Palpations: Abdomen is soft.      Tenderness: There is no abdominal tenderness.   Musculoskeletal:         General: No deformity. Normal range of motion.   Lymphadenopathy:      Cervical: No cervical adenopathy.   Skin:     General: Skin is warm and dry.      Findings: No rash.   Neurological:      Cranial Nerves: No cranial nerve deficit.      Motor: No abnormal muscle tone.                    Assessment and Plan      Medicare annual wellness visit, subsequent    Orders:    CBC & Differential    Hyperglycemia    Orders:    Comprehensive Metabolic Panel    Hemoglobin A1c    Mixed hyperlipidemia       Orders:    Comprehensive Metabolic Panel    Lipid Panel With / Chol / HDL Ratio    TSH Rfx On Abnormal To Free T4    Benign prostatic hyperplasia with urinary frequency    Orders:    PSA DIAGNOSTIC    Thrombocytopenia    Orders:    CBC & Differential    Thrombocytopenia.  New dx.  Check Cbc, TSH, CMP.    Hyperglycemia.  Check A1c.       Preventive Counseling:  Encouraged to stay active.  Covid vaccine UTD.  Pneumovax UTD.  Colonoscopy declined by patient.   Shingrix recommended.    Dentist UTD.  Optho UTD.        Follow Up   Return in about 6 months (around 10/28/2025).  Patient was given instructions and counseling regarding his  condition or for health maintenance advice. Please see specific information pulled into the AVS if appropriate.

## 2025-04-29 LAB
ALBUMIN SERPL-MCNC: 4.3 G/DL (ref 3.5–5.2)
ALBUMIN/GLOB SERPL: 2 G/DL
ALP SERPL-CCNC: 51 U/L (ref 39–117)
ALT SERPL-CCNC: 70 U/L (ref 1–41)
AST SERPL-CCNC: 47 U/L (ref 1–40)
BASOPHILS # BLD AUTO: 0.06 10*3/MM3 (ref 0–0.2)
BASOPHILS NFR BLD AUTO: 1.5 % (ref 0–1.5)
BILIRUB SERPL-MCNC: 0.9 MG/DL (ref 0–1.2)
BUN SERPL-MCNC: 22 MG/DL (ref 8–23)
BUN/CREAT SERPL: 20 (ref 7–25)
CALCIUM SERPL-MCNC: 9.4 MG/DL (ref 8.6–10.5)
CHLORIDE SERPL-SCNC: 106 MMOL/L (ref 98–107)
CHOLEST SERPL-MCNC: 151 MG/DL (ref 0–200)
CHOLEST/HDLC SERPL: 2.9 {RATIO}
CO2 SERPL-SCNC: 27.3 MMOL/L (ref 22–29)
CREAT SERPL-MCNC: 1.1 MG/DL (ref 0.76–1.27)
EGFRCR SERPLBLD CKD-EPI 2021: 71.8 ML/MIN/1.73
EOSINOPHIL # BLD AUTO: 0.08 10*3/MM3 (ref 0–0.4)
EOSINOPHIL NFR BLD AUTO: 2 % (ref 0.3–6.2)
ERYTHROCYTE [DISTWIDTH] IN BLOOD BY AUTOMATED COUNT: 13 % (ref 12.3–15.4)
GLOBULIN SER CALC-MCNC: 2.2 GM/DL
GLUCOSE SERPL-MCNC: 144 MG/DL (ref 65–99)
HBA1C MFR BLD: 5.6 % (ref 4.8–5.6)
HCT VFR BLD AUTO: 46 % (ref 37.5–51)
HDLC SERPL-MCNC: 52 MG/DL (ref 40–60)
HGB BLD-MCNC: 15.5 G/DL (ref 13–17.7)
IMM GRANULOCYTES # BLD AUTO: 0.01 10*3/MM3 (ref 0–0.05)
IMM GRANULOCYTES NFR BLD AUTO: 0.2 % (ref 0–0.5)
LDLC SERPL CALC-MCNC: 86 MG/DL (ref 0–100)
LYMPHOCYTES # BLD AUTO: 1.27 10*3/MM3 (ref 0.7–3.1)
LYMPHOCYTES NFR BLD AUTO: 31 % (ref 19.6–45.3)
MCH RBC QN AUTO: 33.4 PG (ref 26.6–33)
MCHC RBC AUTO-ENTMCNC: 33.7 G/DL (ref 31.5–35.7)
MCV RBC AUTO: 99.1 FL (ref 79–97)
MONOCYTES # BLD AUTO: 0.26 10*3/MM3 (ref 0.1–0.9)
MONOCYTES NFR BLD AUTO: 6.3 % (ref 5–12)
NEUTROPHILS # BLD AUTO: 2.42 10*3/MM3 (ref 1.7–7)
NEUTROPHILS NFR BLD AUTO: 59 % (ref 42.7–76)
PLATELET # BLD AUTO: 126 10*3/MM3 (ref 140–450)
POTASSIUM SERPL-SCNC: 4 MMOL/L (ref 3.5–5.2)
PROT SERPL-MCNC: 6.5 G/DL (ref 6–8.5)
PSA SERPL-MCNC: 3.07 NG/ML (ref 0–4)
RBC # BLD AUTO: 4.64 10*6/MM3 (ref 4.14–5.8)
SODIUM SERPL-SCNC: 142 MMOL/L (ref 136–145)
TRIGL SERPL-MCNC: 66 MG/DL (ref 0–150)
TSH SERPL DL<=0.005 MIU/L-ACNC: 2.17 UIU/ML (ref 0.27–4.2)
VLDLC SERPL CALC-MCNC: 13 MG/DL (ref 5–40)
WBC # BLD AUTO: 4.1 10*3/MM3 (ref 3.4–10.8)

## 2025-08-19 DIAGNOSIS — R11.0 CHRONIC NAUSEA: ICD-10-CM

## 2025-08-19 RX ORDER — ONDANSETRON 4 MG/1
TABLET, ORALLY DISINTEGRATING ORAL
Qty: 60 TABLET | Refills: 5 | Status: SHIPPED | OUTPATIENT
Start: 2025-08-19

## (undated) DEVICE — CANNULA,ADULT,SOFT-TOUCH,7'TUBE,UC: Brand: PENDING

## (undated) DEVICE — PERCUTANEOUS ENDOSCOPIC GASTROSTOMY KIT: Brand: ENDOVIVE SAFETY PEG KIT

## (undated) DEVICE — TUBING, SUCTION, 1/4" X 10', STRAIGHT: Brand: MEDLINE

## (undated) DEVICE — BITEBLOCK OMNI BLOC

## (undated) DEVICE — Device: Brand: DEFENDO AIR/WATER/SUCTION AND BIOPSY VALVE

## (undated) DEVICE — BNDR ABD 4PANEL 12IN MED/LG

## (undated) DEVICE — CANN NASL CO2 TRULINK W/O2 A/